# Patient Record
Sex: FEMALE | Race: WHITE | Employment: OTHER | URBAN - METROPOLITAN AREA
[De-identification: names, ages, dates, MRNs, and addresses within clinical notes are randomized per-mention and may not be internally consistent; named-entity substitution may affect disease eponyms.]

---

## 2017-01-10 ENCOUNTER — TELEPHONE (OUTPATIENT)
Dept: NEUROLOGY | Age: 82
End: 2017-01-10

## 2017-01-18 ENCOUNTER — OFFICE VISIT (OUTPATIENT)
Dept: INTERNAL MEDICINE CLINIC | Age: 82
End: 2017-01-18

## 2017-01-18 ENCOUNTER — DOCUMENTATION ONLY (OUTPATIENT)
Dept: INTERNAL MEDICINE CLINIC | Age: 82
End: 2017-01-18

## 2017-01-18 VITALS
TEMPERATURE: 97.5 F | HEIGHT: 61 IN | BODY MASS INDEX: 20.65 KG/M2 | SYSTOLIC BLOOD PRESSURE: 132 MMHG | DIASTOLIC BLOOD PRESSURE: 77 MMHG | HEART RATE: 73 BPM | OXYGEN SATURATION: 98 % | WEIGHT: 109.4 LBS

## 2017-01-18 DIAGNOSIS — Z13.39 SCREENING FOR ALCOHOLISM: ICD-10-CM

## 2017-01-18 DIAGNOSIS — F90.9 ATTENTION DEFICIT HYPERACTIVITY DISORDER (ADHD), UNSPECIFIED ADHD TYPE: ICD-10-CM

## 2017-01-18 DIAGNOSIS — Z00.00 ROUTINE GENERAL MEDICAL EXAMINATION AT A HEALTH CARE FACILITY: ICD-10-CM

## 2017-01-18 DIAGNOSIS — Z13.31 SCREENING FOR DEPRESSION: ICD-10-CM

## 2017-01-18 DIAGNOSIS — F03.90 DEMENTIA WITHOUT BEHAVIORAL DISTURBANCE, UNSPECIFIED DEMENTIA TYPE: Primary | ICD-10-CM

## 2017-01-18 PROBLEM — F98.8 ADD (ATTENTION DEFICIT DISORDER): Status: ACTIVE | Noted: 2017-01-18

## 2017-01-18 NOTE — MR AVS SNAPSHOT
Visit Information Date & Time Provider Department Dept. Phone Encounter #  
 1/18/2017 10:30 AM Elio Simon, [address] [phone] [id number] Your Appointments 5/23/2017  3:40 PM  
Follow Up with Sharee Marr MD  
[facility] [address]) Appt Note: follow up memory loss $ 0 CP jll 11/7/16  
 [address] [place] [address], 
[address], [address] [address]  
[address], [address], [address] [phone] Upcoming Health Maintenance Date Due ZOSTER VACCINE AGE 60> 1/20/1992 GLAUCOMA SCREENING Q2Y 1/20/1997 MEDICARE YEARLY EXAM 1/19/2018 DTaP/Tdap/Td series (3 - Td) 10/22/2025 Allergies as of 1/18/2017  Review Complete On: 1/18/2017 By: Yusra Denson LPN Severity Noted Reaction Type Reactions Latex  12/14/2013    Unknown (comments) Dog Dander  12/14/2013    Shortness of Breath Current Immunizations  Reviewed on 4/18/2016 Name Date Influenza High Dose Vaccine PF 10/22/2015 Influenza Vaccine 10/10/2014 Influenza Vaccine Split 10/17/2011  9:12 AM  
 Influenza Vaccine Whole 9/1/2010 Pneumococcal Conjugate (PCV-13) 10/22/2015 Pneumococcal Vaccine (Unspecified Type) 1/10/2004 TDAP Vaccine 8/16/2010 Tdap 10/22/2015 Not reviewed this visit You Were Diagnosed With   
  
 Codes Comments Dementia without behavioral disturbance, unspecified dementia type    -  Primary ICD-10-CM: F03.90 ICD-9-CM: 294.20 Attention deficit hyperactivity disorder (ADHD), unspecified ADHD type     ICD-10-CM: F90.9 ICD-9-CM: 314.01 Vitals BP Pulse Temp Height(growth percentile) Weight(growth percentile) SpO2  
 132/77 (BP 1 Location: Left arm, BP Patient Position: Sitting) 73 97.5 °F (36.4 °C) (Oral) 5' 1\" (1.549 m) 109 lb 6.4 oz (49.6 kg) 98% BMI OB Status Smoking Status 20.67 kg/m2 Hysterectomy Never Smoker BMI and BSA Data Body Mass Index Body Surface Area  
 20.67 kg/m 2 1.46 m 2 Preferred Pharmacy Pharmacy Name Phone [address] [facility] [phone] Your Updated Medication List  
  
   
This list is accurate as of: 1/18/17 11:13 AM.  Always use your most recent med list.  
  
  
  
  
 atorvastatin 10 mg tablet Commonly known as:  LIPITOR  
TAKE 1 TABLET DAILY B COMPLEX PO Take  by mouth.  
  
 carbamide peroxide 6.5 % otic solution Commonly known as:  Joseph Citizen of [place] Administer 5 Drops into each ear two (2) times a day. CENTRUM SILVER PO Take  by mouth daily. diclofenac 1 % Gel Commonly known as:  VOLTAREN Apply 2 g to affected area four (4) times daily. Apply to hands qid  
  
 donepezil 10 mg tablet Commonly known as:  ARICEPT Take 1 Tab by mouth nightly. FLAXSEED OIL PO Take  by mouth. FLONASE 50 mcg/actuation nasal spray Generic drug:  fluticasone 2 Sprays by Both Nostrils route daily. Allergy note June 2013. LIQUID TEARS OP Apply  to eye.  
  
 lisdexamfetamine 20 mg capsule Commonly known as:  VYVANSE Earliest Fill Date: 1/10/17. Take 1 capsule daily MAGNESIUM PO Take  by mouth. * memantine 5-10 mg Dspk Commonly known as:  NAMENDA TITRATION GRAY Take 1 Tab by mouth two (2) times a day. Take as directed per the titration gray then take 10 mg bid * memantine 10 mg tablet Commonly known as:  Gwynda Peppers Take 1 Tab by mouth two (2) times a day. memantine-donepezil 28-10 mg Cspx Commonly known as:  MOTION PICTURE AND [facility] Take 1 Tab by mouth daily. SINGULAIR 10 mg tablet Generic drug:  montelukast  
Take 10 mg by mouth daily. SYMBICORT 160-4.5 mcg/actuation HFA inhaler Generic drug:  budesonide-formoterol Take 2 Puffs by inhalation two (2) times a day. From Dr. Renetta German. VESIcare 10 mg tablet Generic drug:  solifenacin Take 1 Tab by mouth daily. VITAMIN C PO Take  by mouth. VITAMIN D3 1,000 unit tablet Generic drug:  cholecalciferol Take  by mouth daily. * Notice: This list has 2 medication(s) that are the same as other medications prescribed for you. Read the directions carefully, and ask your doctor or other care provider to review them with you. Introducing [facility] SERVICES! Dear Oscar Ruvalcaba: Thank you for requesting a [employer] account. Our records indicate that you already have an active [employer] account. You can access your account anytime at [email or web address] Did you know that you can access your hospital and ER discharge instructions at any time in [employer]? You can also review all of your test results from your hospital stay or ER visit. Additional Information If you have questions, please visit the Frequently Asked Questions section of the [employer] website at [email or web address] Remember, [employer] is NOT to be used for urgent needs. For medical emergencies, dial 911. Now available from your iPhone and Android! Please provide this summary of care documentation to your next provider. Your primary care clinician is listed as Jeovany PRESSLEY. If you have any questions after today's visit, please call [phone].

## 2017-01-18 NOTE — PROGRESS NOTES
NNAWV    This is a Subsequent Medicare Annual Wellness Visit providing Personalized Prevention Plan Services (PPPS) (Performed 12 months after initial AWV and PPPS )    I have reviewed the patient's medical history in detail and updated the computerized patient record. History     Past Medical History   Diagnosis Date    Asthma      Dr. Angel Luis Fernando 2013--doesn't see routinely--pt reports no change in allergies--no need for routine follow-up. Dr. Martha Maza sees for mild COPD--on Symbicort since ~2013. Continued Singulair.  Ceruminosis      periodic ear cleaning at [place] [facility]    Encounter for screening colonoscopy ~     Normal per pt--[facility] records requested 2013 visit.  Fainting     Falls     Fatigue     Hearing loss, left      [facility]    History of mammogram May 2014     Normal/no change.  History of shingles 2010     Dec 2014:  Pt reports clinical history and shingles vaccine 4yrs ago. To try to obtain exact date of zostavax from /source.  History of skin cancer      melanoma ?, BCC & SCC- followed by Dr. Jose Amor- twice a yr appts. 2013-still on 6mo schedule for skin checks.  Memory disorder     Osteopenia      DEXA     Overactive bladder      detrol LA. Vesicare--[facility]es [facility] Considering procedure for incontinence.  Postmenopausal hormone replacement therapy      since hysterectomy    Right shoulder pain 2013. Mild osteopenia; mild AC osteoarthritis. Sports Med Eval Oct 2013.  Well woman exam (no gynecological exam) Aug 3, 2012     \"[facility] Screening\":  Results scanned to chart. Updated 2014-no change except noted concern for GFR--see Dec 2014 note, letter as reviewed with pt.       Past Surgical History   Procedure Laterality Date    Multiple delivery       Hx hysterectomy  1948    Hx cataract removal      Pr laser surgery of eye       Dr. Dexter Irvin- last eye exam 2010 Current Outpatient Prescriptions   Medication Sig Dispense Refill    lisdexamfetamine (VYVANSE) 20 mg capsule Earliest Fill Date: 1/10/17. Take 1 capsule daily 30 Cap 0    atorvastatin (LIPITOR) 10 mg tablet TAKE 1 TABLET DAILY 90 Tab 3    memantine-donepezil (NAMZARIC) 28-10 mg CSpX Take 1 Tab by mouth daily. 90 Cap 3    montelukast (SINGULAIR) 10 mg tablet Take 10 mg by mouth daily.  diclofenac (VOLTAREN) 1 % gel Apply 2 g to affected area four (4) times daily. Apply to hands qid 100 g 3    solifenacin (VESICARE) 10 mg tablet Take 1 Tab by mouth daily.  POLYVINYL ALCOHOL (LIQUID TEARS OP) Apply  to eye.  budesonide-formoterol (SYMBICORT) 160-4.5 mcg/actuation HFA inhaler Take 2 Puffs by inhalation two (2) times a day. From Dr. Nichole Mckeon.  ASCORBATE CALCIUM (VITAMIN C PO) Take  by mouth.  cholecalciferol, vitamin d3, (VITAMIN D) 1,000 unit tablet Take  by mouth daily.  carbamide peroxide (DEBROX) 6.5 % otic solution Administer 5 Drops into each ear two (2) times a day. 30 mL 1    fluticasone (FLONASE) 50 mcg/actuation nasal spray 2 Sprays by Both Nostrils route daily. Allergy note June 2013.  VITAMIN B COMPLEX (B COMPLEX PO) Take  by mouth.  MULTIVITAMINS W-MINERALS/LUT (CENTRUM SILVER PO) Take  by mouth daily.  FLAXSEED OIL PO Take  by mouth.  MAGNESIUM PO Take  by mouth.        Allergies   Allergen Reactions    Latex Unknown (comments)    Dog Dander Shortness of Breath     Family History   Problem Relation Age of Onset   Surendra Salty Dementia Father     Hypertension Mother     Stroke Mother     Cancer Other      daughter- breast cancer     Social History   Substance Use Topics    Smoking status: Never Smoker    Smokeless tobacco: Never Used    Alcohol use 0.0 oz/week      Comment: 1-2 drinks/every evening ( wine/vodka)     Patient Active Problem List   Diagnosis Code    Postmenopausal hormone replacement therapy Z79.890    Overactive bladder N32.81    Asthma J45.909    Hearing loss, left H91.92    Ceruminosis H61.20    History of skin cancer Z85.828    Syncope and collapse R55    Fever, unspecified R50.9    Syncope and collapse R55    TMJ (temporomandibular joint disorder) M26.609    Mild cognitive impairment without memory loss G31.84    Generalized anxiety disorder F41.1    Hyperlipidemia E78.5    ADD (attention deficit disorder) F98.8       Depression Risk Factor Screening:     PHQ 2 / 9, over the last two weeks 1/18/2017   Little interest or pleasure in doing things Not at all   Feeling down, depressed or hopeless Not at all   Total Score PHQ 2 -   Trouble falling or staying asleep, or sleeping too much Not at all   Feeling tired or having little energy Not at all   Poor appetite or overeating Not at all   Feeling bad about yourself - or that you are a failure or have let yourself or your family down Not at all   Moving or speaking so slowly that other people could have noticed; or the opposite being so fidgety that others notice Not at all   Thoughts of being better off dead, or hurting yourself in some way Not at all   How difficult have these problems made it for you to do your work, take care of your home and get along with others Not difficult at all     Alcohol Risk Factor Screening:   Pt stated has a vodka tonic every afternoon and a glass of wine with dinner some evenings, but not every evening. Counseled pt on recommendation of no more than 1 drink per day for females. Understanding and agreement was verbalized. Functional Ability and Level of Safety:     Hearing Loss   No hearing loss noted during visit. Activities of Daily Living   Self-care. Requires assistance with: no ADLs except needs help with setting up meds    Fall Risk     Fall Risk Assessment, last 12 mths 1/18/2017   Able to walk? Yes   Fall in past 12 months? Yes   Fall with injury?  No     Abuse Screen   Patient is not abused    Review of Systems   Not required    Physical Examination     Evaluation of Cognitive Function:  Mood/affect:  happy  Appearance: age appropriate and casually dressed  Family member/caregiver input:  present    No exam performed today, Medicare AWV    Patient Care Team:  Madisyn Minor MD as PCP - General (Internal Medicine)  Daniela Tristan MD as Consulting Provider (Urology)  Debora Cook MD (Ophthalmology)  Esther Baltazar MD (Neurology)  James Stevenson, RN as Ambulatory Care Navigator    Advice/Referrals/Counseling   Education and counseling provided:  Are appropriate based on today's review and evaluation  End-of-Life planning (with patient's consent)  Screening for glaucoma    Assessment/Plan     Encounter Diagnoses   Name Primary?  Dementia without behavioral disturbance, unspecified dementia type Yes    Attention deficit hyperactivity disorder (ADHD), unspecified ADHD type     Routine general medical examination at a health care facility     Screening for alcoholism     Screening for depression      Patient verbalized understanding of information presented. AVS and Medicare Part B Preventive Services Table printed and given to pt and reviewed. See table for findings under Recommendation and Scheduled. All of the patient's questions were answered. Pt has an advance medical directive, will review, and will bring or mail a copy for her chart. Pt lives with her  at [place] who is her main support network. Pt also has daughters. Exercise level - walks 2-2.5 miles around [place]; Tuesday and Thursday does stretching and balance exercises    Health Maintenance Due   Topic Date Due    ZOSTER VACCINE AGE 60>  01/20/1992    GLAUCOMA SCREENING Q2Y  01/20/1997     Glaucoma screening - Medical record request was faxed to Dr. Milton Zimmerman.   Zoster-pt stated received at [facility]     PCP note    Reviewed and agree with above    Travis Castle MD

## 2017-01-18 NOTE — PROGRESS NOTES
Today's Date -  01/18/17   Birth Date - [birth year]  Medicare Part B Preventive Services Limitations Recommendation/Date completed if known Scheduled/ Next Due   Bone Mass Measurement  (age 72 & older, biennial) Requires diagnosis related to osteoporosis or estrogen deficiency. Biennial benefit unless patient has history of long-term glucocorticoid tx or baseline is needed because initial test was by other method Completed:      Recommended every 2 years DUE:   Cardiovascular Screening Blood Tests (every 5 years)  Total cholesterol, HDL, Triglycerides Order as a panel if possible Completed:  10/18/16    Recommended annually DUE: 10/2017   Colorectal Cancer Screening  -Fecal occult blood test (annual)  -Flexible sigmoidoscopy (5y)  -Screening colonoscopy (10y)  -Barium Enema  Completed:        Recommended every ( ) years DUE:   Counseling to Prevent Tobacco Use (up to 8 sessions per year)  - Counseling greater than 3 and up to 10 minutes  - Counseling greater than 10 minutes Patients must be asymptomatic of tobacco-related conditions to receive as preventive service     Diabetes Screening Tests (at least every 3 years, Medicare covers annually or at 6-month intervals for prediabetic patients)    Fasting blood sugar (FBS) or glucose tolerance test (GTT) Patient must be diagnosed with one of the following:  -Hypertension, Dyslipidemia, obesity, previous impaired FBS or GTT  Or any two of the following: overweight, FH of diabetes, age ? 72, history of gestational diabetes, birth of baby weighing more than 9 pounds Completed:  Glucose 87 (normal) on 4/27/16      Recommended annually DUE: next set of usual labs-fasting   Diabetes Self-Management Training (DSMT) (no USPSTF recommendation) Requires referral by treating physician for patient with diabetes or renal disease. 10 hours of initial DSMT session of no less than 30 minutes each in a continuous 12-month period. 2 hours of follow-up DSMT in subsequent years.  n/a n/a Glaucoma Screening (no USPSTF recommendation) Diabetes mellitus, family history, , age 48 or over,  American, age 72 or over Completed:        Recommended annually DUE:   Human Immunodeficiency Virus (HIV) Screening (annually for increased risk patients)  HIV-1 and HIV-2 by EIA, TIMOTHY, rapid antibody test, or oral mucosa transudate Patient must be at increased risk for HIV infection per USPSTF guidelines or pregnant. Tests covered annually for patients at increased risk. Pregnant patients may receive up to 3 test during pregnancy. n/a n/a   Medical Nutrition Therapy (MNT) (for diabetes or renal disease not recommended schedule) Requires referral by treating physician for patient with diabetes or renal disease. Can be provided in same year as diabetes self-management training (DSMT), and CMS recommends medical nutrition therapy take place after DSMT. Up to 3 hours for initial year and 2 hours in subsequent years. n/a n/a   Prostate Cancer Screening (annually up to age 76)  - Digital rectal exam (RITO)  - Prostate specific antigen (PSA) Annually (age 48 or over), RITO not paid separately when covered E/M service is provided on same date Completed:  n/a      Recommended annually n/a   Seasonal Influenza Vaccination (annually)  Completed:       Recommended annually    DUE every Fall    Pneumococcal Vaccination (once after 72)  Completed:  1/10/04-PPSV 23  10/22/15-PCV 13 complete   Hepatitis B Vaccinations (if medium/high risk) Medium/high risk factors:  End-stage renal disease,  Hemophiliacs who received Factor VIII or IX concentrates, Clients of institutions for the mentally retarded, Persons who live in the same house as a HepB virus carrier, Homosexual men, Illicit injectable drug abusers. n/a n/a   Screening Mammography (biennial age 54-69)? Annually (age 36 or over) Completed:   5/21/14    Recommended annually DUE: now.  Call [phone] for appointment   Screening Pap Tests and Pelvic Examination (up to age 79 and after 79 if unknown history or abnormal study last 10 years) Every 24 months except high risk Completed:  n/a      Recommended every 2 years n/a   Ultrasound Screening for Abdominal Aortic Aneurysm (AAA) (once) Patient must be referred through IPPE and not have had a screening for abdominal aortic aneurysm before under Medicare. Limited to patients who meet one of the following criteria:  - Men who are 73-68 years old and have smoked more than 100 cigarettes in their lifetime.  -Anyone with a FH of AAA  -Anyone recommended for screening by USPSTF Not covered by Medicare as preventive. n/a n/a   Thanks for coming in today. It was nice to spend some time with you. If you have any questions about your visit today, please call [phone] and ask for [address].

## 2017-01-18 NOTE — PROGRESS NOTES
HISTORY OF PRESENT ILLNESS  Ruben Kaba is a 80 y.o. female. HPI     F/u dementia with mild irritibility  Has been started on namzeric by Dr. Surekha Henry  Pt and  feel her short term recall has improved--names etc  No medication side effects  Taking vyvanse for ADHD    Patient Active Problem List    Diagnosis Date Noted    ADD (attention deficit disorder) 01/18/2017    Hyperlipidemia 07/20/2015    Mild cognitive impairment without memory loss 02/02/2015    Generalized anxiety disorder 02/02/2015    TMJ (temporomandibular joint disorder) 12/21/2011    Syncope and collapse 08/10/2011    Fever, unspecified 08/10/2011    Syncope and collapse 08/10/2011    History of skin cancer     Asthma     Hearing loss, left     Ceruminosis     Postmenopausal hormone replacement therapy     Overactive bladder      Current Outpatient Prescriptions   Medication Sig Dispense Refill    lisdexamfetamine (VYVANSE) 20 mg capsule Earliest Fill Date: 1/10/17. Take 1 capsule daily 30 Cap 0    atorvastatin (LIPITOR) 10 mg tablet TAKE 1 TABLET DAILY 90 Tab 3    memantine-donepezil (NAMZARIC) 28-10 mg CSpX Take 1 Tab by mouth daily. 90 Cap 3    montelukast (SINGULAIR) 10 mg tablet Take 10 mg by mouth daily.  diclofenac (VOLTAREN) 1 % gel Apply 2 g to affected area four (4) times daily. Apply to hands qid 100 g 3    solifenacin (VESICARE) 10 mg tablet Take 1 Tab by mouth daily.  POLYVINYL ALCOHOL (LIQUID TEARS OP) Apply  to eye.  budesonide-formoterol (SYMBICORT) 160-4.5 mcg/actuation HFA inhaler Take 2 Puffs by inhalation two (2) times a day. From Dr. Esteban Voss.  ASCORBATE CALCIUM (VITAMIN C PO) Take  by mouth.  cholecalciferol, vitamin d3, (VITAMIN D) 1,000 unit tablet Take  by mouth daily.  carbamide peroxide (DEBROX) 6.5 % otic solution Administer 5 Drops into each ear two (2) times a day. 30 mL 1    fluticasone (FLONASE) 50 mcg/actuation nasal spray 2 Sprays by Both Nostrils route daily. Allergy note June 2013.  VITAMIN B COMPLEX (B COMPLEX PO) Take  by mouth.  MULTIVITAMINS W-MINERALS/LUT (CENTRUM SILVER PO) Take  by mouth daily.  FLAXSEED OIL PO Take  by mouth.  MAGNESIUM PO Take  by mouth. Allergies   Allergen Reactions    Latex Unknown (comments)    Dog Dander Shortness of Breath      Lab Results  Component Value Date/Time   WBC 5.4 04/27/2016 09:18 AM   HGB 13.5 04/27/2016 09:18 AM   HCT 39.5 04/27/2016 09:18 AM   PLATELET 697 45/58/2738 09:18 AM   MCV 95 04/27/2016 09:18 AM       Lab Results  Component Value Date/Time   Cholesterol, total 204 10/18/2016 11:41 AM   HDL Cholesterol 95 10/18/2016 11:41 AM   LDL, calculated 94 10/18/2016 11:41 AM   Triglyceride 74 10/18/2016 11:41 AM   CHOL/HDL Ratio 2.4 08/31/2010 10:48 AM       Lab Results  Component Value Date/Time   GFR est AA >60 08/13/2011 03:55 AM   GFR est non-AA >60 08/13/2011 03:55 AM   Creatinine 0.73 04/27/2016 09:18 AM   BUN 14 04/27/2016 09:18 AM   Sodium 138 04/27/2016 09:18 AM   Potassium 4.0 04/27/2016 09:18 AM   Chloride 99 04/27/2016 09:18 AM   CO2 26 04/27/2016 09:18 AM         ROS    Physical Exam   Constitutional: She appears well-developed and well-nourished. Appears stated age   Cardiovascular: Normal rate, regular rhythm and normal heart sounds. Exam reveals no gallop and no friction rub. No murmur heard. Pulmonary/Chest: Effort normal and breath sounds normal. No respiratory distress. She has no wheezes. Abdominal: Soft. Bowel sounds are normal.   Musculoskeletal: She exhibits no edema. Neurological: She is alert. Psychiatric: She has a normal mood and affect. Nursing note and vitals reviewed. ASSESSMENT and PLAN  Latoya Bardales was seen today for memory loss and annual wellness visit.     Diagnoses and all orders for this visit:    Dementia without behavioral disturbance, unspecified dementia type   Stable on namzaric   Has f/u Dr Manuel     Attention deficit hyperactivity disorder (ADHD), unspecified ADHD type   vyvanse per neurologist    Routine general medical examination at a health care facility  -     Depression Screen Annual    Screening for alcoholism    Screening for depression  -     Depression Screen Annual    RTC 6 months  Follow-up Disposition: Not on File

## 2017-01-18 NOTE — PATIENT INSTRUCTIONS
Today's Date - 01/18/17 Birth Date - [birth year]  Medicare Part B Preventive Services Limitations Recommendation/Date completed if known Scheduled/ Next Due   Bone Mass Measurement  (age 72 & older, biennial) Requires diagnosis related to osteoporosis or estrogen deficiency. Biennial benefit unless patient has history of long-term glucocorticoid tx or baseline is needed because initial test was by other method Completed:   8/2011     Recommended every 2 years DUE: discuss with Dr. Darío Caballero Blood Tests (every 5 years)  Total cholesterol, HDL, Triglycerides Order as a panel if possible Completed:  10/18/16     Recommended annually DUE: 10/2017   Colorectal Cancer Screening  -Fecal occult blood test (annual)  -Flexible sigmoidoscopy (5y)  -Screening colonoscopy (10y)  -Barium Enema   Completed:   not done in the past        Recommended every ( ) years DUE: no further screening colonoscopy indicated due to your age     Counseling to Prevent Tobacco Use (up to 8 sessions per year)  - Counseling greater than 3 and up to 10 minutes  - Counseling greater than 10 minutes Patients must be asymptomatic of tobacco-related conditions to receive as preventive service  not a current smoker n/a   Diabetes Screening Tests (at least every 3 years, Medicare covers annually or at 6-month intervals for prediabetic patients)     Fasting blood sugar (FBS) or glucose tolerance test (GTT) Patient must be diagnosed with one of the following:  -Hypertension, Dyslipidemia, obesity, previous impaired FBS or GTT  Or any two of the following: overweight, FH of diabetes, age ? 72, history of gestational diabetes, birth of baby weighing more than 9 pounds Completed:  Glucose 87 (normal) on 4/27/16        Recommended annually DUE: next set of usual labs-fasting   Diabetes Self-Management Training (DSMT) (no USPSTF recommendation) Requires referral by treating physician for patient with diabetes or renal disease.  10 hours of initial DSMT session of no less than 30 minutes each in a continuous 12-month period. 2 hours of follow-up DSMT in subsequent years. n/a n/a   Glaucoma Screening (no USPSTF recommendation) Diabetes mellitus, family history, , age 48 or over,  American, age 72 or over Completed:   Dr. Nolberto Young  12/2016     Recommended annually DUE: 12/2017   Human Immunodeficiency Virus (HIV) Screening (annually for increased risk patients)  HIV-1 and HIV-2 by EIA, TIMOTHY, rapid antibody test, or oral mucosa transudate Patient must be at increased risk for HIV infection per USPSTF guidelines or pregnant. Tests covered annually for patients at increased risk. Pregnant patients may receive up to 3 test during pregnancy. n/a n/a   Medical Nutrition Therapy (MNT) (for diabetes or renal disease not recommended schedule) Requires referral by treating physician for patient with diabetes or renal disease. Can be provided in same year as diabetes self-management training (DSMT), and CMS recommends medical nutrition therapy take place after DSMT. Up to 3 hours for initial year and 2 hours in subsequent years. n/a n/a   Prostate Cancer Screening (annually up to age 76)  - Digital rectal exam (RITO)  - Prostate specific antigen (PSA) Annually (age 48 or over), RITO not paid separately when covered E/M service is provided on same date Completed:  n/a        Recommended annually n/a   Seasonal Influenza Vaccination (annually)   Completed:   10/2016 at [address] annually     DUE every Fall    Pneumococcal Vaccination (once after 72)   Completed:  1/10/04-PPSV 23  10/22/15-PCV 13 complete   Hepatitis B Vaccinations (if medium/high risk) Medium/high risk factors: End-stage renal disease,  Hemophiliacs who received Factor VIII or IX concentrates, Clients of institutions for the mentally retarded, Persons who live in the same house as a HepB virus carrier, Homosexual men, Illicit injectable drug abusers.  n/a n/a Screening Mammography (biennial age 54-69)? Annually (age 36 or over) Completed:   5/21/14     Recommended annually DUE: you decline further screening   Screening Pap Tests and Pelvic Examination (up to age 79 and after 79 if unknown history or abnormal study last 10 years) Every 25 months except high risk Completed:  n/a        Recommended every 2 years n/a   Ultrasound Screening for Abdominal Aortic Aneurysm (AAA) (once) Patient must be referred through IPPE and not have had a screening for abdominal aortic aneurysm before under Medicare. Limited to patients who meet one of the following criteria:  - Men who are 73-68 years old and have smoked more than 100 cigarettes in their lifetime.  -Anyone with a FH of AAA  -Anyone recommended for screening by USPSTF Not covered by Medicare as preventive.     n/a n/a   Thanks for coming in today. It was nice to spend some time with you. If you have any questions about your visit today, please call [phone] and ask for Amrita Graham. Health Maintenance Due   Topic Date Due    Shingles Vaccine  01/20/1992    Glaucoma Screening   01/20/1997     Shingles vaccine done at [facility] at [employer] Glaucoma screening-Patricia will ask Dr. Radha Cuellar for your glaucoma screening  You have an advance medical directive, also known as a living will. Please review for any changes that may need to be made and please bring or mail a copy to be placed in your chart. Have [employer] fax your advance medical directive to Dr. Alexandra Madrid at [phone]. Advance Directives: After Your Visit  Your Care Instructions  An advance directive is a legal way to state your wishes at the end of your life. It tells your family and your doctor what to do if you can no longer say what you want. There are two main types of advance directives. You can change them any time that your wishes change. · A living will tells your family and your doctor your wishes about life support and other treatment.   · A medical power of  lets you name a person to make treatment decisions for you when you can't speak for yourself. This person is called a health care agent. If you do not have an advance directive, decisions about your medical care may be made by a doctor or a  who doesn't know you. It may help to think of an advance directive as a gift to the people who care for you. If you have one, they won't have to make tough decisions by themselves. Follow-up care is a key part of your treatment and safety. Be sure to make and go to all appointments, and call your doctor if you are having problems. It's also a good idea to know your test results and keep a list of the medicines you take. How can you care for yourself at home? · Discuss your wishes with your loved ones and your doctor. This way, there are no surprises. · Many states have a unique form. Or you might use a universal form that has been approved by many states. This kind of form can sometimes be completed and stored online. Your electronic copy will then be available wherever you have a connection to the Internet. In most cases, doctors will respect your wishes even if you have a form from a different state. · You don't need a  to do an advance directive. But you may want to get legal advice. · Think about these questions when you prepare an advance directive:  ¨ Who do you want to make decisions about your medical care if you are not able to? Many people choose a family member, close friend, or doctor. ¨ Do you know enough about life support methods that might be used? If not, talk to your doctor so you understand. ¨ What are you most afraid of that might happen? You might be afraid of having pain, losing your independence, or being kept alive by machines. ¨ Where would you prefer to die? Choices include your home, a hospital, or a nursing home. ¨ Would you like to have information about hospice care to support you and your family?   ¨ Do you want to donate organs when you die? ¨ Do you want certain [employer]ze practices performed before you die? If so, put your wishes in the advance directive. · Read your advance directive every year, and make changes as needed. When should you call for help? Be sure to contact your doctor if you have any questions. Where can you learn more? Go to [employer]  Enter R264 in the search box to learn more about \"Advance Directives: After Your Visit. \"   © 3925-0034 Healthwise, Incorporated. Care instructions adapted under license by [employer] (which disclaims liability or warranty for this information). This care instruction is for use with your licensed healthcare professional. If you have questions about a medical condition or this instruction, always ask your healthcare professional. [address] any warranty or liability for your use of this information.   Content Version: 13.1.359401; Current as of: February 20, 2015

## 2017-03-28 ENCOUNTER — TELEPHONE (OUTPATIENT)
Dept: NEUROLOGY | Age: 82
End: 2017-03-28

## 2017-03-28 NOTE — TELEPHONE ENCOUNTER
----- Message from Cira Rider sent at 3/27/2017  4:28 PM EDT -----  Regarding: Dr. Wood Miller: Pt called regarding refill for Rx Vyvanse 20mg being sent to [facility] on [address] in [place].  P t number [phone]

## 2017-03-28 NOTE — TELEPHONE ENCOUNTER
----- Message from Darren Harrison sent at 3/27/2017  4:32 PM EDT -----  Regarding: Dr. Amber Recinos: Pt called regarding refill for Rx Vyvanse 20mg being sent to [facility] on [address] in [place].  P t number [phone]

## 2017-04-13 ENCOUNTER — OFFICE VISIT (OUTPATIENT)
Dept: NEUROLOGY | Age: 82
End: 2017-04-13

## 2017-04-13 VITALS
HEIGHT: 61 IN | HEART RATE: 69 BPM | WEIGHT: 109.8 LBS | BODY MASS INDEX: 20.73 KG/M2 | SYSTOLIC BLOOD PRESSURE: 138 MMHG | OXYGEN SATURATION: 98 % | DIASTOLIC BLOOD PRESSURE: 80 MMHG

## 2017-04-13 DIAGNOSIS — F43.23 ADJUSTMENT DISORDER WITH MIXED ANXIETY AND DEPRESSED MOOD: ICD-10-CM

## 2017-04-13 DIAGNOSIS — F02.80 ALZHEIMER'S TYPE DEMENTIA WITH LATE ONSET WITHOUT BEHAVIORAL DISTURBANCE (HCC): ICD-10-CM

## 2017-04-13 DIAGNOSIS — F43.21 ADJUSTMENT DISORDER WITH DEPRESSED MOOD: ICD-10-CM

## 2017-04-13 DIAGNOSIS — F03.90 DEMENTIA WITHOUT BEHAVIORAL DISTURBANCE, UNSPECIFIED DEMENTIA TYPE: Primary | ICD-10-CM

## 2017-04-13 DIAGNOSIS — F98.8 ADD (ATTENTION DEFICIT DISORDER) WITHOUT HYPERACTIVITY: ICD-10-CM

## 2017-04-13 DIAGNOSIS — G30.1 ALZHEIMER'S TYPE DEMENTIA WITH LATE ONSET WITHOUT BEHAVIORAL DISTURBANCE (HCC): ICD-10-CM

## 2017-04-13 RX ORDER — MEMANTINE HYDROCHLORIDE 10 MG/1
TABLET ORAL DAILY
COMMUNITY
End: 2017-05-06

## 2017-04-13 NOTE — PROGRESS NOTES
NEUROLOGY FOLLOW UP NOTE    04/13/17    Chief Complaint   Patient presents with    Follow-up     Memory loss       Ligia Lorenzo is a 80 y.o. female who presented to the neurology office for management of dementia and adjustment disorder. The patient has been having mild memory changes such as not remembering where she has placed things. She has not had any problems with finances except she forgot how to try to check once. Other than that, no problems with driving. She does not drive long distances but within her neighborhood and to the grocery store. She is on Namzaric 28/10 mg to be taken every day and has noticed mild improvement. She is also taking Vyvanse for anxiety. Current Outpatient Prescriptions   Medication Sig    memantine (NAMENDA) 10 mg tablet Take  by mouth daily.  [START ON 6/13/2017] lisdexamfetamine (VYVANSE) 20 mg capsule Earliest Fill Date: 6/13/17. Take 1 capsule daily    [START ON 5/13/2017] lisdexamfetamine (VYVANSE) 20 mg capsule Take 1 Cap (20 mg total) by mouth dailyEarliest Fill Date: 5/13/17. Max Daily Amount: 20 mg    lisdexamfetamine (VYVANSE) 20 mg capsule Take 1 Cap (20 mg total) by mouth daily. Max Daily Amount: 20 mg    atorvastatin (LIPITOR) 10 mg tablet TAKE 1 TABLET DAILY    memantine-donepezil (NAMZARIC) 28-10 mg CSpX Take 1 Tab by mouth daily.  montelukast (SINGULAIR) 10 mg tablet Take 10 mg by mouth daily.  diclofenac (VOLTAREN) 1 % gel Apply 2 g to affected area four (4) times daily. Apply to hands qid    solifenacin (VESICARE) 10 mg tablet Take 1 Tab by mouth daily.  carbamide peroxide (DEBROX) 6.5 % otic solution Administer 5 Drops into each ear two (2) times a day.  POLYVINYL ALCOHOL (LIQUID TEARS OP) Apply  to eye.  budesonide-formoterol (SYMBICORT) 160-4.5 mcg/actuation HFA inhaler Take 2 Puffs by inhalation two (2) times a day. From Dr. Janell Castillo.     fluticasone (FLONASE) 50 mcg/actuation nasal spray 2 Sprays by Both Nostrils route daily. Allergy note June 2013.  ASCORBATE CALCIUM (VITAMIN C PO) Take  by mouth.  VITAMIN B COMPLEX (B COMPLEX PO) Take  by mouth.  MULTIVITAMINS W-MINERALS/LUT (CENTRUM SILVER PO) Take  by mouth daily.  FLAXSEED OIL PO Take  by mouth.  MAGNESIUM PO Take  by mouth.  cholecalciferol, vitamin d3, (VITAMIN D) 1,000 unit tablet Take  by mouth daily. No current facility-administered medications for this visit. REVIEW OF SYSTEMS:   A ten system review of constitutional, cardiovascular, respiratory, musculoskeletal, endocrine, skin, SHEENT, genitourinary, psychiatric and neurologic systems was obtained and is unremarkable memory loss    EXAMINATION:   Visit Vitals    /80    Pulse 69    Ht 5' 1\" (1.549 m)    Wt 109 lb 12.8 oz (49.8 kg)    SpO2 98%    BMI 20.75 kg/m2        General:   General appearance: Pt is in no acute distress   Distal pulses are preserved    Neurological Examination:   Mental Status: AAO x3. Speech is fluent. Follows commands, has fair fund of knowledge, attention, short term recall, comprehension and insight. Cranial Nerves: Visual fields are full. PERRL, Extraocular movements are full. Facial sensation intact V1- V3. Facial movement intact, symmetric. Hearing intact to conversation. Palate elevates symmetrically. Shoulder shrug symmetric. Tongue midline. Motor: Strength is 5/5 in all 4 ext. Sensation: Normal to light touch    Coordination/Cerebellar: Intact to finger-nose-finger     Skin: No significant bruising or lacerations.     Laboratory review:   Results for orders placed or performed in visit on 10/18/16   LIPID PANEL   Result Value Ref Range    Cholesterol, total 204 (H) 100 - 199 mg/dL    Triglyceride 74 0 - 149 mg/dL    HDL Cholesterol 95 >39 mg/dL    VLDL, calculated 15 5 - 40 mg/dL    LDL, calculated 94 0 - 99 mg/dL   AST   Result Value Ref Range    AST (SGOT) 25 0 - 40 IU/L   ALT   Result Value Ref Range    ALT (SGPT) 26 0 - 32 IU/L       Imaging review:  8/10/2011  CT scan of the head without contrast  No acute process or change compared to the prior exam     Documentation review:  I did review the notes from Dr. Alicia Banerjee from 3/15/2016 and the patient has had decline in functioning over time. Initially the diagnosis with mild cognitive impairment in 2015 but he diagnosed her as mild dementia and adjustment disorder with depression/irritable features which is mild. Assessment/Plan:   Zuri Myers is a 80 y.o. female who presented to the neurology office for management of mild dementia and adjustment disorder with depressed/irritable features. The patient is taking Namzaric 28/10 mg a day and is also on Vyvanse 20 mg a day. She did recently moved to [facility] and is happy over there. I did refill the Vyvanse for 3 months. Follow-up in 6 months      ICD-10-CM ICD-9-CM    1. Dementia without behavioral disturbance, unspecified dementia type F03.90 294.20    2. Adjustment disorder with mixed anxiety and depressed mood F43.23 309.28 lisdexamfetamine (VYVANSE) 20 mg capsule      lisdexamfetamine (VYVANSE) 20 mg capsule      lisdexamfetamine (VYVANSE) 20 mg capsule   3. 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 ADD (attention deficit disorder) without hyperactivity F98.8 314.00    5. Adjustment disorder with depressed mood F43.21 309.0         Over 40 minutes was spent with the patient and family of which > 50% of the visit was spent counseling on diagnosis, management, and treatment of the diagnosis. I did discuss about mild dementia and anxiety. Hilaria Fuller MD  Diplomate, American Board of Psychiatry & Neurology (Neurology)  [facility] Board of Psychiatry & Neurology (Clinical Neurophysiology)    CC: Linda Jefferson MD  Fax: [phone]    This note was created using voice recognition software.  Despite editing, there may be syntax errors. This note will not be viewable in [address]

## 2017-04-13 NOTE — MR AVS SNAPSHOT
Visit Information Date & Time Provider Department Dept. Phone Encounter #  
 4/13/2017  8:00 AM Soniya Paul MD [facility]rology [facility] at [facility] [phone] [id number] Your Appointments 5/24/2017 10:40 AM  
Follow Up with Kaykay Edgar MD  
Neurology [facility] [address] [place] ([address]) Appt Note: Follow up fran from [facility] office  memory loss [place] [address] [facility] [address] [id number] [phone]  
  
   
 [place] [address] [address] [address] 7/18/2017  9:45 AM  
ROUTINE CARE with Neil Laura MD  
[facility] [address]) Appt Note: 6 month follow up, memory loss/ labs [facility] [address] [facility]  
[phone]  
  
   
 [facility] [address] [facility] [address] [address]  
  
    
 10/12/2017  9:00 AM  
Follow Up with Soniya Paul MD  
Neurology Clinic at [facility] [address]) Appt Note: FU,Dementia,CP,$0,adt,4/13/2017  
 [address] [facility], 
[address] [place] [address], [address] [address]  
[address], [address] [facility], [address] [address] Upcoming Health Maintenance Date Due ZOSTER VACCINE AGE 60> 1/20/1992 MEDICARE YEARLY EXAM 1/19/2018 GLAUCOMA SCREENING Q2Y 3/16/2018 DTaP/Tdap/Td series (3 - Td) 10/22/2025 Allergies as of 4/13/2017  Review Complete On: 4/13/2017 By: Soniya Paul MD  
  
 Severity Noted Reaction Type Reactions Latex  12/14/2013    Unknown (comments) Dog Dander  12/14/2013    Shortness of Breath Current Immunizations  Reviewed on 4/18/2016 Name Date Influenza High Dose Vaccine PF 10/22/2015 Influenza Vaccine 10/10/2014 Influenza Vaccine Split 10/17/2011  9:12 AM  
 Influenza Vaccine Whole 9/1/2010 Pneumococcal Conjugate (PCV-13) 10/22/2015 Pneumococcal Vaccine (Unspecified Type) 1/10/2004 TDAP Vaccine 8/16/2010 Tdap 10/22/2015 Not reviewed this visit You Were Diagnosed With   
  
 Codes Comments Dementia without behavioral disturbance, unspecified dementia type    -  Primary ICD-10-CM: F03.90 ICD-9-CM: 294.20 Adjustment disorder with mixed anxiety and depressed mood     ICD-10-CM: F43.23 
ICD-9-CM: 309.28 Vitals BP Pulse Height(growth percentile) Weight(growth percentile) SpO2 BMI  
 138/80 69 5' 1\" (1.549 m) 109 lb 12.8 oz (49.8 kg) 98% 20.75 kg/m2 OB Status Smoking Status Hysterectomy Never Smoker BMI and BSA Data Body Mass Index Body Surface Area 20.75 kg/m 2 1.46 m 2 Preferred Pharmacy Pharmacy Name [facility]  [phone] , [address] [place] [address] [phone] Your Updated Medication List  
  
   
This list is accurate as of: 4/13/17  8:45 AM.  Always use your most recent med list.  
  
  
  
  
 atorvastatin 10 mg tablet Commonly known as:  LIPITOR  
TAKE 1 TABLET DAILY B COMPLEX PO Take  by mouth.  
  
 carbamide peroxide 6.5 % otic solution Commonly known as:  Jorge Alberto Jungamadou Administer 5 Drops into each ear two (2) times a day. CENTRUM SILVER PO Take  by mouth daily. diclofenac 1 % Gel Commonly known as:  VOLTAREN Apply 2 g to affected area four (4) times daily. Apply to hands qid FLAXSEED OIL PO Take  by mouth. FLONASE 50 mcg/actuation nasal spray Generic drug:  fluticasone 2 Sprays by Both Nostrils route daily. Allergy note June 2013. LIQUID TEARS OP Apply  to eye. * lisdexamfetamine 20 mg capsule Commonly known as:  VYVANSE Take 1 Cap (20 mg total) by mouth daily. Max Daily Amount: 20 mg  
  
 * lisdexamfetamine 20 mg capsule Commonly known as:  VYVANSE Take 1 Cap (20 mg total) by mouth dailyEarliest Fill Date: 5/13/17.   Max Daily Amount: 20 mg  
 Start taking on:  5/13/2017 * lisdexamfetamine 20 mg capsule Commonly known as:  VYVANSE Earliest Fill Date: 6/13/17. Take 1 capsule daily Start taking on:  6/13/2017 MAGNESIUM PO Take  by mouth.  
  
 memantine-donepezil 28-10 mg Cspx Commonly known as:  [facility] Take 1 Tab by mouth daily. NAMENDA 10 mg tablet Generic drug:  memantine Take  by mouth daily. SINGULAIR 10 mg tablet Generic drug:  montelukast  
Take 10 mg by mouth daily. SYMBICORT 160-4.5 mcg/actuation HFA inhaler Generic drug:  budesonide-formoterol Take 2 Puffs by inhalation two (2) times a day. From Dr. Collins Liao. VESIcare 10 mg tablet Generic drug:  solifenacin Take 1 Tab by mouth daily. VITAMIN C PO Take  by mouth. VITAMIN D3 1,000 unit tablet Generic drug:  cholecalciferol Take  by mouth daily. * Notice: This list has 3 medication(s) that are the same as other medications prescribed for you. Read the directions carefully, and ask your doctor or other care provider to review them with you. Prescriptions Printed Refills  
 lisdexamfetamine (VYVANSE) 20 mg capsule 0 Starting on: 6/13/2017 Sig: Earliest [facility] Date: 6/13/17. Take 1 capsule daily Class: Print  
 lisdexamfetamine (VYVANSE) 20 mg capsule 0 Starting on: 5/13/2017 Sig: Take 1 Cap (20 mg total) by mouth dailyEarliest Fill Date: 5/13/17. Max Daily Amount: 20 mg  
 Class: Print Route: Oral  
 lisdexamfetamine (VYVANSE) 20 mg capsule 0 Sig: Take 1 Cap (20 mg total) by mouth daily. Max Daily Amount: 20 mg  
 Class: Print Route: Oral  
  
Introducing hospitals & HEALTH SERVICES! Dear Terra Childs: Thank you for requesting a [employer] account. Our records indicate that you already have an active [employer] account. You can access your account anytime at [email or web address]. [employer]/Work in Field Did you know that you can access your hospital and ER discharge instructions at any time in [employer]? You can also review all of your test results from your hospital stay or ER visit. Additional Information If you have questions, please visit the Frequently Asked Questions section of the [employer] website at [email or web address] Remember, [employer] is NOT to be used for urgent needs. For medical emergencies, dial 911. Now available from your iPhone and Android! Please provide this summary of care documentation to your next provider. Your primary care clinician is listed as Miguel PRESSLEY. If you have any questions after today's visit, please call [phone].

## 2017-04-13 NOTE — LETTER
4/13/2017 9:14 AM 
 
Patient:    Rober Lawson YOB: 1932 Date of Visit:    4/13/2017 Dear Gustavo Huggins MD 
 
Thank you for referring Ms. Dang Espana to me for evaluation/treatment. Below are the relevant portions of my assessment and plan of care. NEUROLOGY FOLLOW UP NOTE 
 
04/13/17 Chief Complaint Patient presents with  Follow-up Memory loss SUBJECTIVE Rober Lawson is a 80 y.o. female who presented to the neurology office for management of dementia and adjustment disorder. The patient has been having mild memory changes such as not remembering where she has placed things. She has not had any problems with finances except she forgot how to try to check once. Other than that, no problems with driving. She does not drive long distances but within her neighborhood and to the grocery store. She is on Namzaric 28/10 mg to be taken every day and has noticed mild improvement. She is also taking Vyvanse for anxiety. Current Outpatient Prescriptions Medication Sig  
 memantine (NAMENDA) 10 mg tablet Take  by mouth daily.  [START ON 6/13/2017] lisdexamfetamine (VYVANSE) 20 mg capsule Earliest Fill Date: 6/13/17. Take 1 capsule daily  [START ON 5/13/2017] lisdexamfetamine (VYVANSE) 20 mg capsule Take 1 Cap (20 mg total) by mouth dailyEarliest Fill Date: 5/13/17. Max Daily Amount: 20 mg  
 lisdexamfetamine (VYVANSE) 20 mg capsule Take 1 Cap (20 mg total) by mouth daily. Max Daily Amount: 20 mg  
 atorvastatin (LIPITOR) 10 mg tablet TAKE 1 TABLET DAILY  memantine-donepezil (NAMZARIC) 28-10 mg CSpX Take 1 Tab by mouth daily.  montelukast (SINGULAIR) 10 mg tablet Take 10 mg by mouth daily.  diclofenac (VOLTAREN) 1 % gel Apply 2 g to affected area four (4) times daily. Apply to hands qid  solifenacin (VESICARE) 10 mg tablet Take 1 Tab by mouth daily.   
 carbamide peroxide (DEBROX) 6.5 % otic solution Administer 5 Drops into each ear two (2) times a day.  POLYVINYL ALCOHOL (LIQUID TEARS OP) Apply  to eye.  budesonide-formoterol (SYMBICORT) 160-4.5 mcg/actuation HFA inhaler Take 2 Puffs by inhalation two (2) times a day. From Dr. bAilio Jacobs.  fluticasone (FLONASE) 50 mcg/actuation nasal spray 2 Sprays by Both Nostrils route daily. Allergy note June 2013.  ASCORBATE CALCIUM (VITAMIN C PO) Take  by mouth.  VITAMIN B COMPLEX (B COMPLEX PO) Take  by mouth.  MULTIVITAMINS W-MINERALS/LUT (CENTRUM SILVER PO) Take  by mouth daily.  FLAXSEED OIL PO Take  by mouth.  MAGNESIUM PO Take  by mouth.  cholecalciferol, vitamin d3, (VITAMIN D) 1,000 unit tablet Take  by mouth daily. No current facility-administered medications for this visit. REVIEW OF SYSTEMS:  
A ten system review of constitutional, cardiovascular, respiratory, musculoskeletal, endocrine, skin, SHEENT, genitourinary, psychiatric and neurologic systems was obtained and is unremarkable memory loss EXAMINATION:  
Visit Vitals  /80  Pulse 69  Ht 5' 1\" (1.549 m)  Wt 109 lb 12.8 oz (49.8 kg)  SpO2 98%  BMI 20.75 kg/m2 General:  
General appearance: Pt is in no acute distress Distal pulses are preserved Neurological Examination:  
Mental Status: AAO x3. Speech is fluent. Follows commands, has fair fund of knowledge, attention, short term recall, comprehension and insight. Cranial Nerves: Visual fields are full. PERRL, Extraocular movements are full. Facial sensation intact V1- V3. Facial movement intact, symmetric. Hearing intact to conversation. Palate elevates symmetrically. Shoulder shrug symmetric. Tongue midline. Motor: Strength is 5/5 in all 4 ext. Sensation: Normal to light touch Coordination/Cerebellar: Intact to finger-nose-finger Skin: No significant bruising or lacerations. Laboratory review:  
Results for orders placed or performed in visit on 10/18/16 LIPID PANEL  
 Result Value Ref Range Cholesterol, total 204 (H) 100 - 199 mg/dL Triglyceride 74 0 - 149 mg/dL HDL Cholesterol 95 >39 mg/dL VLDL, calculated 15 5 - 40 mg/dL LDL, calculated 94 0 - 99 mg/dL AST Result Value Ref Range AST (SGOT) 25 0 - 40 IU/L  
ALT Result Value Ref Range ALT (SGPT) 26 0 - 32 IU/L Imaging review: 
8/10/2011 CT scan of the head without contrast 
No acute process or change compared to the prior exam  
 
Documentation review: I did review the notes from Dr. Capo Cuellar from 3/15/2016 and the patient has had decline in functioning over time. Initially the diagnosis with mild cognitive impairment in 2015 but he diagnosed her as mild dementia and adjustment disorder with depression/irritable features which is mild. Assessment/Plan:  
Brenden Stark is a 80 y.o. female who presented to the neurology office for management of mild dementia and adjustment disorder with depressed/irritable features. The patient is taking Namzaric 28/10 mg a day and is also on Vyvanse 20 mg a day. She did recently moved to [facility] and is happy over there. I did refill the Vyvanse for 3 months. Follow-up in 6 months ICD-10-CM ICD-9-CM 1. Dementia without behavioral disturbance, unspecified dementia type F03.90 294.20 2. Adjustment disorder with mixed anxiety and depressed mood F43.23 309.28 lisdexamfetamine (VYVANSE) 20 mg capsule  
   lisdexamfetamine (VYVANSE) 20 mg capsule  
   lisdexamfetamine (VYVANSE) 20 mg capsule 3. 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   
4. ADD (attention deficit disorder) without hyperactivity F98.8 314.00   
5. Adjustment disorder with depressed mood F43.21 309.0 Over 40 minutes was spent with the patient and family of which > 50% of the visit was spent counseling on diagnosis, management, and treatment of the diagnosis. I did discuss about mild dementia and anxiety. Salvatore Hassan MD 
Diplomate, American Board of Psychiatry & Neurology (Neurology) Jannette Lacey Board of Psychiatry & Neurology (Clinical Neurophysiology) CC: Lucille Schroeder MD 
Fax: [phone] This note was created using voice recognition software. Despite editing, there may be syntax errors. This note will not be viewable in [address] If you have questions, please do not hesitate to call me. I look forward to following Ms. Sandhu along with you. Sincerely, Salvatore Hassan MD

## 2017-05-02 DIAGNOSIS — F43.23 ADJUSTMENT DISORDER WITH MIXED ANXIETY AND DEPRESSED MOOD: ICD-10-CM

## 2017-05-03 NOTE — TELEPHONE ENCOUNTER
states Dr. Dimple Carroll wrote for his wife's Vyvanse last month. At the time they had not yet run out of medication, and he had some pills to last through until May. She will run out on Saturday & the fill by dates are going to prevent her getting this filled. Please advise if approved.

## 2017-05-03 NOTE — TELEPHONE ENCOUNTER
Advised  script is ready. He will  script tomorrow. Of note, he mentioned that his wife had to start seeing Dr. Sera Young since Dr. Racheal Zhao transferred. At their last OV Dr. Sera Young informed patient that she should start getting Vyvanse filled through PCP since this is a \"routine thing\". Advised  Ebony Horne I will pass this along to Dr. Landon Dubin.

## 2017-05-05 ENCOUNTER — OFFICE VISIT (OUTPATIENT)
Dept: FAMILY MEDICINE CLINIC | Age: 82
End: 2017-05-05

## 2017-05-05 VITALS
HEART RATE: 103 BPM | SYSTOLIC BLOOD PRESSURE: 132 MMHG | RESPIRATION RATE: 20 BRPM | HEIGHT: 61 IN | BODY MASS INDEX: 21.11 KG/M2 | TEMPERATURE: 97.3 F | OXYGEN SATURATION: 98 % | WEIGHT: 111.8 LBS | DIASTOLIC BLOOD PRESSURE: 77 MMHG

## 2017-05-05 DIAGNOSIS — R06.2 WHEEZING: ICD-10-CM

## 2017-05-05 DIAGNOSIS — J40 BRONCHITIS: Primary | ICD-10-CM

## 2017-05-05 DIAGNOSIS — G30.1 LATE ONSET ALZHEIMER'S DISEASE WITHOUT BEHAVIORAL DISTURBANCE (HCC): ICD-10-CM

## 2017-05-05 DIAGNOSIS — F02.80 LATE ONSET ALZHEIMER'S DISEASE WITHOUT BEHAVIORAL DISTURBANCE (HCC): ICD-10-CM

## 2017-05-05 RX ORDER — CEFUROXIME AXETIL 500 MG/1
500 TABLET ORAL 2 TIMES DAILY
Qty: 20 TAB | Refills: 0 | Status: SHIPPED | OUTPATIENT
Start: 2017-05-05 | End: 2017-08-07 | Stop reason: ALTCHOICE

## 2017-05-05 NOTE — PROGRESS NOTES
Subjective: Porfirio Paige is a 80 y.o. female  here for follow up of chronic medical conditions listed has Postmenopausal hormone replacement therapy, Overactive bladder, Asthma, Hearing loss, left, Ceruminosis, History of skin cancer, Syncope and collapse, Fever, unspecified, Syncope and collapse, TMJ (temporomandibular joint disorder), Mild cognitive impairment without memory loss, Generalized anxiety disorder, Hyperlipidemia, and ADD (attention deficit disorder) on her problem list.. She  is accompanied by {Relatives - adult:5061::\"patient\"}. She lives as follows: {Lives:[address]1940} and {does/not):28272} have Advanced Directives. New Complaints today include: Cold Symptoms (since tue, productive brown mucus) and Generalized Body Aches       Recent History includes: {recent admits:20826}    Review of Systems  ROS     Geriatric Issues: Activities of Daily Living (ADLs):    She is independent in the following: {adls:42758}  Requires assistance with the following: {adls:33207}  Patient has changes due to:  {changes in status:20827}    Outside reports reviewed: {Outside review:92777}. Patient Active Problem List   Diagnosis Code    Postmenopausal hormone replacement therapy Z79.890    Overactive bladder N32.81    Asthma J45.909    Hearing loss, left H91.92    Ceruminosis H61.20    History of skin cancer Z85.828    Syncope and collapse R55    Fever, unspecified R50.9    Syncope and collapse R55    TMJ (temporomandibular joint disorder) M26.609    Mild cognitive impairment without memory loss G31.84    Generalized anxiety disorder F41.1    Hyperlipidemia E78.5    ADD (attention deficit disorder) F98.8     Current Outpatient Prescriptions   Medication Sig Dispense Refill    cefUROXime (CEFTIN) 500 mg tablet Take 1 Tab by mouth two (2) times a day. 20 Tab 0    [START ON 6/13/2017] lisdexamfetamine (VYVANSE) 20 mg capsule Earliest Fill Date: 6/13/17.   Take 1 capsule daily 30 Cap 0    atorvastatin (LIPITOR) 10 mg tablet TAKE 1 TABLET DAILY 90 Tab 3    memantine-donepezil (NAMZARIC) 28-10 mg CSpX Take 1 Tab by mouth daily. 90 Cap 3    montelukast (SINGULAIR) 10 mg tablet Take 10 mg by mouth daily.  diclofenac (VOLTAREN) 1 % gel Apply 2 g to affected area four (4) times daily. Apply to hands qid 100 g 3    POLYVINYL ALCOHOL (LIQUID TEARS OP) Apply  to eye.  budesonide-formoterol (SYMBICORT) 160-4.5 mcg/actuation HFA inhaler Take 2 Puffs by inhalation two (2) times a day. From Dr. Francisca Staley.  fluticasone (FLONASE) 50 mcg/actuation nasal spray 2 Sprays by Both Nostrils route daily. Allergy note June 2013.  ASCORBATE CALCIUM (VITAMIN C PO) Take  by mouth.  MULTIVITAMINS W-MINERALS/LUT (CENTRUM SILVER PO) Take  by mouth daily.  FLAXSEED OIL PO Take  by mouth.  MAGNESIUM PO Take  by mouth.  cholecalciferol, vitamin d3, (VITAMIN D) 1,000 unit tablet Take  by mouth daily.  lisdexamfetamine (VYVANSE) 20 mg capsule Take 1 Cap (20 mg total) by mouth daily. Max Daily Amount: 20 mg 14 Cap 0    memantine (NAMENDA) 10 mg tablet Take  by mouth daily.  [START ON 5/13/2017] lisdexamfetamine (VYVANSE) 20 mg capsule Take 1 Cap (20 mg total) by mouth dailyEarliest Fill Date: 5/13/17. Max Daily Amount: 20 mg 30 Cap 0    lisdexamfetamine (VYVANSE) 20 mg capsule Take 1 Cap (20 mg total) by mouth daily. Max Daily Amount: 20 mg 30 Cap 0    solifenacin (VESICARE) 10 mg tablet Take 1 Tab by mouth daily.  carbamide peroxide (DEBROX) 6.5 % otic solution Administer 5 Drops into each ear two (2) times a day. 30 mL 1    VITAMIN B COMPLEX (B COMPLEX PO) Take  by mouth. Allergies   Allergen Reactions    Latex Unknown (comments)    Dog Dander Shortness of Breath     Past Medical History:   Diagnosis Date    Asthma     Dr. Astrid Barajas June 2013--doesn't see routinely--pt reports no change in allergies--no need for routine follow-up.   Dr. Frnacisca Staley sees for mild COPD--on Symbicort since ~2013. Continued Singulair.  Ceruminosis     periodic ear cleaning at [place] [facility]    Encounter for screening colonoscopy ~    Normal per pt--[facility] records requested 2013 visit.  Fainting     Falls     Fatigue     Hearing loss, left     [facility]    History of mammogram May 2014    Normal/no change.  History of shingles 2010    Dec 2014:  Pt reports clinical history and shingles vaccine 4yrs ago. To try to obtain exact date of zostavax from /source.  History of skin cancer     melanoma ?, BCC & SCC- followed by Dr. Amie Zhang- twice a yr appts. 2013-still on 6mo schedule for skin checks.  Memory disorder     Osteopenia     DEXA     Overactive bladder     detrol LA. Vesicare--[facility] Considering procedure for incontinence.  Postmenopausal hormone replacement therapy     since hysterectomy    Right shoulder pain 2013. Mild osteopenia; mild AC osteoarthritis. Sports Med Eval Oct 2013.  Well woman exam (no gynecological exam) Aug 3, 2012    \"[facility] Screening\":  Results scanned to chart. Updated 2014-no change except noted concern for GFR--see Dec 2014 note, letter as reviewed with pt.      Past Surgical History:   Procedure Laterality Date    HX CATARACT REMOVAL      Deborah Forte - [address] [place] [address]- last eye exam 2010    MULTIPLE DELIVERY        Family History   Problem Relation Age of Onset   [facility] Dementia Father     Hypertension Mother     Stroke Mother     Cancer Other      daughter- breast cancer     Social History   Substance Use Topics    Smoking status: Never Smoker    Smokeless tobacco: Never Used    Alcohol use 0.0 oz/week      Comment: 1-2 drinks/every evening ( wine/vodka)          Objective:     Visit Vitals    /77 (BP 1 Location: Left arm, BP Patient Position: Sitting)    Pulse (!) 103    Temp 97.3 °F (36.3 °C) (Oral)    Resp 20    Ht 5' 1\" (1.549 m)    Wt 111 lb 12.8 oz (50.7 kg)    SpO2 98%    BMI 21.12 kg/m2     Physical Exam     Imaging  ***    Lab Review  Results for orders placed or performed in visit on 10/18/16   LIPID PANEL   Result Value Ref Range    Cholesterol, total 204 (H) 100 - 199 mg/dL    Triglyceride 74 0 - 149 mg/dL    HDL Cholesterol 95 >39 mg/dL    VLDL, calculated 15 5 - 40 mg/dL    LDL, calculated 94 0 - 99 mg/dL   AST   Result Value Ref Range    AST (SGOT) 25 0 - 40 IU/L   ALT   Result Value Ref Range    ALT (SGPT) 26 0 - 32 IU/L        Assessment/Plan:   ***    ICD-10-CM ICD-9-CM    1. Bronchitis J40 490 cefUROXime (CEFTIN) 500 mg tablet   2.  Wheezing R06.2 786.07 cefUROXime (CEFTIN) 500 mg tablet     Follow-up Disposition: Not on File    By:  Eula Ferrell [address]  [phone]

## 2017-05-05 NOTE — PROGRESS NOTES
Subjective:     Chief Complaint   Patient presents with    Cold Symptoms     since tue, productive brown mucus    Generalized Body Aches        She  is a 80y.o. year old female who presents for evaluation of cough with production, increased wheezing X 5 days. Sputum is yellow-brown. Her  accompanies her. He took her temperature at home several times and it was always below 99. She is eating and drinking well per her . Review of Systems   Constitutional: Positive for malaise/fatigue. Negative for chills, diaphoresis, fever and weight loss. HENT: Negative for congestion, ear discharge, ear pain, hearing loss, nosebleeds, sore throat and tinnitus. Eyes: Negative for blurred vision, double vision, photophobia, pain, discharge and redness. Respiratory: Positive for cough, sputum production and wheezing. Negative for hemoptysis, shortness of breath and stridor. Cardiovascular: Negative for chest pain, palpitations, orthopnea, claudication, leg swelling and PND. Gastrointestinal: Negative for abdominal pain, blood in stool, constipation, diarrhea, heartburn, melena, nausea and vomiting. Genitourinary: Negative for dysuria, flank pain, frequency, hematuria and urgency. Musculoskeletal: Negative for back pain, falls, joint pain, myalgias and neck pain. Skin: Negative for itching and rash. Neurological: Negative for dizziness, tingling, tremors, sensory change, speech change, focal weakness, seizures, loss of consciousness, weakness and headaches. Endo/Heme/Allergies: Negative for environmental allergies and polydipsia. Does not bruise/bleed easily. Psychiatric/Behavioral: Positive for memory loss. Negative for depression, hallucinations, substance abuse and suicidal ideas. The patient is not nervous/anxious and does not have insomnia.         Objective:     Vitals:    05/05/17 0938   BP: 132/77   Pulse: (!) 103   Resp: 20   Temp: 97.3 °F (36.3 °C)   TempSrc: Oral   SpO2: 98% Weight: 111 lb 12.8 oz (50.7 kg)   Height: 5' 1\" (1.549 m)       Physical Exam   Constitutional: She is oriented to person, place, and time. She appears well-developed and well-nourished. No distress. HENT:   Head: Normocephalic and atraumatic. Right Ear: External ear normal.   Left Ear: External ear normal.   Nose: Nose normal.   Mouth/Throat: Oropharynx is clear and moist. No oropharyngeal exudate. Eyes: Conjunctivae and EOM are normal. Pupils are equal, round, and reactive to light. Right eye exhibits no discharge. Left eye exhibits no discharge. No scleral icterus. Neck: Normal range of motion. Neck supple. No JVD present. No tracheal deviation present. No thyromegaly present. Cardiovascular: Normal rate, regular rhythm, normal heart sounds and intact distal pulses. Exam reveals no gallop and no friction rub. No murmur heard. Pulmonary/Chest: Effort normal. No stridor. No respiratory distress. She has wheezes. She has rales. She exhibits no tenderness. Exp wheezes and scattered rales. Abdominal: Soft. Bowel sounds are normal. She exhibits no distension and no mass. There is no tenderness. There is no rebound and no guarding. Musculoskeletal: Normal range of motion. She exhibits no edema or deformity. Lymphadenopathy:     She has no cervical adenopathy. Neurological: She is alert and oriented to person, place, and time. She has normal reflexes. No cranial nerve deficit. She exhibits normal muscle tone. Coordination normal.   Skin: Skin is warm and dry. No rash noted. She is not diaphoretic. No erythema. No pallor. Psychiatric: She has a normal mood and affect. Her behavior is normal. Judgment and thought content normal.   Nursing note and vitals reviewed.       Allergies   Allergen Reactions    Latex Unknown (comments)    Dog Dander Shortness of Breath      Social History     Social History    Marital status:      Spouse name: N/A    Number of children: N/A    Years of education: N/A     Social History Main Topics    Smoking status: Never Smoker    Smokeless tobacco: Never Used    Alcohol use 0.0 oz/week      Comment: 1-2 drinks/every evening ( wine/vodka)    Drug use: No    Sexual activity: No     Other Topics Concern    None     Social History Narrative      Family History   Problem Relation Age of Onset    Dementia Father     Hypertension Mother     Stroke Mother     Cancer Other      daughter- breast cancer      Past Surgical History:   Procedure Laterality Date    HX CATARACT REMOVAL      Cassie Repress      Dr. Dea Rowell - [address] [place] [address]- last eye exam 2010    MULTIPLE DELIVERY         Past Medical History:   Diagnosis Date    Asthma     Dr. Sari Paz 2013--doesn't see routinely--pt reports no change in allergies--no need for routine follow-up. Dr. Rozina Renee sees for mild COPD--on Symbicort since ~2013. Continued Singulair.  Ceruminosis     periodic ear cleaning at [place] [facility]    Encounter for screening colonoscopy ~    Normal per pt--[facility] records requested 2013 visit.  Fainting     Falls     Fatigue     Hearing loss, left     [facility]    History of mammogram May 2014    Normal/no change.  History of shingles 2010    Dec 2014:  Pt reports clinical history and shingles vaccine 4yrs ago. To try to obtain exact date of zostavax from /source.  History of skin cancer     melanoma ?, BCC & SCC- followed by Dr. Sisi Sharif- twice a yr appts. 2013-still on 6mo schedule for skin checks.  Memory disorder     Osteopenia     DEXA     Overactive bladder     detrol LA. [facility]are--[facility] Considering procedure for incontinence.  Postmenopausal hormone replacement therapy     since hysterectomy    Right shoulder pain 2013. Mild osteopenia; mild AC osteoarthritis. Sports Med Eval Oct 2013.     Well woman exam (no gynecological exam) Aug 3, 2012    \"[facility] Screening\":  Results scanned to chart. Updated Nov 2014-no change except noted concern for GFR--see Dec 2014 note, letter as reviewed with pt. Current Outpatient Prescriptions   Medication Sig Dispense Refill    cefUROXime (CEFTIN) 500 mg tablet Take 1 Tab by mouth two (2) times a day. 20 Tab 0    [START ON 6/13/2017] lisdexamfetamine (VYVANSE) 20 mg capsule Earliest Fill Date: 6/13/17. Take 1 capsule daily 30 Cap 0    atorvastatin (LIPITOR) 10 mg tablet TAKE 1 TABLET DAILY 90 Tab 3    memantine-donepezil (NAMZARIC) 28-10 mg CSpX Take 1 Tab by mouth daily. 90 Cap 3    montelukast (SINGULAIR) 10 mg tablet Take 10 mg by mouth daily.  diclofenac (VOLTAREN) 1 % gel Apply 2 g to affected area four (4) times daily. Apply to hands qid 100 g 3    POLYVINYL ALCOHOL (LIQUID TEARS OP) Apply  to eye.  budesonide-formoterol (SYMBICORT) 160-4.5 mcg/actuation HFA inhaler Take 2 Puffs by inhalation two (2) times a day. From Dr. Gomez Cox.  fluticasone (FLONASE) 50 mcg/actuation nasal spray 2 Sprays by Both Nostrils route daily. Allergy note June 2013.  ASCORBATE CALCIUM (VITAMIN C PO) Take  by mouth.  MULTIVITAMINS W-MINERALS/LUT (CENTRUM SILVER PO) Take  by mouth daily.  FLAXSEED OIL PO Take  by mouth.  MAGNESIUM PO Take  by mouth.  cholecalciferol, vitamin d3, (VITAMIN D) 1,000 unit tablet Take  by mouth daily.  lisdexamfetamine (VYVANSE) 20 mg capsule Take 1 Cap (20 mg total) by mouth daily. Max Daily Amount: 20 mg 14 Cap 0    memantine (NAMENDA) 10 mg tablet Take  by mouth daily.  [START ON 5/13/2017] lisdexamfetamine (VYVANSE) 20 mg capsule Take 1 Cap (20 mg total) by mouth dailyEarliest Fill Date: 5/13/17. Max Daily Amount: 20 mg 30 Cap 0    lisdexamfetamine (VYVANSE) 20 mg capsule Take 1 Cap (20 mg total) by mouth daily. Max Daily Amount: 20 mg 30 Cap 0    solifenacin (VESICARE) 10 mg tablet Take 1 Tab by mouth daily.       carbamide peroxide (DEBROX) 6.5 % otic solution Administer 5 Drops into each ear two (2) times a day. 30 mL 1    VITAMIN B COMPLEX (B COMPLEX PO) Take  by mouth. Assessment/ Plan:   Elena Keller was seen today for cold symptoms and generalized body aches. Diagnoses and all orders for this visit:    Bronchitis  -     cefUROXime (CEFTIN) 500 mg tablet; Take 1 Tab by mouth two (2) times a day. Wheezing  -     cefUROXime (CEFTIN) 500 mg tablet; Take 1 Tab by mouth two (2) times a day. I have discussed the diagnosis with the patient and the intended plan as seen in the above orders. The patient has received an after-visit summary and questions were answered concerning future plans. I have discussed medication side effects and warnings with the patient as well.     Follow-up Disposition: Not on File

## 2017-05-05 NOTE — PATIENT INSTRUCTIONS
Bronchitis: Care Instructions  Your Care Instructions    Bronchitis is inflammation of the bronchial tubes, which carry air to the lungs. The tubes swell and produce mucus, or phlegm. The mucus and inflamed bronchial tubes make you cough. You may have trouble breathing. Most cases of bronchitis are caused by viruses like those that cause colds. Antibiotics usually do not help and they may be harmful. Bronchitis usually develops rapidly and lasts about 2 to 3 weeks in otherwise healthy people. Follow-up care is a key part of your treatment and safety. Be sure to make and go to all appointments, and call your doctor if you are having problems. It's also a good idea to know your test results and keep a list of the medicines you take. How can you care for yourself at home? · Take all medicines exactly as prescribed. Call your doctor if you think you are having a problem with your medicine. · Get some extra rest.  · Take an over-the-counter pain medicine, such as acetaminophen (Tylenol), ibuprofen (Advil, Motrin), or naproxen (Aleve) to reduce fever and relieve body aches. Read and follow all instructions on the label. · Do not take two or more pain medicines at the same time unless the doctor told you to. Many pain medicines have acetaminophen, which is Tylenol. Too much acetaminophen (Tylenol) can be harmful. · Take an over-the-counter cough medicine that contains dextromethorphan to help quiet a dry, hacking cough so that you can sleep. Avoid cough medicines that have more than one active ingredient. Read and follow all instructions on the label. · Breathe moist air from a humidifier, hot shower, or sink filled with hot water. The heat and moisture will thin mucus so you can cough it out. · Do not smoke. Smoking can make bronchitis worse. If you need help quitting, talk to your doctor about stop-smoking programs and medicines. These can increase your chances of quitting for good.   When should you call for help? Call 911 anytime you think you may need emergency care. For example, call if:  · You have severe trouble breathing. Call your doctor now or seek immediate medical care if:  · You have new or worse trouble breathing. · You cough up dark brown or bloody mucus (sputum). · You have a new or higher fever. · You have a new rash. Watch closely for changes in your health, and be sure to contact your doctor if:  · You cough more deeply or more often, especially if you notice more mucus or a change in the color of your mucus. · You are not getting better as expected. Where can you learn more? Go to http://jelani-joyce.info/. Enter H333 in the search box to learn more about \"Bronchitis: Care Instructions. \"  Current as of: May 23, 2016  Content Version: 11.2  © 5619-9352 [employer]. Care instructions adapted under license by [employer] (which disclaims liability or warranty for this information). If you have questions about a medical condition or this instruction, always ask your healthcare professional. [address] any warranty or liability for your use of this information.

## 2017-05-05 NOTE — PROGRESS NOTES
Chief Complaint   Patient presents with    Cold Symptoms     since tue, productive brown mucus    Generalized Body Aches     Health Maintenance Due   Topic Date Due    ZOSTER VACCINE AGE 60>  01/20/1992     Coordination of Care Questions    1. Have you been to the ER, urgent care clinic since your last visit? No       Hospitalized since your last visit? No    2. Have you seen or consulted any other health care providers outside of the [address] [place] [address] since your last visit? Include any pap smears or colon screening.  No

## 2017-05-05 NOTE — MR AVS SNAPSHOT
Visit Information Date & Time Provider Department Dept. Phone Encounter #  
 5/5/2017  9:30 AM Queen Gaudencio NP [facility] [address] [facility] [phone] [id number] Follow-up Instructions Return if symptoms worsen or fail to improve. Follow-up and Disposition History Your Appointments 5/24/2017 10:40 AM  
Follow Up with Ivan Love MD  
Neurology [facility] [address] ([facility]) Appt Note: Follow up fran from [facility] office  memory loss [place] [address] [address] [phone]  
  
   
 [place] [address] [phone] [address] 7/18/2017  9:45 AM  
ROUTINE CARE with Sherif Quinones MD  
[facility]) Appt Note: 6 month follow up, memory loss/ labs [address] [place] [phone]  
  
   
 [address] [facility] [address] [address]  
  
    
 10/12/2017  9:00 AM  
Follow Up with Maren Delcid MD  
[facility]) Appt Note: FU,Dementia,CP,$0,adt,4/13/2017  
 [address] [place] [address], 
[address] [place] [address], [address] [address]  
[address] [facility], [address] [facility], [address] [address] Upcoming Health Maintenance Date Due ZOSTER VACCINE AGE 60> 1/20/1992 INFLUENZA AGE 9 TO ADULT 8/1/2017 MEDICARE YEARLY EXAM 1/19/2018 GLAUCOMA SCREENING Q2Y 3/16/2018 DTaP/Tdap/Td series (3 - Td) 10/22/2025 Allergies as of 5/5/2017  Review Complete On: 5/5/2017 By: Queen Gaudencio NP Severity Noted Reaction Type Reactions Latex  12/14/2013    Unknown (comments) Dog Dander  12/14/2013    Shortness of Breath Current Immunizations  Reviewed on 4/18/2016 Name Date Influenza High Dose Vaccine PF 10/22/2015 Influenza Vaccine 10/10/2014  Influenza Vaccine Split 10/17/2011  9:12 AM  
 Influenza Vaccine Whole 9/1/2010 Pneumococcal Conjugate (PCV-13) 10/22/2015 Pneumococcal Vaccine (Unspecified Type) 1/10/2004 TDAP Vaccine 8/16/2010 Tdap 10/22/2015 Not reviewed this visit You Were Diagnosed With   
  
 Codes Comments Bronchitis    -  Primary ICD-10-CM: K44 ICD-9-CM: 131 Wheezing     ICD-10-CM: R06.2 ICD-9-CM: 786.07 Late onset Alzheimer's disease without behavioral disturbance     ICD-10-CM: G30.1, F02.80 ICD-9-CM: 331.0, 294.10 Vitals BP Pulse Temp Resp Height(growth percentile) Weight(growth percentile) 132/77 (BP 1 Location: Left arm, BP Patient Position: Sitting) (!) 103 97.3 °F (36.3 °C) (Oral) 20 5' 1\" (1.549 m) 111 lb 12.8 oz (50.7 kg) SpO2 BMI OB Status Smoking Status 98% 21.12 kg/m2 Hysterectomy Never Smoker BMI and BSA Data Body Mass Index Body Surface Area  
 21.12 kg/m 2 1.48 m 2 Preferred Pharmacy Pharmacy Name Phone [phone] - [address], [address] AT [place] [address] [phone] Your Updated Medication List  
  
   
This list is accurate as of: 5/5/17  2:59 PM.  Always use your most recent med list.  
  
  
  
  
 atorvastatin 10 mg tablet Commonly known as:  LIPITOR  
TAKE 1 TABLET DAILY B COMPLEX PO Take  by mouth.  
  
 carbamide peroxide 6.5 % otic solution Commonly known as:  Mary Jo Kiara Administer 5 Drops into each ear two (2) times a day. cefUROXime 500 mg tablet Commonly known as:  CEFTIN Take 1 Tab by mouth two (2) times a day. CENTRUM SILVER PO Take  by mouth daily. diclofenac 1 % Gel Commonly known as:  VOLTAREN Apply 2 g to affected area four (4) times daily. Apply to hands qid FLAXSEED OIL PO Take  by mouth. FLONASE 50 mcg/actuation nasal spray Generic drug:  fluticasone 2 Sprays by Both Nostrils route daily. Allergy note June 2013.   
  
 LIQUID TEARS OP  
 Apply  to eye. * lisdexamfetamine 20 mg capsule Commonly known as:  VYVANSE Take 1 Cap (20 mg total) by mouth daily. Max Daily Amount: 20 mg  
  
 * lisdexamfetamine 20 mg capsule Commonly known as:  VYVANSE Take 1 Cap (20 mg total) by mouth daily. Max Daily Amount: 20 mg  
  
 * lisdexamfetamine 20 mg capsule Commonly known as:  VYVANSE Take 1 Cap (20 mg total) by mouth dailyEarliest Fill Date: 5/13/17. Max Daily Amount: 20 mg  
Start taking on:  5/13/2017 * lisdexamfetamine 20 mg capsule Commonly known as:  VYVANSE Earliest Fill Date: 6/13/17. Take 1 capsule daily Start taking on:  6/13/2017 MAGNESIUM PO Take  by mouth.  
  
 memantine-donepezil 28-10 mg Cspx Commonly known as:  [facility] Take 1 Tab by mouth daily. NAMENDA 10 mg tablet Generic drug:  memantine Take  by mouth daily. SINGULAIR 10 mg tablet Generic drug:  montelukast  
Take 10 mg by mouth daily. SYMBICORT 160-4.5 mcg/actuation HFA inhaler Generic drug:  budesonide-formoterol Take 2 Puffs by inhalation two (2) times a day. From Dr. Cailin Sarmiento. VESIcare 10 mg tablet Generic drug:  solifenacin Take 1 Tab by mouth daily. VITAMIN C PO Take  by mouth. VITAMIN D3 1,000 unit tablet Generic drug:  cholecalciferol Take  by mouth daily. * Notice: This list has 4 medication(s) that are the same as other medications prescribed for you. Read the directions carefully, and ask your doctor or other care provider to review them with you. Prescriptions Sent to Pharmacy Refills  
 cefUROXime (CEFTIN) 500 mg tablet 0 Sig: Take 1 Tab by mouth two (2) times a day. Class: Normal  
 Pharmacy: [facility] [address] [place] [address] #: [phone] Route: Oral  
  
Follow-up Instructions Return if symptoms worsen or fail to improve. Patient Instructions Bronchitis: Care Instructions Your Care Instructions Bronchitis is inflammation of the bronchial tubes, which carry air to the lungs. The tubes swell and produce mucus, or phlegm. The mucus and inflamed bronchial tubes make you cough. You may have trouble breathing. Most cases of bronchitis are caused by viruses like those that cause colds. Antibiotics usually do not help and they may be harmful. Bronchitis usually develops rapidly and lasts about 2 to 3 weeks in otherwise healthy people. Follow-up care is a key part of your treatment and safety. Be sure to make and go to all appointments, and call your doctor if you are having problems. It's also a good idea to know your test results and keep a list of the medicines you take. How can you care for yourself at home? · Take all medicines exactly as prescribed. Call your doctor if you think you are having a problem with your medicine. · Get some extra rest. 
· Take an over-the-counter pain medicine, such as acetaminophen (Tylenol), ibuprofen (Advil, Motrin), or naproxen (Aleve) to reduce fever and relieve body aches. Read and follow all instructions on the label. · Do not take two or more pain medicines at the same time unless the doctor told you to. Many pain medicines have acetaminophen, which is Tylenol. Too much acetaminophen (Tylenol) can be harmful. · Take an over-the-counter cough medicine that contains dextromethorphan to help quiet a dry, hacking cough so that you can sleep. Avoid cough medicines that have more than one active ingredient. Read and follow all instructions on the label. · Breathe moist air from a humidifier, hot shower, or sink filled with hot water. The heat and moisture will thin mucus so you can cough it out. · Do not smoke. Smoking can make bronchitis worse. If you need help quitting, talk to your doctor about stop-smoking programs and medicines. These can increase your chances of quitting for good. When should you call for help? Call 911 anytime you think you may need emergency care. For example, call if: 
· You have severe trouble breathing. Call your doctor now or seek immediate medical care if: 
· You have new or worse trouble breathing. · You cough up dark brown or bloody mucus (sputum). · You have a new or higher fever. · You have a new rash. Watch closely for changes in your health, and be sure to contact your doctor if: 
· You cough more deeply or more often, especially if you notice more mucus or a change in the color of your mucus. · You are not getting better as expected. Where can you learn more? Go to http://jelani-joyce.info/. Enter H333 in the search box to learn more about \"Bronchitis: Care Instructions. \" Current as of: May 23, 2016 Content Version: 11.2 © 6097-0362 [employer]. Care instructions adapted under license by [employer] (which disclaims liability or warranty for this information). If you have questions about a medical condition or this instruction, always ask your healthcare professional. [address] any warranty or liability for your use of this information. Patient Instructions History Introducing [facility]! Dear Larry: Thank you for requesting a [employer] account. Our records indicate that you already have an active [employer] account. You can access your account anytime at [email or web address] Did you know that you can access your hospital and ER discharge instructions at any time in [employer]? You can also review all of your test results from your hospital stay or ER visit. Additional Information If you have questions, please visit the Frequently Asked Questions section of the [employer] website at [email or web address] Remember, [employer] is NOT to be used for urgent needs. For medical emergencies, dial 911. Now available from your iPhone and Android! Please provide this summary of care documentation to your next provider. Your primary care clinician is listed as Sujata PRESSLEY. If you have any questions after today's visit, please call [phone].

## 2017-05-05 NOTE — PROGRESS NOTES
Subjective: Brenden Stark is a 80 y.o. female  here for follow up of chronic medical conditions listed has Postmenopausal hormone replacement therapy, Overactive bladder, Asthma, Hearing loss, left, Ceruminosis, History of skin cancer, Syncope and collapse, Fever, unspecified, Syncope and collapse, TMJ (temporomandibular joint disorder), Mild cognitive impairment without memory loss, Generalized anxiety disorder, Hyperlipidemia, and ADD (attention deficit disorder) on her problem list.. She  is accompanied by {Relatives - adult:5061::\"patient\"}. She lives as follows: {Lives:[address]} and {does/not):[address]} have Advanced Directives. New Complaints today include: Cold Symptoms (since tue, productive brown mucus) and Generalized Body Aches       Recent History includes: {recent admits:20826}    Review of Systems  ROS     Geriatric Issues: Activities of Daily Living (ADLs):    She is independent in the following: {adls:48859}  Requires assistance with the following: {adls:36337}  Patient has changes due to:  {changes in status:20827}    Outside reports reviewed: {Outside review:03467}. Patient Active Problem List   Diagnosis Code    Postmenopausal hormone replacement therapy Z79.890    Overactive bladder N32.81    Asthma J45.909    Hearing loss, left H91.92    Ceruminosis H61.20    History of skin cancer Z85.828    Syncope and collapse R55    Fever, unspecified R50.9    Syncope and collapse R55    TMJ (temporomandibular joint disorder) M26.609    Mild cognitive impairment without memory loss G31.84    Generalized anxiety disorder F41.1    Hyperlipidemia E78.5    ADD (attention deficit disorder) F98.8     Current Outpatient Prescriptions   Medication Sig Dispense Refill    cefUROXime (CEFTIN) 500 mg tablet Take 1 Tab by mouth two (2) times a day. 20 Tab 0    [START ON 6/13/2017] lisdexamfetamine (VYVANSE) 20 mg capsule Earliest Fill Date: 6/13/17.   Take 1 capsule daily 30 Cap 0    atorvastatin (LIPITOR) 10 mg tablet TAKE 1 TABLET DAILY 90 Tab 3    memantine-donepezil (NAMZARIC) 28-10 mg CSpX Take 1 Tab by mouth daily. 90 Cap 3    montelukast (SINGULAIR) 10 mg tablet Take 10 mg by mouth daily.  diclofenac (VOLTAREN) 1 % gel Apply 2 g to affected area four (4) times daily. Apply to hands qid 100 g 3    POLYVINYL ALCOHOL (LIQUID TEARS OP) Apply  to eye.  budesonide-formoterol (SYMBICORT) 160-4.5 mcg/actuation HFA inhaler Take 2 Puffs by inhalation two (2) times a day. From Dr. Radha Lawson.  fluticasone (FLONASE) 50 mcg/actuation nasal spray 2 Sprays by Both Nostrils route daily. Allergy note June 2013.  ASCORBATE CALCIUM (VITAMIN C PO) Take  by mouth.  MULTIVITAMINS W-MINERALS/LUT (CENTRUM SILVER PO) Take  by mouth daily.  FLAXSEED OIL PO Take  by mouth.  MAGNESIUM PO Take  by mouth.  cholecalciferol, vitamin d3, (VITAMIN D) 1,000 unit tablet Take  by mouth daily.  lisdexamfetamine (VYVANSE) 20 mg capsule Take 1 Cap (20 mg total) by mouth daily. Max Daily Amount: 20 mg 14 Cap 0    memantine (NAMENDA) 10 mg tablet Take  by mouth daily.  [START ON 5/13/2017] lisdexamfetamine (VYVANSE) 20 mg capsule Take 1 Cap (20 mg total) by mouth dailyEarliest Fill Date: 5/13/17. Max Daily Amount: 20 mg 30 Cap 0    lisdexamfetamine (VYVANSE) 20 mg capsule Take 1 Cap (20 mg total) by mouth daily. Max Daily Amount: 20 mg 30 Cap 0    solifenacin (VESICARE) 10 mg tablet Take 1 Tab by mouth daily.  carbamide peroxide (DEBROX) 6.5 % otic solution Administer 5 Drops into each ear two (2) times a day. 30 mL 1    VITAMIN B COMPLEX (B COMPLEX PO) Take  by mouth. Allergies   Allergen Reactions    Latex Unknown (comments)    Dog Dander Shortness of Breath     Past Medical History:   Diagnosis Date    Asthma     Dr. Tran Santos June 2013--doesn't see routinely--pt reports no change in allergies--no need for routine follow-up.   Dr. Radha Lawson sees for mild COPD--on Symbicort since ~2013. Continued Singulair.  Ceruminosis     periodic ear cleaning at [place] [facility]    Encounter for screening colonoscopy ~    Normal per pt--[facility] records requested 2013 visit.  Fainting     Falls     Fatigue     [facility] loss, left     [facility]    History of mammogram May 2014    Normal/no change.  History of shingles 2010    Dec 2014:  Pt reports clinical history and shingles vaccine 4yrs ago. To try to obtain exact date of zostavax from /source.  History of skin cancer     melanoma ?, BCC & SCC- followed by Dr. Komal Simpson- twice a yr appts. 2013-still on 6mo schedule for skin checks.  Memory disorder     Osteopenia     DEXA     Overactive bladder     detrol LA. Vesicare--[facility]es [facility] Considering procedure for incontinence.  Postmenopausal hormone replacement therapy     since hysterectomy    Right shoulder pain 2013. Mild osteopenia; mild AC osteoarthritis. Sports Med Eval Oct 2013.  Well woman exam (no gynecological exam) Aug 3, 2012    \"[facility] Screening\":  Results scanned to chart. Updated 2014-no change except noted concern for GFR--see Dec 2014 note, letter as reviewed with pt.      Past Surgical History:   Procedure Laterality Date    HX CATARACT REMOVAL      Selvin Wilson - [facility]- last eye exam 2010    MULTIPLE DELIVERY        Family History   Problem Relation Age of Onset   [facility] Dementia Father     Hypertension Mother     Stroke Mother     Cancer Other      daughter- breast cancer     Social History   Substance Use Topics    Smoking status: Never Smoker    Smokeless tobacco: Never Used    Alcohol use 0.0 oz/week      Comment: 1-2 drinks/every evening ( wine/vodka)          Objective:     Visit Vitals    /77 (BP 1 Location: Left arm, BP Patient Position: Sitting)    Pulse (!) 103    Temp 97.3 °F (36.3 °C) (Oral)    Resp 20    Ht 5' 1\" (1.549 m)    Wt 111 lb 12.8 oz (50.7 kg)    SpO2 98%    BMI 21.12 kg/m2     Physical Exam     Imaging  ***    Lab Review  Results for orders placed or performed in visit on 10/18/16   LIPID PANEL   Result Value Ref Range    Cholesterol, total 204 (H) 100 - 199 mg/dL    Triglyceride 74 0 - 149 mg/dL    HDL Cholesterol 95 >39 mg/dL    VLDL, calculated 15 5 - 40 mg/dL    LDL, calculated 94 0 - 99 mg/dL   AST   Result Value Ref Range    AST (SGOT) 25 0 - 40 IU/L   ALT   Result Value Ref Range    ALT (SGPT) 26 0 - 32 IU/L        Assessment/Plan:   ***    ICD-10-CM ICD-9-CM    1. Bronchitis J40 490 cefUROXime (CEFTIN) 500 mg tablet   2.  Wheezing R06.2 786.07 cefUROXime (CEFTIN) 500 mg tablet     Follow-up Disposition: Not on File    By:  Eula Ferrell 55 [address]  [phone]

## 2017-05-06 ENCOUNTER — HOSPITAL ENCOUNTER (EMERGENCY)
Age: 82
Discharge: HOME | End: 2017-05-06
Attending: EMERGENCY MEDICINE
Payer: MEDICARE

## 2017-05-06 ENCOUNTER — APPOINTMENT (OUTPATIENT)
Dept: GENERAL RADIOLOGY | Age: 82
End: 2017-05-06
Attending: EMERGENCY MEDICINE
Payer: MEDICARE

## 2017-05-06 VITALS
HEIGHT: 60 IN | TEMPERATURE: 98.1 F | BODY MASS INDEX: 21.4 KG/M2 | RESPIRATION RATE: 16 BRPM | SYSTOLIC BLOOD PRESSURE: 105 MMHG | WEIGHT: 109 LBS | OXYGEN SATURATION: 97 % | DIASTOLIC BLOOD PRESSURE: 45 MMHG | HEART RATE: 104 BPM

## 2017-05-06 DIAGNOSIS — J45.901 ACUTE BRONCHITIS WITH ASTHMA WITH ACUTE EXACERBATION: Primary | ICD-10-CM

## 2017-05-06 DIAGNOSIS — J20.9 ACUTE BRONCHITIS WITH ASTHMA WITH ACUTE EXACERBATION: Primary | ICD-10-CM

## 2017-05-06 LAB
ALBUMIN SERPL BCP-MCNC: 3.1 G/DL (ref 3.5–5)
ALBUMIN/GLOB SERPL: 0.8 {RATIO} (ref 1.1–2.2)
ALP SERPL-CCNC: 67 U/L (ref 45–117)
ALT SERPL-CCNC: 26 U/L (ref 12–78)
ANION GAP BLD CALC-SCNC: 10 MMOL/L (ref 5–15)
APPEARANCE UR: ABNORMAL
AST SERPL W P-5'-P-CCNC: 19 U/L (ref 15–37)
BACTERIA URNS QL MICRO: NEGATIVE /HPF
BASOPHILS # BLD AUTO: 0 K/UL (ref 0–0.1)
BASOPHILS # BLD: 0 % (ref 0–1)
BILIRUB SERPL-MCNC: 0.3 MG/DL (ref 0.2–1)
BILIRUB UR QL: NEGATIVE
BUN SERPL-MCNC: 11 MG/DL (ref 6–20)
BUN/CREAT SERPL: 16 (ref 12–20)
CALCIUM SERPL-MCNC: 8.2 MG/DL (ref 8.5–10.1)
CHLORIDE SERPL-SCNC: 103 MMOL/L (ref 97–108)
CK SERPL-CCNC: 60 U/L (ref 26–192)
CO2 SERPL-SCNC: 24 MMOL/L (ref 21–32)
COLOR UR: ABNORMAL
CREAT SERPL-MCNC: 0.7 MG/DL (ref 0.55–1.02)
DIFFERENTIAL METHOD BLD: ABNORMAL
EOSINOPHIL # BLD: 0.1 K/UL (ref 0–0.4)
EOSINOPHIL NFR BLD: 1 % (ref 0–7)
EPITH CASTS URNS QL MICRO: ABNORMAL /LPF
ERYTHROCYTE [DISTWIDTH] IN BLOOD BY AUTOMATED COUNT: 12.8 % (ref 11.5–14.5)
FLUAV AG NPH QL IA: NEGATIVE
FLUBV AG NOSE QL IA: NEGATIVE
GLOBULIN SER CALC-MCNC: 3.7 G/DL (ref 2–4)
GLUCOSE SERPL-MCNC: 88 MG/DL (ref 65–100)
GLUCOSE UR STRIP.AUTO-MCNC: NEGATIVE MG/DL
HCT VFR BLD AUTO: 37.9 % (ref 35–47)
HGB BLD-MCNC: 12.5 G/DL (ref 11.5–16)
HGB UR QL STRIP: ABNORMAL
KETONES UR QL STRIP.AUTO: NEGATIVE MG/DL
LEUKOCYTE ESTERASE UR QL STRIP.AUTO: ABNORMAL
LYMPHOCYTES # BLD AUTO: 8 % (ref 12–49)
LYMPHOCYTES # BLD: 0.5 K/UL (ref 0.8–3.5)
MCH RBC QN AUTO: 32.3 PG (ref 26–34)
MCHC RBC AUTO-ENTMCNC: 33 G/DL (ref 30–36.5)
MCV RBC AUTO: 97.9 FL (ref 80–99)
MONOCYTES # BLD: 0.9 K/UL (ref 0–1)
MONOCYTES NFR BLD AUTO: 13 % (ref 5–13)
NEUTS SEG # BLD: 5.2 K/UL (ref 1.8–8)
NEUTS SEG NFR BLD AUTO: 78 % (ref 32–75)
NITRITE UR QL STRIP.AUTO: NEGATIVE
PH UR STRIP: 6.5 [PH] (ref 5–8)
PLATELET # BLD AUTO: 180 K/UL (ref 150–400)
POTASSIUM SERPL-SCNC: 4.1 MMOL/L (ref 3.5–5.1)
PROT SERPL-MCNC: 6.8 G/DL (ref 6.4–8.2)
PROT UR STRIP-MCNC: ABNORMAL MG/DL
RBC # BLD AUTO: 3.87 M/UL (ref 3.8–5.2)
RBC #/AREA URNS HPF: ABNORMAL /HPF (ref 0–5)
RBC MORPH BLD: ABNORMAL
SODIUM SERPL-SCNC: 137 MMOL/L (ref 136–145)
SP GR UR REFRACTOMETRY: 1.03 (ref 1–1.03)
TROPONIN I SERPL-MCNC: <0.04 NG/ML
UA: UC IF INDICATED,UAUC: ABNORMAL
UROBILINOGEN UR QL STRIP.AUTO: 0.2 EU/DL (ref 0.2–1)
WBC # BLD AUTO: 6.7 K/UL (ref 3.6–11)
WBC URNS QL MICRO: ABNORMAL /HPF (ref 0–4)

## 2017-05-06 PROCEDURE — 74011000250 HC RX REV CODE- 250: Performed by: EMERGENCY MEDICINE

## 2017-05-06 PROCEDURE — 85025 COMPLETE CBC W/AUTO DIFF WBC: CPT | Performed by: EMERGENCY MEDICINE

## 2017-05-06 PROCEDURE — 99284 EMERGENCY DEPT VISIT MOD MDM: CPT

## 2017-05-06 PROCEDURE — 36415 COLL VENOUS BLD VENIPUNCTURE: CPT | Performed by: EMERGENCY MEDICINE

## 2017-05-06 PROCEDURE — 81001 URINALYSIS AUTO W/SCOPE: CPT | Performed by: EMERGENCY MEDICINE

## 2017-05-06 PROCEDURE — 96374 THER/PROPH/DIAG INJ IV PUSH: CPT

## 2017-05-06 PROCEDURE — 84484 ASSAY OF TROPONIN QUANT: CPT | Performed by: EMERGENCY MEDICINE

## 2017-05-06 PROCEDURE — 94640 AIRWAY INHALATION TREATMENT: CPT

## 2017-05-06 PROCEDURE — 87804 INFLUENZA ASSAY W/OPTIC: CPT | Performed by: EMERGENCY MEDICINE

## 2017-05-06 PROCEDURE — 80053 COMPREHEN METABOLIC PANEL: CPT | Performed by: EMERGENCY MEDICINE

## 2017-05-06 PROCEDURE — 93005 ELECTROCARDIOGRAM TRACING: CPT

## 2017-05-06 PROCEDURE — 71020 XR CHEST PA LAT: CPT

## 2017-05-06 PROCEDURE — 77030013140 HC MSK NEB VYRM -A

## 2017-05-06 PROCEDURE — 74011250636 HC RX REV CODE- 250/636: Performed by: EMERGENCY MEDICINE

## 2017-05-06 PROCEDURE — 82550 ASSAY OF CK (CPK): CPT | Performed by: EMERGENCY MEDICINE

## 2017-05-06 PROCEDURE — 87086 URINE CULTURE/COLONY COUNT: CPT | Performed by: EMERGENCY MEDICINE

## 2017-05-06 RX ORDER — NEBULIZER AND COMPRESSOR
1 EACH MISCELLANEOUS
Qty: 1 EACH | Refills: 0 | Status: SHIPPED | OUTPATIENT
Start: 2017-05-06 | End: 2017-05-06

## 2017-05-06 RX ORDER — IPRATROPIUM BROMIDE AND ALBUTEROL SULFATE 2.5; .5 MG/3ML; MG/3ML
3 SOLUTION RESPIRATORY (INHALATION) ONCE
Status: COMPLETED | OUTPATIENT
Start: 2017-05-06 | End: 2017-05-06

## 2017-05-06 RX ORDER — ALBUTEROL SULFATE 0.83 MG/ML
2.5 SOLUTION RESPIRATORY (INHALATION)
Qty: 24 EACH | Refills: 2 | Status: SHIPPED | OUTPATIENT
Start: 2017-05-06 | End: 2018-09-06

## 2017-05-06 RX ORDER — NEBULIZER AND COMPRESSOR
1 EACH MISCELLANEOUS
Qty: 1 EACH | Refills: 0 | Status: SHIPPED | OUTPATIENT
Start: 2017-05-06 | End: 2019-11-04

## 2017-05-06 RX ORDER — PREDNISONE 20 MG/1
40 TABLET ORAL DAILY
Qty: 10 TAB | Refills: 0 | Status: SHIPPED | OUTPATIENT
Start: 2017-05-06 | End: 2017-05-11

## 2017-05-06 RX ADMIN — IPRATROPIUM BROMIDE AND ALBUTEROL SULFATE 3 ML: .5; 3 SOLUTION RESPIRATORY (INHALATION) at 10:09

## 2017-05-06 RX ADMIN — METHYLPREDNISOLONE SODIUM SUCCINATE 125 MG: 125 INJECTION, POWDER, FOR SOLUTION INTRAMUSCULAR; INTRAVENOUS at 10:09

## 2017-05-06 NOTE — ED PROVIDER NOTES
HPI Comments: Kei Loaiza is a 80 y.o. female with history significant for a memory disorder, asthma, osteopenia presenting ambulatory to [facility] ED with c/o sudden onset of gradually worsening SOB x today. Per , the pt was seen by [facility] nurse x yesterday for her symptoms. Chart review informs pt was seen for a persistent productive cough x 5 days with associated yellow-brown sputum. Chart review reports pt was diagnosed with bronchitis and placed on Ceftin 500mg BID. Pt states she has since taken two doses of her antibiotic. Pt's  informs following that visit, the pt's symptoms did not seem to improve rather her cough and SOB remained and gradually worsened into today with severity x this morning. Pt reports she has taken her Albuterol inhaler twice already with one treatment yesterday and another one this morning prior to arrival around 0715. Pt informs she took two puffs and did not visualize much relief to her SOB.  denies any fever and states the last reading was at 80 F following the pt's ingestion of \"hot coffee\". Pt and  specifically deny any nausea, vomiting, fevers, chills, abdominal pain, urinary complications, or chest pain. PCP: Anthony Sheehan MD    PMHx: hearing loss L   PSHx: hysterectomy   Social Hx: + EtOH; - Smoker; - Illicit Drugs    There are no other changes, complaints or physical findings at this time. Written by ALLISON Zendejas, as dictated by Richardson Coleman MD.     The history is provided by the patient and the spouse. Past Medical History:   Diagnosis Date    Asthma     Dr. Anny Rios June 2013--doesn't see routinely--pt reports no change in allergies--no need for routine follow-up. Dr. Joanne Cortes sees for mild COPD--on Symbicort since ~Nov 2013. Continued Singulair.     Ceruminosis     periodic ear cleaning at [facility]    Chronic obstructive pulmonary disease ([facility] [address]     Encounter for screening colonoscopy ~2006 Normal per pt--[facility] records requested 2013 visit.  Fainting     Falls     Fatigue     Hearing loss, left     [facility] hearing    History of mammogram May 2014    Normal/no change.  History of shingles 2010    Dec 2014:  Pt reports clinical history and shingles vaccine 4yrs ago. To try to obtain exact date of zostavax from /source.  History of skin cancer     melanoma ?, BCC & SCC- followed by Dr. Clayton Kitchen- twice a yr appts. 2013-still on 6mo schedule for skin checks.  Memory disorder     Osteopenia     DEXA     Overactive bladder     detrol LA. Vesicare--[facility]es [facility] Considering procedure for incontinence.  Postmenopausal hormone replacement therapy     since hysterectomy    Right shoulder pain 2013. Mild osteopenia; mild AC osteoarthritis. Sports Med Eval Oct 2013.  Well woman exam (no gynecological exam) Aug 3, 2012    \"[facility] Screening\":  Results scanned to chart. Updated 2014-no change except noted concern for GFR--see Dec 2014 note, letter as reviewed with pt. Past Surgical History:   Procedure Laterality Date    HX CATARACT REMOVAL      Samantha Davenport - [address] [place] [address]- last eye exam 2010    MULTIPLE DELIVERY        Family History:   Problem Relation Age of Onset   [address] [facility] Dementia Father     Hypertension Mother     Stroke Mother     Cancer Other      daughter- breast cancer     Social History     Social History    Marital status:      Spouse name: N/A    Number of children: N/A    Years of education: N/A     Occupational History    Not on file.      Social History Main Topics    Smoking status: Never Smoker    Smokeless tobacco: Never Used    Alcohol use 0.0 oz/week      Comment: 1-2 drinks/every evening ( wine/vodka)    Drug use: No    Sexual activity: No     Other Topics Concern    Not on file     Social History Narrative     ALLERGIES: Latex and Dog dander    Review of Systems   Constitutional: Negative for activity change, chills and fever. HENT: Negative for congestion and sore throat. Eyes: Negative for pain and redness. Respiratory: Positive for cough and shortness of breath. Negative for chest tightness. Cardiovascular: Negative for chest pain and palpitations. Gastrointestinal: Negative for abdominal pain, diarrhea, nausea and vomiting. Genitourinary: Negative for dysuria, frequency, hematuria and urgency. Musculoskeletal: Negative for back pain and neck pain. Skin: Negative for rash. Neurological: Negative for syncope, light-headedness and headaches. Psychiatric/Behavioral: Negative for confusion. All other systems reviewed and are negative. Vitals:    05/06/17 0808 05/06/17 1048   BP: 136/69 105/45   Pulse: 84 (!) 104   Resp: 16    Temp: 98.1 °F (36.7 °C)    SpO2: 95% 97%   Weight: 49.4 kg (109 lb)    Height: 5' (1.524 m)           Physical Exam   Constitutional: She is oriented to person, place, and time. She appears well-developed and well-nourished. No distress. HENT:   Head: Normocephalic. Nose: Nose normal.   Mouth/Throat: Oropharynx is clear and moist. No oropharyngeal exudate. Eyes: Conjunctivae are normal. Pupils are equal, round, and reactive to light. No scleral icterus. Neck: Normal range of motion. Neck supple. No JVD present. No tracheal deviation present. No thyromegaly present. Cardiovascular: Normal rate, regular rhythm and intact distal pulses. Exam reveals no gallop and no friction rub. No murmur heard. Pulmonary/Chest: Effort normal. No stridor. No respiratory distress. She has wheezes (minimal, BL expiratory). She has no rales. Good air movements, speaking in normal, full sentences    Abdominal: Soft. Bowel sounds are normal. She exhibits no distension. There is no tenderness. There is no rebound and no guarding. Musculoskeletal: Normal range of motion. She exhibits no edema. Lymphadenopathy:     She has no cervical adenopathy. Neurological: She is alert and oriented to person, place, and time. No cranial nerve deficit. She exhibits normal muscle tone. Coordination normal.   Skin: Skin is warm and dry. No rash noted. She is not diaphoretic. No erythema. Psychiatric: She has a normal mood and affect. Her behavior is normal.   Nursing note and vitals reviewed. MDM  Number of Diagnoses or Management Options  Acute bronchitis with asthma with acute exacerbation:   Diagnosis management comments: DDx: acute asthma, bronchitis, PNA. CHF, ACS, flu       Amount and/or Complexity of Data Reviewed  Tests in the medicine section of CPT®: reviewed and ordered  Obtain history from someone other than the patient: yes (Spouse)  Review and summarize past medical records: yes  Independent visualization of images, tracings, or specimens: yes    Risk of Complications, Morbidity, and/or Mortality  General comments: Pt well appearing; normal cxr; flu negative; vss; no increased wbc; normal cardiac enzymes; normal ekg; sx markedly improved with duoneb; I believe she was not getting good delivery with her mdi at home; will dc with nebulizer and compressor and short course prednisone; pt agreed to return to ed if any worsening sob, fevers, cp; Eleni Doe MD      Patient Progress  Patient progress: stable    ED Course     Procedures    ED EKG interpretation: 08:31  Rhythm: normal sinus rhythm; and regular . Rate (approx.): 87; Axis: normal; MD Interval: normal; QRS interval: normal ; ST/T wave: normal; normal ekg; This EKG was interpreted by Eleni Doe MD,ED Provider. Progress Note:   11:33 AM  Pt reports her breathing is much improved and she feels better.    Written by More Fuller, ED Scribe, as dictated by Eleni Doe MD.     LABORATORY TESTS:  Recent Results (from the past 12 hour(s))   EKG, 12 LEAD, INITIAL    Collection Time: 05/06/17  8:31 AM   Result Value Ref Range    Ventricular Rate 87 BPM    Atrial Rate 87 BPM    P-R Interval 154 ms    QRS Duration 68 ms    Q-T Interval 374 ms    QTC Calculation (Bezet) 450 ms    Calculated P Axis 48 degrees    Calculated R Axis 1 degrees    Calculated T Axis 50 degrees    Diagnosis       Normal sinus rhythm  Normal ECG  When compared with ECG of 10-AUG-2011 08:31,  T wave inversion no longer evident in Inferior leads     CBC WITH AUTOMATED DIFF    Collection Time: 05/06/17  9:45 AM   Result Value Ref Range    WBC 6.7 3.6 - 11.0 K/uL    RBC 3.87 3.80 - 5.20 M/uL    HGB 12.5 11.5 - 16.0 g/dL    HCT 37.9 35.0 - 47.0 %    MCV 97.9 80.0 - 99.0 FL    MCH 32.3 26.0 - 34.0 PG    MCHC 33.0 30.0 - 36.5 g/dL    RDW 12.8 11.5 - 14.5 %    PLATELET 805 190 - 264 K/uL    NEUTROPHILS 78 (H) 32 - 75 %    LYMPHOCYTES 8 (L) 12 - 49 %    MONOCYTES 13 5 - 13 %    EOSINOPHILS 1 0 - 7 %    BASOPHILS 0 0 - 1 %    ABS. NEUTROPHILS 5.2 1.8 - 8.0 K/UL    ABS. LYMPHOCYTES 0.5 (L) 0.8 - 3.5 K/UL    ABS. MONOCYTES 0.9 0.0 - 1.0 K/UL    ABS. EOSINOPHILS 0.1 0.0 - 0.4 K/UL    ABS. BASOPHILS 0.0 0.0 - 0.1 K/UL    RBC COMMENTS NORMOCYTIC, NORMOCHROMIC      DF SMEAR SCANNED     METABOLIC PANEL, COMPREHENSIVE    Collection Time: 05/06/17  9:45 AM   Result Value Ref Range    Sodium 137 136 - 145 mmol/L    Potassium 4.1 3.5 - 5.1 mmol/L    Chloride 103 97 - 108 mmol/L    CO2 24 21 - 32 mmol/L    Anion gap 10 5 - 15 mmol/L    Glucose 88 65 - 100 mg/dL    BUN 11 6 - 20 MG/DL    Creatinine 0.70 0.55 - 1.02 MG/DL    BUN/Creatinine ratio 16 12 - 20      GFR est AA >60 >60 ml/min/1.73m2    GFR est non-AA >60 >60 ml/min/1.73m2    Calcium 8.2 (L) 8.5 - 10.1 MG/DL    Bilirubin, total 0.3 0.2 - 1.0 MG/DL    ALT (SGPT) 26 12 - 78 U/L    AST (SGOT) 19 15 - 37 U/L    Alk.  phosphatase 67 45 - 117 U/L    Protein, total 6.8 6.4 - 8.2 g/dL    Albumin 3.1 (L) 3.5 - 5.0 g/dL    Globulin 3.7 2.0 - 4.0 g/dL    A-G Ratio 0.8 (L) 1.1 - 2.2     TROPONIN I    Collection Time: 05/06/17  9:45 AM   Result Value Ref Range    Troponin-I, Qt. <0.04 <0.05 ng/mL   CK W/ REFLX CKMB    Collection Time: 05/06/17  9:45 AM   Result Value Ref Range    CK 60 26 - 192 U/L   URINALYSIS W/ REFLEX CULTURE    Collection Time: 05/06/17 10:07 AM   Result Value Ref Range    Color YELLOW/STRAW      Appearance CLOUDY (A) CLEAR      Specific gravity 1.027 1.003 - 1.030      pH (UA) 6.5 5.0 - 8.0      Protein TRACE (A) NEG mg/dL    Glucose NEGATIVE  NEG mg/dL    Ketone NEGATIVE  NEG mg/dL    Bilirubin NEGATIVE  NEG      Blood TRACE (A) NEG      Urobilinogen 0.2 0.2 - 1.0 EU/dL    Nitrites NEGATIVE  NEG      Leukocyte Esterase TRACE (A) NEG      WBC 5-10 0 - 4 /hpf    RBC 0-5 0 - 5 /hpf    Epithelial cells FEW FEW /lpf    Bacteria NEGATIVE  NEG /hpf    UA:UC IF INDICATED URINE CULTURE ORDERED (A) CNI     INFLUENZA A & B AG (RAPID TEST)    Collection Time: 05/06/17 10:52 AM   Result Value Ref Range    Influenza A Antigen NEGATIVE  NEG      Influenza B Antigen NEGATIVE  NEG       IMAGING RESULTS:  INDICATION: short of breath      COMPARISON: 8/10/2011     FINDINGS: PA and lateral views of the chest demonstrate a stable  cardiomediastinal silhouette and clear lungs bilaterally. The visualized osseous  structures are unremarkable.     IMPRESSION  IMPRESSION: No acute process    MEDICATIONS GIVEN:  Medications   albuterol-ipratropium (DUO-NEB) 2.5 MG-0.5 MG/3 ML (3 mL Nebulization Given 5/6/17 1009)   methylPREDNISolone (PF) (SOLU-MEDROL) injection 125 mg (125 mg IntraVENous Given 5/6/17 1009)     IMPRESSION:  1. Acute bronchitis with asthma with acute exacerbation      PLAN:  1. Current Discharge Medication List      START taking these medications    Details   predniSONE (DELTASONE) 20 mg tablet Take 2 Tabs by mouth daily for 5 days. Qty: 10 Tab, Refills: 0      albuterol (PROVENTIL VENTOLIN) 2.5 mg /3 mL (0.083 %) nebulizer solution 3 mL by Nebulization route every four (4) hours as needed for Wheezing.   Qty: 24 Each, Refills: 2      Nebulizer & Compressor machine 1 Each by Does Not Apply route every four (4) hours as needed. As directed  Indications: acute asthma with exacerbation  Qty: 1 Each, Refills: 0           2. Follow-up Information     Follow up With Details Comments [address], [place] Schedule an appointment as soon as possible for a visit in 2 days  [facility]  [place]  [phone]          Return to ED if worse     DISCHARGE NOTE:    11:41 AM  The patient is ready for discharge. The patient signs, symptoms, diagnosis, and discharge instructions have been discussed and the patient has conveyed their understanding. The patient is to follow-up as reccommended or returned to the ER should their symptoms worsen. Plan has been discussed and patient is in agreement. This note is prepared by Brielle Mendoza acting as Scribe for Ivan Montes MD.    Ivan Montes MD: This scribe's documentation has been prepared under my direction and personally reviewed by me in its entirety. I confirm that the note above accurately reflects all work, treatment procedures and medical decision makings performed by me.

## 2017-05-06 NOTE — ED NOTES
Wheeled to exam room. C/o cough, wheezing weakness sob x6days. [facility], visited the nurse there and was placed on abx. Felt worse today came to ed. Denies fever and pain.

## 2017-05-06 NOTE — DISCHARGE INSTRUCTIONS
Bronchitis: Care Instructions  Your Care Instructions    Bronchitis is inflammation of the bronchial tubes, which carry air to the lungs. The tubes swell and produce mucus, or phlegm. The mucus and inflamed bronchial tubes make you cough. You may have trouble breathing. Most cases of bronchitis are caused by viruses like those that cause colds. Antibiotics usually do not help and they may be harmful. Bronchitis usually develops rapidly and lasts about 2 to 3 weeks in otherwise healthy people. Follow-up care is a key part of your treatment and safety. Be sure to make and go to all appointments, and call your doctor if you are having problems. It's also a good idea to know your test results and keep a list of the medicines you take. How can you care for yourself at home? · Take all medicines exactly as prescribed. Call your doctor if you think you are having a problem with your medicine. · Get some extra rest.  · Take an over-the-counter pain medicine, such as acetaminophen (Tylenol), ibuprofen (Advil, Motrin), or naproxen (Aleve) to reduce fever and relieve body aches. Read and follow all instructions on the label. · Do not take two or more pain medicines at the same time unless the doctor told you to. Many pain medicines have acetaminophen, which is Tylenol. Too much acetaminophen (Tylenol) can be harmful. · Take an over-the-counter cough medicine that contains dextromethorphan to help quiet a dry, hacking cough so that you can sleep. Avoid cough medicines that have more than one active ingredient. Read and follow all instructions on the label. · Breathe moist air from a humidifier, hot shower, or sink filled with hot water. The heat and moisture will thin mucus so you can cough it out. · Do not smoke. Smoking can make bronchitis worse. If you need help quitting, talk to your doctor about stop-smoking programs and medicines. These can increase your chances of quitting for good.   When should you call for help? Call 911 anytime you think you may need emergency care. For example, call if:  · You have severe trouble breathing. Call your doctor now or seek immediate medical care if:  · You have new or worse trouble breathing. · You cough up dark brown or bloody mucus (sputum). · You have a new or higher fever. · You have a new rash. Watch closely for changes in your health, and be sure to contact your doctor if:  · You cough more deeply or more often, especially if you notice more mucus or a change in the color of your mucus. · You are not getting better as expected. Where can you learn more? Go to http://jelani-joyce.info/. Enter H333 in the search box to learn more about \"Bronchitis: Care Instructions. \"  Current as of: May 23, 2016  Content Version: 11.2  © 9590-1345 [employer]. Care instructions adapted under license by [employer] (which disclaims liability or warranty for this information). If you have questions about a medical condition or this instruction, always ask your healthcare professional. [address] any warranty or liability for your use of this information.

## 2017-05-07 LAB
BACTERIA SPEC CULT: NORMAL
CC UR VC: NORMAL
SERVICE CMNT-IMP: NORMAL

## 2017-05-08 LAB
ATRIAL RATE: 87 BPM
CALCULATED P AXIS, ECG09: 48 DEGREES
CALCULATED R AXIS, ECG10: 1 DEGREES
CALCULATED T AXIS, ECG11: 50 DEGREES
DIAGNOSIS, 93000: NORMAL
P-R INTERVAL, ECG05: 154 MS
Q-T INTERVAL, ECG07: 374 MS
QRS DURATION, ECG06: 68 MS
QTC CALCULATION (BEZET), ECG08: 450 MS
VENTRICULAR RATE, ECG03: 87 BPM

## 2017-06-29 ENCOUNTER — PATIENT MESSAGE (OUTPATIENT)
Dept: INTERNAL MEDICINE CLINIC | Age: 82
End: 2017-06-29

## 2017-06-29 RX ORDER — DICLOFENAC SODIUM 10 MG/G
2 GEL TOPICAL 4 TIMES DAILY
Qty: 100 G | Refills: 3 | Status: SHIPPED | OUTPATIENT
Start: 2017-06-29 | End: 2018-02-12 | Stop reason: SDUPTHER

## 2017-06-29 NOTE — TELEPHONE ENCOUNTER
From: Yeimi Harrison  To: Susie Montero MD  Sent: 6/29/2017 8:25 AM EDT  Subject: Non-Urgent Medical Question    I would like a refill of the prescription- Diclofenac Sodium Topical Gel 1% sent to [facility] on [address] I m almost out for treatment of arthritic hands.

## 2017-07-07 ENCOUNTER — TELEPHONE (OUTPATIENT)
Dept: NEUROLOGY | Age: 82
End: 2017-07-07

## 2017-07-11 DIAGNOSIS — F43.23 ADJUSTMENT DISORDER WITH MIXED ANXIETY AND DEPRESSED MOOD: ICD-10-CM

## 2017-07-18 DIAGNOSIS — F43.23 ADJUSTMENT DISORDER WITH MIXED ANXIETY AND DEPRESSED MOOD: ICD-10-CM

## 2017-08-07 ENCOUNTER — OFFICE VISIT (OUTPATIENT)
Dept: INTERNAL MEDICINE CLINIC | Age: 82
End: 2017-08-07

## 2017-08-07 VITALS
OXYGEN SATURATION: 94 % | BODY MASS INDEX: 22.07 KG/M2 | TEMPERATURE: 97 F | HEART RATE: 78 BPM | WEIGHT: 112.4 LBS | HEIGHT: 60 IN | SYSTOLIC BLOOD PRESSURE: 107 MMHG | DIASTOLIC BLOOD PRESSURE: 64 MMHG

## 2017-08-07 DIAGNOSIS — R41.3 MEMORY LOSS: ICD-10-CM

## 2017-08-07 DIAGNOSIS — F90.9 ATTENTION DEFICIT HYPERACTIVITY DISORDER (ADHD), UNSPECIFIED ADHD TYPE: Primary | ICD-10-CM

## 2017-08-07 NOTE — PROGRESS NOTES
Patient is here today for medication refill and follow up  On the medTa Vyvanse. Patient has no complaints on today's visit.

## 2017-08-07 NOTE — PROGRESS NOTES
HISTORY OF PRESENT ILLNESS  Stanley Holman is a 80 y.o. female. HPI      F/u dementia with mild irritibility  Has been started on namzeric by Dr. Spencer Maurice  Pt and  feel her short term recall has improved--names etc but still has some difficulty with names, words and remembering conversation  Long term memory fairly well intact per pt  No medication side effects  Taking vyvanse for ADHD--pt not sure if this has had any effect on improving memory or attention but not any worse  They want to f/u with Dr Spencer Maurice at [address] albuterol MDI only prn for sob sxs which are infrequent    Patient Active Problem List    Diagnosis Date Noted    ADD (attention deficit disorder) 01/18/2017    Hyperlipidemia 07/20/2015    Mild cognitive impairment without memory loss 02/02/2015    Generalized anxiety disorder 02/02/2015    TMJ (temporomandibular joint disorder) 12/21/2011    Syncope and collapse 08/10/2011    Fever, unspecified 08/10/2011    Syncope and collapse 08/10/2011    History of skin cancer     Asthma     Hearing loss, left     Ceruminosis     Postmenopausal hormone replacement therapy     Overactive bladder      Current Outpatient Prescriptions   Medication Sig Dispense Refill    lisdexamfetamine (VYVANSE) 20 mg capsule Take 1 capsule daily--may fill 10-13-17 30 Cap 0    diclofenac (VOLTAREN) 1 % gel Apply 2 g to affected area four (4) times daily. Apply to hands qid 100 g 3    atorvastatin (LIPITOR) 10 mg tablet TAKE 1 TABLET DAILY 90 Tab 3    memantine-donepezil (NAMZARIC) 28-10 mg CSpX Take 1 Tab by mouth daily. 90 Cap 3    montelukast (SINGULAIR) 10 mg tablet Take 10 mg by mouth daily.  solifenacin (VESICARE) 10 mg tablet Take 1 Tab by mouth daily.  fluticasone (FLONASE) 50 mcg/actuation nasal spray 2 Sprays by Both Nostrils route daily. Allergy note June 2013.       albuterol (PROVENTIL VENTOLIN) 2.5 mg /3 mL (0.083 %) nebulizer solution 3 mL by Nebulization route every four (4) hours as needed for Wheezing. 24 Each 2    Nebulizer & Compressor machine 1 Each by Does Not Apply route every four (4) hours as needed. As directed  Indications: acute asthma with exacerbation 1 Each 0    POLYVINYL ALCOHOL (LIQUID TEARS OP) Apply  to eye.  budesonide-formoterol (SYMBICORT) 160-4.5 mcg/actuation HFA inhaler Take 2 Puffs by inhalation two (2) times a day. From Dr. Hayley Ceballos.  ASCORBATE CALCIUM (VITAMIN C PO) Take  by mouth.  VITAMIN B COMPLEX (B COMPLEX PO) Take  by mouth.  cholecalciferol, vitamin d3, (VITAMIN D) 1,000 unit tablet Take  by mouth daily. Allergies   Allergen Reactions    Latex Unknown (comments)    Dog Dander Shortness of Breath      Lab Results  Component Value Date/Time   GFR est non-AA >60 05/06/2017 09:45 AM   GFR est AA >60 05/06/2017 09:45 AM   Creatinine 0.70 05/06/2017 09:45 AM   BUN 11 05/06/2017 09:45 AM   Sodium 137 05/06/2017 09:45 AM   Potassium 4.1 05/06/2017 09:45 AM   Chloride 103 05/06/2017 09:45 AM   CO2 24 05/06/2017 09:45 AM   Magnesium 1.8 08/10/2011 05:30 PM        ROS    Physical Exam    ASSESSMENT and PLAN  Diagnoses and all orders for this visit:    1. Attention deficit hyperactivity disorder (ADHD), unspecified ADHD type  -     REFERRAL TO NEUROLOGY   Refilled vyvanse for 3 months   Reviewed     of pt will call in 3 month for another 90d refill       2. Memory loss   Mild , stable   Continue namzeric      Follow-up Disposition:  Return in about 6 months (around 2/7/2018) for adhd, memory loss.

## 2017-08-07 NOTE — MR AVS SNAPSHOT
Visit Information Date & Time Provider Department Dept. Phone Encounter #  
 8/7/2017  1:45 PM [facility], [address] [phone] [id number] Follow-up Instructions Return in about 6 months (around 2/7/2018) for adhd, memory loss. Your Appointments 10/12/2017  9:00 AM  
Follow Up with Ravin Simpson MD  
Neurology Clinic at [facility] [address]) Appt Note: FU,Dementia,CP,$0,adt,4/13/2017  
 [facility], 
[address], [address] [phone]  
[address], [address], [address] [address] Upcoming Health Maintenance Date Due ZOSTER VACCINE AGE 60> 11/20/1991 INFLUENZA AGE 9 TO ADULT 8/1/2017 MEDICARE YEARLY EXAM 1/19/2018 GLAUCOMA SCREENING Q2Y 3/16/2018 DTaP/Tdap/Td series (3 - Td) 10/22/2025 Allergies as of 8/7/2017  Review Complete On: 8/7/2017 By: Kassandra Sanchez LPN Severity Noted Reaction Type Reactions Latex  12/14/2013    Unknown (comments) Dog Dander  12/14/2013    Shortness of Breath Current Immunizations  Reviewed on 4/18/2016 Name Date Influenza High Dose Vaccine PF 10/22/2015 Influenza Vaccine 10/10/2014 Influenza Vaccine Split 10/17/2011  9:12 AM  
 Influenza Vaccine Whole 9/1/2010 Pneumococcal Conjugate (PCV-13) 10/22/2015 TDAP Vaccine 8/16/2010 Tdap 10/22/2015 ZZZ-RETIRED (DO NOT USE) Pneumococcal Vaccine (Unspecified Type) 1/10/2004 Not reviewed this visit You Were Diagnosed With   
  
 Codes Comments Attention deficit hyperactivity disorder (ADHD), unspecified ADHD type    -  Primary ICD-10-CM: F90.9 ICD-9-CM: 314.01 Memory loss     ICD-10-CM: R41.3 ICD-9-CM: 780.93 Vitals  BP Pulse Temp Height(growth percentile) Weight(growth percentile) SpO2  
 107/64 (BP 1 Location: Left arm, BP Patient Position: Sitting) 78 97 °F (36.1 °C) (Oral) 5' (1.524 m) 112 lb 6.4 oz (51 kg) 94% BMI OB Status Smoking Status 21.95 kg/m2 Hysterectomy Never Smoker BMI and BSA Data Body Mass Index Body Surface Area  
 21.95 kg/m 2 1.47 m 2 Preferred Pharmacy Pharmacy Name Phone [facility] [address] - [address], [address] [place] [address] [phone] Your Updated Medication List  
  
   
This list is accurate as of: 8/7/17  2:48 PM.  Always use your most recent med list.  
  
  
  
  
 albuterol 2.5 mg /3 mL (0.083 %) nebulizer solution Commonly known as:  PROVENTIL VENTOLIN  
3 mL by Nebulization route every four (4) hours as needed for Wheezing. atorvastatin 10 mg tablet Commonly known as:  LIPITOR  
TAKE 1 TABLET DAILY B COMPLEX PO Take  by mouth. diclofenac 1 % Gel Commonly known as:  VOLTAREN Apply 2 g to affected area four (4) times daily. Apply to hands qid FLONASE 50 mcg/actuation nasal spray Generic drug:  fluticasone 2 Sprays by Both Nostrils route daily. Allergy note June 2013. LIQUID TEARS OP Apply  to eye.  
  
 lisdexamfetamine 20 mg capsule Commonly known as:  VYVANSE Take 1 capsule daily--may fill 10-13-17  
  
 memantine-donepezil 28-10 mg Cspx Commonly known as:  [facility] Take 1 Tab by mouth daily. Nebulizer & Compressor machine 1 Each by Does Not Apply route every four (4) hours as needed. As directed  Indications: acute asthma with exacerbation SINGULAIR 10 mg tablet Generic drug:  montelukast  
Take 10 mg by mouth daily. SYMBICORT 160-4.5 mcg/actuation HFA inhaler Generic drug:  budesonide-formoterol Take 2 Puffs by inhalation two (2) times a day. From Dr. Mag Horner. VESIcare 10 mg tablet Generic drug:  solifenacin Take 1 Tab by mouth daily. VITAMIN C PO Take  by mouth. VITAMIN D3 1,000 unit tablet Generic drug:  cholecalciferol Take  by mouth daily. We Performed the Following REFERRAL TO NEUROLOGY [FJA03 Custom] Comments:  
 [place] [address], [address], [place], [address] [facility] Follow-up Instructions Return in about 6 months (around 2/7/2018) for adhd, memory loss. Referral Information Referral ID Referred By Referred To  
  
 [id number] [facility], [address] [place] [address] [address], [address] Phone: [phone] Fax: [phone] Visits Status Start Date End Date 1 New Request 8/7/17 8/7/18 If your referral has a status of pending review or denied, additional information will be sent to support the outcome of this decision. Introducing [facility]! Dear Sid Barber: Thank you for requesting a e-volo account. Our records indicate that you already have an active e-volo account. You can access your account anytime at [email or web address] Did you know that you can access your hospital and ER discharge instructions at any time in e-volo? You can also review all of your test results from your hospital stay or ER visit. Additional Information If you have questions, please visit the Frequently Asked Questions section of the e-volo website at [email or web address] Remember, e-volo is NOT to be used for urgent needs. For medical emergencies, dial 911. Now available from your iPhone and Android! Please provide this summary of care documentation to your next provider. Your primary care clinician is listed as Ajay PRESSLEY. If you have any questions after today's visit, please call [phone].

## 2017-09-27 ENCOUNTER — OFFICE VISIT (OUTPATIENT)
Dept: NEUROLOGY | Age: 82
End: 2017-09-27

## 2017-09-27 DIAGNOSIS — F02.80 ALZHEIMER'S TYPE DEMENTIA WITH LATE ONSET WITHOUT BEHAVIORAL DISTURBANCE (HCC): Primary | ICD-10-CM

## 2017-09-27 DIAGNOSIS — F98.8 ADD (ATTENTION DEFICIT DISORDER) WITHOUT HYPERACTIVITY: ICD-10-CM

## 2017-09-27 DIAGNOSIS — G30.1 ALZHEIMER'S TYPE DEMENTIA WITH LATE ONSET WITHOUT BEHAVIORAL DISTURBANCE (HCC): Primary | ICD-10-CM

## 2017-09-27 NOTE — PROGRESS NOTES
Name: Karmen Hwang  :  1932  MRN:  [id number]     PCP:  Zohra Gamez MD    Chief Complaint   Patient presents with    Memory Loss     HISTORY OF PRESENT ILLNESS: Pt here for f/u of dementia. Patient is doing well overall. She is now taking Namzeric. She is not having any issues with the medication. Patient reports her long-term memory is okay. She is slow to recall things. She reports that her  is very supportive and helpful with this. Patient is very active. She walks daily at [employer]. She has not gotten lost.  Patient is not driving. Her  drives her. She would like to drive, but we discussed reaction time probably is not good. Patient has not started any new medications since last visit. Patient does take Vyvanse for ADD. They are concerned about whether she needs this medication or not. They are also having issues getting it refilled at her previous office at [address] [place] [address]. Her PCP has discussed that he is willing to do the medication for them. Patient is only had one fall since last visit where she got tripped up by a tree root. Patient has had some issues with anger outbursts, but this is not often. Patient denies hallucinations. Recap:  She is a new patient for me today. Previously followed at [facility]. She started with [facility] in . They moved to [facility] in July of this year. Patient likes her new living arrangement. She says the people and nice and happy. They cook dinner for them. They eat breakfast and lunch in the apartment. She has gained some weight. She is getting involved in athletics. She has a schedule and this helps. Pt feels memory is okay. She has challenges with peoples names. She can recognize the face. Her  notes that she has been more forgetful since the move. She will forget going to activities and keeping a schedule. They wish they would have moved sooner.     She is on aricept and this was increased to 10mg in  prior to the move. She has just started on namenda 10mg BID and is on the titration pack at 5/10 at this point. She has had some crying spells in the evening. The patient doesn't recall telling her  about these events. Current Outpatient Prescriptions   Medication Sig    lisdexamfetamine (VYVANSE) 20 mg capsule Take 1 capsule daily--may fill 10-13-17    diclofenac (VOLTAREN) 1 % gel Apply 2 g to affected area four (4) times daily. Apply to hands qid    albuterol (PROVENTIL VENTOLIN) 2.5 mg /3 mL (0.083 %) nebulizer solution 3 mL by Nebulization route every four (4) hours as needed for Wheezing.  Nebulizer & Compressor machine 1 Each by Does Not Apply route every four (4) hours as needed. As directed  Indications: acute asthma with exacerbation    atorvastatin (LIPITOR) 10 mg tablet TAKE 1 TABLET DAILY    memantine-donepezil (NAMZARIC) 28-10 mg CSpX Take 1 Tab by mouth daily.  montelukast (SINGULAIR) 10 mg tablet Take 10 mg by mouth daily.  solifenacin (VESICARE) 10 mg tablet Take 1 Tab by mouth daily.  POLYVINYL ALCOHOL (LIQUID TEARS OP) Apply  to eye.  budesonide-formoterol (SYMBICORT) 160-4.5 mcg/actuation HFA inhaler Take 2 Puffs by inhalation two (2) times a day. From Dr. Bruce Spears.  ASCORBATE CALCIUM (VITAMIN C PO) Take  by mouth.  VITAMIN B COMPLEX (B COMPLEX PO) Take  by mouth.  cholecalciferol, vitamin d3, (VITAMIN D) 1,000 unit tablet Take  by mouth daily.  fluticasone (FLONASE) 50 mcg/actuation nasal spray 2 Sprays by Both Nostrils route daily. Allergy note June 2013. No current facility-administered medications for this visit. Allergies   Allergen Reactions    Latex Unknown (comments)    Dog Dander Shortness of Breath     Past Medical History:   Diagnosis Date    Asthma     Dr. Chantal Hall June 2013--doesn't see routinely--pt reports no change in allergies--no need for routine follow-up.   Dr. Bruce Spears sees for mild COPD--on Symbicort since ~2013. Continued Singulair.  Ceruminosis     periodic ear cleaning at [place] hearing    Chronic obstructive pulmonary disease ([facility] [address]     Encounter for screening colonoscopy ~    Normal per pt--[facility] records requested 2013 visit.  Fainting     Falls     Fatigue     [facility] loss[facility] left     [facility]    History of mammogram May 2014    Normal/no change.  History of shingles 2010    Dec 2014:  Pt reports clinical history and shingles vaccine 4yrs ago. To try to obtain exact date of zostavax from /source.  History of skin cancer     melanoma ?, BCC & SCC- followed by Dr. Alicea Shoulder- twice a yr appts. 2013-still on 6mo schedule for skin checks.  Memory disorder     Osteopenia     DEXA     Overactive bladder     detrol LA. Vesicare--sees [facility] Considering procedure for incontinence.  Postmenopausal hormone replacement therapy     since hysterectomy    Right shoulder pain 2013. Mild osteopenia; mild AC osteoarthritis. Sports Med Eval Oct 2013.  Well woman exam (no gynecological exam) Aug 3, 2012    \"[facility] Screening\":  Results scanned to chart. Updated 2014-no change except noted concern for GFR--see Dec 2014 note, letter as reviewed with pt. Past Surgical History:   Procedure Laterality Date    HX CATARACT REMOVAL      Donavan Webber - [facility]- last eye exam 2010    MULTIPLE DELIVERY        Social History     Social History    Marital status:      Spouse name: N/A    Number of children: N/A    Years of education: N/A     Occupational History    Not on file.      Social History Main Topics    Smoking status: Never Smoker    Smokeless tobacco: Never Used    Alcohol use 0.0 oz/week      Comment: 1-2 drinks/every evening ( wine/vodka)    Drug use: Yes     Special: Prescription, OTC    Sexual activity: No     Other Topics Concern    Not on file     Social History Narrative     Family History   Problem Relation Age of Onset   Gopi Yoder Dementia Father     Hypertension Mother     Stroke Mother     Cancer Other      daughter- breast cancer     Repeat neuropsych with progression to dementia from [facility]    PHYSICAL EXAMINATION:    Vitals stable  General: Well defined, nourished, and groomed individual in no acute distress. Neck: Supple, nontender, no bruits, no pain with resistance to active range of motion. Heart: Regular rate and rhythm, no murmurs, rub, or gallop. Normal S1S2. Lungs: Clear to auscultation bilaterally with equal chest expansion, no cough, no wheeze  Musculoskeletal: Extremities revealed no edema and had full range of motion of joints. Psych: Good mood and bright affect    NEUROLOGICAL EXAMINATION:   Mental Status: Alert and oriented to person, place, and time. She was unable to recall what for they came to. She was aware of current events. Cranial Nerves:   II, III, IV, VI: Visual acuity grossly intact. Visual fields are normal.   Pupils are equal, round, and reactive to light and accommodation. Extra-ocular movements are full and fluid. Fundoscopic exam was benign, no ptosis or nystagmus. V-XII: Hearing is grossly intact. Facial features are symmetric, with normal sensation and strength. The palate rises symmetrically and the tongue protrudes midline. Sternocleidomastoids 5/5. Motor Examination: Normal tone, bulk, and strength, 5/5 muscle strength throughout. Coordination: Finger to nose was normal. No resting or intention tremor  Gait and Station: Steady while walking. Normal arm swing. No pronator drift. No muscle wasting or fasiculations noted. Reflexes: DTRs 2+ throughout. ASSESSMENT AND PLAN   79 y/o female with dementia who is currently stable on Namzeric. She is also on Vyvanse for ADD. They question if this is needed. We did discuss other medication options. 1.  Continue Namzeric daily.   2.  Discussed irritability and potential for other medications including SSRI for this. 3.  Continue Vyvanse. Will send message to PCP to ask if he will fill this for them. Did discuss a trial going off of this medication and/or considering Strattera is a future alternative  4. Discussed neuropsych testing. Discussed that this could be repeated, but at this point I do not think it would change her management, so I am not sure that it is necessary. Will hold off on ordering this for now. Will reassess at follow-up. 5.  Encouraged patient to refrain from driving as she already is. FU in 6 months    Angel Jones MD  9/27/2017      Over 40 minutes was spent with the patient and family of which > 50% of the visit was spent counseling on diagnosis, management, and treatment of the diagnosis dementia    This note was created using voice recognition software. Despite editing, there may be syntax errors. This note will not be viewable in [address]

## 2017-09-27 NOTE — LETTER
Tamanna Samuel, Would you consider taking over the prescription for Ms. Sandhu's Vyvanse. We discussed today about potential of going off this medication or trying a non-stimulant variant, but for now family would like to continue current prescription. Please let me know if it will be an issue for you to fill it. Thanks Willam Claudia Patient states she feels she is doing well,  states his wife has had a slight decline in short term memory, patient does agree to this. He also states she gets more upset and her temperament is changing. Name:  Montana Huffman 
:  1932 MRN:  [id number] PCP:  Sandy Dsouza MD 
 
Chief Complaint Patient presents with  Memory Loss HISTORY OF PRESENT ILLNESS: Pt here for f/u of dementia. Patient is doing well overall. She is now taking Namzeric. She is not having any issues with the medication. Patient reports her long-term memory is okay. She is slow to recall things. She reports that her  is very supportive and helpful with this. Patient is very active. She walks daily at [employer]. She has not gotten lost. 
Patient is not driving. Her  drives her. She would like to drive, but we discussed reaction time probably is not good. Patient has not started any new medications since last visit. Patient does take Vyvanse for ADD. They are concerned about whether she needs this medication or not. They are also having issues getting it refilled at her previous office at [address] [place] [address]. Her PCP has discussed that he is willing to do the medication for them. Patient is only had one fall since last visit where she got tripped up by a tree root. Patient has had some issues with anger outbursts, but this is not often. Patient denies hallucinations. Recap: She is a new patient for me today. Previously followed at [facility]. She started with [facility] in .  They moved to [facility] in July of this year. Patient likes her new living arrangement. She says the people and nice and happy. They cook dinner for them. They eat breakfast and lunch in the apartment. She has gained some weight. She is getting involved in athletics. She has a schedule and this helps. Pt feels memory is okay. She has challenges with peoples names. She can recognize the face. Her  notes that she has been more forgetful since the move. She will forget going to activities and keeping a schedule. They wish they would have moved sooner. She is on aricept and this was increased to 10mg in June, prior to the move. She has just started on namenda 10mg BID and is on the titration pack at 5/10 at this point. She has had some crying spells in the evening. The patient doesn't recall telling her  about these events. Current Outpatient Prescriptions Medication Sig  
 lisdexamfetamine (VYVANSE) 20 mg capsule Take 1 capsule daily--may fill 10-13-17  diclofenac (VOLTAREN) 1 % gel Apply 2 g to affected area four (4) times daily. Apply to hands qid  albuterol (PROVENTIL VENTOLIN) 2.5 mg /3 mL (0.083 %) nebulizer solution 3 mL by Nebulization route every four (4) hours as needed for Wheezing.  Nebulizer & Compressor machine 1 Each by Does Not Apply route every four (4) hours as needed. As directed  Indications: acute asthma with exacerbation  atorvastatin (LIPITOR) 10 mg tablet TAKE 1 TABLET DAILY  memantine-donepezil (NAMZARIC) 28-10 mg CSpX Take 1 Tab by mouth daily.  montelukast (SINGULAIR) 10 mg tablet Take 10 mg by mouth daily.  solifenacin (VESICARE) 10 mg tablet Take 1 Tab by mouth daily.  POLYVINYL ALCOHOL (LIQUID TEARS OP) Apply  to eye.  budesonide-formoterol (SYMBICORT) 160-4.5 mcg/actuation HFA inhaler Take 2 Puffs by inhalation two (2) times a day. From Dr. Cindy Min.  ASCORBATE CALCIUM (VITAMIN C PO) Take  by mouth.  VITAMIN B COMPLEX (B COMPLEX PO) Take  by mouth.  cholecalciferol, vitamin d3, (VITAMIN D) 1,000 unit tablet Take  by mouth daily.  fluticasone (FLONASE) 50 mcg/actuation nasal spray 2 Sprays by Both Nostrils route daily. Allergy note June 2013. No current facility-administered medications for this visit. Allergies Allergen Reactions  Latex Unknown (comments)  Dog Dander Shortness of Breath Past Medical History:  
Diagnosis Date  Asthma Dr. Emmy Francis June 2013--doesn't see routinely--pt reports no change in allergies--no need for routine follow-up. Dr. Isha Self sees for mild COPD--on Symbicort since ~Nov 2013. Continued Singulair.  Ceruminosis   
 periodic ear cleaning at [address] [facility]  Chronic obstructive pulmonary disease ([address]  Encounter for screening colonoscopy ~2006 Normal per pt--[facility] records requested July 2013 visit.  Fainting [address]  Fatigue  [facility]  History of mammogram May 2014 Normal/no change.  History of shingles 2010 Dec 2014:  Pt reports clinical history and shingles vaccine 4yrs ago. To try to obtain exact date of zostavax from /source.  History of skin cancer   
 melanoma 2003?, BCC & SCC- followed by Dr. Marin Carry- twice a yr appts. July 2013-still on 6mo schedule for skin checks.  Memory disorder  Osteopenia DEXA 8/11  Overactive bladder   
 detrol LA. Vesicare--sees [facility]. Considering procedure for incontinence.  Postmenopausal hormone replacement therapy   
 since hysterectomy  Right shoulder pain Sept 2013. Mild osteopenia; mild AC osteoarthritis. Sports Med Eval Oct 2013.  Well woman exam (no gynecological exam) Aug 3, 2012 \"[facility] Screening\":  Results scanned to chart. Updated Nov 2014-no change except noted concern for GFR--see Dec 2014 note, letter as reviewed with pt. Past Surgical History:  
Procedure Laterality Date  HX CATARACT REMOVAL  2005 [address]  LASER SURGERY OF EYE Dr. Mateo Conklin- last eye exam 2010  MULTIPLE DELIVERY  Social History Social History  Marital status:  Spouse name: N/A  
 Number of children: N/A  
 Years of education: N/A Occupational History  Not on file. Social History Main Topics  Smoking status: Never Smoker  Smokeless tobacco: Never Used  Alcohol use 0.0 oz/week Comment: 1-2 drinks/every evening ( wine/vodka)  Drug use: Yes Special: Prescription, OTC  Sexual activity: No  
 
Other Topics Concern  Not on file Social History Narrative Family History Problem Relation Age of Onset  Dementia Father  Hypertension Mother  Stroke Mother  Cancer Other   
  daughter- breast cancer Repeat neuropsych with progression to dementia from [facility] PHYSICAL EXAMINATION:   
Vitals stable General: Well defined, nourished, and groomed individual in no acute distress. Neck: Supple, nontender, no bruits, no pain with resistance to active range of motion. Heart: Regular rate and rhythm, no murmurs, rub, or gallop. Normal S1S2. Lungs: Clear to auscultation bilaterally with equal chest expansion, no cough, no wheeze Musculoskeletal: Extremities revealed no edema and had full range of motion of joints. Psych: Good mood and bright affect NEUROLOGICAL EXAMINATION:  
Mental Status: Alert and oriented to person, place, and time. She was unable to recall what for they came to. She was aware of current events. Cranial Nerves:  
II, III, IV, VI: Visual acuity grossly intact. Visual fields are normal.  
Pupils are equal, round, and reactive to light and accommodation. Extra-ocular movements are full and fluid. Fundoscopic exam was benign, no ptosis or nystagmus. V-XII: Hearing is grossly intact. Facial features are symmetric, with normal sensation and strength.  The palate rises symmetrically and the tongue protrudes midline. Sternocleidomastoids 5/5. Motor Examination: Normal tone, bulk, and strength, 5/5 muscle strength throughout. Coordination: Finger to nose was normal. No resting or intention tremor Gait and Station: Steady while walking. Normal arm swing. No pronator drift. No muscle wasting or fasiculations noted. Reflexes: DTRs 2+ throughout. ASSESSMENT AND PLAN 
 79 y/o female with dementia who is currently stable on Namzeric. She is also on Vyvanse for ADD. They question if this is needed. We did discuss other medication options. 1.  Continue Namzeric daily. 2.  Discussed irritability and potential for other medications including SSRI for this. 3.  Continue Vyvanse. Will send message to PCP to ask if he will fill this for them. Did discuss a trial going off of this medication and/or considering Amanda Awa is a future alternative 4. Discussed neuropsych testing. Discussed that this could be repeated, but at this point I do not think it would change her management, so I am not sure that it is necessary. Will hold off on ordering this for now. Will reassess at follow-up. 5.  Encouraged patient to refrain from driving as she already is. FU in 6 months Juan José Gaines MD 
9/27/2017 Over 40 minutes was spent with the patient and family of which > 50% of the visit was spent counseling on diagnosis, management, and treatment of the diagnosis dementia This note was created using voice recognition software. Despite editing, there may be syntax errors. This note will not be viewable in [address]

## 2017-09-27 NOTE — MR AVS SNAPSHOT
Visit Information Date & Time Provider Department Dept. Phone Encounter #  
 9/27/2017 10:40 AM Pretty Smith MD [facility] Neurology [facility] [phone] [id number] Your Appointments 10/12/2017  9:00 AM  
Follow Up with Hilario Haines MD  
Neurology [facility] at [facility] [address]) Appt Note: FU,Dementia,CP,$0,adt,4/13/2017  
 [facility], 
[address] [place] [address], [address] [phone]  
[address] [facility], [address] [place] [address], [address] [phone] Upcoming Health Maintenance Date Due ZOSTER VACCINE AGE 60> 11/20/1991 INFLUENZA AGE 9 TO ADULT 8/1/2017 MEDICARE YEARLY EXAM 1/19/2018 GLAUCOMA SCREENING Q2Y 3/16/2018 DTaP/Tdap/Td series (3 - Td) 10/22/2025 Allergies as of 9/27/2017  Review Complete On: 9/27/2017 By: Gilberto Lara Severity Noted Reaction Type Reactions Latex  12/14/2013    Unknown (comments) Dog Dander  12/14/2013    Shortness of Breath Current Immunizations  Reviewed on 4/18/2016 Name Date Influenza High Dose Vaccine PF 10/22/2015 Influenza Vaccine 10/10/2014 Influenza Vaccine Split 10/17/2011  9:12 AM  
 Influenza Vaccine Whole 9/1/2010 Pneumococcal Conjugate (PCV-13) 10/22/2015 TDAP Vaccine 8/16/2010 Tdap 10/22/2015 ZZZ-RETIRED (DO NOT USE) Pneumococcal Vaccine (Unspecified Type) 1/10/2004 Not reviewed this visit Vitals OB Status Smoking Status Hysterectomy Never Smoker Preferred Pharmacy Pharmacy Name Phone [phone], [address] AT [place] [address] [phone] Your Updated Medication List  
  
   
This list is accurate as of: 9/27/17 11:37 AM.  Always use your most recent med list.  
  
  
  
  
 albuterol 2.5 mg /3 mL (0.083 %) nebulizer solution Commonly known as:  PROVENTIL VENTOLIN  
 3 mL by Nebulization route every four (4) hours as needed for Wheezing. atorvastatin 10 mg tablet Commonly known as:  LIPITOR  
TAKE 1 TABLET DAILY B COMPLEX PO Take  by mouth. diclofenac 1 % Gel Commonly known as:  VOLTAREN Apply 2 g to affected area four (4) times daily. Apply to hands qid FLONASE 50 mcg/actuation nasal spray Generic drug:  fluticasone 2 Sprays by Both Nostrils route daily. Allergy note June 2013. LIQUID TEARS OP Apply  to eye.  
  
 lisdexamfetamine 20 mg capsule Commonly known as:  VYVANSE Take 1 capsule daily--may fill 10-13-17  
  
 memantine-donepezil 28-10 mg Cspx Commonly known as:  MOTION PICTURE [facility] Take 1 Tab by mouth daily. Nebulizer & Compressor machine 1 Each by Does Not Apply route every four (4) hours as needed. As directed  Indications: acute asthma with exacerbation SINGULAIR 10 mg tablet Generic drug:  montelukast  
Take 10 mg by mouth daily. SYMBICORT 160-4.5 mcg/actuation Hfaa Generic drug:  budesonide-formoterol Take 2 Puffs by inhalation two (2) times a day. From Dr. Mag Horner. VESIcare 10 mg tablet Generic drug:  solifenacin Take 1 Tab by mouth daily. VITAMIN C PO Take  by mouth. VITAMIN D3 1,000 unit tablet Generic drug:  cholecalciferol Take  by mouth daily. Patient Instructions PRESCRIPTION REFILL POLICY [facility] Statement to Patients April 1, 2014 In an effort to ensure the large volume of patient prescription refills is processed in the most efficient and expeditious manner, we are asking our patients to assist us by calling your Pharmacy for all prescription refills, this will include also your  Mail Order Pharmacy. The pharmacy will contact our office electronically to continue the refill process. Please do not wait until the last minute to call your pharmacy.  We need at least 48 hours (2days) to fill prescriptions. We also encourage you to call your pharmacy before going to  your prescription to make sure it is ready. With regard to controlled substance prescription refill requests (narcotic refills) that need to be picked up at our office, we ask your cooperation by providing us with at least 72 hours (3days) notice that you will need a refill. We will not refill narcotic prescription refill requests after 4:00pm on any weekday, Monday through Thursday, or after 2:00pm on Fridays, or on the weekends. We encourage everyone to explore another way of getting your prescription refill request processed using Wildfang, our patient web portal through our electronic medical record system. Wildfang is an efficient and effective way to communicate your medication request directly to the office and  downloadable as an arcelia on your smart phone . Wildfang also features a review functionality that allows you to view your medication list as well as leave messages for your physician. Are you ready to get connected? If so please review the attatched instructions or speak to any of our staff to get you set up right away! Thank you so much for your cooperation. Should you have any questions please contact our Practice Administrator. The Physicians and Staff,  [facility] [address] What is a living will? A living will is a legal form you use to write down the kind of care you want at the end of your life. It is used by the health professionals who will treat you if you aren't able to decide for yourself. If you put your wishes in writing, your loved ones and others will know what kind of care you want. They won't need to guess. This can ease your mind and be helpful to others. A living will is not the same as an estate or property will. An estate will explains what you want to happen with your money and property after you die. Is a living will a legal document? A living will is a legal document. Each state has its own laws about living bee. If you move to another state, make sure that your living will is legal in the state where you now live. Or you might use a universal form that has been approved by many states. This kind of form can sometimes be completed and stored online. Your electronic copy will then be available wherever you have a connection to the Internet. In most cases, doctors will respect your wishes even if you have a form from a different state. · You don't need an  to complete a living will. But legal advice can be helpful if your state's laws are unclear, your health history is complicated, or your family can't agree on what should be in your living will. · You can change your living will at any time. Some people find that their wishes about end-of-life care change as their health changes. · In addition to making a living will, think about completing a medical power of  form. This form lets you name the person you want to make end-of-life treatment decisions for you (your \"health care agent\") if you're not able to. Many hospitals and nursing homes will give you the forms you need to complete a living will and a medical power of . · Your living will is used only if you can't make or communicate decisions for yourself anymore. If you become able to make decisions again, you can accept or refuse any treatment, no matter what you wrote in your living will. · Your state may offer an online registry. This is a place where you can store your living will online so the doctors and nurses who need to treat you can find it right away. What should you think about when creating a living will? Talk about your end-of-life wishes with your family members and your doctor. Let them know what you want. That way the people making decisions for you won't be surprised by your choices. Think about these questions as you make your living will: · Do you know enough about life support methods that might be used? If not, talk to your doctor so you know what might be done if you can't breathe on your own, your heart stops, or you're unable to swallow. · What things would you still want to be able to do after you receive life-support methods? Would you want to be able to walk? To speak? To eat on your own? To live without the help of machines? · If you have a choice, where do you want to be cared for? In your home? At a hospital or nursing home? · Do you want certain Yazidi practices performed if you become very ill? · If you have a choice at the end of your life, where would you prefer to die? At home? In a hospital or nursing home? Somewhere else? · Would you prefer to be buried or cremated? · Do you want your organs to be donated after you die? What should you do with your living will? · Make sure that your family members and your health care agent have copies of your living will. · Give your doctor a copy of your living will to keep in your medical record. If you have more than one doctor, make sure that each one has a copy. · You may want to put a copy of your living will where it can be easily found. Where can you learn more? Go to http://jelani-joyce.info/. Enter K703 in the search box to learn more about \"Learning About Living Guera. \" Current as of: August 8, 2016 Content Version: 11.3 © 5210-0263 [employer] Technologies. Care instructions adapted under license by [employer] (which disclaims liability or warranty for this information). If you have questions about a medical condition or this instruction, always ask your healthcare professional. [address] any warranty or liability for your use of this information. Advance Directives: Care Instructions Your Care Instructions An advance directive is a legal way to state your wishes at the end of your life. It tells your family and your doctor what to do if you can no longer say what you want. There are two main types of advance directives. You can change them any time that your wishes change. · A living will tells your family and your doctor your wishes about life support and other treatment. · A durable power of  for health care lets you name a person to make treatment decisions for you when you can't speak for yourself. This person is called a health care agent. If you do not have an advance directive, decisions about your medical care may be made by a doctor or a  who doesn't know you. It may help to think of an advance directive as a gift to the people who care for you. If you have one, they won't have to make tough decisions by themselves. Follow-up care is a key part of your treatment and safety. Be sure to make and go to all appointments, and call your doctor if you are having problems. It's also a good idea to know your test results and keep a list of the medicines you take. How can you care for yourself at home? · Discuss your wishes with your loved ones and your doctor. This way, there are no surprises. · Many states have a unique form. Or you might use a universal form that has been approved by many states. This kind of form can sometimes be completed and stored online. Your electronic copy will then be available wherever you have a connection to the Internet. In most cases, doctors will respect your wishes even if you have a form from a different state. · You don't need a  to do an advance directive. But you may want to get legal advice. · Think about these questions when you prepare an advance directive: ¨ Who do you want to make decisions about your medical care if you are not able to? Many people choose a family member or close friend. ¨ Do you know enough about life support methods that might be used? If not, talk to your doctor so you understand. ¨ What are you most afraid of that might happen? You might be afraid of having pain, losing your independence, or being kept alive by machines. ¨ Where would you prefer to die? Choices include your home, a hospital, or a nursing home. ¨ Would you like to have information about hospice care to support you and your family? ¨ Do you want to donate organs when you die? ¨ Do you want certain Taoism practices performed before you die? If so, put your wishes in the advance directive. · Read your advance directive every year, and make changes as needed. When should you call for help? Be sure to contact your doctor if you have any questions. Where can you learn more? Go to http://jelani-joyce.info/. Enter R264 in the search box to learn more about \"Advance Directives: Care Instructions. \" Current as of: November 17, 2016 Content Version: 11.3 © 4064-6782 [employer]. Care instructions adapted under license by [employer] (which disclaims liability or warranty for this information). If you have questions about a medical condition or this instruction, always ask your healthcare professional. [place] [address] any warranty or liability for your use of this information. Introducing [facility] & HEALTH SERVICES! Dear Helen Rodriguez: Thank you for requesting a [employer] account. Our records indicate that you already have an active [employer] account. You can access your account anytime at [email or web address] Did you know that you can access your hospital and ER discharge instructions at any time in [employer]? You can also review all of your test results from your hospital stay or ER visit. Additional Information If you have questions, please visit the Frequently Asked Questions section of the [employer] website at https://[place]. [employer]. "ivi[employer] [email or web address]."/mychart/. Remember, [employer] is NOT to be used for urgent needs. For medical emergencies, dial 911. Now available from your iPhone and Android! Please provide this summary of care documentation to your next provider. Your primary care clinician is listed as Isma PRESSLEY. If you have any questions after today's visit, please call [phone].

## 2017-09-27 NOTE — PATIENT INSTRUCTIONS
[address] [facility]   Statement to Patients  April 1, 2014      In an effort to ensure the large volume of patient prescription refills is processed in the most efficient and expeditious manner, we are asking our patients to assist us by calling your Pharmacy for all prescription refills, this will include also your  Mail Order Pharmacy. The pharmacy will contact our office electronically to continue the refill process. Please do not wait until the last minute to call your pharmacy. We need at least 48 hours (2days) to fill prescriptions. We also encourage you to call your pharmacy before going to  your prescription to make sure it is ready. With regard to controlled substance prescription refill requests (narcotic refills) that need to be picked up at our office, we ask your cooperation by providing us with at least 72 hours (3days) notice that you will need a refill. We will not refill narcotic prescription refill requests after 4:00pm on any weekday, Monday through Thursday, or after 2:00pm on Fridays, or on the weekends. We encourage everyone to explore another way of getting your prescription refill request processed using Zooomr, our patient web portal through our electronic medical record system. Zooomr is an efficient and effective way to communicate your medication request directly to the office and  downloadable as an arcelia on your smart phone . Zooomr also features a review functionality that allows you to view your medication list as well as leave messages for your physician. Are you ready to get connected? If so please review the attatched instructions or speak to any of our staff to get you set up right away! Thank you so much for your cooperation. Should you have any questions please contact our Practice Administrator. The Physicians and Staff,  [facility] Neurology [id number] [address]  What is a living will?   A living will is a legal form you use to write down the kind of care you want at the end of your life. It is used by the health professionals who will treat you if you aren't able to decide for yourself. If you put your wishes in writing, your loved ones and others will know what kind of care you want. They won't need to guess. This can ease your mind and be helpful to others. A living will is not the same as an estate or property will. An estate will explains what you want to happen with your money and property after you die. Is a living will a legal document? A living will is a legal document. Each state has its own laws about living bee. If you move to another state, make sure that your living will is legal in the state where you now live. Or you might use a universal form that has been approved by many states. This kind of form can sometimes be completed and stored online. Your electronic copy will then be available wherever you have a connection to the Internet. In most cases, doctors will respect your wishes even if you have a form from a different state. · You don't need an  to complete a living will. But legal advice can be helpful if your state's laws are unclear, your health history is complicated, or your family can't agree on what should be in your living will. · You can change your living will at any time. Some people find that their wishes about end-of-life care change as their health changes. · In addition to making a living will, think about completing a medical power of  form. This form lets you name the person you want to make end-of-life treatment decisions for you (your \"health care agent\") if you're not able to. Many hospitals and nursing homes will give you the forms you need to complete a living will and a medical power of . · Your living will is used only if you can't make or communicate decisions for yourself anymore.  If you become able to make decisions again, you can accept or refuse any treatment, no matter what you wrote in your living will. · Your state may offer an online registry. This is a place where you can store your living will online so the doctors and nurses who need to treat you can find it right away. What should you think about when creating a living will? Talk about your end-of-life wishes with your family members and your doctor. Let them know what you want. That way the people making decisions for you won't be surprised by your choices. Think about these questions as you make your living will:  · Do you know enough about life support methods that might be used? If not, talk to your doctor so you know what might be done if you can't breathe on your own, your heart stops, or you're unable to swallow. · What things would you still want to be able to do after you receive life-support methods? Would you want to be able to walk? To speak? To eat on your own? To live without the help of machines? · If you have a choice, where do you want to be cared for? In your home? At a hospital or nursing home? · Do you want certain Jain practices performed if you become very ill? · If you have a choice at the end of your life, where would you prefer to die? At home? In a hospital or nursing home? Somewhere else? · Would you prefer to be buried or cremated? · Do you want your organs to be donated after you die? What should you do with your living will? · Make sure that your family members and your health care agent have copies of your living will. · Give your doctor a copy of your living will to keep in your medical record. If you have more than one doctor, make sure that each one has a copy. · You may want to put a copy of your living will where it can be easily found. Where can you learn more? Go to http://jelani-joyce.info/. Enter B850 in the search box to learn more about \"Learning About Living Genesis Dates. \"  Current as of: August 8, 2016  Content Version: 11.3  © 0045-5246 RollCall (roll.to). Care instructions adapted under license by [employer] (which disclaims liability or warranty for this information). If you have questions about a medical condition or this instruction, always ask your healthcare professional. [facility] any warranty or liability for your use of this information. Advance Directives: Care Instructions  Your Care Instructions  An advance directive is a legal way to state your wishes at the end of your life. It tells your family and your doctor what to do if you can no longer say what you want. There are two main types of advance directives. You can change them any time that your wishes change. · A living will tells your family and your doctor your wishes about life support and other treatment. · A durable power of  for health care lets you name a person to make treatment decisions for you when you can't speak for yourself. This person is called a health care agent. If you do not have an advance directive, decisions about your medical care may be made by a doctor or a  who doesn't know you. It may help to think of an advance directive as a gift to the people who care for you. If you have one, they won't have to make tough decisions by themselves. Follow-up care is a key part of your treatment and safety. Be sure to make and go to all appointments, and call your doctor if you are having problems. It's also a good idea to know your test results and keep a list of the medicines you take. How can you care for yourself at home? · Discuss your wishes with your loved ones and your doctor. This way, there are no surprises. · Many states have a unique form. Or you might use a universal form that has been approved by many states. This kind of form can sometimes be completed and stored online.  Your electronic copy will then be available wherever you have a connection to the Internet. In most cases, doctors will respect your wishes even if you have a form from a different state. · You don't need a  to do an advance directive. But you may want to get legal advice. · Think about these questions when you prepare an advance directive:  ¨ Who do you want to make decisions about your medical care if you are not able to? Many people choose a family member or close friend. ¨ Do you know enough about life support methods that might be used? If not, talk to your doctor so you understand. ¨ What are you most afraid of that might happen? You might be afraid of having pain, losing your independence, or being kept alive by machines. ¨ Where would you prefer to die? Choices include your home, a hospital, or a nursing home. ¨ Would you like to have information about hospice care to support you and your family? ¨ Do you want to donate organs when you die? ¨ Do you want certain Advent practices performed before you die? If so, put your wishes in the advance directive. · Read your advance directive every year, and make changes as needed. When should you call for help? Be sure to contact your doctor if you have any questions. Where can you learn more? Go to http://jelani-joyce.info/. Enter R264 in the search box to learn more about \"Advance Directives: Care Instructions. \"  Current as of: November 17, 2016  Content Version: 11.3  © 1689-6342 [employer]. Care instructions adapted under license by [employer] (which disclaims liability or warranty for this information). If you have questions about a medical condition or this instruction, always ask your healthcare professional. [address] any warranty or liability for your use of this information.

## 2017-09-27 NOTE — PROGRESS NOTES
Patient states she feels she is doing well,  states his wife has had a slight decline in short term memory, patient does agree to this. He also states she gets more upset and her temperament is changing.

## 2017-10-31 ENCOUNTER — TELEPHONE (OUTPATIENT)
Dept: INTERNAL MEDICINE CLINIC | Age: 82
End: 2017-10-31

## 2017-10-31 NOTE — TELEPHONE ENCOUNTER
Patient's , Luiz Haas, requests a kathy back to get the name of a New Urologist in the [place] area as patient's current doctor has moved out of the area & patient needs a New specialist. Please call to advise.  Thank you

## 2017-10-31 NOTE — TELEPHONE ENCOUNTER
Advised patients  that he could call [facility] and schedule with them.  Number given

## 2017-11-20 DIAGNOSIS — F43.23 ADJUSTMENT DISORDER WITH MIXED ANXIETY AND DEPRESSED MOOD: ICD-10-CM

## 2017-12-02 ENCOUNTER — HOSPITAL ENCOUNTER (EMERGENCY)
Age: 82
Discharge: HOME | End: 2017-12-02
Attending: EMERGENCY MEDICINE
Payer: MEDICARE

## 2017-12-02 VITALS
OXYGEN SATURATION: 100 % | BODY MASS INDEX: 22.58 KG/M2 | HEART RATE: 83 BPM | HEIGHT: 59 IN | RESPIRATION RATE: 16 BRPM | SYSTOLIC BLOOD PRESSURE: 154 MMHG | TEMPERATURE: 97.5 F | WEIGHT: 111.99 LBS | DIASTOLIC BLOOD PRESSURE: 64 MMHG

## 2017-12-02 DIAGNOSIS — R30.0 DYSURIA: Primary | ICD-10-CM

## 2017-12-02 LAB
ALBUMIN SERPL-MCNC: 3.1 G/DL (ref 3.5–5)
ALBUMIN/GLOB SERPL: 0.9 {RATIO} (ref 1.1–2.2)
ALP SERPL-CCNC: 67 U/L (ref 45–117)
ALT SERPL-CCNC: 33 U/L (ref 12–78)
ANION GAP SERPL CALC-SCNC: 7 MMOL/L (ref 5–15)
APPEARANCE UR: CLEAR
AST SERPL-CCNC: 24 U/L (ref 15–37)
BACTERIA URNS QL MICRO: NEGATIVE /HPF
BASOPHILS # BLD: 0 K/UL (ref 0–0.1)
BASOPHILS NFR BLD: 1 % (ref 0–1)
BILIRUB SERPL-MCNC: 0.3 MG/DL (ref 0.2–1)
BILIRUB UR QL: NEGATIVE
BUN SERPL-MCNC: 11 MG/DL (ref 6–20)
BUN/CREAT SERPL: 16 (ref 12–20)
CALCIUM SERPL-MCNC: 8.9 MG/DL (ref 8.5–10.1)
CHLORIDE SERPL-SCNC: 106 MMOL/L (ref 97–108)
CLUE CELLS VAG QL WET PREP: NORMAL
CO2 SERPL-SCNC: 29 MMOL/L (ref 21–32)
COLOR UR: NORMAL
CREAT SERPL-MCNC: 0.7 MG/DL (ref 0.55–1.02)
EOSINOPHIL # BLD: 0.2 K/UL (ref 0–0.4)
EOSINOPHIL NFR BLD: 4 % (ref 0–7)
EPITH CASTS URNS QL MICRO: NORMAL /LPF
ERYTHROCYTE [DISTWIDTH] IN BLOOD BY AUTOMATED COUNT: 12.4 % (ref 11.5–14.5)
GLOBULIN SER CALC-MCNC: 3.4 G/DL (ref 2–4)
GLUCOSE SERPL-MCNC: 94 MG/DL (ref 65–100)
GLUCOSE UR STRIP.AUTO-MCNC: NEGATIVE MG/DL
HCT VFR BLD AUTO: 39.1 % (ref 35–47)
HGB BLD-MCNC: 12.9 G/DL (ref 11.5–16)
HGB UR QL STRIP: NEGATIVE
HYALINE CASTS URNS QL MICRO: NORMAL /LPF (ref 0–5)
KETONES UR QL STRIP.AUTO: NEGATIVE MG/DL
KOH PREP SPEC: NORMAL
LEUKOCYTE ESTERASE UR QL STRIP.AUTO: NEGATIVE
LYMPHOCYTES # BLD: 1.2 K/UL (ref 0.8–3.5)
LYMPHOCYTES NFR BLD: 26 % (ref 12–49)
MCH RBC QN AUTO: 32.2 PG (ref 26–34)
MCHC RBC AUTO-ENTMCNC: 33 G/DL (ref 30–36.5)
MCV RBC AUTO: 97.5 FL (ref 80–99)
MONOCYTES # BLD: 0.4 K/UL (ref 0–1)
MONOCYTES NFR BLD: 9 % (ref 5–13)
NEUTS SEG # BLD: 2.9 K/UL (ref 1.8–8)
NEUTS SEG NFR BLD: 60 % (ref 32–75)
NITRITE UR QL STRIP.AUTO: NEGATIVE
PH UR STRIP: 8 [PH] (ref 5–8)
PLATELET # BLD AUTO: 249 K/UL (ref 150–400)
POTASSIUM SERPL-SCNC: 4.1 MMOL/L (ref 3.5–5.1)
PROT SERPL-MCNC: 6.5 G/DL (ref 6.4–8.2)
PROT UR STRIP-MCNC: NEGATIVE MG/DL
RBC # BLD AUTO: 4.01 M/UL (ref 3.8–5.2)
RBC #/AREA URNS HPF: NORMAL /HPF (ref 0–5)
SERVICE CMNT-IMP: NORMAL
SODIUM SERPL-SCNC: 142 MMOL/L (ref 136–145)
SP GR UR REFRACTOMETRY: 1.01 (ref 1–1.03)
T VAGINALIS VAG QL WET PREP: NORMAL
UA: UC IF INDICATED,UAUC: NORMAL
UROBILINOGEN UR QL STRIP.AUTO: 0.2 EU/DL (ref 0.2–1)
WBC # BLD AUTO: 4.8 K/UL (ref 3.6–11)
WBC URNS QL MICRO: NORMAL /HPF (ref 0–4)

## 2017-12-02 PROCEDURE — 80053 COMPREHEN METABOLIC PANEL: CPT | Performed by: EMERGENCY MEDICINE

## 2017-12-02 PROCEDURE — 87086 URINE CULTURE/COLONY COUNT: CPT | Performed by: EMERGENCY MEDICINE

## 2017-12-02 PROCEDURE — 99284 EMERGENCY DEPT VISIT MOD MDM: CPT

## 2017-12-02 PROCEDURE — 85025 COMPLETE CBC W/AUTO DIFF WBC: CPT | Performed by: EMERGENCY MEDICINE

## 2017-12-02 PROCEDURE — 81001 URINALYSIS AUTO W/SCOPE: CPT | Performed by: EMERGENCY MEDICINE

## 2017-12-02 PROCEDURE — 87210 SMEAR WET MOUNT SALINE/INK: CPT | Performed by: EMERGENCY MEDICINE

## 2017-12-02 PROCEDURE — 36415 COLL VENOUS BLD VENIPUNCTURE: CPT | Performed by: EMERGENCY MEDICINE

## 2017-12-02 NOTE — DISCHARGE INSTRUCTIONS
Painful Urination (Dysuria): Care Instructions  Your Care Instructions  Burning pain with urination (dysuria) is a common symptom of a urinary tract infection or other urinary problems. The bladder may become inflamed. This can cause pain when the bladder fills and empties. You may also feel pain if the tube that carries urine from the bladder to the outside of the body (urethra) gets irritated or infected. Sexually transmitted infections (STIs) also may cause pain when you urinate. Sometimes the pain can be caused by things other than an infection. The urethra can be irritated by soaps, perfumes, or foreign objects in the urethra. Kidney stones can cause pain when they pass through the urethra. The cause may be hard to find. You may need tests. Treatment for painful urination depends on the cause. Follow-up care is a key part of your treatment and safety. Be sure to make and go to all appointments, and call your doctor if you are having problems. It's also a good idea to know your test results and keep a list of the medicines you take. How can you care for yourself at home? · Drink extra water for the next day or two. This will help make the urine less concentrated. (If you have kidney, heart, or liver disease and have to limit fluids, talk with your doctor before you increase the amount of fluids you drink.)  · Avoid drinks that are carbonated or have caffeine. They can irritate the bladder. · Urinate often. Try to empty your bladder each time. For women:  · Urinate right after you have sex. · After going to the bathroom, wipe from front to back. · Avoid douches, bubble baths, and feminine hygiene sprays. And avoid other feminine hygiene products that have deodorants. When should you call for help? Call your doctor now or seek immediate medical care if:  ? · You have new symptoms, such as fever, nausea, or vomiting. ? · You have new or worse symptoms of a urinary problem.  For example:  Dawna Lefort have blood or pus in your urine. ¨ You have chills or body aches. ¨ It hurts worse to urinate. ¨ You have groin or belly pain. ¨ You have pain in your back just below your rib cage (the flank area). ? Watch closely for changes in your health, and be sure to contact your doctor if you have any problems. Where can you learn more? Go to http://jelani-joyce.info/. Enter K822 in the search box to learn more about \"Painful Urination (Dysuria): Care Instructions. \"  Current as of: May 12, 2017  Content Version: 11.4  © 8975-6651 [employer]. Care instructions adapted under license by [employer] (which disclaims liability or warranty for this information). If you have questions about a medical condition or this instruction, always ask your healthcare professional. [address] any warranty or liability for your use of this information.

## 2017-12-02 NOTE — ED PROVIDER NOTES
EMERGENCY DEPARTMENT HISTORY AND PHYSICAL EXAM      Date: 12/2/2017  Patient Name: Danyelle Loo    History of Presenting Illness     Chief Complaint   Patient presents with    Urinary Frequency     Pt. complains of urinary frequency and burning that started during the night. History Provided By: Patient    HPI: Danyelle Loo, 80 y.o. female with PMHx significant for COPD, overactive bladder, presents ambulatory to the ED with cc of two days of gradually worsening mild to moderate dysuria, described as a burning pain, with associated urinary frequency, lower back aching, c/w past UTIs. Patient states her sxs are typically responsive to abx. She specifically denies fever, vomiting, flank pain, abd pain, changes in PO intake, cough, chest pain, abd distension. Her pmhx is not significant for CKD, pelvic reconstructive surgery, chemotherapy or radiation therapy. Patient takes Vesicare qam.       PCP: Jsoefina Livingston MD    There are no other complaints, changes, or physical findings at this time. Current Outpatient Prescriptions   Medication Sig Dispense Refill    lisdexamfetamine (VYVANSE) 20 mg capsule Earliest Fill Date: 11/20/17. Take 1 capsule daily--may fill 10-13-17 30 Cap 0    [START ON 12/18/2017] lisdexamfetamine (VYVANSE) 20 mg capsule Take 1 Cap (20 mg total) by mouth dailyEarliest Fill Date: 12/18/17. Max Daily Amount: 20 mg 30 Cap 0    [START ON 1/19/2018] lisdexamfetamine (VYVANSE) 20 mg capsule Take 1 Cap (20 mg total) by mouth dailyEarliest Fill Date: 1/19/18. Max Daily Amount: 20 mg 30 Cap 0    diclofenac (VOLTAREN) 1 % gel Apply 2 g to affected area four (4) times daily. Apply to hands qid 100 g 3    albuterol (PROVENTIL VENTOLIN) 2.5 mg /3 mL (0.083 %) nebulizer solution 3 mL by Nebulization route every four (4) hours as needed for Wheezing. 24 Each 2    Nebulizer & Compressor machine 1 Each by Does Not Apply route every four (4) hours as needed.  As directed  Indications: acute asthma with exacerbation 1 Each 0    atorvastatin (LIPITOR) 10 mg tablet TAKE 1 TABLET DAILY 90 Tab 3    memantine-donepezil (NAMZARIC) 28-10 mg CSpX Take 1 Tab by mouth daily. 90 Cap 3    montelukast (SINGULAIR) 10 mg tablet Take 10 mg by mouth daily.  solifenacin (VESICARE) 10 mg tablet Take 1 Tab by mouth daily.  POLYVINYL ALCOHOL (LIQUID TEARS OP) Apply  to eye.  budesonide-formoterol (SYMBICORT) 160-4.5 mcg/actuation HFA inhaler Take 2 Puffs by inhalation two (2) times a day. From Dr. Renetta German.  fluticasone (FLONASE) 50 mcg/actuation nasal spray 2 Sprays by Both Nostrils route daily. Allergy note June 2013.  ASCORBATE CALCIUM (VITAMIN C PO) Take  by mouth.  VITAMIN B COMPLEX (B COMPLEX PO) Take  by mouth.  cholecalciferol, vitamin d3, (VITAMIN D) 1,000 unit tablet Take  by mouth daily. Past History     Past Medical History:  Past Medical History:   Diagnosis Date    Asthma     Dr. Carmen Bagley June 2013--doesn't see routinely--pt reports no change in allergies--no need for routine follow-up. Dr. Renetta German sees for mild COPD--on Symbicort since ~Nov 2013. Continued Singulair.  Ceruminosis     [facility]    Chronic obstructive pulmonary disease ([facility] [address]     Encounter for screening colonoscopy ~2006    Normal per pt--[facility] records requested July 2013 visit.  Fainting     Falls     Fatigue     Hearing loss, left     va hearing    History of mammogram May 2014    Normal/no change.  History of shingles 2010    Dec 2014:  Pt reports clinical history and shingles vaccine 4yrs ago. To try to obtain exact date of zostavax from /source.  History of skin cancer     melanoma 2003?, BCC & SCC- followed by Dr. Kathryn Galeana- twice a yr appts. July 2013-still on 6mo schedule for skin checks.  Memory disorder     Osteopenia     DEXA 8/11    Overactive bladder     detrol [place]. Vesicare--sees Urology Spring/Fall.   Considering procedure for incontinence.  Postmenopausal hormone replacement therapy     since hysterectomy    Right shoulder pain 2013. Mild osteopenia; mild AC osteoarthritis. Sports Med Eval Oct 2013.  Well woman exam (no gynecological exam) Aug 3, 2012    \"[facility] Screening\":  Results scanned to chart. Updated 2014-no change except noted concern for GFR--see Dec 2014 note, letter as reviewed with pt. Past Surgical History:  Past Surgical History:   Procedure Laterality Date    HX CATARACT REMOVAL      Preethi Hassan - [address] [place] [address]- last eye exam 2010    MULTIPLE DELIVERY          Family History:  Family History   Problem Relation Age of Onset   Darlene Bijal Dementia Father     Hypertension Mother     Stroke Mother     Cancer Other      daughter- breast cancer       Social History:  Social History   Substance Use Topics    Smoking status: Never Smoker    Smokeless tobacco: Never Used    Alcohol use 0.0 oz/week      Comment: 1-2 drinks/every evening ( wine/vodka)       Allergies: Allergies   Allergen Reactions    Latex Unknown (comments)    Dog Dander Shortness of Breath         Review of Systems   Review of Systems   Constitutional: Negative for appetite change, chills and fever. HENT: Negative for congestion. Eyes: Negative for visual disturbance. Respiratory: Negative for cough and chest tightness. Cardiovascular: Negative for chest pain and leg swelling. Gastrointestinal: Negative for abdominal distention, abdominal pain, diarrhea, nausea and vomiting. Endocrine: Negative for polyuria. Genitourinary: Positive for dysuria and frequency. Negative for flank pain. Musculoskeletal: Positive for back pain. Negative for myalgias. Skin: Negative for color change. Allergic/Immunologic: Negative for immunocompromised state. Neurological: Negative for numbness.        Physical Exam   Physical Exam   Nursing note and vitals reviewed. General appearance: non-toxic  Eyes: PERRL, conjunctiva normal, anicteric sclera  HEENT: mucous membranes slightly tacky, oropharynx is clear  Pulmonary: clear to auscultation bilaterally  Cardiac: normal rate and regular rhythm, no murmurs, gallops, or rubs, 2+DP pulses, 2+ radial pulses  Abdomen: soft, minimally tender in suprapubic region, nondistended, bowel sounds present, no CVAT  : Nml appearing external. No discharge. MSK: no pre-tibial edema, no step off or focal TTP  Neuro: Alert, answers questions appropriately  Skin: capillary refill brisk    Diagnostic Study Results     Labs -     Recent Results (from the past 12 hour(s))   URINALYSIS W/ REFLEX CULTURE    Collection Time: 12/02/17  8:33 AM   Result Value Ref Range    Color YELLOW/STRAW      Appearance CLEAR CLEAR      Specific gravity 1.013 1.003 - 1.030      pH (UA) 8.0 5.0 - 8.0      Protein NEGATIVE  NEG mg/dL    Glucose NEGATIVE  NEG mg/dL    Ketone NEGATIVE  NEG mg/dL    Bilirubin NEGATIVE  NEG      Blood NEGATIVE  NEG      Urobilinogen 0.2 0.2 - 1.0 EU/dL    Nitrites NEGATIVE  NEG      Leukocyte Esterase NEGATIVE  NEG      WBC 0-4 0 - 4 /hpf    RBC 0-5 0 - 5 /hpf    Epithelial cells FEW FEW /lpf    Bacteria NEGATIVE  NEG /hpf    UA:UC IF INDICATED CULTURE NOT INDICATED BY UA RESULT CNI      Hyaline cast 0-2 0 - 5 /lpf   CBC WITH AUTOMATED DIFF    Collection Time: 12/02/17  9:12 AM   Result Value Ref Range    WBC 4.8 3.6 - 11.0 K/uL    RBC 4.01 3.80 - 5.20 M/uL    HGB 12.9 11.5 - 16.0 g/dL    HCT 39.1 35.0 - 47.0 %    MCV 97.5 80.0 - 99.0 FL    MCH 32.2 26.0 - 34.0 PG    MCHC 33.0 30.0 - 36.5 g/dL    RDW 12.4 11.5 - 14.5 %    PLATELET 732 278 - 055 K/uL    NEUTROPHILS 60 32 - 75 %    LYMPHOCYTES 26 12 - 49 %    MONOCYTES 9 5 - 13 %    EOSINOPHILS 4 0 - 7 %    BASOPHILS 1 0 - 1 %    ABS. NEUTROPHILS 2.9 1.8 - 8.0 K/UL    ABS. LYMPHOCYTES 1.2 0.8 - 3.5 K/UL    ABS. MONOCYTES 0.4 0.0 - 1.0 K/UL    ABS.  EOSINOPHILS 0.2 0.0 - 0.4 K/UL ABS. BASOPHILS 0.0 0.0 - 0.1 K/UL   METABOLIC PANEL, COMPREHENSIVE    Collection Time: 12/02/17  9:12 AM   Result Value Ref Range    Sodium 142 136 - 145 mmol/L    Potassium 4.1 3.5 - 5.1 mmol/L    Chloride 106 97 - 108 mmol/L    CO2 29 21 - 32 mmol/L    Anion gap 7 5 - 15 mmol/L    Glucose 94 65 - 100 mg/dL    BUN 11 6 - 20 MG/DL    Creatinine 0.70 0.55 - 1.02 MG/DL    BUN/Creatinine ratio 16 12 - 20      GFR est AA >60 >60 ml/min/1.73m2    GFR est non-AA >60 >60 ml/min/1.73m2    Calcium 8.9 8.5 - 10.1 MG/DL    Bilirubin, total 0.3 0.2 - 1.0 MG/DL    ALT (SGPT) 33 12 - 78 U/L    AST (SGOT) 24 15 - 37 U/L    Alk. phosphatase 67 45 - 117 U/L    Protein, total 6.5 6.4 - 8.2 g/dL    Albumin 3.1 (L) 3.5 - 5.0 g/dL    Globulin 3.4 2.0 - 4.0 g/dL    A-G Ratio 0.9 (L) 1.1 - 2.2     KOH, OTHER SOURCES    Collection Time: 12/02/17 11:10 AM   Result Value Ref Range    Special Requests: NO SPECIAL REQUESTS      KOH NO YEAST SEEN     WET PREP    Collection Time: 12/02/17 11:10 AM   Result Value Ref Range    Clue cells CLUE CELLS ABSENT      Wet prep NO TRICHOMONAS SEEN         Radiologic Studies -   No orders to display       Medical Decision Making   I am the first provider for this patient. I reviewed the vital signs, available nursing notes, past medical history, past surgical history, family history and social history. Vital Signs-Reviewed the patient's vital signs. Patient Vitals for the past 12 hrs:   Temp Pulse Resp BP SpO2   12/02/17 0829 97.5 °F (36.4 °C) 83 16 154/64 100 %       Pulse Oximetry Analysis - 100% on RA    Cardiac Monitor:   Rate: 80 bpm  Rhythm: Normal Sinus Rhythm     Records Reviewed: Nursing Notes and Old Medical Records    Provider Notes (Medical Decision Making):   DDx: cystitis, bladder spasm, lower suspicion for pyelonephritis, no signs of systemic toxicity.  Doubt GI or other intra-abd pathology    Work up largely negative; suspicious for bladder spasm given chronic use of vesicare; no other signs of serious pathology; imaging not indicated at this point as pt essentially has no symptoms on re-examination. ED Course:   Initial assessment performed. The patients presenting problems have been discussed, and they are in agreement with the care plan formulated and outlined with them. I have encouraged them to ask questions as they arise throughout their visit. 8:43 AM  Patient was offered a dose of pain medication. Patient declines analgesics at this time. 10:05 AM  On re-examination, pt has no abd pain on palpation. Updated and counseled on negative UA, addressed questions, updated on plan to perform external vaginal exam. Patient expresses understanding and agrees with plan. Awaiting labs. Written by Radha Hodges ED Scribe, as dictated by Porfirio Barker MD.     Procedure Note - Pelvic Exam:    11:08 AM   Performed by: Porfirio Barker MD  Chaperoned by: clementina Mitchell  External pelvic exam performed. Further findings noted in physical exam.   The procedure took 1-15 minutes, and pt tolerated well. 11:08 AM  Of note, pt recently voided. No sxs, no burning. Pt declines any imaging at this time as she has no pain or symptoms. Will discharge and f/u swabs. Written by Radha Hodges ED Scribe, as dictated by Porfirio Barker MD.       Critical Care Time: none    Disposition:    Discharge Note:  11:19 AM  The pt is ready for discharge. The pt's signs, symptoms, diagnosis, and discharge instructions have been discussed and pt has conveyed their understanding. The pt is to follow up as recommended or return to ER should their symptoms worsen. Plan has been discussed and pt is in agreement. PLAN:  1. Discharge Medication List as of 12/2/2017 11:18 AM      CONTINUE these medications which have NOT CHANGED    Details   !! lisdexamfetamine (VYVANSE) 20 mg capsule Earliest Fill Date: 11/20/17.   Take 1 capsule daily--may fill 10-13-17, Print, Disp-30 Cap, R-0      !! lisdexamfetamine (VYVANSE) 20 mg capsule Take 1 Cap (20 mg total) by mouth dailyEarliest Fill Date: 12/18/17. Max Daily Amount: 20 mg, Print, Disp-30 Cap, R-0      !! lisdexamfetamine (VYVANSE) 20 mg capsule Take 1 Cap (20 mg total) by mouth dailyEarliest Fill Date: 1/19/18. Max Daily Amount: 20 mg, Print, Disp-30 Cap, R-0      diclofenac (VOLTAREN) 1 % gel Apply 2 g to affected area four (4) times daily. Apply to hands qid, Normal, Disp-100 g, R-3      albuterol (PROVENTIL VENTOLIN) 2.5 mg /3 mL (0.083 %) nebulizer solution 3 mL by Nebulization route every four (4) hours as needed for Wheezing., Normal, Disp-24 Each, R-2      Nebulizer & Compressor machine 1 Each by Does Not Apply route every four (4) hours as needed. As directed  Indications: acute asthma with exacerbation, Print, Disp-1 Each, R-0      atorvastatin (LIPITOR) 10 mg tablet TAKE 1 TABLET DAILY, Normal, Disp-90 Tab, R-3      memantine-donepezil (NAMZARIC) 28-10 mg CSpX Take 1 Tab by mouth daily. , Normal, Disp-90 Cap, R-3      montelukast (SINGULAIR) 10 mg tablet Take 10 mg by mouth daily. , Historical Med      solifenacin (VESICARE) 10 mg tablet Take 1 Tab by mouth daily. , Historical Med      POLYVINYL ALCOHOL (LIQUID TEARS OP) Apply  to eye., Historical Med      budesonide-formoterol (SYMBICORT) 160-4.5 mcg/actuation HFA inhaler Take 2 Puffs by inhalation two (2) times a day. From Dr. Lane Potter., Historical Med      fluticasone (FLONASE) 50 mcg/actuation nasal spray 2 Sprays by Both Nostrils route daily. Allergy note June 2013., Historical Med      ASCORBATE CALCIUM (VITAMIN C PO) Take  by mouth., Historical Med      VITAMIN B COMPLEX (B COMPLEX PO) Take  by mouth., Historical Med      cholecalciferol, vitamin d3, (VITAMIN D) 1,000 unit tablet Take  by mouth daily. , Historical Med       !! - Potential duplicate medications found. Please discuss with provider.         2.   Follow-up Information     Follow up With Details Comments Contact Info    Jelani Horne MD Schedule an appointment as soon as possible for a visit in 2 days  [address] [facility] [address]. [phone]      [facility] EMERGENCY DEPT Go in 1 day If symptoms worsen [address] [place] [address]  [phone]        Return to ED if worse     Diagnosis     Clinical Impression:   1. Dysuria        Attestations: This note is prepared by Yesika Cullen, acting as Scribe for Beto Camarena MD.       The scribe's documentation has been prepared under my direction and personally reviewed by me in its entirety. I confirm that the note above accurately reflects all work, treatment, procedures, and medical decision making performed by me.

## 2017-12-03 LAB
BACTERIA SPEC CULT: NORMAL
CC UR VC: NORMAL
SERVICE CMNT-IMP: NORMAL

## 2018-02-12 DIAGNOSIS — E78.5 DYSLIPIDEMIA: Primary | ICD-10-CM

## 2018-02-12 RX ORDER — ATORVASTATIN CALCIUM 10 MG/1
10 TABLET, FILM COATED ORAL DAILY
Qty: 90 TAB | Refills: 3 | Status: SHIPPED | OUTPATIENT
Start: 2018-02-12 | End: 2018-02-20 | Stop reason: SDUPTHER

## 2018-02-12 RX ORDER — DICLOFENAC SODIUM 10 MG/G
2 GEL TOPICAL 4 TIMES DAILY
Qty: 100 G | Refills: 3 | Status: SHIPPED | OUTPATIENT
Start: 2018-02-12 | End: 2020-02-26

## 2018-02-12 NOTE — TELEPHONE ENCOUNTER
Requested Prescriptions     Pending Prescriptions Disp Refills    atorvastatin (LIPITOR) 10 mg tablet 90 Tab 3     Sig: Take 1 Tab by mouth daily. PCP: Tina Salinas MD    Last appt: 8/7/2017  Future Appointments  Date Time Provider Fred Leonei   3/1/2018 2:40 PM Vesta Busby MD [address]   3/26/2018 9:40 AM Reshma Jalloh MD [address] [facility]       Requested Prescriptions     Pending Prescriptions Disp Refills    atorvastatin (LIPITOR) 10 mg tablet 90 Tab 3     Sig: Take 1 Tab by mouth daily.

## 2018-02-14 ENCOUNTER — TELEPHONE (OUTPATIENT)
Dept: INTERNAL MEDICINE CLINIC | Age: 83
End: 2018-02-14

## 2018-02-14 ENCOUNTER — OFFICE VISIT (OUTPATIENT)
Dept: INTERNAL MEDICINE CLINIC | Age: 83
End: 2018-02-14

## 2018-02-14 VITALS
BODY MASS INDEX: 22.78 KG/M2 | HEART RATE: 80 BPM | SYSTOLIC BLOOD PRESSURE: 152 MMHG | DIASTOLIC BLOOD PRESSURE: 61 MMHG | HEIGHT: 59 IN | WEIGHT: 113 LBS | OXYGEN SATURATION: 98 % | TEMPERATURE: 97.8 F

## 2018-02-14 DIAGNOSIS — R05.9 COUGH: Primary | ICD-10-CM

## 2018-02-14 RX ORDER — DICLOFENAC SODIUM 10 MG/G
2 GEL TOPICAL 4 TIMES DAILY
Qty: 100 G | Refills: 11 | Status: SHIPPED | OUTPATIENT
Start: 2018-02-14 | End: 2018-06-01 | Stop reason: SDUPTHER

## 2018-02-14 RX ORDER — ALBUTEROL SULFATE 90 UG/1
1 AEROSOL, METERED RESPIRATORY (INHALATION)
Qty: 3 INHALER | Refills: 3 | Status: SHIPPED | OUTPATIENT
Start: 2018-02-14

## 2018-02-14 RX ORDER — LEVOFLOXACIN 500 MG/1
500 TABLET, FILM COATED ORAL DAILY
Qty: 10 TAB | Refills: 0 | Status: SHIPPED | OUTPATIENT
Start: 2018-02-14 | End: 2018-06-01 | Stop reason: ALTCHOICE

## 2018-02-14 NOTE — MR AVS SNAPSHOT
[address] [facility] 
[phone] Patient: Holger Shabazz MRN: [id number] Visit Information Date & Time Provider Department Dept. Phone Encounter #  
 2/14/2018 11:45 AM Siri Sanderson, [address] [place] [address],[address] Floor [phone] [id number] Follow-up Instructions Return if symptoms worsen or fail to improve. Your Appointments 3/1/2018  2:40 PM  
Follow Up with Louis Mccollum MD  
[facility]) Appt Note: memory loss [place] [address] [facility] 99 [id number] [phone]  
  
   
 [place] [address] [place] [phone]  
  
    
 3/26/2018  9:40 AM  
Follow Up with Aleta Jacobs MD  
[facility] at [facility] Appt Note: FU,Dementia,CP,$0,adt,4/13/2017; FU,Dementia,CP,$0,adt,4/13/2017  
 [address] [place] [address], 
Merged with [facility], [address] [phone]  
[address] [facility], [address] [place] [address], [address] [place] [address] [address] [phone] Upcoming Health Maintenance Date Due ZOSTER VACCINE AGE 60> 11/20/1991 Influenza Age 5 to Adult 8/1/2017 MEDICARE YEARLY EXAM 1/19/2018 GLAUCOMA SCREENING Q2Y 3/16/2018 DTaP/Tdap/Td series (3 - Td) 10/22/2025 Allergies as of 2/14/2018  Review Complete On: 2/14/2018 By: Hank August LPN Severity Noted Reaction Type Reactions Latex  12/14/2013    Unknown (comments) Dog Dander  12/14/2013    Shortness of Breath Current Immunizations  Reviewed on 4/18/2016 Name Date Influenza High Dose Vaccine PF 10/22/2015 Influenza Vaccine 10/10/2014 Influenza Vaccine Split 10/17/2011  9:12 AM  
 Influenza Vaccine Whole 9/1/2010 Pneumococcal Conjugate (PCV-13) 10/22/2015 TDAP Vaccine 8/16/2010 Tdap 10/22/2015 ZZZ-RETIRED (DO NOT USE) Pneumococcal Vaccine (Unspecified Type) 1/10/2004 Not reviewed this visit You Were Diagnosed With   
  
 Codes Comments Cough    -  Primary ICD-10-CM: N96 ICD-9-CM: 983. 2 Vitals BP Pulse Temp Height(growth percentile) Weight(growth percentile) SpO2  
 152/61 (BP 1 Location: Left arm, BP Patient Position: Sitting) 80 97.8 °F (36.6 °C) (Oral) 4' 11\" (1.499 m) 113 lb (51.3 kg) 98% BMI OB Status Smoking Status 22.82 kg/m2 Hysterectomy Never Smoker BMI and BSA Data Body Mass Index Body Surface Area  
 22.82 kg/m 2 1.46 m 2 Preferred Pharmacy Pharmacy Name Phone [phone], [address] AT [place] [address] [phone] Your Updated Medication List  
  
   
This list is accurate as of: 2/14/18 12:17 PM.  Always use your most recent med list.  
  
  
  
  
 albuterol 2.5 mg /3 mL (0.083 %) nebulizer solution Commonly known as:  PROVENTIL VENTOLIN  
3 mL by Nebulization route every four (4) hours as needed for Wheezing. atorvastatin 10 mg tablet Commonly known as:  LIPITOR Take 1 Tab by mouth daily. B COMPLEX PO Take  by mouth. diclofenac 1 % Gel Commonly known as:  VOLTAREN Apply 2 g to affected area four (4) times daily. Apply to hands qid FLONASE 50 mcg/actuation nasal spray Generic drug:  fluticasone 2 Sprays by Both Nostrils route daily. Allergy note June 2013. levoFLOXacin 500 mg tablet Commonly known as:  Marli Asters Take 1 Tab by mouth daily. LIQUID TEARS OP Apply  to eye.  
  
 lisdexamfetamine 20 mg capsule Commonly known as:  VYVANSE Earliest Fill Date: 11/20/17. Take 1 capsule daily--may fill 10-13-17  
  
 memantine-donepezil 28-10 mg Cspx Commonly known as:  [employer] [facility] Take 1 Tab by mouth daily. Nebulizer & Compressor machine 1 Each by Does Not Apply route every four (4) hours as needed. As directed  Indications: acute asthma with exacerbation SINGULAIR 10 mg tablet Generic drug:  montelukast  
Take 10 mg by mouth daily. SYMBICORT 160-4.5 mcg/actuation [facility] Generic drug:  budesonide-formoterol Take 2 Puffs by inhalation two (2) times a day. From Dr. Alfred Benavides. VESIcare 10 mg tablet Generic drug:  solifenacin Take 1 Tab by mouth daily. VITAMIN C PO Take  by mouth. VITAMIN D3 1,000 unit tablet Generic drug:  cholecalciferol Take  by mouth daily. Prescriptions Sent to Pharmacy Refills  
 levoFLOXacin (LEVAQUIN) 500 mg tablet 0 Sig: Take 1 Tab by mouth daily. Class: Normal  
 Pharmacy: [facility] [address] [place] [address] #: [phone] Route: Oral  
  
Follow-up Instructions Return if symptoms worsen or fail to improve. To-Do List   
 02/14/2018 Imaging:  XR CHEST PA LAT Introducing [facility] SERVICES! Dear Jay Pelletier: Thank you for requesting a [employer] account. Our records indicate that you already have an active [employer] account. You can access your account anytime at [email or web address] Did you know that you can access your hospital and ER discharge instructions at any time in [employer]? You can also review all of your test results from your hospital stay or ER visit. Additional Information If you have questions, please visit the Frequently Asked Questions section of the [employer] website at [email or web address]. Remember, [employer] is NOT to be used for urgent needs. For medical emergencies, dial 911. Now available from your iPhone and Android! Please provide this summary of care documentation to your next provider. Your primary care clinician is listed as Edith PRESSLEY.  If you have any questions after today's visit, please call [phone].

## 2018-02-14 NOTE — PROGRESS NOTES
HISTORY OF PRESENT ILLNESS  Ny Espinal is a 80 y.o. female. HPI     Here with   C/o productive cough, some wheezes and fatigue for 3-5 d  No fever or myalgias but some chills  On symbicort for asthma  Decreased appetitie and more weak today  No sob at rest  Patient Active Problem List    Diagnosis Date Noted    ADD (attention deficit disorder) 01/18/2017    Hyperlipidemia 07/20/2015    Mild cognitive impairment without memory loss 02/02/2015    Generalized anxiety disorder 02/02/2015    TMJ (temporomandibular joint disorder) 12/21/2011    Syncope and collapse 08/10/2011    Fever, unspecified 08/10/2011    Syncope and collapse 08/10/2011    History of skin cancer     Asthma     Hearing loss, left     Ceruminosis     Postmenopausal hormone replacement therapy     Overactive bladder      Current Outpatient Prescriptions   Medication Sig Dispense Refill    levoFLOXacin (LEVAQUIN) 500 mg tablet Take 1 Tab by mouth daily. 10 Tab 0    diclofenac (VOLTAREN) 1 % gel Apply 2 g to affected area four (4) times daily. 100 g 11    atorvastatin (LIPITOR) 10 mg tablet Take 1 Tab by mouth daily. 90 Tab 3    lisdexamfetamine (VYVANSE) 20 mg capsule Earliest Fill Date: 11/20/17. Take 1 capsule daily--may fill 10-13-17 30 Cap 0    albuterol (PROVENTIL VENTOLIN) 2.5 mg /3 mL (0.083 %) nebulizer solution 3 mL by Nebulization route every four (4) hours as needed for Wheezing. 24 Each 2    Nebulizer & Compressor machine 1 Each by Does Not Apply route every four (4) hours as needed. As directed  Indications: acute asthma with exacerbation 1 Each 0    memantine-donepezil (NAMZARIC) 28-10 mg CSpX Take 1 Tab by mouth daily. 90 Cap 3    montelukast (SINGULAIR) 10 mg tablet Take 10 mg by mouth daily.  solifenacin (VESICARE) 10 mg tablet Take 1 Tab by mouth daily.  POLYVINYL ALCOHOL (LIQUID TEARS OP) Apply  to eye.       budesonide-formoterol (SYMBICORT) 160-4.5 mcg/actuation HFA inhaler Take 2 Puffs by inhalation two (2) times a day. From Dr. Kesha Blunt.  ASCORBATE CALCIUM (VITAMIN C PO) Take  by mouth.  cholecalciferol, vitamin d3, (VITAMIN D) 1,000 unit tablet Take  by mouth daily.  diclofenac (VOLTAREN) 1 % gel Apply 2 g to affected area four (4) times daily. Apply to hands qid 100 g 3    fluticasone (FLONASE) 50 mcg/actuation nasal spray 2 Sprays by Both Nostrils route daily. Allergy note June 2013.  VITAMIN B COMPLEX (B COMPLEX PO) Take  by mouth. Allergies   Allergen Reactions    Latex Unknown (comments)    Dog Dander Shortness of Breath      Lab Results  Component Value Date/Time   Glucose 94 12/02/2017 09:12 AM   LDL, calculated 94 10/18/2016 11:41 AM   Creatinine 0.70 12/02/2017 09:12 AM      Lab Results  Component Value Date/Time   Cholesterol, total 204 (H) 10/18/2016 11:41 AM   HDL Cholesterol 95 10/18/2016 11:41 AM   LDL, calculated 94 10/18/2016 11:41 AM   Triglyceride 74 10/18/2016 11:41 AM   CHOL/HDL Ratio 2.4 08/31/2010 10:48 AM     Lab Results  Component Value Date/Time   GFR est non-AA >60 12/02/2017 09:12 AM   GFR est AA >60 12/02/2017 09:12 AM   Creatinine 0.70 12/02/2017 09:12 AM   BUN 11 12/02/2017 09:12 AM   Sodium 142 12/02/2017 09:12 AM   Potassium 4.1 12/02/2017 09:12 AM   Chloride 106 12/02/2017 09:12 AM   CO2 29 12/02/2017 09:12 AM   Magnesium 1.8 08/10/2011 05:30 PM        ROS    Physical Exam   Constitutional: She appears well-developed and well-nourished. Appears stated age   Cardiovascular: Normal rate, regular rhythm and normal heart sounds. Exam reveals no gallop and no friction rub. No murmur heard. Pulmonary/Chest: Effort normal. No respiratory distress. She has wheezes. Abdominal: Soft. Bowel sounds are normal.   Musculoskeletal: She exhibits no edema. Neurological: She is alert. Psychiatric: She has a normal mood and affect. Nursing note and vitals reviewed. ASSESSMENT and PLAN  Diagnoses and all orders for this visit:    1. Cough  -     XR CHEST PA LAT; Future   levaquin rx   mucinex   Continue symbicort   To call or return if not improving  Other orders  -     levoFLOXacin (LEVAQUIN) 500 mg tablet; Take 1 Tab by mouth daily. -     diclofenac (VOLTAREN) 1 % gel; Apply 2 g to affected area four (4) times daily. Follow-up Disposition:  Return if symptoms worsen or fail to improve.

## 2018-02-14 NOTE — TELEPHONE ENCOUNTER
#[phone] , Phylicia Brunor states pt has a bad cold and asthma. She is having a hard time breathing and needs to be seen. Please call right back. Thanks.

## 2018-02-14 NOTE — TELEPHONE ENCOUNTER
Spoke with patient after 2 patient identifiers being note and advised of appt with Dr. Blanca Montez on Wednesday, February 14, 2018 11:45 AM, patient accepted. Patient expressed understanding and has no further questions at this time.

## 2018-02-20 DIAGNOSIS — E78.5 DYSLIPIDEMIA: ICD-10-CM

## 2018-02-20 RX ORDER — ATORVASTATIN CALCIUM 10 MG/1
10 TABLET, FILM COATED ORAL DAILY
Qty: 90 TAB | Refills: 3 | Status: SHIPPED | OUTPATIENT
Start: 2018-02-20 | End: 2019-07-19 | Stop reason: SDUPTHER

## 2018-02-20 NOTE — TELEPHONE ENCOUNTER
Stephany Lennox, spouse, is requesting that a Rx for Atorvastatin 90 day supply with 3 refills  be sent to [facility] today as opposed to the one that was sent to [facility] in error     Best contact [phone] or [phone]       Message received & copied from [facility]

## 2018-02-26 DIAGNOSIS — F90.9 ATTENTION DEFICIT HYPERACTIVITY DISORDER (ADHD), UNSPECIFIED ADHD TYPE: Primary | ICD-10-CM

## 2018-02-26 DIAGNOSIS — F43.23 ADJUSTMENT DISORDER WITH MIXED ANXIETY AND DEPRESSED MOOD: ICD-10-CM

## 2018-03-01 ENCOUNTER — OFFICE VISIT (OUTPATIENT)
Dept: NEUROLOGY | Age: 83
End: 2018-03-01

## 2018-03-01 VITALS
RESPIRATION RATE: 20 BRPM | DIASTOLIC BLOOD PRESSURE: 64 MMHG | HEIGHT: 59 IN | SYSTOLIC BLOOD PRESSURE: 110 MMHG | BODY MASS INDEX: 22.78 KG/M2 | WEIGHT: 113 LBS

## 2018-03-01 DIAGNOSIS — G30.1 ALZHEIMER'S TYPE DEMENTIA WITH LATE ONSET WITHOUT BEHAVIORAL DISTURBANCE (HCC): Primary | ICD-10-CM

## 2018-03-01 DIAGNOSIS — F43.23 ADJUSTMENT DISORDER WITH MIXED ANXIETY AND DEPRESSED MOOD: ICD-10-CM

## 2018-03-01 DIAGNOSIS — F98.8 ADD (ATTENTION DEFICIT DISORDER) WITHOUT HYPERACTIVITY: ICD-10-CM

## 2018-03-01 DIAGNOSIS — F02.80 ALZHEIMER'S TYPE DEMENTIA WITH LATE ONSET WITHOUT BEHAVIORAL DISTURBANCE (HCC): Primary | ICD-10-CM

## 2018-03-01 NOTE — PROGRESS NOTES
Name: Keith Hwang  :  1932  MRN:  [id number]     PCP:  Maria M Esqueda MD    Chief Complaint   Patient presents with    Memory Loss     HISTORY OF PRESENT ILLNESS: Pt here for f/u of dementia. Since last visit patient did have to be hospitalized for bronchitis. Her short-term memory has progressively worsened. She and her  question if that has something to do with her drinking 1 alcoholic beverage plus a glass of wine at dinner every night. Patient reports that she will forget events that occurred hours previous. Overall patient's mood is stable. She continues on Vyvanse for focusing and this is working well. She does have some trouble remembering to take medications in the morning. Her  wants her to take them right away but she wants to take them with food and then will subsequently forget to take them at times. She has started on Myrbetriq for frequency and this is helping. Patient is reading a lot and follows the story okay. She is active and walks the cord was outside when she can. She does live at [facility]. Generally she sleeps well with no issues. Occasionally she will wake up around 3 AM and had trouble falling asleep after this, but it is not typical.    Recap:  Patient is doing well overall. She is now taking Namzeric. She is not having any issues with the medication. Patient reports her long-term memory is okay. She is slow to recall things. She reports that her  is very supportive and helpful with this. Patient is very active. She walks daily at [facility]. She has not gotten lost.  Patient is not driving. Her  drives her. She would like to drive, but we discussed reaction time probably is not good. Patient has not started any new medications since last visit. Patient does take Vyvanse for ADD. They are concerned about whether she needs this medication or not.   They are also having issues getting it refilled at her previous office at [address] [place] [address]. Her PCP has discussed that he is willing to do the medication for them. Patient is only had one fall since last visit where she got tripped up by a tree root. Patient has had some issues with anger outbursts, but this is not often. Patient denies hallucinations. Current Outpatient Prescriptions   Medication Sig    MIRABEGRON (MYRBETRIQ PO) Take  by mouth.  lisdexamfetamine (VYVANSE) 20 mg capsule Take 1 Cap (20 mg total) by mouth dailyIndications: Attention-Deficit Hyperactivity Disorder Earliest Fill Date: 2/26/18. Take 1 capsule daily Max Daily Amount: 20 mg    [START ON 3/23/2018] lisdexamfetamine (VYVANSE) 20 mg capsule Take 1 Cap (20 mg total) by mouth dailyIndications: Attention-Deficit Hyperactivity Disorder Earliest Fill Date: 3/23/18. Max Daily Amount: 20 mg    [START ON 4/23/2018] lisdexamfetamine (VYVANSE) 20 mg capsule Take 1 Cap (20 mg total) by mouth dailyIndications: Attention-Deficit Hyperactivity Disorder Earliest Fill Date: 4/23/18. Max Daily Amount: 20 mg    atorvastatin (LIPITOR) 10 mg tablet Take 1 Tab by mouth daily.  diclofenac (VOLTAREN) 1 % gel Apply 2 g to affected area four (4) times daily.  albuterol (PROAIR HFA) 90 mcg/actuation inhaler Take 1 Puff by inhalation every four (4) hours as needed for Wheezing or Shortness of Breath. Indications: Acute Asthma Attack    diclofenac (VOLTAREN) 1 % gel Apply 2 g to affected area four (4) times daily. Apply to hands qid    albuterol (PROVENTIL VENTOLIN) 2.5 mg /3 mL (0.083 %) nebulizer solution 3 mL by Nebulization route every four (4) hours as needed for Wheezing.  Nebulizer & Compressor machine 1 Each by Does Not Apply route every four (4) hours as needed. As directed  Indications: acute asthma with exacerbation    memantine-donepezil (NAMZARIC) 28-10 mg CSpX Take 1 Tab by mouth daily.  montelukast (SINGULAIR) 10 mg tablet Take 10 mg by mouth daily.     solifenacin (VESICARE) 10 mg tablet Take 1 Tab by mouth daily.  budesonide-formoterol (SYMBICORT) 160-4.5 mcg/actuation HFA inhaler Take 2 Puffs by inhalation two (2) times a day. From Dr. Edison Walker.  fluticasone (FLONASE) 50 mcg/actuation nasal spray 2 Sprays by Both Nostrils route daily. Allergy note June 2013.  ASCORBATE CALCIUM (VITAMIN C PO) Take  by mouth.  cholecalciferol, vitamin d3, (VITAMIN D) 1,000 unit tablet Take  by mouth daily.  levoFLOXacin (LEVAQUIN) 500 mg tablet Take 1 Tab by mouth daily.  POLYVINYL ALCOHOL (LIQUID TEARS OP) Apply  to eye.  VITAMIN B COMPLEX (B COMPLEX PO) Take  by mouth. No current facility-administered medications for this visit. Allergies   Allergen Reactions    Latex Unknown (comments)    Dog Dander Shortness of Breath     Past Medical History:   Diagnosis Date    Asthma     Dr. Lanie Cordoba June 2013--doesn't see routinely--pt reports no change in allergies--no need for routine follow-up. Dr. Edison Walker sees for mild COPD--on Symbicort since ~Nov 2013. Continued Singulair.  Ceruminosis     periodic ear cleaning at [facility]    Chronic obstructive pulmonary disease ([facility] [address]     Encounter for screening colonoscopy ~2006    Normal per pt--[facility] records requested July 2013 visit.  Fainting     Falls     Fatigue     Hearing loss, left     va hearing    History of mammogram May 2014    Normal/no change.  History of shingles 2010    Dec 2014:  Pt reports clinical history and shingles vaccine 4yrs ago. To try to obtain exact date of zostavax from /source.  History of skin cancer     melanoma 2003?, BCC & SCC- followed by Dr. Christelle Ferrera- twice a yr appts. July 2013-still on 6mo schedule for skin checks.  Memory disorder     Osteopenia     DEXA 8/11    Overactive bladder     detrol LA. [facility]--sees Urology Spring/Fall. Considering procedure for incontinence.     Postmenopausal hormone replacement therapy     since hysterectomy    Right shoulder pain Sept .    Mild osteopenia; mild AC osteoarthritis. Sports Med Eval Oct 2013.  Well woman exam (no gynecological exam) Aug 3, 2012    \"[facility] Screening\":  Results scanned to chart. Updated 2014-no change except noted concern for GFR--see Dec 2014 note, letter as reviewed with pt. Past Surgical History:   Procedure Laterality Date    HX CATARACT REMOVAL      HX HYSTERECTOMY  1948    MULTIPLE DELIVERY       GA TRABECULOPLASTY BY LASER SURGERY      Dr. Handy Acosta- last eye exam 2010     Social History     Social History    Marital status:      Spouse name: N/A    Number of children: N/A    Years of education: N/A     Occupational History    Not on file. Social History Main Topics    Smoking status: Never Smoker    Smokeless tobacco: Never Used    Alcohol use 0.0 oz/week      Comment: 1-2 drinks/every evening ( wine/vodka)    Drug use: Yes     Special: Prescription, OTC    Sexual activity: No     Other Topics Concern    Not on file     Social History Narrative     Family History   Problem Relation Age of Onset    Dementia Father     Hypertension Mother     Stroke Mother     Cancer Other      daughter- breast cancer     Repeat neuropsych with progression to dementia from [facility]    PHYSICAL EXAMINATION:    Vitals stable  General: Well defined, nourished, and groomed individual in no acute distress. Neck: Supple, nontender, no bruits, no pain with resistance to active range of motion. Heart: Regular rate and rhythm, no murmurs, rub, or gallop. Normal S1S2. Lungs: Clear to auscultation bilaterally with equal chest expansion, no cough, no wheeze  Musculoskeletal: Extremities revealed no edema and had full range of motion of joints. Psych: Good mood and bright affect    NEUROLOGICAL EXAMINATION:   Mental Status: Alert and oriented to person, place, and time. 2/3 word recall   Cranial nerves:  II, III, IV, VI: Visual acuity grossly intact.  Visual fields are normal.   Pupils are equal, round, and reactive to light and accommodation. Extra-ocular movements are full and fluid. Fundoscopic exam was benign, no ptosis or nystagmus. V-XII: Hearing is grossly intact. Facial features are symmetric, with normal sensation and strength. The palate rises symmetrically and the tongue protrudes midline. Sternocleidomastoids 5/5. Motor Examination: Normal tone, bulk, and strength, 5/5 muscle strength throughout. Coordination: Finger to nose was normal. No resting or intention tremor  Gait and Station: Steady while walking. Normal arm swing. No pronator drift. No muscle wasting or fasiculations noted. Reflexes: DTRs 2+ throughout. ASSESSMENT AND PLAN   81 y/o female with dementia who is currently stable on Namzeric. She is also on Vyvanse for ADD. We discussed decreasing her alcohol intake to avoid further decline. 1.  Continue Namzeric daily. 2.  Discussed irritability and potential for other medications including SSRI for this. This is stable at this point. 3.  Continue Vyvanse. 4.  Discussed neuropsych testing. Discussed that this could be repeated, but at this point I do not think it would change her management, so I am not sure that it is necessary. Will hold off on ordering this for now. Will reassess at follow-up. 5.  Encouraged patient to limit her alcohol intake. 6.  Encouraged patient to remain physically, socially, and mentally active    FU in 6 months    Steven Vale MD  3/1/2018      This note was created using voice recognition software. Despite editing, there may be syntax errors. This note will not be viewable in [address]

## 2018-03-01 NOTE — LETTER
Name: Lyle Hogan 
:  1932 MRN:  [id number] PCP:  Romana Nevarez MD 
 
Chief Complaint Patient presents with  Memory Loss HISTORY OF PRESENT ILLNESS: Pt here for f/u of dementia. Since last visit patient did have to be hospitalized for bronchitis. Her short-term memory has progressively worsened. She and her  question if that has something to do with her drinking 1 alcoholic beverage plus a glass of wine at dinner every night. Patient reports that she will forget events that occurred hours previous. Overall patient's mood is stable. She continues on Vyvanse for focusing and this is working well. She does have some trouble remembering to take medications in the morning. Her  wants her to take them right away but she wants to take them with food and then will subsequently forget to take them at times. She has started on Myrbetriq for frequency and this is helping. Patient is reading a lot and follows the story okay. She is active and walks the cord was outside when she can. She does live at [place]. Generally she sleeps well with no issues. Occasionally she will wake up around 3 AM and had trouble falling asleep after this, but it is not typical. 
 
Recap: 
Patient is doing well overall. She is now taking Namzeric. She is not having any issues with the medication. Patient reports her long-term memory is okay. She is slow to recall things. She reports that her  is very supportive and helpful with this. Patient is very active. She walks daily at [employer]. She has not gotten lost. 
Patient is not driving. Her  drives her. She would like to drive, but we discussed reaction time probably is not good. Patient has not started any new medications since last visit. Patient does take Vyvanse for ADD. They are concerned about whether she needs this medication or not.   They are also having issues getting it refilled at her previous office at [address] [place] [address]. Her PCP has discussed that he is willing to do the medication for them. Patient is only had one fall since last visit where she got tripped up by a tree root. Patient has had some issues with anger outbursts, but this is not often. Patient denies hallucinations. Current Outpatient Prescriptions Medication Sig  MIRABEGRON (MYRBETRIQ PO) Take  by mouth.  lisdexamfetamine (VYVANSE) 20 mg capsule Take 1 Cap (20 mg total) by mouth dailyIndications: Attention-Deficit Hyperactivity Disorder Earliest Fill Date: 2/26/18. Take 1 capsule daily Max Daily Amount: 20 mg  
 [START ON 3/23/2018] lisdexamfetamine (VYVANSE) 20 mg capsule Take 1 Cap (20 mg total) by mouth dailyIndications: Attention-Deficit Hyperactivity Disorder Earliest Fill Date: 3/23/18. Max Daily Amount: 20 mg  
 [START ON 4/23/2018] lisdexamfetamine (VYVANSE) 20 mg capsule Take 1 Cap (20 mg total) by mouth dailyIndications: Attention-Deficit Hyperactivity Disorder Earliest Fill Date: 4/23/18. Max Daily Amount: 20 mg  
 atorvastatin (LIPITOR) 10 mg tablet Take 1 Tab by mouth daily.  diclofenac (VOLTAREN) 1 % gel Apply 2 g to affected area four (4) times daily.  albuterol (PROAIR HFA) 90 mcg/actuation inhaler Take 1 Puff by inhalation every four (4) hours as needed for Wheezing or Shortness of Breath. Indications: Acute Asthma Attack  diclofenac (VOLTAREN) 1 % gel Apply 2 g to affected area four (4) times daily. Apply to hands qid  albuterol (PROVENTIL VENTOLIN) 2.5 mg /3 mL (0.083 %) nebulizer solution 3 mL by Nebulization route every four (4) hours as needed for Wheezing.  Nebulizer & Compressor machine 1 Each by Does Not Apply route every four (4) hours as needed. As directed  Indications: acute asthma with exacerbation  memantine-donepezil (NAMZARIC) 28-10 mg CSpX Take 1 Tab by mouth daily.  montelukast (SINGULAIR) 10 mg tablet Take 10 mg by mouth daily.  solifenacin (VESICARE) 10 mg tablet Take 1 Tab by mouth daily.  budesonide-formoterol (SYMBICORT) 160-4.5 mcg/actuation HFA inhaler Take 2 Puffs by inhalation two (2) times a day. From Dr. Edwardo Finn.  fluticasone (FLONASE) 50 mcg/actuation nasal spray 2 Sprays by Both Nostrils route daily. Allergy note June 2013.  ASCORBATE CALCIUM (VITAMIN C PO) Take  by mouth.  cholecalciferol, vitamin d3, (VITAMIN D) 1,000 unit tablet Take  by mouth daily.  levoFLOXacin (LEVAQUIN) 500 mg tablet Take 1 Tab by mouth daily.  POLYVINYL ALCOHOL (LIQUID TEARS OP) Apply  to eye.  VITAMIN B COMPLEX (B COMPLEX PO) Take  by mouth. No current facility-administered medications for this visit. Allergies Allergen Reactions  Latex Unknown (comments)  Dog Dander Shortness of Breath Past Medical History:  
Diagnosis Date  Asthma Dr. Chica Norton June 2013--doesn't see routinely--pt reports no change in allergies--no need for routine follow-up. Dr. Edwardo Finn sees for mild COPD--on Symbicort since ~Nov 2013. Continued Singulair.  Ceruminosis   
 periodic ear cleaning at [facility]  Chronic obstructive pulmonary disease ([facility] [address]  Encounter for screening colonoscopy ~2006 Normal per pt--[facility] records requested July 2013 visit.  Fainting [address]  Fatigue  Hearing loss, left   
 [facility] hearing  History of mammogram May 2014 Normal/no change.  History of shingles 2010 Dec 2014:  Pt reports clinical history and shingles vaccine 4yrs ago. To try to obtain exact date of zostavax from /source.  History of skin cancer   
 melanoma 2003?, BCC & SCC- followed by Dr. Deepa Alvarado- twice a yr appts. July 2013-still on 6mo schedule for skin checks.  Memory disorder  Osteopenia DEXA 8/11  Overactive bladder   
 detrol [place]. [facility]--sees Urology Spring/Fall. Considering procedure for incontinence.  Postmenopausal hormone replacement therapy   
 since hysterectomy  Right shoulder pain 2013. Mild osteopenia; mild AC osteoarthritis. Sports Med Eval Oct 2013.  Well woman exam (no gynecological exam) Aug 3, 2012 \"[facility] Screening\":  Results scanned to chart. Updated 2014-no change except noted concern for GFR--see Dec 2014 note, letter as reviewed with pt. Past Surgical History:  
Procedure Laterality Date  HX CATARACT REMOVAL   [facility]  MULTIPLE DELIVERY     
 [place] TRABECULOPLASTY BY LASER SURGERY Dr. Venson Fothergill- last eye exam 2010 Social History Social History  Marital status:  Spouse name: N/A  
 Number of children: N/A  
 Years of education: N/A Occupational History  Not on file. Social History Main Topics  Smoking status: Never Smoker  Smokeless tobacco: Never Used  Alcohol use 0.0 oz/week Comment: 1-2 drinks/every evening ( wine/vodka)  Drug use: Yes Special: Prescription, OTC  Sexual activity: No  
 
Other Topics Concern  Not on file Social History Narrative Family History Problem Relation Age of Onset  Dementia Father  Hypertension Mother  Stroke Mother  Cancer Other   
  daughter- breast cancer Repeat neuropsych with progression to dementia from [facility] PHYSICAL EXAMINATION:   
Vitals stable General: Well defined, nourished, and groomed individual in no acute distress. Neck: Supple, nontender, no bruits, no pain with resistance to active range of motion. Heart: Regular rate and rhythm, no murmurs, rub, or gallop. Normal S1S2. Lungs: Clear to auscultation bilaterally with equal chest expansion, no cough, no wheeze Musculoskeletal: Extremities revealed no edema and had full range of motion of joints. Psych: Good mood and bright affect NEUROLOGICAL EXAMINATION:  
Mental Status: Alert and oriented to person, place, and time. 2/3 word recall Cranial nerves: II, III, IV, VI: Visual acuity grossly intact. Visual fields are normal.  
Pupils are equal, round, and reactive to light and accommodation. Extra-ocular movements are full and fluid. Fundoscopic exam was benign, no ptosis or nystagmus. V-XII: Hearing is grossly intact. Facial features are symmetric, with normal sensation and strength. The palate rises symmetrically and the tongue protrudes midline. Sternocleidomastoids 5/5. Motor Examination: Normal tone, bulk, and strength, 5/5 muscle strength throughout. Coordination: Finger to nose was normal. No resting or intention tremor Gait and Station: Steady while walking. Normal arm swing. No pronator drift. No muscle wasting or fasiculations noted. Reflexes: DTRs 2+ throughout. ASSESSMENT AND PLAN 
 79 y/o female with dementia who is currently stable on Namzeric. She is also on Vyvanse for ADD. We discussed decreasing her alcohol intake to avoid further decline. 1.  Continue Namzeric daily. 2.  Discussed irritability and potential for other medications including SSRI for this. This is stable at this point. 3.  Continue Vyvanse. 4.  Discussed neuropsych testing. Discussed that this could be repeated, but at this point I do not think it would change her management, so I am not sure that it is necessary. Will hold off on ordering this for now. Will reassess at follow-up. 5.  Encouraged patient to limit her alcohol intake. 6.  Encouraged patient to remain physically, socially, and mentally active FU in 6 months Joe Puentes MD 
3/1/2018 This note was created using voice recognition software. Despite editing, there may be syntax errors. This note will not be viewable in [address]

## 2018-03-01 NOTE — PATIENT INSTRUCTIONS
Patient Instructions/Plans:  · As we discussed in the office today:  · Please try to limit alcohol to no more than 2-3 drinks in a week's time to help save your brain cells

## 2018-03-01 NOTE — MR AVS SNAPSHOT
[facility] 
 
 
 [place] [address] [place] [id number] [phone] Patient: Jelani Hernandez MRN: [id number] BS Visit Information Date & Time Provider Department Dept. Phone Encounter #  
 3/1/2018  2:40 PM Shay Meneses MD [facility] Neurology [facility] [phone] [id number] Your Appointments 2018  2:40 PM  
Follow Up with Shay Meneses MD  
[facility]) Appt Note: memory loss [place] [address] [facility] [address] [place] [phone]  
  
   
 [place]  [address] [id number] [address] Upcoming Health Maintenance Date Due ZOSTER VACCINE AGE 60> 1991 Influenza Age 5 to Adult 2017 MEDICARE YEARLY EXAM 2018 GLAUCOMA SCREENING Q2Y 3/16/2018 DTaP/Tdap/Td series (3 - Td) 10/22/2025 Allergies as of 3/1/2018  Review Complete On: 2018 By: Severa Anes, LPN Severity Noted Reaction Type Reactions Latex  2013    Unknown (comments) Dog Dander  2013    Shortness of Breath Current Immunizations  Reviewed on 2016 Name Date Influenza High Dose Vaccine PF 10/22/2015 Influenza Vaccine 10/10/2014 Influenza Vaccine Split 10/17/2011  9:12 AM  
 Influenza Vaccine Whole 2010 Pneumococcal Conjugate (PCV-13) 10/22/2015 TDAP Vaccine 2010 Tdap 10/22/2015 ZZZ-RETIRED (DO NOT USE) Pneumococcal Vaccine (Unspecified Type) 1/10/2004 Not reviewed this visit You Were Diagnosed With   
  
 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 
ICD-9-CM: 309.28 Vitals BP Resp Height(growth percentile) Weight(growth percentile) BMI OB Status 110/64 20 4' 11\" (1.499 m) 113 lb (51.3 kg) 22.82 kg/m2 Hysterectomy Smoking Status Never Smoker Vitals History BMI and BSA Data Body Mass Index Body Surface Area  
 22.82 kg/m 2 1.46 m 2 Preferred Pharmacy Pharmacy Name Phone [address] [facility] [phone] Your Updated Medication List  
  
   
This list is accurate as of 3/1/18  3:22 PM.  Always use your most recent med list.  
  
  
  
  
 * albuterol 2.5 mg /3 mL (0.083 %) nebulizer solution Commonly known as:  PROVENTIL VENTOLIN  
3 mL by Nebulization route every four (4) hours as needed for Wheezing. * albuterol 90 mcg/actuation inhaler Commonly known as:  PROAIR HFA Take 1 Puff by inhalation every four (4) hours as needed for Wheezing or Shortness of Breath. Indications: Acute Asthma Attack  
  
 atorvastatin 10 mg tablet Commonly known as:  LIPITOR Take 1 Tab by mouth daily. B COMPLEX PO Take  by mouth. * diclofenac 1 % Gel Commonly known as:  VOLTAREN Apply 2 g to affected area four (4) times daily. Apply to hands qid * diclofenac 1 % Gel Commonly known as:  VOLTAREN Apply 2 g to affected area four (4) times daily. FLONASE 50 mcg/actuation nasal spray Generic drug:  fluticasone 2 Sprays by Both Nostrils route daily. Allergy note June 2013. levoFLOXacin 500 mg tablet Commonly known as:  Enriqueta Hipolito Take 1 Tab by mouth daily. LIQUID TEARS OP Apply  to eye. * lisdexamfetamine 20 mg capsule Commonly known as:  VYVANSE Take 1 Cap (20 mg total) by mouth dailyIndications: Attention-Deficit Hyperactivity Disorder Earliest Fill Date: 2/26/18. Take 1 capsule daily Max Daily Amount: 20 mg  
  
 * lisdexamfetamine 20 mg capsule Commonly known as:  VYVANSE  
 Take 1 Cap (20 mg total) by mouth dailyIndications: Attention-Deficit Hyperactivity Disorder Earliest Fill Date: 3/23/18. Max Daily Amount: 20 mg  
Start taking on:  3/23/2018 * lisdexamfetamine 20 mg capsule Commonly known as:  VYVANSE Take 1 Cap (20 mg total) by mouth dailyIndications: Attention-Deficit Hyperactivity Disorder Earliest Fill Date: 4/23/18. Max Daily Amount: 20 mg  
Start taking on:  4/23/2018  
  
 memantine-donepezil 28-10 mg Cspx Commonly known as:  MOTION PICTURE AND [employer] [facility] Take 1 Tab by mouth daily. MYRBETRIQ PO Take  by mouth. Nebulizer & Compressor machine 1 Each by Does Not Apply route every four (4) hours as needed. As directed  Indications: acute asthma with exacerbation SINGULAIR 10 mg tablet Generic drug:  montelukast  
Take 10 mg by mouth daily. SYMBICORT 160-4.5 mcg/actuation Hfaa Generic drug:  budesonide-formoterol Take 2 Puffs by inhalation two (2) times a day. From Dr. Nicole Duran. VESIcare 10 mg tablet Generic drug:  solifenacin Take 1 Tab by mouth daily. VITAMIN C PO Take  by mouth. VITAMIN D3 1,000 unit tablet Generic drug:  cholecalciferol Take  by mouth daily. * Notice: This list has 7 medication(s) that are the same as other medications prescribed for you. Read the directions carefully, and ask your doctor or other care provider to review them with you. Patient Instructions Patient Instructions/Plans: · As we discussed in the office today: · Please try to limit alcohol to no more than 2-3 drinks in a week's time to help save your brain cells Introducing [facility] & HEALTH SERVICES! Dear Salena Neal: Thank you for requesting a [employer] account. Our records indicate that you already have an active [employer] account. You can access your account anytime at [email or web address] Did you know that you can access your hospital and ER discharge instructions at any time in Tictail? You can also review all of your test results from your hospital stay or ER visit. Additional Information If you have questions, please visit the Frequently Asked Questions section of the Tictail website at [email or web address] Remember, Tictail is NOT to be used for urgent needs. For medical emergencies, dial 911. Now available from your iPhone and Android! Please provide this summary of care documentation to your next provider. Your primary care clinician is listed as Edith PRESSLEY. If you have any questions after today's visit, please call [phone].

## 2018-04-24 ENCOUNTER — TELEPHONE (OUTPATIENT)
Dept: NEUROLOGY | Age: 83
End: 2018-04-24

## 2018-04-24 NOTE — TELEPHONE ENCOUNTER
----- Message from Jayden Roe sent at 4/24/2018  1:44 PM EDT -----  Regarding: Dr. Carmen Jamison  Pt's (Toby Cat ) requested a refill on Rx\"(\"Namzaric\" 90 day supply 3 refills ) called to [facility]. Best contact number [phone].

## 2018-06-01 ENCOUNTER — HOSPITAL ENCOUNTER (OUTPATIENT)
Dept: LAB | Age: 83
Discharge: HOME | End: 2018-06-01
Payer: MEDICARE

## 2018-06-01 ENCOUNTER — OFFICE VISIT (OUTPATIENT)
Dept: INTERNAL MEDICINE CLINIC | Age: 83
End: 2018-06-01

## 2018-06-01 VITALS
HEART RATE: 79 BPM | BODY MASS INDEX: 22.38 KG/M2 | OXYGEN SATURATION: 97 % | HEIGHT: 59 IN | WEIGHT: 111 LBS | DIASTOLIC BLOOD PRESSURE: 86 MMHG | TEMPERATURE: 97.6 F | SYSTOLIC BLOOD PRESSURE: 147 MMHG

## 2018-06-01 DIAGNOSIS — R53.81 MALAISE: Primary | ICD-10-CM

## 2018-06-01 LAB
BILIRUB UR QL STRIP: NEGATIVE
GLUCOSE UR-MCNC: NEGATIVE MG/DL
KETONES P FAST UR STRIP-MCNC: NEGATIVE MG/DL
PH UR STRIP: 6 [PH] (ref 4.6–8)
PROT UR QL STRIP: NEGATIVE
SP GR UR STRIP: 1 (ref 1–1.03)
UA UROBILINOGEN AMB POC: NORMAL (ref 0.2–1)
URINALYSIS CLARITY POC: CLEAR
URINALYSIS COLOR POC: YELLOW
URINE BLOOD POC: NORMAL
URINE LEUKOCYTES POC: NEGATIVE
URINE NITRITES POC: NEGATIVE

## 2018-06-01 PROCEDURE — 36415 COLL VENOUS BLD VENIPUNCTURE: CPT

## 2018-06-01 PROCEDURE — 80048 BASIC METABOLIC PNL TOTAL CA: CPT

## 2018-06-01 PROCEDURE — 85027 COMPLETE CBC AUTOMATED: CPT

## 2018-06-01 PROCEDURE — 84443 ASSAY THYROID STIM HORMONE: CPT

## 2018-06-01 NOTE — PROGRESS NOTES
HISTORY OF PRESENT ILLNESS  Santiago Garner is a 80 y.o. female. HPI   Here for acute care visit  Feeling fatigued, weak, unsteady x 2-3d. Feels cloudy , \"cotton in my head\"  appetitie ok,  No f/c dysuria cough   No cp sob n/v  No pain  No mew medicines   had similar symptoms a few days ago    Patient Active Problem List    Diagnosis Date Noted    ADD (attention deficit disorder) 01/18/2017    Hyperlipidemia 07/20/2015    Mild cognitive impairment without memory loss 02/02/2015    Generalized anxiety disorder 02/02/2015    TMJ (temporomandibular joint disorder) 12/21/2011    Syncope and collapse 08/10/2011    Fever, unspecified 08/10/2011    Syncope and collapse 08/10/2011    History of skin cancer     Asthma     Hearing loss, left     Ceruminosis     Postmenopausal hormone replacement therapy     Overactive bladder      Current Outpatient Prescriptions   Medication Sig Dispense Refill    memantine-donepezil (NAMZARIC) 28-10 mg CSpX Take 1 Tab by mouth daily. 90 Cap 3    MIRABEGRON (MYRBETRIQ PO) Take  by mouth.  lisdexamfetamine (VYVANSE) 20 mg capsule Take 1 Cap (20 mg total) by mouth dailyIndications: Attention-Deficit Hyperactivity Disorder Earliest Fill Date: 2/26/18. Take 1 capsule daily Max Daily Amount: 20 mg 30 Cap 0    atorvastatin (LIPITOR) 10 mg tablet Take 1 Tab by mouth daily. 90 Tab 3    albuterol (PROAIR HFA) 90 mcg/actuation inhaler Take 1 Puff by inhalation every four (4) hours as needed for Wheezing or Shortness of Breath. Indications: Acute Asthma Attack 3 Inhaler 3    diclofenac (VOLTAREN) 1 % gel Apply 2 g to affected area four (4) times daily. Apply to hands qid 100 g 3    Nebulizer & Compressor machine 1 Each by Does Not Apply route every four (4) hours as needed. As directed  Indications: acute asthma with exacerbation 1 Each 0    montelukast (SINGULAIR) 10 mg tablet Take 10 mg by mouth daily.       budesonide-formoterol (SYMBICORT) 160-4.5 mcg/actuation HFA inhaler Take 2 Puffs by inhalation two (2) times a day. From Dr. Surjit Lipscomb.  albuterol (PROVENTIL VENTOLIN) 2.5 mg /3 mL (0.083 %) nebulizer solution 3 mL by Nebulization route every four (4) hours as needed for Wheezing. 24 Each 2    POLYVINYL ALCOHOL (LIQUID TEARS OP) Apply  to eye.  fluticasone (FLONASE) 50 mcg/actuation nasal spray 2 Sprays by Both Nostrils route daily. Allergy note June 2013.  ASCORBATE CALCIUM (VITAMIN C PO) Take  by mouth.  VITAMIN B COMPLEX (B COMPLEX PO) Take  by mouth.  cholecalciferol, vitamin d3, (VITAMIN D) 1,000 unit tablet Take  by mouth daily. Allergies   Allergen Reactions    Latex Unknown (comments)    Dog Dander Shortness of Breath     Social History   Substance Use Topics    Smoking status: Never Smoker    Smokeless tobacco: Never Used    Alcohol use 0.0 oz/week      Comment: 1-2 drinks/every evening ( wine/vodka)      Lab Results  Component Value Date/Time   WBC 4.8 12/02/2017 09:12 AM   HGB 12.9 12/02/2017 09:12 AM   HCT 39.1 12/02/2017 09:12 AM   PLATELET 208 03/72/3044 09:12 AM   MCV 97.5 12/02/2017 09:12 AM     Lab Results  Component Value Date/Time   Glucose 94 12/02/2017 09:12 AM   LDL, calculated 94 10/18/2016 11:41 AM   Creatinine 0.70 12/02/2017 09:12 AM      Lab Results  Component Value Date/Time   GFR est non-AA >60 12/02/2017 09:12 AM   GFR est AA >60 12/02/2017 09:12 AM   Creatinine 0.70 12/02/2017 09:12 AM   BUN 11 12/02/2017 09:12 AM   Sodium 142 12/02/2017 09:12 AM   Potassium 4.1 12/02/2017 09:12 AM   Chloride 106 12/02/2017 09:12 AM   CO2 29 12/02/2017 09:12 AM   Magnesium 1.8 08/10/2011 05:30 PM     Lab Results  Component Value Date/Time   TSH 2.970 07/14/2014 09:33 AM   T4, Free 1.17 07/14/2014 09:33 AM         ROS    Physical Exam   Constitutional: She appears well-developed and well-nourished.    Appears stated age, frail elderly but able to take steps without assistance   Cardiovascular: Normal rate, regular rhythm and normal heart sounds. Exam reveals no gallop and no friction rub. No murmur heard. Pulmonary/Chest: Effort normal and breath sounds normal. No respiratory distress. She has no wheezes. Abdominal: Soft. Bowel sounds are normal.   Musculoskeletal: She exhibits no edema. Neurological: She is alert. Clear speech   no focal deficits   Psychiatric: She has a normal mood and affect. Nursing note and vitals reviewed. ASSESSMENT and PLAN  Diagnoses and all orders for this visit:    1. Malaise  -     TSH 3RD GENERATION  -     CBC W/O DIFF  -     METABOLIC PANEL, BASIC  -     AMB POC URINALYSIS DIP STICK AUTO W/O MICRO--negative   ? Viral syndrome   Hydrate   Careful with ambulation--pt declines PT at her facility   To ED if sxs change or get worse--d/w pt and her     Follow-up Disposition:  Return if symptoms worsen or fail to improve.

## 2018-06-01 NOTE — PROGRESS NOTES
Chief Complaint   Patient presents with    Neurologic Problem     unsteady gait x 2 days    Fatigue     x 2 days    Constipation     x 3 days    Decreased Appetite

## 2018-06-01 NOTE — MR AVS SNAPSHOT
[address] [place] [address] 
[phone] Patient: Chase Hand MRN: [id number] OYS:[birth year] Visit Information Date & Time Provider Department Dept. Phone Encounter #  
 6/1/2018 10:30 AM Coleman Skaggs, [place] [address] [place] [address],[address] [phone] [id number] Follow-up Instructions Return if symptoms worsen or fail to improve. Your Appointments 9/6/2018  2:40 PM  
Follow Up with Narayan Interiano MD  
[facility]) Appt Note: memory loss [place] [address] [facility] [id number] [phone]  
  
   
 [place] [address] [id number] [address] Upcoming Health Maintenance Date Due ZOSTER VACCINE AGE 60> 11/20/1991 MEDICARE YEARLY EXAM 3/14/2018 GLAUCOMA SCREENING Q2Y 3/16/2018 Influenza Age 5 to Adult 8/1/2018 DTaP/Tdap/Td series (3 - Td) 10/22/2025 Allergies as of 6/1/2018  Review Complete On: 6/1/2018 By: Oumou Murillo LPN Severity Noted Reaction Type Reactions Latex  12/14/2013    Unknown (comments) Dog Dander  12/14/2013    Shortness of Breath Current Immunizations  Reviewed on 4/18/2016 Name Date Influenza High Dose Vaccine PF 10/22/2015 Influenza Vaccine 10/10/2014 Influenza Vaccine Split 10/17/2011  9:12 AM  
 Influenza Vaccine Whole 9/1/2010 Pneumococcal Conjugate (PCV-13) 10/22/2015 TDAP Vaccine 8/16/2010 Tdap 10/22/2015 ZZZ-RETIRED (DO NOT USE) Pneumococcal Vaccine (Unspecified Type) 1/10/2004 Not reviewed this visit You Were Diagnosed With   
  
 Codes Comments Malaise    -  Primary ICD-10-CM: R53.81 ICD-9-CM: 780.79 Vitals  BP Pulse Temp Height(growth percentile) Weight(growth percentile) SpO2  
 147/86 (BP 1 Location: Left arm, BP Patient Position: Sitting) 79 97.6 °F (36.4 °C) (Oral) 4' 11\" (1.499 m) 111 lb (50.3 kg) 97% BMI OB Status Smoking Status 22.42 kg/m2 Hysterectomy Never Smoker Vitals History BMI and BSA Data Body Mass Index Body Surface Area  
 22.42 kg/m 2 1.45 m 2 Preferred Pharmacy Pharmacy Name Phone [facility] [phone] , [facility] [place] [address] [phone] Your Updated Medication List  
  
   
This list is accurate as of 6/1/18 11:27 AM.  Always use your most recent med list.  
  
  
  
  
 * albuterol 2.5 mg /3 mL (0.083 %) nebulizer solution Commonly known as:  PROVENTIL VENTOLIN  
3 mL by Nebulization route every four (4) hours as needed for Wheezing. * albuterol 90 mcg/actuation inhaler Commonly known as:  PROAIR HFA Take 1 Puff by inhalation every four (4) hours as needed for Wheezing or Shortness of Breath. Indications: Acute Asthma Attack  
  
 atorvastatin 10 mg tablet Commonly known as:  LIPITOR Take 1 Tab by mouth daily. B COMPLEX PO Take  by mouth. diclofenac 1 % Gel Commonly known as:  VOLTAREN Apply 2 g to affected area four (4) times daily. Apply to hands qid FLONASE 50 mcg/actuation nasal spray Generic drug:  fluticasone 2 Sprays by Both Nostrils route daily. Allergy note June 2013. LIQUID TEARS OP Apply  to eye.  
  
 lisdexamfetamine 20 mg capsule Commonly known as:  VYVANSE Take 1 Cap (20 mg total) by mouth dailyIndications: Attention-Deficit Hyperactivity Disorder Earliest Fill Date: 2/26/18. Take 1 capsule daily Max Daily Amount: 20 mg  
  
 memantine-donepezil 28-10 mg Cspx Commonly known as:  MOTION PICTURE AND [facility] Take 1 Tab by mouth daily. MYRBETRIQ PO Take  by mouth. Nebulizer & Compressor machine 1 Each by Does Not Apply route every four (4) hours as needed. As directed  Indications: acute asthma with exacerbation SINGULAIR 10 mg tablet Generic drug:  montelukast  
Take 10 mg by mouth daily. SYMBICORT 160-4.5 mcg/actuation Hfaa Generic drug:  budesonide-formoterol Take 2 Puffs by inhalation two (2) times a day. From Dr. Christy Jones. VITAMIN C PO Take  by mouth. VITAMIN D3 1,000 unit tablet Generic drug:  cholecalciferol Take  by mouth daily. * Notice: This list has 2 medication(s) that are the same as other medications prescribed for you. Read the directions carefully, and ask your doctor or other care provider to review them with you. We Performed the Following CBC W/O DIFF [87959 CPT(R)] METABOLIC PANEL, BASIC [10960 CPT(R)] TSH 3RD GENERATION [88530 CPT(R)] Follow-up Instructions Return if symptoms worsen or fail to improve. Introducing [facility] & HE[facility] SERVICES! Dear Michael Morales: Thank you for requesting a [employer]. account. Our records indicate that you already have an active Amen. account. You can access your account anytime at [email or web address] Did you know that you can access your hospital and ER discharge instructions at any time in Amen.? You can also review all of your test results from your hospital stay or ER visit. Additional Information If you have questions, please visit the Frequently Asked Questions section of the Amen. website at [email or web address] Remember, Amen. is NOT to be used for urgent needs. For medical emergencies, dial 911. Now available from your iPhone and Android! Please provide this summary of care documentation to your next provider. Your primary care clinician is listed as Campos PRESSLEY. If you have any questions after today's visit, please call [phone].

## 2018-06-02 LAB
BUN SERPL-MCNC: 14 MG/DL (ref 8–27)
BUN/CREAT SERPL: 18 (ref 12–28)
CALCIUM SERPL-MCNC: 9.9 MG/DL (ref 8.7–10.3)
CHLORIDE SERPL-SCNC: 100 MMOL/L (ref 96–106)
CO2 SERPL-SCNC: 24 MMOL/L (ref 18–29)
CREAT SERPL-MCNC: 0.77 MG/DL (ref 0.57–1)
ERYTHROCYTE [DISTWIDTH] IN BLOOD BY AUTOMATED COUNT: 13.6 % (ref 12.3–15.4)
GFR SERPLBLD CREATININE-BSD FMLA CKD-EPI: 70 ML/MIN/1.73
GFR SERPLBLD CREATININE-BSD FMLA CKD-EPI: 81 ML/MIN/1.73
GLUCOSE SERPL-MCNC: 80 MG/DL (ref 65–99)
HCT VFR BLD AUTO: 41.1 % (ref 34–46.6)
HGB BLD-MCNC: 14 G/DL (ref 11.1–15.9)
MCH RBC QN AUTO: 32.1 PG (ref 26.6–33)
MCHC RBC AUTO-ENTMCNC: 34.1 G/DL (ref 31.5–35.7)
MCV RBC AUTO: 94 FL (ref 79–97)
PLATELET # BLD AUTO: 269 X10E3/UL (ref 150–379)
POTASSIUM SERPL-SCNC: 4 MMOL/L (ref 3.5–5.2)
RBC # BLD AUTO: 4.36 X10E6/UL (ref 3.77–5.28)
SODIUM SERPL-SCNC: 142 MMOL/L (ref 134–144)
TSH SERPL DL<=0.005 MIU/L-ACNC: 7.85 UIU/ML (ref 0.45–4.5)
WBC # BLD AUTO: 6 X10E3/UL (ref 3.4–10.8)

## 2018-06-08 ENCOUNTER — CLINICAL SUPPORT (OUTPATIENT)
Dept: FAMILY MEDICINE CLINIC | Age: 83
End: 2018-06-08

## 2018-06-08 VITALS
RESPIRATION RATE: 18 BRPM | HEART RATE: 85 BPM | OXYGEN SATURATION: 98 % | SYSTOLIC BLOOD PRESSURE: 147 MMHG | DIASTOLIC BLOOD PRESSURE: 83 MMHG | TEMPERATURE: 98.4 F

## 2018-06-08 DIAGNOSIS — G30.1 LATE ONSET ALZHEIMER'S DISEASE WITHOUT BEHAVIORAL DISTURBANCE (HCC): Primary | ICD-10-CM

## 2018-06-08 DIAGNOSIS — R53.82 CHRONIC FATIGUE: ICD-10-CM

## 2018-06-08 DIAGNOSIS — F90.9 ATTENTION DEFICIT HYPERACTIVITY DISORDER (ADHD), UNSPECIFIED ADHD TYPE: ICD-10-CM

## 2018-06-08 DIAGNOSIS — F02.80 LATE ONSET ALZHEIMER'S DISEASE WITHOUT BEHAVIORAL DISTURBANCE (HCC): Primary | ICD-10-CM

## 2018-06-08 DIAGNOSIS — F43.23 ADJUSTMENT DISORDER WITH MIXED ANXIETY AND DEPRESSED MOOD: ICD-10-CM

## 2018-06-08 NOTE — PATIENT INSTRUCTIONS
Lisdexamfetamine (Vyvanse) - (By mouth)   Why this medicine is used:   Treats attention-deficit/hyperactivity disorder and binge eating disorder. Contact a nurse or doctor right away if you have:  · Blistering, peeling, red skin rash  · Fast, pounding, or uneven heartbeat, chest pain, trouble breathing  · Seeing, hearing, or feeling things that are not there, anxiety, fainting  · Extreme energy, mood or mental changes, confusion, agitation, unusual behavior  · Dry mouth, nausea, loss of appetite, vomiting, weight loss, diarrhea, fever  · Numbness or weakness, headache, problems with vision, speech, or walking  · Sores, coldness, or color changes on your fingers or toes   © 2017 [address] [employer] Information is for End User's use only and may not be sold, redistributed or otherwise used for commercial purposes.

## 2018-06-08 NOTE — PROGRESS NOTES
Subjective: Melvin Han is a 80 y.o. female  here for follow up of chronic medical conditions listed has Postmenopausal hormone replacement therapy, Overactive bladder, Asthma, Hearing loss, left, Ceruminosis, History of skin cancer, Syncope and collapse, Fever, unspecified, Syncope and collapse, TMJ (temporomandibular joint disorder), Mild cognitive impairment without memory loss, Generalized anxiety disorder, Hyperlipidemia, and ADD (attention deficit disorder) on her problem list.. She  is accompanied by patient and spouse. She lives as follows: a spouse and does have Advanced Directives. New Complaints today include: Medication Refill   Patient has been driving 40 minutes to an appt for vyvanse refills every 3 months and it is becoming more difficult secondary to her dementia. Her  requests that she be seen in our clinic. She has been on the medication for two years and it is working well for daytime sleepiness without side effects. Her dementia is slowly progressing and she continues to live with her . She had labs 3 months ago which were normal.      Recent History includes: None    Review of Systems  Review of Systems   Constitutional: Negative for chills, fever, malaise/fatigue and weight loss. HENT: Negative for congestion. Respiratory: Negative for cough. Cardiovascular: Negative for chest pain and leg swelling. Gastrointestinal: Negative for abdominal pain, blood in stool, constipation, diarrhea, heartburn, melena, nausea and vomiting. Musculoskeletal: Negative for back pain, falls, joint pain, myalgias and neck pain. Skin: Negative for itching and rash. Neurological: Negative for dizziness, tingling, tremors, sensory change, speech change, focal weakness, seizures, loss of consciousness, weakness and headaches. Psychiatric/Behavioral: Positive for memory loss. Negative for depression, hallucinations, substance abuse and suicidal ideas.  The patient is not nervous/anxious and does not have insomnia. Geriatric Issues: Activities of Daily Living (ADLs):    She is independent in the following: ambulation, bathing and hygiene, feeding, continence, grooming, toileting and dressing  Requires assistance with the following: Transportation  Patient has changes due to:  None    Outside reports reviewed: none. Patient Active Problem List   Diagnosis Code    Postmenopausal hormone replacement therapy Z79.890    Overactive bladder N32.81    Asthma J45.909    Hearing loss, left H91.92    Ceruminosis H61.20    History of skin cancer Z85.828    Syncope and collapse R55    Fever, unspecified R50.9    Syncope and collapse R55    TMJ (temporomandibular joint disorder) M26.609    Mild cognitive impairment without memory loss G31.84    Generalized anxiety disorder F41.1    Hyperlipidemia E78.5    ADD (attention deficit disorder) F98.8     Current Outpatient Prescriptions   Medication Sig Dispense Refill    lisdexamfetamine (VYVANSE) 20 mg capsule Take 1 Cap (20 mg total) by mouth daily. Max Daily Amount: 20 mg 30 Cap 2    memantine-donepezil (NAMZARIC) 28-10 mg CSpX Take 1 Tab by mouth daily. 90 Cap 3    MIRABEGRON (MYRBETRIQ PO) Take  by mouth.  atorvastatin (LIPITOR) 10 mg tablet Take 1 Tab by mouth daily. 90 Tab 3    Nebulizer & Compressor machine 1 Each by Does Not Apply route every four (4) hours as needed. As directed  Indications: acute asthma with exacerbation 1 Each 0    montelukast (SINGULAIR) 10 mg tablet Take 10 mg by mouth daily.  POLYVINYL ALCOHOL (LIQUID TEARS OP) Apply  to eye.  budesonide-formoterol (SYMBICORT) 160-4.5 mcg/actuation HFA inhaler Take 2 Puffs by inhalation two (2) times a day. From Dr. Shea Gong.  fluticasone (FLONASE) 50 mcg/actuation nasal spray 2 Sprays by Both Nostrils route daily. Allergy note June 2013.  ASCORBATE CALCIUM (VITAMIN C PO) Take  by mouth.       VITAMIN B COMPLEX (B COMPLEX PO) Take by mouth.  cholecalciferol, vitamin d3, (VITAMIN D) 1,000 unit tablet Take  by mouth daily.  [START ON 8/1/2018] lisdexamfetamine (VYVANSE) 20 mg capsule Take 1 Cap (20 mg total) by mouth dailyIndications: Attention-Deficit Hyperactivity Disorder Earliest Fill Date: 8/1/18. Max Daily Amount: 20 mg 30 Cap 0    lisdexamfetamine (VYVANSE) 20 mg capsule Take 1 Cap (20 mg total) by mouth dailyIndications: Attention-Deficit Hyperactivity Disorder Earliest Fill Date: 6/8/18. Max Daily Amount: 20 mg 30 Cap 0    albuterol (PROAIR HFA) 90 mcg/actuation inhaler Take 1 Puff by inhalation every four (4) hours as needed for Wheezing or Shortness of Breath. Indications: Acute Asthma Attack 3 Inhaler 3    diclofenac (VOLTAREN) 1 % gel Apply 2 g to affected area four (4) times daily. Apply to hands qid 100 g 3    albuterol (PROVENTIL VENTOLIN) 2.5 mg /3 mL (0.083 %) nebulizer solution 3 mL by Nebulization route every four (4) hours as needed for Wheezing. 24 Each 2     Allergies   Allergen Reactions    Latex Unknown (comments)    Dog Dander Shortness of Breath     Past Medical History:   Diagnosis Date    Asthma     Dr. Esteban Harrison June 2013--doesn't see routinely--pt reports no change in allergies--no need for routine follow-up. Dr. Mag Horner sees for mild COPD--on Symbicort since ~Nov 2013. Continued Singulair.  Ceruminosis     periodic ear cleaning at [place] [facility]    Chronic obstructive pulmonary disease ([facility] [address]     Encounter for screening colonoscopy ~2006    Normal per pt--[facility] records requested July 2013 visit.  Fainting     Falls     Fatigue     Hearing loss, left     [facility]    History of mammogram May 2014    Normal/no change.  History of shingles 2010    Dec 2014:  Pt reports clinical history and shingles vaccine 4yrs ago. To try to obtain exact date of zostavax from /source.  History of skin cancer     melanoma 2003?, BCC & SCC- followed by Dr. Zack Gong- twice a yr appts.   July -still on 6mo schedule for skin checks.  Memory disorder     Osteopenia     DEXA     Overactive bladder     detrol LA. Vesicare--[facility]es [facility] Considering procedure for incontinence.  Postmenopausal hormone replacement therapy     since hysterectomy    Right shoulder pain 2013. Mild osteopenia; mild AC osteoarthritis. Sports Med Eval Oct 2013.  Well woman exam (no gynecological exam) Aug 3, 2012    \"[facility] Screening\":  Results scanned to chart. Updated 2014-no change except noted concern for GFR--see Dec 2014 note, letter as reviewed with pt. Past Surgical History:   Procedure Laterality Date    HX CATARACT REMOVAL      HX HYSTERECTOMY      MULTIPLE DELIVERY       [facility]- last eye exam 2010     Family History   Problem Relation Age of Onset   Kemi Frankel Dementia Father     Hypertension Mother     Stroke Mother     Cancer Other      daughter- breast cancer     Social History   Substance Use Topics    Smoking status: Never Smoker    Smokeless tobacco: Never Used    Alcohol use 0.0 oz/week      Comment: 1-2 drinks/every evening ( wine/vodka)          Objective:     Visit Vitals    /83 (BP 1 Location: Left arm, BP Patient Position: Sitting)    Pulse 85    Temp 98.4 °F (36.9 °C) (Oral)    Resp 18    SpO2 98%     Physical Exam   Constitutional: She is oriented to person, place, and time. She appears well-developed and well-nourished. No distress. HENT:   Head: Normocephalic. Mouth/Throat: Oropharynx is clear and moist. No oropharyngeal exudate. Eyes: Conjunctivae and EOM are normal. Pupils are equal, round, and reactive to light. Right eye exhibits no discharge. Left eye exhibits no discharge. No scleral icterus. Neck: Neck supple. No JVD present. No tracheal deviation present. No thyromegaly present.    Cardiovascular: Normal rate, regular rhythm, normal heart sounds and intact distal pulses. Exam reveals no gallop and no friction rub. No murmur heard. Pulmonary/Chest: Effort normal and breath sounds normal. No respiratory distress. She has no wheezes. She has no rales. Abdominal: Soft. Bowel sounds are normal. She exhibits no distension and no mass. There is no tenderness. There is no rebound and no guarding. Musculoskeletal: She exhibits no edema, tenderness or deformity. Lymphadenopathy:     She has no cervical adenopathy. Neurological: She is alert and oriented to person, place, and time. She has normal reflexes. No cranial nerve deficit. Skin: Skin is warm and dry. No rash noted. She is not diaphoretic. No erythema. No pallor. Psychiatric: She has a normal mood and affect. Her behavior is normal.   Nursing note and vitals reviewed.        Imaging  None    Lab Review  Results for orders placed or performed in visit on 06/01/18   TSH 3RD GENERATION   Result Value Ref Range    TSH 7.850 (H) 0.450 - 4.500 uIU/mL   CBC W/O DIFF   Result Value Ref Range    WBC 6.0 3.4 - 10.8 x10E3/uL    RBC 4.36 3.77 - 5.28 x10E6/uL    HGB 14.0 11.1 - 15.9 g/dL    HCT 41.1 34.0 - 46.6 %    MCV 94 79 - 97 fL    MCH 32.1 26.6 - 33.0 pg    MCHC 34.1 31.5 - 35.7 g/dL    RDW 13.6 12.3 - 15.4 %    PLATELET 430 049 - 000 S01U0/LO   METABOLIC PANEL, BASIC   Result Value Ref Range    Glucose 80 65 - 99 mg/dL    BUN 14 8 - 27 mg/dL    Creatinine 0.77 0.57 - 1.00 mg/dL    GFR est non-AA 70 >59 mL/min/1.73    GFR est AA 81 >59 mL/min/1.73    BUN/Creatinine ratio 18 12 - 28    Sodium 142 134 - 144 mmol/L    Potassium 4.0 3.5 - 5.2 mmol/L    Chloride 100 96 - 106 mmol/L    CO2 24 18 - 29 mmol/L    Calcium 9.9 8.7 - 10.3 mg/dL   AMB POC URINALYSIS DIP STICK AUTO W/O MICRO   Result Value Ref Range    Color (UA POC) Yellow     Clarity (UA POC) Clear     Glucose (UA POC) Negative Negative    Bilirubin (UA POC) Negative Negative    Ketones (UA POC) Negative Negative    Specific gravity (UA POC) 1.005 1.001 - 1.035    Blood (UA POC) Trace Negative    pH (UA POC) 6.0 4.6 - 8.0    Protein (UA POC) Negative Negative    Urobilinogen (UA POC) 0.2 mg/dL 0.2 - 1    Nitrites (UA POC) Negative Negative    Leukocyte esterase (UA POC) Negative Negative        Assessment/Plan:   Diagnoses and all orders for this visit:    1. Late onset Alzheimer's disease without behavioral disturbance  -     lisdexamfetamine (VYVANSE) 20 mg capsule; Take 1 Cap (20 mg total) by mouth daily. Max Daily Amount: 20 mg    2. Chronic fatigue    This medication was added for patient's daytime sleepiness related to dementia. Follow-up Disposition:  Return in about 3 months (around 9/8/2018). Disclaimer:  Advised her to call back or return to office if symptoms worsen/change/persist.  Discussed expected course/resolution/complications of diagnosis in detail with patient. Medication risks/benefits/costs/interactions/alternatives discussed with patient. She was given an after visit summary which includes diagnoses, current medications, & vitals. She expressed understanding with the diagnosis and plan.        By:  Eula Ferrell 08 [address] [facility]  [phone]

## 2018-06-08 NOTE — MR AVS SNAPSHOT
[address] [facility] 
[phone] Patient: Milton Mujica MRN: [id number] MCE:[id number] Visit Information Date & Time Provider Department Dept. Phone Encounter #  
 6/8/2018 11:30 AM Desmond Ryan NP [address] [phone] [id number] Follow-up Instructions Return in about 3 months (around 9/8/2018). Your Appointments 9/6/2018  2:40 PM  
Follow Up with Jay Ragsdale MD  
[facility]) Appt Note: memory loss [place] [address] [facility] [phone] [id number] [phone]  
  
   
 [place] [address] [id number] [address] Upcoming Health Maintenance Date Due ZOSTER VACCINE AGE 60> 11/20/1991 MEDICARE YEARLY EXAM 3/14/2018 GLAUCOMA SCREENING Q2Y 3/16/2018 Influenza Age 5 to Adult 8/1/2018 DTaP/Tdap/Td series (3 - Td) 10/22/2025 Allergies as of 6/8/2018  Review Complete On: 6/8/2018 By: Desmond Ryan NP Severity Noted Reaction Type Reactions Latex  12/14/2013    Unknown (comments) Dog Dander  12/14/2013    Shortness of Breath Current Immunizations  Reviewed on 4/18/2016 Name Date Influenza High Dose Vaccine PF 10/22/2015 Influenza Vaccine 10/10/2014 Influenza Vaccine Split 10/17/2011  9:12 AM  
 Influenza Vaccine Whole 9/1/2010 Pneumococcal Conjugate (PCV-13) 10/22/2015 TDAP Vaccine 8/16/2010 Tdap 10/22/2015 ZZZ-RETIRED (DO NOT USE) Pneumococcal Vaccine (Unspecified Type) 1/10/2004 Not reviewed this visit You Were Diagnosed With   
  
 Codes Comments Late onset Alzheimer's disease without behavioral disturbance    -  Primary ICD-10-CM: G30.1, F02.80 ICD-9-CM: 331.0, 294.10 Vitals BP Pulse Temp Resp SpO2 OB Status 147/83 (BP 1 Location: Left arm, BP Patient Position: Sitting) 85 98.4 °F (36.9 °C) (Oral) 18 98% Hysterectomy Smoking Status Never Smoker Vitals History Preferred Pharmacy Pharmacy Name Phone [facility] [phone] [facility] [phone], [address] AT [place] [address] [phone] Your Updated Medication List  
  
   
This list is accurate as of 6/8/18  2:46 PM.  Always use your most recent med list.  
  
  
  
  
 * albuterol 2.5 mg /3 mL (0.083 %) nebulizer solution Commonly known as:  PROVENTIL VENTOLIN  
3 mL by Nebulization route every four (4) hours as needed for Wheezing. * albuterol 90 mcg/actuation inhaler Commonly known as:  PROAIR HFA Take 1 Puff by inhalation every four (4) hours as needed for Wheezing or Shortness of Breath. Indications: Acute Asthma Attack  
  
 atorvastatin 10 mg tablet Commonly known as:  LIPITOR Take 1 Tab by mouth daily. B COMPLEX PO Take  by mouth. diclofenac 1 % Gel Commonly known as:  VOLTAREN Apply 2 g to affected area four (4) times daily. Apply to hands qid FLONASE 50 mcg/actuation nasal spray Generic drug:  fluticasone 2 Sprays by Both Nostrils route daily. Allergy note June 2013. LIQUID TEARS OP Apply  to eye. * lisdexamfetamine 20 mg capsule Commonly known as:  VYVANSE Take 1 Cap (20 mg total) by mouth dailyIndications: Attention-Deficit Hyperactivity Disorder Earliest Fill Date: 6/8/18. Max Daily Amount: 20 mg  
  
 * lisdexamfetamine 20 mg capsule Commonly known as:  VYVANSE Take 1 Cap (20 mg total) by mouth daily. Max Daily Amount: 20 mg  
  
 * lisdexamfetamine 20 mg capsule Commonly known as:  VYVANSE Take 1 Cap (20 mg total) by mouth dailyIndications: Attention-Deficit Hyperactivity Disorder Earliest Fill Date: 8/1/18. Max Daily Amount: 20 mg  
Start taking on:  8/1/2018 memantine-donepezil 28-10 mg Cspx Commonly known as:  MOTION [facility] Take 1 Tab by mouth daily. MYRBETRIQ PO Take  by mouth. Nebulizer & Compressor machine 1 Each by Does Not Apply route every four (4) hours as needed. As directed  Indications: acute asthma with exacerbation SINGULAIR 10 mg tablet Generic drug:  montelukast  
Take 10 mg by mouth daily. SYMBICORT 160-4.5 mcg/actuation [facility] Generic drug:  budesonide-formoterol Take 2 Puffs by inhalation two (2) times a day. From Dr. Yen Talbert. VITAMIN C PO Take  by mouth. VITAMIN D3 1,000 unit tablet Generic drug:  cholecalciferol Take  by mouth daily. * Notice: This list has 5 medication(s) that are the same as other medications prescribed for you. Read the directions carefully, and ask your doctor or other care provider to review them with you. Follow-up Instructions Return in about 3 months (around 9/8/2018). Patient Instructions Lisdexamfetamine (Vyvanse) - (By mouth) Why this medicine is used:  
Treats attention-deficit/hyperactivity disorder and binge eating disorder. Contact a nurse or doctor right away if you have: · Blistering, peeling, red skin rash · Fast, pounding, or uneven heartbeat, chest pain, trouble breathing · Seeing, hearing, or feeling things that are not there, anxiety, fainting · Extreme energy, mood or mental changes, confusion, agitation, unusual behavior · Dry mouth, nausea, loss of appetite, vomiting, weight loss, diarrhea, fever · Numbness or weakness, headache, problems with vision, speech, or walking · Sores, coldness, or color changes on your fingers or toes © 2017 [facility] Information is for End User's use only and may not be sold, redistributed or otherwise used for commercial purposes. Introducing [facility] HEALTH [facility]! Dear Asuncion Miles: Thank you for requesting a [employer] account.   Our records indicate that you already have an active [employer] account. You can access your account anytime at [email or web address] Did you know that you can access your hospital and ER discharge instructions at any time in [employer]? You can also review all of your test results from your hospital stay or ER visit. Additional Information If you have questions, please visit the Frequently Asked Questions section of the [employer] website at [email or web address] Remember, [employer] is NOT to be used for urgent needs. For medical emergencies, dial 911. Now available from your iPhone and Android! Please provide this summary of care documentation to your next provider. Your primary care clinician is listed as Ajay PRESSLEY. If you have any questions after today's visit, please call [phone].

## 2018-06-08 NOTE — TELEPHONE ENCOUNTER
Dr. Cindy Christine  Received: Today       Lawanda FINK [facility] Front Office Pool                     Mr. Sandhu pt's spouse, is calling to check status of \"Vyvanse\" 90 day supply. He would like to make sure he can pick it up early next week.  [phone].                Message copied/pasted from [facility]

## 2018-06-12 ENCOUNTER — OFFICE VISIT (OUTPATIENT)
Dept: FAMILY MEDICINE CLINIC | Age: 83
End: 2018-06-12

## 2018-06-12 VITALS
TEMPERATURE: 97.5 F | SYSTOLIC BLOOD PRESSURE: 118 MMHG | HEART RATE: 77 BPM | DIASTOLIC BLOOD PRESSURE: 51 MMHG | OXYGEN SATURATION: 100 % | RESPIRATION RATE: 18 BRPM

## 2018-06-12 DIAGNOSIS — E03.9 ACQUIRED HYPOTHYROIDISM: Primary | ICD-10-CM

## 2018-06-12 DIAGNOSIS — J40 BRONCHITIS: ICD-10-CM

## 2018-06-12 DIAGNOSIS — R06.2 WHEEZING: Primary | ICD-10-CM

## 2018-06-12 DIAGNOSIS — R05.9 COUGH: ICD-10-CM

## 2018-06-12 NOTE — PATIENT INSTRUCTIONS
Bronchitis: Care Instructions  Your Care Instructions    Bronchitis is inflammation of the bronchial tubes, which carry air to the lungs. The tubes swell and produce mucus, or phlegm. The mucus and inflamed bronchial tubes make you cough. You may have trouble breathing. Most cases of bronchitis are caused by viruses like those that cause colds. Antibiotics usually do not help and they may be harmful. Bronchitis usually develops rapidly and lasts about 2 to 3 weeks in otherwise healthy people. Follow-up care is a key part of your treatment and safety. Be sure to make and go to all appointments, and call your doctor if you are having problems. It's also a good idea to know your test results and keep a list of the medicines you take. How can you care for yourself at home? · Take all medicines exactly as prescribed. Call your doctor if you think you are having a problem with your medicine. · Get some extra rest.  · Take an over-the-counter pain medicine, such as acetaminophen (Tylenol), ibuprofen (Advil, Motrin), or naproxen (Aleve) to reduce fever and relieve body aches. Read and follow all instructions on the label. · Do not take two or more pain medicines at the same time unless the doctor told you to. Many pain medicines have acetaminophen, which is Tylenol. Too much acetaminophen (Tylenol) can be harmful. · Take an over-the-counter cough medicine that contains dextromethorphan to help quiet a dry, hacking cough so that you can sleep. Avoid cough medicines that have more than one active ingredient. Read and follow all instructions on the label. · Breathe moist air from a humidifier, hot shower, or sink filled with hot water. The heat and moisture will thin mucus so you can cough it out. · Do not smoke. Smoking can make bronchitis worse. If you need help quitting, talk to your doctor about stop-smoking programs and medicines. These can increase your chances of quitting for good.   When should you call for help? Call 911 anytime you think you may need emergency care. For example, call if:  ? · You have severe trouble breathing. ?Call your doctor now or seek immediate medical care if:  ? · You have new or worse trouble breathing. ? · You cough up dark brown or bloody mucus (sputum). ? · You have a new or higher fever. ? · You have a new rash. ? Watch closely for changes in your health, and be sure to contact your doctor if:  ? · You cough more deeply or more often, especially if you notice more mucus or a change in the color of your mucus. ? · You are not getting better as expected. Where can you learn more? Go to http://jelani-joyce.info/. Enter H333 in the search box to learn more about \"Bronchitis: Care Instructions. \"  Current as of: May 12, 2017  Content Version: 11.4  © 8104-4292 [employer]. Care instructions adapted under license by [employer] (which disclaims liability or warranty for this information). If you have questions about a medical condition or this instruction, always ask your healthcare professional. [address] any warranty or liability for your use of this information.

## 2018-06-12 NOTE — PROGRESS NOTES
Subjective: Naomi Alonso is a 80 y.o. female  here for follow up of chronic medical conditions listed has Postmenopausal hormone replacement therapy, Overactive bladder, Asthma, Hearing loss, left, Ceruminosis, History of skin cancer, Syncope and collapse, Fever, unspecified, Syncope and collapse, TMJ (temporomandibular joint disorder), Mild cognitive impairment without memory loss, Generalized anxiety disorder, Hyperlipidemia, and ADD (attention deficit disorder) on her problem list.. She  is accompanied by patient. She lives as follows: a spouse and does have Advanced Directives. New Complaints today include: Cough and Fever   Cough  Patient complains of chills, fever, nonproductive cough and wheezing. Symptoms began 5 days ago. The cough is non-productive, with wheezing and is aggravated by nothing Associated symptoms include:fever, chills, wheezing. Patient does not have new pets. Patient does have a history of asthma. Patient does have a history of environmental allergens. Patient does not have recent travel. Patient does not have a history of smoking. Patient  does not have previous Chest X-ray. Patient has not had a PPD done.  has been ill and is on antibiotic with similar symptoms. Recent History includes: None    Review of Systems  Review of Systems   Constitutional: Positive for chills, fever and malaise/fatigue. Negative for diaphoresis and weight loss. HENT: Negative for congestion, sinus pain and sore throat. Eyes: Negative for discharge and redness. Respiratory: Positive for cough and wheezing. Negative for hemoptysis, sputum production and shortness of breath. Cardiovascular: Negative for chest pain, palpitations and leg swelling. Gastrointestinal: Negative for abdominal pain, constipation, diarrhea, heartburn, nausea and vomiting. Genitourinary: Negative for dysuria, flank pain, frequency, hematuria and urgency.    Musculoskeletal: Negative for back pain, falls, joint pain, myalgias and neck pain. Skin: Negative for itching and rash. Neurological: Negative for dizziness, tingling, tremors, sensory change, speech change, focal weakness, seizures, loss of consciousness, weakness and headaches. Psychiatric/Behavioral: Positive for memory loss. Negative for depression. Geriatric Issues: Activities of Daily Living (ADLs):    She is independent in the following: ambulation, bathing and hygiene, feeding, continence, grooming, toileting and dressing  Requires assistance with the following: Transportation  Patient has changes due to:  None    Outside reports reviewed: office notes. Patient Active Problem List   Diagnosis Code    Postmenopausal hormone replacement therapy Z79.890    Overactive bladder N32.81    Asthma J45.909    Hearing loss, left H91.92    Ceruminosis H61.20    History of skin cancer Z85.828    Syncope and collapse R55    Fever, unspecified R50.9    Syncope and collapse R55    TMJ (temporomandibular joint disorder) M26.609    Mild cognitive impairment without memory loss G31.84    Generalized anxiety disorder F41.1    Hyperlipidemia E78.5    ADD (attention deficit disorder) F98.8     Current Outpatient Prescriptions   Medication Sig Dispense Refill    lisdexamfetamine (VYVANSE) 20 mg capsule Take 1 Cap (20 mg total) by mouth dailyIndications: Attention-Deficit Hyperactivity Disorder Earliest Fill Date: 6/8/18. Max Daily Amount: 20 mg 30 Cap 0    memantine-donepezil (NAMZARIC) 28-10 mg CSpX Take 1 Tab by mouth daily. 90 Cap 3    MIRABEGRON (MYRBETRIQ PO) Take  by mouth.  atorvastatin (LIPITOR) 10 mg tablet Take 1 Tab by mouth daily. 90 Tab 3    albuterol (PROAIR HFA) 90 mcg/actuation inhaler Take 1 Puff by inhalation every four (4) hours as needed for Wheezing or Shortness of Breath. Indications: Acute Asthma Attack 3 Inhaler 3    diclofenac (VOLTAREN) 1 % gel Apply 2 g to affected area four (4) times daily.  Apply to hands qid 100 g 3    albuterol (PROVENTIL VENTOLIN) 2.5 mg /3 mL (0.083 %) nebulizer solution 3 mL by Nebulization route every four (4) hours as needed for Wheezing. 24 Each 2    Nebulizer & Compressor machine 1 Each by Does Not Apply route every four (4) hours as needed. As directed  Indications: acute asthma with exacerbation 1 Each 0    montelukast (SINGULAIR) 10 mg tablet Take 10 mg by mouth daily.  POLYVINYL ALCOHOL (LIQUID TEARS OP) Apply  to eye.  budesonide-formoterol (SYMBICORT) 160-4.5 mcg/actuation HFA inhaler Take 2 Puffs by inhalation two (2) times a day. From Dr. Elijah Cardona.  fluticasone (FLONASE) 50 mcg/actuation nasal spray 2 Sprays by Both Nostrils route daily. Allergy note June 2013.  ASCORBATE CALCIUM (VITAMIN C PO) Take  by mouth.  VITAMIN B COMPLEX (B COMPLEX PO) Take  by mouth.  cholecalciferol, vitamin d3, (VITAMIN D) 1,000 unit tablet Take  by mouth daily. Allergies   Allergen Reactions    Latex Unknown (comments)    Dog Dander Shortness of Breath     Past Medical History:   Diagnosis Date    Asthma     Dr. Meliza Luis June 2013--doesn't see routinely--pt reports no change in allergies--no need for routine follow-up. Dr. Elijah Cardona sees for mild COPD--on Symbicort since ~Nov 2013. Continued Singulair.  Ceruminosis     [facility]    Chronic obstructive pulmonary disease ([facility] [address]     Encounter for screening colonoscopy ~2006    Normal per pt--[facility] records requested July 2013 visit.  Fainting     Falls     Fatigue     Hearing loss, left     va hearing    History of mammogram May 2014    Normal/no change.  History of shingles 2010    Dec 2014:  Pt reports clinical history and shingles vaccine 4yrs ago. To try to obtain exact date of zostavax from /source.  History of skin cancer     melanoma 2003?, BCC & SCC- followed by Dr. Leoma Seip- twice a yr appts. July 2013-still on 6mo schedule for skin checks.     Memory disorder  Osteopenia     DEXA     Overactive bladder     detrol [place]. Vesicare--[facility] Considering procedure for incontinence.  Postmenopausal hormone replacement therapy     since hysterectomy    Right shoulder pain 2013. Mild osteopenia; mild AC osteoarthritis. Sports Med Eval Oct 2013.  Well woman exam (no gynecological exam) Aug 3, 2012    \"[facility] Screening\":  Results scanned to chart. Updated 2014-no change except noted concern for GFR--see Dec 2014 note, letter as reviewed with pt. Past Surgical History:   Procedure Laterality Date    HX CATARACT REMOVAL      HX HYSTERECTOMY  1948    MULTIPLE DELIVERY       IN [place] Left      Dr. Deisy Regalado - [address] [facility]- last eye exam 2010     Family History   Problem Relation Age of Onset   Sim Gonzalez Dementia Father     Hypertension Mother     Stroke Mother     Cancer Other      daughter- breast cancer     Social History   Substance Use Topics    Smoking status: Never Smoker    Smokeless tobacco: Never Used    Alcohol use 0.0 oz/week      Comment: 1-2 drinks/every evening ( wine/vodka)          Objective:     Visit Vitals    /51 (BP 1 Location: Left arm, BP Patient Position: Sitting)    Pulse 77    Temp 97.5 °F (36.4 °C) (Oral)    Resp 18    SpO2 100%     Physical Exam   Constitutional: She is oriented to person, place, and time. She appears well-developed and well-nourished. No distress. HENT:   Head: Normocephalic and atraumatic. Right Ear: External ear normal.   Left Ear: External ear normal.   Nose: Nose normal.   Mouth/Throat: Oropharynx is clear and moist. No oropharyngeal exudate. Eyes: Conjunctivae and EOM are normal. Pupils are equal, round, and reactive to light. Right eye exhibits no discharge. Left eye exhibits no discharge. No scleral icterus. Neck: Normal range of motion. Neck supple. No JVD present. No tracheal deviation present. No thyromegaly present. Cardiovascular: Normal rate, regular rhythm, normal heart sounds and intact distal pulses. Exam reveals no gallop and no friction rub. No murmur heard. Pulmonary/Chest: Effort normal. No respiratory distress. She has wheezes. She has no rales. Abdominal: Soft. Bowel sounds are normal. She exhibits no distension. There is no tenderness. Musculoskeletal: She exhibits no edema. Lymphadenopathy:     She has no cervical adenopathy. Neurological: She is alert and oriented to person, place, and time. Skin: Skin is warm and dry. No rash noted. She is not diaphoretic. No erythema. No pallor. Psychiatric: She has a normal mood and affect. Her behavior is normal.   Nursing note and vitals reviewed.        Imaging  None    Lab Review  Results for orders placed or performed in visit on 06/01/18   TSH 3RD GENERATION   Result Value Ref Range    TSH 7.850 (H) 0.450 - 4.500 uIU/mL   CBC W/O DIFF   Result Value Ref Range    WBC 6.0 3.4 - 10.8 x10E3/uL    RBC 4.36 3.77 - 5.28 x10E6/uL    HGB 14.0 11.1 - 15.9 g/dL    HCT 41.1 34.0 - 46.6 %    MCV 94 79 - 97 fL    MCH 32.1 26.6 - 33.0 pg    MCHC 34.1 31.5 - 35.7 g/dL    RDW 13.6 12.3 - 15.4 %    PLATELET 625 838 - 704 X13H1/KD   METABOLIC PANEL, BASIC   Result Value Ref Range    Glucose 80 65 - 99 mg/dL    BUN 14 8 - 27 mg/dL    Creatinine 0.77 0.57 - 1.00 mg/dL    GFR est non-AA 70 >59 mL/min/1.73    GFR est AA 81 >59 mL/min/1.73    BUN/Creatinine ratio 18 12 - 28    Sodium 142 134 - 144 mmol/L    Potassium 4.0 3.5 - 5.2 mmol/L    Chloride 100 96 - 106 mmol/L    CO2 24 18 - 29 mmol/L    Calcium 9.9 8.7 - 10.3 mg/dL   AMB POC URINALYSIS DIP STICK AUTO W/O MICRO   Result Value Ref Range    Color (UA POC) Yellow     Clarity (UA POC) Clear     Glucose (UA POC) Negative Negative    Bilirubin (UA POC) Negative Negative    Ketones (UA POC) Negative Negative    Specific gravity (UA POC) 1.005 1.001 - 1.035    Blood (UA POC) Trace Negative    pH (UA POC) 6.0 4.6 - 8.0 Protein (UA POC) Negative Negative    Urobilinogen (UA POC) 0.2 mg/dL 0.2 - 1    Nitrites (UA POC) Negative Negative    Leukocyte esterase (UA POC) Negative Negative        Assessment/Plan:   Diagnoses and all orders for this visit:    1. Wheezing  -     XR CHEST PA LAT; Future    2. Cough  -     XR CHEST PA LAT; Future    3. Bronchitis  -     XR CHEST PA LAT; Future        Follow-up Disposition:  Return if symptoms worsen or fail to improve. Disclaimer:  Advised her to call back or return to office if symptoms worsen/change/persist.  Discussed expected course/resolution/complications of diagnosis in detail with patient. Medication risks/benefits/costs/interactions/alternatives discussed with patient. She was given an after visit summary which includes diagnoses, current medications, & vitals. She expressed understanding with the diagnosis and plan.        By:  Eula Ferrell 05 [id number] [facility]  [phone]

## 2018-07-03 ENCOUNTER — OFFICE VISIT (OUTPATIENT)
Dept: FAMILY MEDICINE CLINIC | Age: 83
End: 2018-07-03

## 2018-07-03 VITALS
TEMPERATURE: 98 F | RESPIRATION RATE: 18 BRPM | HEART RATE: 68 BPM | SYSTOLIC BLOOD PRESSURE: 112 MMHG | OXYGEN SATURATION: 98 % | DIASTOLIC BLOOD PRESSURE: 70 MMHG

## 2018-07-03 DIAGNOSIS — S81.811A SKIN TEAR OF RIGHT LOWER LEG WITHOUT COMPLICATION, INITIAL ENCOUNTER: Primary | ICD-10-CM

## 2018-07-03 NOTE — PATIENT INSTRUCTIONS
Skin Tears: Care Instructions  Your Care Instructions  As we get older, our skin gets drier and more fragile. Sometimes this can cause the outer layers of skin to split and tear open. Skin tears are treated in different ways. In some cases, doctors use pieces of tape called Steri-Strips to pull the skin together and help it heal. Other times, it's best to leave the tear open and cover it with a special wound-care bandage. Skin tears are usually not serious. They usually heal in a few weeks. But how long you take to heal depends on your body and the type of tear you have. Sometimes the torn piece of skin is used to protect the wound while it heals. But that piece of skin does not heal. It may fall off on its own. Or the doctor may remove it. As your tear heals, it's important to keep it clean to help prevent infection. The doctor has checked you carefully, but problems can develop later. If you notice any problems or new symptoms, get medical treatment right away. Follow-up care is a key part of your treatment and safety. Be sure to make and go to all appointments, and call your doctor if you are having problems. It's also a good idea to know your test results and keep a list of the medicines you take. How can you care for yourself at home? · If you have pain, ask your doctor if you can take an over-the-counter pain medicine, such as acetaminophen (Tylenol), ibuprofen (Advil, Motrin), or naproxen (Aleve). Be safe with medicines. Read and follow all instructions on the label. · If you have a bandage, follow your doctor's instructions for changing it. · If you have Steri-Strips, leave them on until they fall off. · Follow your doctor's instructions about bathing. · Gently wash the skin tear with plain water 2 times a day. Do not rub the area. · Let the area air dry. Or you can pat it carefully with a soft towel. When should you call for help?   Call your doctor now or seek immediate medical care if:  ? · You have signs of infection, such as:  ¨ Increased pain, swelling, warmth, or redness around the tear. ¨ Red streaks leading from the tear. ¨ Pus draining from the tear. ¨ A fever. ? · The tear starts to bleed a lot. Small amounts of blood are normal.   ? Watch closely for changes in your health, and be sure to contact your doctor if:  ? · You do not get better as expected. Where can you learn more? Go to http://jelani-joyce.info/. Enter L192 in the search box to learn more about \"Skin Tears: Care Instructions. \"  Current as of: March 20, 2017  Content Version: 11.4  © 6072-7768 [employer]. Care instructions adapted under license by [employer] (which disclaims liability or warranty for this information). If you have questions about a medical condition or this instruction, always ask your healthcare professional. [address] any warranty or liability for your use of this information.

## 2018-07-03 NOTE — MR AVS SNAPSHOT
[address] [place] 
[phone] Patient: Melonie Mercado MRN: [id number] FJB:[id number] Visit Information Date & Time Provider Department Dept. Phone Encounter #  
 7/3/2018  1:30 PM Dorina Calderon NP [address] [phone] [id number] Follow-up Instructions Return if symptoms worsen or fail to improve. Follow-up and Disposition History Your Appointments 9/6/2018  2:40 PM  
Follow Up with Adrianna Walker MD  
[facility]) Appt Note: memory loss [address] [place] [address] [facility] [id number] [phone]  
  
   
 [address] [address] [address] Upcoming Health Maintenance Date Due ZOSTER VACCINE AGE 60> 11/20/1991 MEDICARE YEARLY EXAM 3/14/2018 GLAUCOMA SCREENING Q2Y 3/16/2018 Influenza Age 5 to Adult 8/1/2018 DTaP/Tdap/Td series (3 - Td) 10/22/2025 Allergies as of 7/3/2018  Review Complete On: 7/3/2018 By: Dorina Calderon NP Severity Noted Reaction Type Reactions Latex  12/14/2013    Unknown (comments) Dog Dander  12/14/2013    Shortness of Breath Current Immunizations  Reviewed on 4/18/2016 Name Date Influenza High Dose Vaccine PF 10/22/2015 Influenza Vaccine 10/10/2014 Influenza Vaccine Split 10/17/2011  9:12 AM  
 Influenza Vaccine Whole 9/1/2010 Pneumococcal Conjugate (PCV-13) 10/22/2015 TDAP Vaccine 8/16/2010 Tdap 10/22/2015 ZZZ-RETIRED (DO NOT USE) Pneumococcal Vaccine (Unspecified Type) 1/10/2004 Not reviewed this visit You Were Diagnosed With   
  
 Codes Comments Skin tear of right lower leg without complication, initial encounter    -  Primary ICD-10-CM: Z84.539O ICD-9-CM: 891.0 Vitals BP Pulse Temp Resp SpO2 OB Status 112/70 (BP 1 Location: Left arm, BP Patient Position: Sitting) 68 98 °F (36.7 °C) (Oral) 18 98% Hysterectomy Smoking Status Never Smoker Preferred Pharmacy Pharmacy Name Phone [facility] [phone] [facility] [phone], [address] AT [place] [address] [phone] Your Updated Medication List  
  
   
This list is accurate as of 7/3/18  2:32 PM.  Always use your most recent med list.  
  
  
  
  
 * albuterol 2.5 mg /3 mL (0.083 %) nebulizer solution Commonly known as:  PROVENTIL VENTOLIN  
3 mL by Nebulization route every four (4) hours as needed for Wheezing. * albuterol 90 mcg/actuation inhaler Commonly known as:  PROAIR HFA Take 1 Puff by inhalation every four (4) hours as needed for Wheezing or Shortness of Breath. Indications: Acute Asthma Attack  
  
 atorvastatin 10 mg tablet Commonly known as:  LIPITOR Take 1 Tab by mouth daily. B COMPLEX PO Take  by mouth. diclofenac 1 % Gel Commonly known as:  VOLTAREN Apply 2 g to affected area four (4) times daily. Apply to hands qid FLONASE 50 mcg/actuation nasal spray Generic drug:  fluticasone 2 Sprays by Both Nostrils route daily. Allergy note June 2013. LIQUID TEARS OP Apply  to eye.  
  
 lisdexamfetamine 20 mg capsule Commonly known as:  VYVANSE Take 1 Cap (20 mg total) by mouth dailyIndications: Attention-Deficit Hyperactivity Disorder Earliest Fill Date: 6/8/18. Max Daily Amount: 20 mg  
  
 memantine-donepezil 28-10 mg Cspx Commonly known as:  [facility] Take 1 Tab by mouth daily. MYRBETRIQ PO Take  by mouth. Nebulizer & Compressor machine 1 Each by Does Not Apply route every four (4) hours as needed. As directed  Indications: acute asthma with exacerbation SINGULAIR 10 mg tablet Generic drug:  montelukast  
Take 10 mg by mouth daily.   
  
 SYMBICORT 160-4.5 mcg/actuation Hfaa  
 Generic drug:  budesonide-formoterol Take 2 Puffs by inhalation two (2) times a day. From Dr. Jacqueline Cota. VITAMIN C PO Take  by mouth. VITAMIN D3 1,000 unit tablet Generic drug:  cholecalciferol Take  by mouth daily. * Notice: This list has 2 medication(s) that are the same as other medications prescribed for you. Read the directions carefully, and ask your doctor or other care provider to review them with you. Follow-up Instructions Return if symptoms worsen or fail to improve. Patient Instructions Skin Tears: Care Instructions Your Care Instructions As we get older, our skin gets drier and more fragile. Sometimes this can cause the outer layers of skin to split and tear open. Skin tears are treated in different ways. In some cases, doctors use pieces of tape called Steri-Strips to pull the skin together and help it heal. Other times, it's best to leave the tear open and cover it with a special wound-care bandage. Skin tears are usually not serious. They usually heal in a few weeks. But how long you take to heal depends on your body and the type of tear you have. Sometimes the torn piece of skin is used to protect the wound while it heals. But that piece of skin does not heal. It may fall off on its own. Or the doctor may remove it. As your tear heals, it's important to keep it clean to help prevent infection. The doctor has checked you carefully, but problems can develop later. If you notice any problems or new symptoms, get medical treatment right away. Follow-up care is a key part of your treatment and safety. Be sure to make and go to all appointments, and call your doctor if you are having problems. It's also a good idea to know your test results and keep a list of the medicines you take. How can you care for yourself at home?  
· If you have pain, ask your doctor if you can take an over-the-counter pain medicine, such as acetaminophen (Tylenol), ibuprofen (Advil, Motrin), or naproxen (Aleve). Be safe with medicines. Read and follow all instructions on the label. · If you have a bandage, follow your doctor's instructions for changing it. · If you have Steri-Strips, leave them on until they fall off. · Follow your doctor's instructions about bathing. · Gently wash the skin tear with plain water 2 times a day. Do not rub the area. · Let the area air dry. Or you can pat it carefully with a soft towel. When should you call for help? Call your doctor now or seek immediate medical care if: 
? · You have signs of infection, such as: 
¨ Increased pain, swelling, warmth, or redness around the tear. ¨ Red streaks leading from the tear. ¨ Pus draining from the tear. ¨ A fever. ? · The tear starts to bleed a lot. Small amounts of blood are normal. ? Watch closely for changes in your health, and be sure to contact your doctor if: 
? · You do not get better as expected. Where can you learn more? Go to http://jelani-joyce.info/. Enter C305 in the search box to learn more about \"Skin Tears: Care Instructions. \" Current as of: March 20, 2017 Content Version: 11.4 © 2701-9032 [employer]. Care instructions adapted under license by [employer] (which disclaims liability or warranty for this information). If you have questions about a medical condition or this instruction, always ask your healthcare professional. [place] [address] any warranty or liability for your use of this information. Introducing [facility]! Dear Hilario More: Thank you for requesting a [facility] account. Our records indicate that you already have an active [facility] account. You can access your account anytime at [email or web address] Did you know that you can access your hospital and ER discharge instructions at any time in zweitgeist? You can also review all of your test results from your hospital stay or ER visit. Additional Information If you have questions, please visit the Frequently Asked Questions section of the zweitgeist website at [email or web address]/. Remember, zweitgeist is NOT to be used for urgent needs. For medical emergencies, dial 911. Now available from your iPhone and Android! Please provide this summary of care documentation to your next provider. Your primary care clinician is listed as Eric PRESSLEY. If you have any questions after today's visit, please call [phone].

## 2018-07-03 NOTE — PROGRESS NOTES
Subjective: Morenita You is a 80 y.o. female  here for follow up of chronic medical conditions listed has Postmenopausal hormone replacement therapy, Overactive bladder, Asthma, Hearing loss, left, Ceruminosis, History of skin cancer, Syncope and collapse, Fever, unspecified, Syncope and collapse, TMJ (temporomandibular joint disorder), Mild cognitive impairment without memory loss, Generalized anxiety disorder, Hyperlipidemia, and ADD (attention deficit disorder) on her problem list.. She  is accompanied by patient and spouse. She lives as follows: a spouse and does have Advanced Directives. New Complaints today include: Wound Check (new skin tear that occurred yesterday)   Patient's  has been in the hospital and he came home and his wife had a wound on her leg. She states she bumped it on furniture yesterday. She denies pain, fever, discharge, erythema. She is a poor historian secondary to cognitive issues. Recent History includes: None    Review of Systems  Review of Systems   Constitutional: Negative for chills, fever, malaise/fatigue and weight loss. HENT: Negative for congestion, ear discharge and hearing loss. Eyes: Negative for blurred vision, double vision and photophobia. Respiratory: Negative for cough and shortness of breath. Cardiovascular: Negative for chest pain, palpitations, orthopnea and leg swelling. Gastrointestinal: Negative for abdominal pain, blood in stool, constipation, diarrhea, heartburn, melena, nausea and vomiting. Genitourinary: Negative for dysuria, frequency and urgency. Musculoskeletal: Negative for falls and joint pain. Skin: Negative for itching and rash. Neurological: Negative for dizziness, tingling, tremors, sensory change, weakness and headaches. Endo/Heme/Allergies: Does not bruise/bleed easily. Psychiatric/Behavioral: Negative for depression and memory loss. The patient is not nervous/anxious and does not have insomnia. Geriatric Issues: Activities of Daily Living (ADLs):    She is independent in the following: ambulation, bathing and hygiene, feeding, continence, grooming, toileting and dressing  Requires assistance with the following: None  Patient has changes due to:  None    Outside reports reviewed: none. Patient Active Problem List   Diagnosis Code    Postmenopausal hormone replacement therapy Z79.890    Overactive bladder N32.81    Asthma J45.909    Hearing loss, left H91.92    Ceruminosis H61.20    History of skin cancer Z85.828    Syncope and collapse R55    Fever, unspecified R50.9    Syncope and collapse R55    TMJ (temporomandibular joint disorder) M26.609    Mild cognitive impairment without memory loss G31.84    Generalized anxiety disorder F41.1    Hyperlipidemia E78.5    ADD (attention deficit disorder) F98.8     Current Outpatient Prescriptions   Medication Sig Dispense Refill    lisdexamfetamine (VYVANSE) 20 mg capsule Take 1 Cap (20 mg total) by mouth dailyIndications: Attention-Deficit Hyperactivity Disorder Earliest Fill Date: 6/8/18. Max Daily Amount: 20 mg 30 Cap 0    memantine-donepezil (NAMZARIC) 28-10 mg CSpX Take 1 Tab by mouth daily. 90 Cap 3    MIRABEGRON (MYRBETRIQ PO) Take  by mouth.  atorvastatin (LIPITOR) 10 mg tablet Take 1 Tab by mouth daily. 90 Tab 3    diclofenac (VOLTAREN) 1 % gel Apply 2 g to affected area four (4) times daily. Apply to hands qid 100 g 3    Nebulizer & Compressor machine 1 Each by Does Not Apply route every four (4) hours as needed. As directed  Indications: acute asthma with exacerbation 1 Each 0    montelukast (SINGULAIR) 10 mg tablet Take 10 mg by mouth daily.  POLYVINYL ALCOHOL (LIQUID TEARS OP) Apply  to eye.  budesonide-formoterol (SYMBICORT) 160-4.5 mcg/actuation HFA inhaler Take 2 Puffs by inhalation two (2) times a day. From Dr. Martha Maza.       fluticasone (FLONASE) 50 mcg/actuation nasal spray 2 Sprays by Both Nostrils route daily. Allergy note June 2013.  ASCORBATE CALCIUM (VITAMIN C PO) Take  by mouth.  VITAMIN B COMPLEX (B COMPLEX PO) Take  by mouth.  cholecalciferol, vitamin d3, (VITAMIN D) 1,000 unit tablet Take  by mouth daily.  albuterol (PROAIR HFA) 90 mcg/actuation inhaler Take 1 Puff by inhalation every four (4) hours as needed for Wheezing or Shortness of Breath. Indications: Acute Asthma Attack 3 Inhaler 3    albuterol (PROVENTIL VENTOLIN) 2.5 mg /3 mL (0.083 %) nebulizer solution 3 mL by Nebulization route every four (4) hours as needed for Wheezing. 24 Each 2     Allergies   Allergen Reactions    Latex Unknown (comments)    Dog Dander Shortness of Breath     Past Medical History:   Diagnosis Date    Asthma     Dr. Yamila Lyons June 2013--doesn't see routinely--pt reports no change in allergies--no need for routine follow-up. Dr. Esteban Voss sees for mild COPD--on Symbicort since ~Nov 2013. Continued Singulair.  Ceruminosis     [facility]    Chronic obstructive pulmonary disease ([facility] [address]     Encounter for screening colonoscopy ~2006    Normal per pt--[facility] records requested July 2013 visit.  Fainting     Falls     Fatigue     Hearing loss, left     va hearing    History of mammogram May 2014    Normal/no change.  History of shingles 2010    Dec 2014:  Pt reports clinical history and shingles vaccine 4yrs ago. To try to obtain exact date of zostavax from /source.  History of skin cancer     melanoma 2003?, BCC & SCC- followed by Dr. Solomon Friendly- twice a yr appts. July 2013-still on 6mo schedule for skin checks.  Memory disorder     Osteopenia     DEXA 8/11    Overactive bladder     detrol LA. [facility]icare--sees Urology Spring/Fall. Considering procedure for incontinence.  Postmenopausal hormone replacement therapy     since hysterectomy    Right shoulder pain Sept 2013. Mild osteopenia; mild AC osteoarthritis. Sports Med Eval Oct 2013.     Well woman exam (no gynecological exam) Aug 3, 2012    \"[facility] Screening\":  Results scanned to chart. Updated 2014-no change except noted concern for GFR--see Dec 2014 note, letter as reviewed with pt. Past Surgical History:   Procedure Laterality Date    HX CATARACT REMOVAL      HX HYSTERECTOMY  1948    MULTIPLE DELIVERY       [facility] Jaquelin Hassan - [address] [place] [address]- last eye exam 2010     Family History   Problem Relation Age of Onset   [address] [facility] [address] Dementia Father     Hypertension Mother     Stroke Mother     Cancer Other      daughter- breast cancer     Social History   Substance Use Topics    Smoking status: Never Smoker    Smokeless tobacco: Never Used    Alcohol use 0.0 oz/week      Comment: 1-2 drinks/every evening ( wine/vodka)          Objective:     Visit Vitals    /70 (BP 1 Location: Left arm, BP Patient Position: Sitting)    Pulse 68    Temp 98 °F (36.7 °C) (Oral)    Resp 18    SpO2 98%     Physical Exam   Constitutional: She appears well-developed and well-nourished. No distress. HENT:   Mouth/Throat: Oropharynx is clear and moist. No oropharyngeal exudate. Eyes: Right eye exhibits no discharge. Left eye exhibits no discharge. No scleral icterus. Neck: Normal range of motion. Neck supple. No JVD present. No tracheal deviation present. No thyromegaly present. Cardiovascular: Normal rate, regular rhythm, normal heart sounds and intact distal pulses. Exam reveals no gallop and no friction rub. No murmur heard. Pulmonary/Chest: Effort normal and breath sounds normal. No respiratory distress. She has no wheezes. She has no rales. Abdominal: Soft. She exhibits no distension. Musculoskeletal: She exhibits no edema. Lymphadenopathy:     She has no cervical adenopathy. Neurological: She is alert. Oriented X 2 to person and place   Skin: Skin is warm and dry. No rash noted. She is not diaphoretic. No erythema. No pallor. 4 cm round skin tear on RLE pretibial area. No erythema or discharge. Bottom 1/4 with loose skin. Left in place to aid healing. Psychiatric: She has a normal mood and affect. Her behavior is normal.   Pleasantly confused and poor STM. Nursing note and vitals reviewed. Imaging  None    Lab Review  Results for orders placed or performed in visit on 06/01/18   TSH 3RD GENERATION   Result Value Ref Range    TSH 7.850 (H) 0.450 - 4.500 uIU/mL   CBC W/O DIFF   Result Value Ref Range    WBC 6.0 3.4 - 10.8 x10E3/uL    RBC 4.36 3.77 - 5.28 x10E6/uL    HGB 14.0 11.1 - 15.9 g/dL    HCT 41.1 34.0 - 46.6 %    MCV 94 79 - 97 fL    MCH 32.1 26.6 - 33.0 pg    MCHC 34.1 31.5 - 35.7 g/dL    RDW 13.6 12.3 - 15.4 %    PLATELET 342 835 - 021 L00P5/NS   METABOLIC PANEL, BASIC   Result Value Ref Range    Glucose 80 65 - 99 mg/dL    BUN 14 8 - 27 mg/dL    Creatinine 0.77 0.57 - 1.00 mg/dL    GFR est non-AA 70 >59 mL/min/1.73    GFR est AA 81 >59 mL/min/1.73    BUN/Creatinine ratio 18 12 - 28    Sodium 142 134 - 144 mmol/L    Potassium 4.0 3.5 - 5.2 mmol/L    Chloride 100 96 - 106 mmol/L    CO2 24 18 - 29 mmol/L    Calcium 9.9 8.7 - 10.3 mg/dL   AMB POC URINALYSIS DIP STICK AUTO W/O MICRO   Result Value Ref Range    Color (UA POC) Yellow     Clarity (UA POC) Clear     Glucose (UA POC) Negative Negative    Bilirubin (UA POC) Negative Negative    Ketones (UA POC) Negative Negative    Specific gravity (UA POC) 1.005 1.001 - 1.035    Blood (UA POC) Trace Negative    pH (UA POC) 6.0 4.6 - 8.0    Protein (UA POC) Negative Negative    Urobilinogen (UA POC) 0.2 mg/dL 0.2 - 1    Nitrites (UA POC) Negative Negative    Leukocyte esterase (UA POC) Negative Negative        Assessment/Plan:   Diagnoses and all orders for this visit:    1. Skin tear of right lower leg without complication, initial encounter  area cleansed with NS and vaseline gauze and telfa applied. Taught  how to do wound care daily until healed. Follow-up Disposition:  Return if symptoms worsen or fail to improve. Disclaimer:  Advised her to call back or return to office if symptoms worsen/change/persist.  Discussed expected course/resolution/complications of diagnosis in detail with patient. Medication risks/benefits/costs/interactions/alternatives discussed with patient. She was given an after visit summary which includes diagnoses, current medications, & vitals. She expressed understanding with the diagnosis and plan.        By:  Eula Ferrell [id number] [facility]  [phone]

## 2018-07-13 ENCOUNTER — OFFICE VISIT (OUTPATIENT)
Dept: FAMILY MEDICINE CLINIC | Age: 83
End: 2018-07-13

## 2018-07-13 VITALS
OXYGEN SATURATION: 97 % | RESPIRATION RATE: 16 BRPM | BODY MASS INDEX: 22.62 KG/M2 | SYSTOLIC BLOOD PRESSURE: 132 MMHG | HEIGHT: 59 IN | TEMPERATURE: 97.8 F | HEART RATE: 90 BPM | WEIGHT: 112.2 LBS | DIASTOLIC BLOOD PRESSURE: 80 MMHG

## 2018-07-13 DIAGNOSIS — M79.604 PAIN OF RIGHT LOWER EXTREMITY: ICD-10-CM

## 2018-07-13 DIAGNOSIS — S81.802D LEG WOUND, LEFT, SUBSEQUENT ENCOUNTER: Primary | ICD-10-CM

## 2018-07-13 NOTE — PROGRESS NOTES
Identified Patient with two Patient identifiers (Name and ). Two Patient Identifiers confirmed. Reviewed record in preparation for visit and have obtained necessary documentation. Chief Complaint   Patient presents with    Leg Injury     open wound lower right leg over a week       Visit Vitals    /80 (BP 1 Location: Left arm, BP Patient Position: Sitting)    Pulse 90    Temp 97.8 °F (36.6 °C) (Oral)    Resp 16    Ht 4' 11\" (1.499 m)    Wt 112 lb 3.2 oz (50.9 kg)    SpO2 97%    BMI 22.66 kg/m2       1. Have you been to the ER, urgent care clinic since your last visit? Yes [facility] this week for the wound  Hospitalized since your last visit? No    2. Have you seen or consulted any other health care providers outside of the [address] [place] [address] since your last visit? Include any pap smears or colon screening.  No

## 2018-07-16 NOTE — PROGRESS NOTES
Subjective: Gisell Maddox is a 80 y.o. female  here for follow up of chronic medical conditions listed has Postmenopausal hormone replacement therapy, Overactive bladder, Asthma, Hearing loss, left, Ceruminosis, History of skin cancer, Syncope and collapse, Fever, unspecified, Syncope and collapse, TMJ (temporomandibular joint disorder), Mild cognitive impairment without memory loss, Generalized anxiety disorder, Hyperlipidemia, and ADD (attention deficit disorder) on her problem list.. She  is accompanied by patient. She lives as follows: a spouse and does have Advanced Directives. New Complaints today include: Leg Injury (open wound lower right leg over a week)   Patient had a skin tear. Then she went to urgent care on several days ago because it got red. Given cephalexin for a wound infection. The wound looks worse to her  with increased erythema and it is painful for the patient. No fevers. Patient is a poor historian secondary to dementia. Recent History includes: None    Review of Systems  Review of Systems   Constitutional: Negative for chills, fever, malaise/fatigue and weight loss. Respiratory: Negative for cough, shortness of breath and wheezing. Cardiovascular: Negative for chest pain, palpitations and leg swelling. Gastrointestinal: Negative for abdominal pain, constipation, diarrhea, heartburn, nausea and vomiting. Skin: Negative for itching and rash. Psychiatric/Behavioral: Positive for memory loss. Negative for depression. Geriatric Issues: Activities of Daily Living (ADLs):    She is independent in the following: ambulation, bathing and hygiene, feeding, continence, grooming, toileting and dressing  Requires assistance with the following: None  Patient has changes due to:  None    Outside reports reviewed: ER records.     Patient Active Problem List   Diagnosis Code    Postmenopausal hormone replacement therapy Z79.890    Overactive bladder N32.81    Asthma J45.909    Hearing loss, left H91.92    Ceruminosis H61.20    History of skin cancer Z85.828    Syncope and collapse R55    Fever, unspecified R50.9    Syncope and collapse R55    TMJ (temporomandibular joint disorder) M26.609    Mild cognitive impairment without memory loss G31.84    Generalized anxiety disorder F41.1    Hyperlipidemia E78.5    ADD (attention deficit disorder) F98.8     Current Outpatient Prescriptions   Medication Sig Dispense Refill    lisdexamfetamine (VYVANSE) 20 mg capsule Take 1 Cap (20 mg total) by mouth dailyIndications: Attention-Deficit Hyperactivity Disorder Earliest Fill Date: 6/8/18. Max Daily Amount: 20 mg 30 Cap 0    memantine-donepezil (NAMZARIC) 28-10 mg CSpX Take 1 Tab by mouth daily. 90 Cap 3    MIRABEGRON (MYRBETRIQ PO) Take  by mouth.  atorvastatin (LIPITOR) 10 mg tablet Take 1 Tab by mouth daily. 90 Tab 3    albuterol (PROAIR HFA) 90 mcg/actuation inhaler Take 1 Puff by inhalation every four (4) hours as needed for Wheezing or Shortness of Breath. Indications: Acute Asthma Attack 3 Inhaler 3    diclofenac (VOLTAREN) 1 % gel Apply 2 g to affected area four (4) times daily. Apply to hands qid 100 g 3    albuterol (PROVENTIL VENTOLIN) 2.5 mg /3 mL (0.083 %) nebulizer solution 3 mL by Nebulization route every four (4) hours as needed for Wheezing. 24 Each 2    Nebulizer & Compressor machine 1 Each by Does Not Apply route every four (4) hours as needed. As directed  Indications: acute asthma with exacerbation 1 Each 0    montelukast (SINGULAIR) 10 mg tablet Take 10 mg by mouth daily.  POLYVINYL ALCOHOL (LIQUID TEARS OP) Apply  to eye.  budesonide-formoterol (SYMBICORT) 160-4.5 mcg/actuation HFA inhaler Take 2 Puffs by inhalation two (2) times a day. From Dr. Gail Arias.  fluticasone (FLONASE) 50 mcg/actuation nasal spray 2 Sprays by Both Nostrils route daily. Allergy note June 2013.  ASCORBATE CALCIUM (VITAMIN C PO) Take  by mouth.  VITAMIN B COMPLEX (B COMPLEX PO) Take  by mouth.  cholecalciferol, vitamin d3, (VITAMIN D) 1,000 unit tablet Take  by mouth daily. Allergies   Allergen Reactions    Latex Unknown (comments)    Dog Dander Shortness of Breath     Past Medical History:   Diagnosis Date    Asthma     Dr. Pieter Ramos 2013--doesn't see routinely--pt reports no change in allergies--no need for routine follow-up. Dr. Leigh Ann Ramos sees for mild COPD--on Symbicort since ~2013. Continued Singulair.  Ceruminosis     [facility] ear [facility] at [facility]    Chronic obstructive pulmonary disease ([facility] [address]     Encounter for screening colonoscopy ~    Normal per pt[facility].  Fainting     Falls     Fatigue     Hearing loss, left     va hearing    History of mammogram May 2014    Normal/no change.  History of shingles 2010    Dec 2014:  Pt reports clinical history and shingles vaccine 4yrs ago. To try to obtain exact date of zostavax from /source.  History of skin cancer     melanoma ?, BCC & SCC- followed by Dr. Inez Neil- twice a yr appts. 2013-still on 6mo schedule for skin checks.  Memory disorder     Osteopenia     DEXA     Overactive bladder     detrol LA. Vesicare--sees Urology [facility]/. Considering procedure for incontinence.  Postmenopausal hormone replacement therapy     since hysterectomy    Right shoulder pain 2013. Mild osteopenia; mild AC osteoarthritis. Sports Med Eval Oct 2013.  Well woman exam (no gynecological exam) Aug 3, 2012    \"[facility] Screening\":  Results scanned to chart. Updated 2014-no change except noted concern for GFR--see Dec 2014 note, letter as reviewed with pt.      Past Surgical History:   Procedure Laterality Date    HX CATARACT REMOVAL      HX HYSTERECTOMY  1948    MULTIPLE DELIVERY       HI TRABECULOPLASTY BY LASER SURGERY      Dr. Cazares Daily - [facility]- last eye exam 4/2010     Family History   Problem Relation Age of Onset   [facility] Dementia Father     Hypertension Mother     Stroke Mother     Cancer Other      daughter- breast cancer     Social History   Substance Use Topics    Smoking status: Never Smoker    Smokeless tobacco: Never Used    Alcohol use 0.0 oz/week      Comment: 1-2 drinks/every evening ( wine/vodka)          Objective:     Visit Vitals    /80 (BP 1 Location: Left arm, BP Patient Position: Sitting)    Pulse 90    Temp 97.8 °F (36.6 °C) (Oral)    Resp 16    Ht 4' 11\" (1.499 m)    Wt 112 lb 3.2 oz (50.9 kg)    SpO2 97%    BMI 22.66 kg/m2     Physical Exam   Constitutional: She appears well-developed and well-nourished. No distress. HENT:   Head: Normocephalic and atraumatic. Mouth/Throat: Oropharynx is clear and moist. No oropharyngeal exudate. Eyes: Right eye exhibits no discharge. Left eye exhibits no discharge. No scleral icterus. Neck: Normal range of motion. Neck supple. No JVD present. No tracheal deviation present. No thyromegaly present. Cardiovascular: Normal rate, regular rhythm, normal heart sounds and intact distal pulses. Exam reveals no gallop and no friction rub. No murmur heard. Pulmonary/Chest: Effort normal and breath sounds normal. No respiratory distress. She has no wheezes. She has no rales. Abdominal: Soft. Bowel sounds are normal. She exhibits no distension. There is no tenderness. Musculoskeletal: She exhibits no edema. Lymphadenopathy:     She has no cervical adenopathy. Neurological: She is alert. Skin: Skin is warm and dry. She is not diaphoretic. There is erythema. LE wound is now 0.8 X 1 cm and 4 mm deep. Serous drainage from wound but has erythema 2 cm around and is TTP. Wound is deeper and larger. Some yellowish slough. Psychiatric: She has a normal mood and affect. Poor stm, confusion   Nursing note and vitals reviewed.        Imaging  None    Lab Review  Results for orders placed or performed in visit on 06/01/18   TSH 3RD GENERATION   Result Value Ref Range    TSH 7.850 (H) 0.450 - 4.500 uIU/mL   CBC W/O DIFF   Result Value Ref Range    WBC 6.0 3.4 - 10.8 x10E3/uL    RBC 4.36 3.77 - 5.28 x10E6/uL    HGB 14.0 11.1 - 15.9 g/dL    HCT 41.1 34.0 - 46.6 %    MCV 94 79 - 97 fL    MCH 32.1 26.6 - 33.0 pg    MCHC 34.1 31.5 - 35.7 g/dL    RDW 13.6 12.3 - 15.4 %    PLATELET 539 882 - 242 X56P2/OL   METABOLIC PANEL, BASIC   Result Value Ref Range    Glucose 80 65 - 99 mg/dL    BUN 14 8 - 27 mg/dL    Creatinine 0.77 0.57 - 1.00 mg/dL    GFR est non-AA 70 >59 mL/min/1.73    GFR est AA 81 >59 mL/min/1.73    BUN/Creatinine ratio 18 12 - 28    Sodium 142 134 - 144 mmol/L    Potassium 4.0 3.5 - 5.2 mmol/L    Chloride 100 96 - 106 mmol/L    CO2 24 18 - 29 mmol/L    Calcium 9.9 8.7 - 10.3 mg/dL   AMB POC URINALYSIS DIP STICK AUTO W/O MICRO   Result Value Ref Range    Color (UA POC) Yellow     Clarity (UA POC) Clear     Glucose (UA POC) Negative Negative    Bilirubin (UA POC) Negative Negative    Ketones (UA POC) Negative Negative    Specific gravity (UA POC) 1.005 1.001 - 1.035    Blood (UA POC) Trace Negative    pH (UA POC) 6.0 4.6 - 8.0    Protein (UA POC) Negative Negative    Urobilinogen (UA POC) 0.2 mg/dL 0.2 - 1    Nitrites (UA POC) Negative Negative    Leukocyte esterase (UA POC) Negative Negative        Assessment/Plan:   Diagnoses and all orders for this visit:    1. Leg wound, left, subsequent encounter    2. Pain of right lower extremity    Continue cephalexin for wound infection. Called Dr. Sandra Palafox because patient likely needs unna boot and he will see later today or tomorrow and will come to [employer] to see her. Follow-up Disposition:  Return if symptoms worsen or fail to improve. Disclaimer:  Advised her to call back or return to office if symptoms worsen/change/persist.  Discussed expected course/resolution/complications of diagnosis in detail with patient.     Medication risks/benefits/costs/interactions/alternatives discussed with patient. She was given an after visit summary which includes diagnoses, current medications, & vitals. She expressed understanding with the diagnosis and plan.        By:  Eula Ferrell [id number] [facility]  [phone]

## 2018-07-16 NOTE — PATIENT INSTRUCTIONS
Cellulitis: Care Instructions  Your Care Instructions    Cellulitis is a skin infection caused by bacteria, most often strep or staph. It often occurs after a break in the skin from a scrape, cut, bite, or puncture, or after a rash. Cellulitis may be treated without doing tests to find out what caused it. But your doctor may do tests, if needed, to look for a specific bacteria, like methicillin-resistant Staphylococcus aureus (MRSA). The doctor has checked you carefully, but problems can develop later. If you notice any problems or new symptoms, get medical treatment right away. Follow-up care is a key part of your treatment and safety. Be sure to make and go to all appointments, and call your doctor if you are having problems. It's also a good idea to know your test results and keep a list of the medicines you take. How can you care for yourself at home? · Take your antibiotics as directed. Do not stop taking them just because you feel better. You need to take the full course of antibiotics. · Prop up the infected area on pillows to reduce pain and swelling. Try to keep the area above the level of your heart as often as you can. · If your doctor told you how to care for your wound, follow your doctor's instructions. If you did not get instructions, follow this general advice:  ¨ Wash the wound with clean water 2 times a day. Don't use hydrogen peroxide or alcohol, which can slow healing. ¨ You may cover the wound with a thin layer of petroleum jelly, such as Vaseline, and a nonstick bandage. ¨ Apply more petroleum jelly and replace the bandage as needed. · Be safe with medicines. Take pain medicines exactly as directed. ¨ If the doctor gave you a prescription medicine for pain, take it as prescribed. ¨ If you are not taking a prescription pain medicine, ask your doctor if you can take an over-the-counter medicine.   To prevent cellulitis in the future  · Try to prevent cuts, scrapes, or other injuries to your skin. Cellulitis most often occurs where there is a break in the skin. · If you get a scrape, cut, mild burn, or bite, wash the wound with clean water as soon as you can to help avoid infection. Don't use hydrogen peroxide or alcohol, which can slow healing. · If you have swelling in your legs (edema), support stockings and good skin care may help prevent leg sores and cellulitis. · Take care of your feet, especially if you have diabetes or other conditions that increase the risk of infection. Wear shoes and socks. Do not go barefoot. If you have athlete's foot or other skin problems on your feet, talk to your doctor about how to treat them. When should you call for help? Call your doctor now or seek immediate medical care if:    · You have signs that your infection is getting worse, such as:  ¨ Increased pain, swelling, warmth, or redness. ¨ Red streaks leading from the area. ¨ Pus draining from the area. ¨ A fever.     · You get a rash.    Watch closely for changes in your health, and be sure to contact your doctor if:    · You do not get better as expected. Where can you learn more? Go to http://jelani-joyce.info/. Kathia Snare in the search box to learn more about \"Cellulitis: Care Instructions. \"  Current as of: May 10, 2017  Content Version: 11.7  © 0631-7501 [employer]. Care instructions adapted under license by [employer] (which disclaims liability or warranty for this information). If you have questions about a medical condition or this instruction, always ask your healthcare professional. [place] [address] any warranty or liability for your use of this information.

## 2018-07-18 ENCOUNTER — OFFICE VISIT (OUTPATIENT)
Dept: FAMILY MEDICINE CLINIC | Age: 83
End: 2018-07-18

## 2018-07-18 DIAGNOSIS — T14.8XXA INFECTED WOUND: Primary | ICD-10-CM

## 2018-07-18 DIAGNOSIS — L08.9 INFECTED WOUND: Primary | ICD-10-CM

## 2018-07-18 NOTE — MR AVS SNAPSHOT
[facility] 
[phone] Patient: Kojo King MRN: [id number] Visit Information Date & Time Provider Department Dept. Phone Encounter #  
 7/18/2018  1:00 PM Melony Castillo NP [facility] [address] [facility] [phone] Follow-up Instructions Return if symptoms worsen or fail to improve. Follow-up and Disposition History Your Appointments 9/6/2018  2:40 PM  
Follow Up with Loni Dye MD  
[facility]) Appt Note: memory loss [place] [address] [facility] [id number] [phone]  
  
   
 [place] [address] [id number] [address] Upcoming Health Maintenance Date Due ZOSTER VACCINE AGE 60> 11/20/1991 MEDICARE YEARLY EXAM 3/14/2018 GLAUCOMA SCREENING Q2Y 3/16/2018 Influenza Age 5 to Adult 8/1/2018 DTaP/Tdap/Td series (3 - Td) 10/22/2025 Allergies as of 7/18/2018  Review Complete On: 7/16/2018 By: Melony Castillo NP Severity Noted Reaction Type Reactions Latex  12/14/2013    Unknown (comments) Dog Dander  12/14/2013    Shortness of Breath Current Immunizations  Reviewed on 4/18/2016 Name Date Influenza High Dose Vaccine PF 10/22/2015 Influenza Vaccine 10/10/2014 Influenza Vaccine Split 10/17/2011  9:12 AM  
 Influenza Vaccine Whole 9/1/2010 Pneumococcal Conjugate (PCV-13) 10/22/2015 TDAP Vaccine 8/16/2010 Tdap 10/22/2015 ZZZ-RETIRED (DO NOT USE) Pneumococcal Vaccine (Unspecified Type) 1/10/2004 Not reviewed this visit You Were Diagnosed With   
  
 Codes Comments Infected wound    -  Primary ICD-10-CM: T14. 8XXA, L08.9 ICD-9-CM: 110. 3 Vitals BP Pulse Temp Resp SpO2 OB Status  118/68 (BP 1 Location: Left arm, BP Patient Position: Sitting) 80 98.2 °F (36.8 °C) (Oral) 18 98% Hysterectomy Smoking Status Never Smoker Preferred Pharmacy Pharmacy Name Phone [facility] [address] [facility] [phone], [address] AT [place] [address] [phone] Your Updated Medication List  
  
   
This list is accurate as of 7/18/18 11:59 PM.  Always use your most recent med list.  
  
  
  
  
 * albuterol 2.5 mg /3 mL (0.083 %) nebulizer solution Commonly known as:  PROVENTIL VENTOLIN  
3 mL by Nebulization route every four (4) hours as needed for Wheezing. * albuterol 90 mcg/actuation inhaler Commonly known as:  PROAIR HFA Take 1 Puff by inhalation every four (4) hours as needed for Wheezing or Shortness of Breath. Indications: Acute Asthma Attack  
  
 atorvastatin 10 mg tablet Commonly known as:  LIPITOR Take 1 Tab by mouth daily. B COMPLEX PO Take  by mouth. diclofenac 1 % Gel Commonly known as:  VOLTAREN Apply 2 g to affected area four (4) times daily. Apply to hands qid FLONASE 50 mcg/actuation nasal spray Generic drug:  fluticasone 2 Sprays by Both Nostrils route daily. Allergy note June 2013. LIQUID TEARS OP Apply  to eye.  
  
 lisdexamfetamine 20 mg capsule Commonly known as:  VYVANSE Take 1 Cap (20 mg total) by mouth dailyIndications: Attention-Deficit Hyperactivity Disorder Earliest Fill Date: 6/8/18. Max Daily Amount: 20 mg  
  
 memantine-donepezil 28-10 mg Cspx Commonly known as:  MOTION PICTURE AND [facility] Take 1 Tab by mouth daily. MYRBETRIQ PO Take  by mouth. Nebulizer & Compressor machine 1 Each by Does Not Apply route every four (4) hours as needed. As directed  Indications: acute asthma with exacerbation SINGULAIR 10 mg tablet Generic drug:  montelukast  
Take 10 mg by mouth daily. SYMBICORT 160-4.5 mcg/actuation [facility] Generic drug:  budesonide-formoterol Take 2 Puffs by inhalation two (2) times a day. From Dr. Carmelina Hale. VITAMIN C PO Take  by mouth. VITAMIN D3 1,000 unit tablet Generic drug:  cholecalciferol Take  by mouth daily. * Notice: This list has 2 medication(s) that are the same as other medications prescribed for you. Read the directions carefully, and ask your doctor or other care provider to review them with you. Follow-up Instructions Return if symptoms worsen or fail to improve. Patient Instructions Amoxicillin/Clavulanate Potassium (By mouth) Amoxicillin (l-qaw-j-ASHER-in), Clavulanate Potassium (JYEC-dg-lb-julio vjg-RWX-mq-um) Treats infections. This medicine is a penicillin antibiotic. Brand Name(s): Augmentin, Augmentin ES-600, Augmentin XR There may be other brand names for this medicine. When This Medicine Should Not Be Used: This medicine is not right for everyone. Do not use it if you had an allergic reaction to amoxicillin, clavulanate, or a similar antibiotic (penicillin or cephalosporin), or if you had liver problems caused by Augmentin®. How to Use This Medicine:  
Liquid, Tablet, Chewable Tablet, Long Acting Tablet · Your doctor will tell you how much medicine to use. Do not use more than directed. · Take this medicine with a snack or at the beginning of a meal to help prevent nausea. · Chewable tablets: Chew the tablet completely before you swallow it. · Measure the oral liquid medicine with a marked measuring spoon, oral syringe, or medicine cup. Shake the medicine well just before you measure each dose. Rinse the spoon or dropper after each use. · Swallow the extended-release tablet whole. Do not crush, break, or chew it. · Take all of the medicine in your prescription to clear up your infection, even if you feel better after the first few doses. · Missed dose: Take a dose as soon as you remember.  If it is almost time for your next dose, wait until then and take a regular dose. Do not take extra medicine to make up for a missed dose. · Tablet, extended-release tablet, chewable tablet: Store at room temperature, away from heat, moisture, and direct light. · Oral liquid: Store in the refrigerator. Do not freeze. · Throw away any unused oral liquid after 10 days. Drugs and Foods to Avoid: Ask your doctor or pharmacist before using any other medicine, including over-the-counter medicines, vitamins, and herbal products. · Some medicines can affect how this medicine works. Tell your doctor if you are taking a blood thinner (such as warfarin), allopurinol, or probenecid. Warnings While Using This Medicine: · Tell your doctor if you are pregnant or breastfeeding, or if you have kidney disease, liver disease, or mononucleosis (mono). · Birth control pills may not work as well while you are taking this medicine. Use another form of birth control to prevent pregnancy. · This medicine can cause diarrhea. Call your doctor if the diarrhea becomes severe, does not stop, or is bloody. Do not take any medicine to stop diarrhea until you have talked to your doctor. Diarrhea can occur 2 months or more after you stop taking this medicine. · Tell any doctor or dentist who treats you that you are using this medicine. This medicine may affect certain medical test results. · Call your doctor if your symptoms do not improve or if they get worse. · The chewable tablet and oral liquid contain phenylalanine. Talk to your doctor before you use this medicine if you have phenylketonuria (PKU). · Keep all medicine out of the reach of children. Never share your medicine with anyone. Possible Side Effects While Using This Medicine:  
Call your doctor right away if you notice any of these side effects: · Allergic reaction: Itching or hives, swelling in your face or hands, swelling or tingling in your mouth or throat, chest tightness, trouble breathing · Blistering, peeling, red skin rash · Change in how much or how often you urinate · Dark urine or pale stools, nausea, vomiting, loss of appetite, stomach pain, yellow eyes or skin · Diarrhea that may contain blood, stomach cramps If you notice these less serious side effects, talk with your doctor: · Diaper rash · Mild diarrhea, nausea, vomiting · Tooth discoloration (in children) If you notice other side effects that you think are caused by this medicine, tell your doctor. Call your doctor for medical advice about side effects. You may report side effects to FDA at 2-233-KWN-3255 © 2017 [address] Information is for End User's use only and may not be sold, redistributed or otherwise used for commercial purposes. The above information is an  only. It is not intended as medical advice for individual conditions or treatments. Talk to your doctor, nurse or pharmacist before following any medical regimen to see if it is safe and effective for you. Patient Instructions History Introducing [facility]! Dear Jasmin Stroud: Thank you for requesting a Serviceful account. Our records indicate that you already have an active Serviceful account. You can access your account anytime at [email or web address] Did you know that you can access your hospital and ER discharge instructions at any time in Serviceful? You can also review all of your test results from your hospital stay or ER visit. Additional Information If you have questions, please visit the Frequently Asked Questions section of the Serviceful website at [email or web address] Remember, Serviceful is NOT to be used for urgent needs. For medical emergencies, dial 911. Now available from your iPhone and Android! Please provide this summary of care documentation to your next provider. Your primary care clinician is listed as Evette PRESSLEY.  If you have any questions after today's visit, please call [phone].

## 2018-07-19 NOTE — PROGRESS NOTES
Subjective:     Chief Complaint   Patient presents with    Wound Check        She  is a 80y.o. year old female who presents for evaluation of her wound on her RLE. She saw the podiatrist on Saturday the 14th who prescribed pomogran dressing but the prescription was $200 so they didn't get it. She has refused to allow her  to change the dressing since but will allow me to change it today. She states the area is sore and is draining brown fluid. She denies fevers, chills. Her  is with her. She is taking Augmentin as ordered. Review of Systems   Constitutional: Negative for chills, fever, malaise/fatigue and weight loss. HENT: Negative for congestion. Respiratory: Negative for cough, shortness of breath and wheezing. Cardiovascular: Negative for chest pain, palpitations and leg swelling. Gastrointestinal: Negative for abdominal pain, constipation, diarrhea, heartburn, nausea and vomiting. Musculoskeletal: Negative for back pain, falls, joint pain, myalgias and neck pain. Psychiatric/Behavioral: Positive for memory loss. Negative for depression. Objective:     Vitals:    07/18/18 1302   BP: 118/68   Pulse: 80   Resp: 18   Temp: 98.2 °F (36.8 °C)   TempSrc: Oral   SpO2: 98%       Physical Exam   Constitutional: She is oriented to person, place, and time. She appears well-developed and well-nourished. No distress. HENT:   Head: Normocephalic and atraumatic. Mouth/Throat: Oropharynx is clear and moist. No oropharyngeal exudate. Eyes: Right eye exhibits no discharge. Left eye exhibits no discharge. No scleral icterus. Neck: Normal range of motion. Neck supple. No JVD present. No tracheal deviation present. No thyromegaly present. Cardiovascular: Normal rate, regular rhythm, normal heart sounds and intact distal pulses. Exam reveals no gallop and no friction rub. No murmur heard. Pulmonary/Chest: Effort normal and breath sounds normal. No respiratory distress.  She has no wheezes. She has no rales. Abdominal: Soft. Bowel sounds are normal. She exhibits no distension. There is no tenderness. Musculoskeletal: She exhibits no edema. Lymphadenopathy:     She has no cervical adenopathy. Neurological: She is alert and oriented to person, place, and time. No cranial nerve deficit. Skin: Skin is warm and dry. No rash noted. She is not diaphoretic. There is erythema. No pallor. Open wound on right pretibial area. Copious brownish discharge with odor. Erythema 1 cm around. Yellow slough on wound. Area 1.2 X 2.3 cm. It is deeper. It is TTP. Cleansed with NS and dressed with band-aid until  picks up dressing today. Psychiatric: She has a normal mood and affect. Her behavior is normal.   Poor STM   Nursing note and vitals reviewed. Allergies   Allergen Reactions    Latex Unknown (comments)    Dog Dander Shortness of Breath      Social History     Social History    Marital status:      Spouse name: N/A    Number of children: N/A    Years of education: N/A     Social History Main Topics    Smoking status: Never Smoker    Smokeless tobacco: Never Used    Alcohol use 0.0 oz/week      Comment: 1-2 drinks/every evening ( wine/vodka)    Drug use: Yes     Special: Prescription, OTC    Sexual activity: No     Other Topics Concern    None     Social History Narrative      Family History   Problem Relation Age of Onset    Dementia Father     Hypertension Mother     Stroke Mother     Cancer Other      daughter- breast cancer      Past Surgical History:   Procedure Laterality Date    HX CATARACT REMOVAL      HX HYSTERECTOMY  194    MULTIPLE DELIVERY       MO TRABECULOPLASTY BY Clarence Barriga- last eye exam 2010      Past Medical History:   Diagnosis Date    Asthma     Dr. Danilo James 2013--doesn't see routinely--pt reports no change in allergies--no need for routine follow-up.    Halima Cartagena sees for mild COPD--on Symbicort since ~Nov 2013. Continued Singulair.  Ceruminosis     periodic ear cleaning at [place] [facility]    Chronic obstructive pulmonary disease ([facility] [address]     Encounter for screening colonoscopy ~2006    Normal per pt--[facility] records requested July 2013 visit.  Fainting     Falls     Fatigue     Hearing loss, left     [facility]    History of mammogram May 2014    Normal/no change.  History of shingles 2010    Dec 2014:  Pt reports clinical history and shingles vaccine 4yrs ago. To try to obtain exact date of zostavax from /source.  History of skin cancer     melanoma 2003?, BCC & SCC- followed by Dr. Cecilio Meadows- twice a yr appts. July 2013-still on 6mo schedule for skin checks.  Memory disorder     Osteopenia     DEXA 8/11    Overactive bladder     detrol LA. Vesicare--sees Urology Spring/Fall. Considering procedure for incontinence.  Postmenopausal hormone replacement therapy     since hysterectomy    Right shoulder pain Sept 2013. Mild osteopenia; mild AC osteoarthritis. Sports Med Eval Oct 2013.  Well woman exam (no gynecological exam) Aug 3, 2012    \"[facility] Screening\":  Results scanned to chart. Updated Nov 2014-no change except noted concern for GFR--see Dec 2014 note, letter as reviewed with pt. Current Outpatient Prescriptions   Medication Sig Dispense Refill    lisdexamfetamine (VYVANSE) 20 mg capsule Take 1 Cap (20 mg total) by mouth dailyIndications: Attention-Deficit Hyperactivity Disorder Earliest Fill Date: 6/8/18. Max Daily Amount: 20 mg 30 Cap 0    memantine-donepezil (NAMZARIC) 28-10 mg CSpX Take 1 Tab by mouth daily. 90 Cap 3    MIRABEGRON (MYRBETRIQ PO) Take  by mouth.  albuterol (PROAIR HFA) 90 mcg/actuation inhaler Take 1 Puff by inhalation every four (4) hours as needed for Wheezing or Shortness of Breath.  Indications: Acute Asthma Attack 3 Inhaler 3    diclofenac (VOLTAREN) 1 % gel Apply 2 g to affected area four (4) times daily. Apply to hands qid 100 g 3    albuterol (PROVENTIL VENTOLIN) 2.5 mg /3 mL (0.083 %) nebulizer solution 3 mL by Nebulization route every four (4) hours as needed for Wheezing. 24 Each 2    Nebulizer & Compressor machine 1 Each by Does Not Apply route every four (4) hours as needed. As directed  Indications: acute asthma with exacerbation 1 Each 0    montelukast (SINGULAIR) 10 mg tablet Take 10 mg by mouth daily.  POLYVINYL ALCOHOL (LIQUID TEARS OP) Apply  to eye.  budesonide-formoterol (SYMBICORT) 160-4.5 mcg/actuation HFA inhaler Take 2 Puffs by inhalation two (2) times a day. From Dr. Alma Dangelo.  fluticasone (FLONASE) 50 mcg/actuation nasal spray 2 Sprays by Both Nostrils route daily. Allergy note June 2013.  ASCORBATE CALCIUM (VITAMIN C PO) Take  by mouth.  VITAMIN B COMPLEX (B COMPLEX PO) Take  by mouth.  cholecalciferol, vitamin d3, (VITAMIN D) 1,000 unit tablet Take  by mouth daily.  atorvastatin (LIPITOR) 10 mg tablet Take 1 Tab by mouth daily. 90 Tab 3        Assessment/ Plan:   Diagnoses and all orders for this visit:    1. Infected wound    Explained the importance of the dressing ordered by podiatry and patient and  agree to purchase it. Her wound is larger and there is not much space left before the bone. F/U with Dr. Shellie Burger scheduled Wednesday 25th at 3 pm.  Discussed with patient that she needs to allow her  to change the dressing daily and she agrees. Follow-up Disposition:  Return if symptoms worsen or fail to improve. Disclaimer:  Advised her to call back or return to office if symptoms worsen/change/persist.  Discussed expected course/resolution/complications of diagnosis in detail with patient. Medication risks/benefits/costs/interactions/alternatives discussed with patient. She was given an after visit summary which includes diagnoses, current medications, & vitals.   She expressed understanding with the diagnosis and plan.       Michelle Swan, NP

## 2018-07-20 ENCOUNTER — TELEPHONE (OUTPATIENT)
Dept: INTERNAL MEDICINE CLINIC | Age: 83
End: 2018-07-20

## 2018-07-20 VITALS
OXYGEN SATURATION: 98 % | RESPIRATION RATE: 18 BRPM | DIASTOLIC BLOOD PRESSURE: 68 MMHG | HEART RATE: 80 BPM | TEMPERATURE: 98.2 F | SYSTOLIC BLOOD PRESSURE: 118 MMHG

## 2018-07-20 NOTE — TELEPHONE ENCOUNTER
#[phone]  Kyle Horner states that script for Vyvanse was written for the wrong date as pt is out of medication and it can't be filled until the 32 th   The pharmacy states they would have to speak with Dr. Suzanne Pierson pertaining to filling this. Kyle Horner states \"this is an emergency and he wants this done now as he is waiting and going to wait at the pharmacy\"  I did try to explain time frame, but he over spoke me and told me to \"get the nurse and explain this so Dr. Suzanne Pierson would do what he wanted\"    Kyle Horner has requested a call back as soon as possible about this.

## 2018-07-20 NOTE — TELEPHONE ENCOUNTER
Spoke with patients pharmacist after 2 patient identifiers being note and she advised that patients  came into pharmacy and yelled an screamed at them about the vyvance script needing to be refilled. He asked her to void the vyvance script that the NP wrote as she is not a doctor and can onto write vyvance scripts. Pharmacist says she offered to call and get script authorized he declined still screaming and wanted the script canceled. The pharmacist states the script has now been canceled and patient needs a hard copy. Attempted to call patients  but he did not answer my call. Will try again later.

## 2018-07-20 NOTE — TELEPHONE ENCOUNTER
Spoke with patients  after 2 patient identifiers being note and he advised that he got another MD to fill the script and he will be back to us in three months. Patients  expressed understanding and has no further questions at this time.

## 2018-07-20 NOTE — PATIENT INSTRUCTIONS
Amoxicillin/Clavulanate Potassium (By mouth)   Amoxicillin (p-vpi-m-ASHER-in), Clavulanate Potassium (NXAS-oi-jr-julio pdf-LFY-vy-um)  Treats infections. This medicine is a penicillin antibiotic. Brand Name(s): Augmentin, Augmentin ES-600, Augmentin XR   There may be other brand names for this medicine. When This Medicine Should Not Be Used: This medicine is not right for everyone. Do not use it if you had an allergic reaction to amoxicillin, clavulanate, or a similar antibiotic (penicillin or cephalosporin), or if you had liver problems caused by Augmentin®. How to Use This Medicine:   Liquid, Tablet, Chewable Tablet, Long Acting Tablet  · Your doctor will tell you how much medicine to use. Do not use more than directed. · Take this medicine with a snack or at the beginning of a meal to help prevent nausea. · Chewable tablets: Chew the tablet completely before you swallow it. · Measure the oral liquid medicine with a marked measuring spoon, oral syringe, or medicine cup. Shake the medicine well just before you measure each dose. Rinse the spoon or dropper after each use. · Swallow the extended-release tablet whole. Do not crush, break, or chew it. · Take all of the medicine in your prescription to clear up your infection, even if you feel better after the first few doses. · Missed dose: Take a dose as soon as you remember. If it is almost time for your next dose, wait until then and take a regular dose. Do not take extra medicine to make up for a missed dose. · Tablet, extended-release tablet, chewable tablet: Store at room temperature, away from heat, moisture, and direct light. · Oral liquid: Store in the refrigerator. Do not freeze. · Throw away any unused oral liquid after 10 days. Drugs and Foods to Avoid:   Ask your doctor or pharmacist before using any other medicine, including over-the-counter medicines, vitamins, and herbal products. · Some medicines can affect how this medicine works.  Tell your doctor if you are taking a blood thinner (such as warfarin), allopurinol, or probenecid. Warnings While Using This Medicine:   · Tell your doctor if you are pregnant or breastfeeding, or if you have kidney disease, liver disease, or mononucleosis (mono). · Birth control pills may not work as well while you are taking this medicine. Use another form of birth control to prevent pregnancy. · This medicine can cause diarrhea. Call your doctor if the diarrhea becomes severe, does not stop, or is bloody. Do not take any medicine to stop diarrhea until you have talked to your doctor. Diarrhea can occur 2 months or more after you stop taking this medicine. · Tell any doctor or dentist who treats you that you are using this medicine. This medicine may affect certain medical test results. · Call your doctor if your symptoms do not improve or if they get worse. · The chewable tablet and oral liquid contain phenylalanine. Talk to your doctor before you use this medicine if you have phenylketonuria (PKU). · Keep all medicine out of the reach of children. Never share your medicine with anyone. Possible Side Effects While Using This Medicine:   Call your doctor right away if you notice any of these side effects:  · Allergic reaction: Itching or hives, swelling in your face or hands, swelling or tingling in your mouth or throat, chest tightness, trouble breathing  · Blistering, peeling, red skin rash  · Change in how much or how often you urinate  · Dark urine or pale stools, nausea, vomiting, loss of appetite, stomach pain, yellow eyes or skin  · Diarrhea that may contain blood, stomach cramps  If you notice these less serious side effects, talk with your doctor:   · Diaper rash  · Mild diarrhea, nausea, vomiting  · Tooth discoloration (in children)  If you notice other side effects that you think are caused by this medicine, tell your doctor. Call your doctor for medical advice about side effects.  You may report side effects to FDA at 2-760-FDA-9761  © 2017 [employer] [employer] Information is for End User's use only and may not be sold, redistributed or otherwise used for commercial purposes. The above information is an  only. It is not intended as medical advice for individual conditions or treatments. Talk to your doctor, nurse or pharmacist before following any medical regimen to see if it is safe and effective for you.

## 2018-07-23 ENCOUNTER — CLINICAL SUPPORT (OUTPATIENT)
Dept: FAMILY MEDICINE CLINIC | Age: 83
End: 2018-07-23

## 2018-07-23 VITALS
HEART RATE: 77 BPM | HEIGHT: 59 IN | WEIGHT: 112 LBS | BODY MASS INDEX: 22.58 KG/M2 | OXYGEN SATURATION: 97 % | SYSTOLIC BLOOD PRESSURE: 145 MMHG | DIASTOLIC BLOOD PRESSURE: 76 MMHG | TEMPERATURE: 97 F | RESPIRATION RATE: 16 BRPM

## 2018-07-23 DIAGNOSIS — S81.802D LEG WOUND, LEFT, SUBSEQUENT ENCOUNTER: Primary | ICD-10-CM

## 2018-07-23 NOTE — PROGRESS NOTES
Identified Patient with two Patient identifiers (Name and ). Two Patient Identifiers confirmed. Reviewed record in preparation for visit and have obtained necessary documentation. Chief Complaint   Patient presents with    Dressing Change     right leg redressing       Visit Vitals    /76 (BP 1 Location: Left arm, BP Patient Position: Sitting)    Pulse 77    Temp 97 °F (36.1 °C)    Resp 16    Ht 4' 11\" (1.499 m)    Wt 112 lb (50.8 kg)    SpO2 97%    BMI 22.62 kg/m2       1. Have you been to the ER, urgent care clinic since your last visit? Hospitalized since your last visit? No    2. Have you seen or consulted any other health care providers outside of the [address] [place] [address] since your last visit? Include any pap smears or colon screening.  No

## 2018-07-23 NOTE — MR AVS SNAPSHOT
[facility] 
[phone] Patient: Silvia Shah MRN: [id number] IP Visit Information Date & Time Provider Department Dept. Phone Encounter #  
 2018 11:15 AM Kael Barron NP [address] [phone] [id number] Follow-up Instructions Return if symptoms worsen or fail to improve. Follow-up and Disposition History Your Appointments 2018  2:40 PM  
Follow Up with Zulema Singleton MD  
[facility]) Appt Note: memory loss [place] [address] [facility] [place] [address] [facility] [id number] [phone]  
  
   
 [place] [address] [id number] [address] [place] Upcoming Health Maintenance Date Due ZOSTER VACCINE AGE 60> 1991 MEDICARE YEARLY EXAM 3/14/2018 GLAUCOMA SCREENING Q2Y 3/16/2018 Influenza Age 5 to Adult 2018 DTaP/Tdap/Td series (3 - Td) 10/22/2025 Allergies as of 2018  Review Complete On: 2018 By: Kael Barron NP Severity Noted Reaction Type Reactions Latex  2013    Unknown (comments) Dog Dander  2013    Shortness of Breath Current Immunizations  Reviewed on 2016 Name Date Influenza High Dose Vaccine PF 10/22/2015 Influenza Vaccine 10/10/2014 Influenza Vaccine Split 10/17/2011  9:12 AM  
 Influenza Vaccine Whole 2010 Pneumococcal Conjugate (PCV-13) 10/22/2015 TDAP Vaccine 2010 Tdap 10/22/2015 ZZZ-RETIRED (DO NOT USE) Pneumococcal Vaccine (Unspecified Type) 1/10/2004 Not reviewed this visit You Were Diagnosed With   
  
 Codes Comments Leg wound, left, subsequent encounter    -  Primary ICD-10-CM: C65.554Y ICD-9-CM: V58.89, 894.0 Vitals BP Pulse Temp Resp Height(growth percentile) Weight(growth percentile) 145/76 (BP 1 Location: Left arm, BP Patient Position: Sitting) 77 97 °F (36.1 °C) 16 4' 11\" (1.499 m) 112 lb (50.8 kg) SpO2 BMI OB Status Smoking Status 97% 22.62 kg/m2 Hysterectomy Never Smoker BMI and BSA Data Body Mass Index Body Surface Area  
 22.62 kg/m 2 1.45 m 2 Preferred Pharmacy Pharmacy Name Phone [phone], [address] AT [place] [address] [phone] Your Updated Medication List  
  
   
This list is accurate as of 7/23/18 11:59 PM.  Always use your most recent med list.  
  
  
  
  
 * albuterol 2.5 mg /3 mL (0.083 %) nebulizer solution Commonly known as:  PROVENTIL VENTOLIN  
3 mL by Nebulization route every four (4) hours as needed for Wheezing. * albuterol 90 mcg/actuation inhaler Commonly known as:  PROAIR HFA Take 1 Puff by inhalation every four (4) hours as needed for Wheezing or Shortness of Breath. Indications: Acute Asthma Attack  
  
 atorvastatin 10 mg tablet Commonly known as:  LIPITOR Take 1 Tab by mouth daily. B COMPLEX PO Take  by mouth. diclofenac 1 % Gel Commonly known as:  VOLTAREN Apply 2 g to affected area four (4) times daily. Apply to hands qid FLONASE 50 mcg/actuation nasal spray Generic drug:  fluticasone 2 Sprays by Both Nostrils route daily. Allergy note June 2013. LIQUID TEARS OP Apply  to eye.  
  
 lisdexamfetamine 20 mg capsule Commonly known as:  VYVANSE Take 1 Cap (20 mg total) by mouth dailyIndications: Attention-Deficit Hyperactivity Disorder Earliest Fill Date: 6/8/18. Max Daily Amount: 20 mg  
  
 memantine-donepezil 28-10 mg Cspx Commonly known as:  MOTION PICTURE AND [facility] Take 1 Tab by mouth daily. MYRBETRIQ PO Take  by mouth. Nebulizer & Compressor machine 1 Each by Does Not Apply route every four (4) hours as needed. As directed  Indications: acute asthma with exacerbation SINGULAIR 10 mg tablet Generic drug:  montelukast  
Take 10 mg by mouth daily. SYMBICORT 160-4.5 mcg/actuation [facility] Generic drug:  budesonide-formoterol Take 2 Puffs by inhalation two (2) times a day. From Dr. Gail Arias. VITAMIN C PO Take  by mouth. VITAMIN D3 1,000 unit tablet Generic drug:  cholecalciferol Take  by mouth daily. * Notice: This list has 2 medication(s) that are the same as other medications prescribed for you. Read the directions carefully, and ask your doctor or other care provider to review them with you. Follow-up Instructions Return if symptoms worsen or fail to improve. Introducing [facility]! Dear Oscar Ruvalcaba: Thank you for requesting a [employer] account. Our records indicate that you already have an active Feedlooks account. You can access your account anytime at [email or web address]://"Shahab P[employer] Tabatabai[employer] Broker". hopTo/"[employer]ah[employer]ker" Did you know that you can access your hospital and ER discharge instructions at any time in Feedlooks? You can also review all of your test results from your hospital stay or ER visit. Additional Information If you have questions, please visit the Frequently Asked Questions section of the Feedlooks website at https://"Shah[employer] Tabatabai, Broker". hopTo/"Shahab [employer] Tab[employer]ai, Broker"/. Remember, Feedlooks is NOT to be used for urgent needs. For medical emergencies, dial 911. Now available from your iPhone and Android! Please provide this summary of care documentation to your next provider. Your primary care clinician is listed as Jeovany PRESSLEY. If you have any questions after today's visit, please call [phone].

## 2018-08-06 ENCOUNTER — PATIENT MESSAGE (OUTPATIENT)
Dept: NEUROLOGY | Age: 83
End: 2018-08-06

## 2018-08-06 RX ORDER — ESCITALOPRAM OXALATE 10 MG/1
10 TABLET ORAL DAILY
Qty: 30 TAB | Refills: 5 | Status: SHIPPED | OUTPATIENT
Start: 2018-08-06 | End: 2018-11-05 | Stop reason: SDUPTHER

## 2018-08-07 ENCOUNTER — TELEPHONE (OUTPATIENT)
Dept: NEUROLOGY | Age: 83
End: 2018-08-07

## 2018-08-07 NOTE — TELEPHONE ENCOUNTER
Spoke with  and let them know that prescription was sent into [facility]. They stated understanding.

## 2018-08-07 NOTE — TELEPHONE ENCOUNTER
Patient's spouse called stating that the [facility] in [place] did not have Rx that was requested on 08/06/18. Rx was sent to [facility] in [place]. Patient requests that we resend Rx to [facility] in [place] instead (in pharmacy preferences). Patient's spouse would like it re-sent as soon as possible.  [phone]

## 2018-08-07 NOTE — TELEPHONE ENCOUNTER
Called and gave the pharmacy a verbal order for Lexapro 10 mg take 1 tablet daily by mouth 90 tablet 1 refill. Per Dr. Moreno Graham.

## 2018-09-06 ENCOUNTER — OFFICE VISIT (OUTPATIENT)
Dept: NEUROLOGY | Age: 83
End: 2018-09-06

## 2018-09-06 VITALS
OXYGEN SATURATION: 95 % | RESPIRATION RATE: 18 BRPM | SYSTOLIC BLOOD PRESSURE: 120 MMHG | HEART RATE: 78 BPM | DIASTOLIC BLOOD PRESSURE: 70 MMHG | WEIGHT: 110 LBS | HEIGHT: 59 IN | BODY MASS INDEX: 22.18 KG/M2

## 2018-09-06 DIAGNOSIS — F02.80 DEMENTIA OF THE ALZHEIMER'S TYPE, WITH LATE ONSET, UNCOMPLICATED (HCC): Primary | ICD-10-CM

## 2018-09-06 DIAGNOSIS — G30.1 DEMENTIA OF THE ALZHEIMER'S TYPE, WITH LATE ONSET, UNCOMPLICATED (HCC): Primary | ICD-10-CM

## 2018-09-06 DIAGNOSIS — F98.8 ADD (ATTENTION DEFICIT DISORDER) WITHOUT HYPERACTIVITY: ICD-10-CM

## 2018-09-06 DIAGNOSIS — F43.21 ADJUSTMENT DISORDER WITH DEPRESSED MOOD: ICD-10-CM

## 2018-09-06 NOTE — PATIENT INSTRUCTIONS
10 [facility]   Statement to Patients  April 1, 2014      In an effort to ensure the large volume of patient prescription refills is processed in the most efficient and expeditious manner, we are asking our patients to assist us by calling your Pharmacy for all prescription refills, this will include also your  Mail Order Pharmacy. The pharmacy will contact our office electronically to continue the refill process. Please do not wait until the last minute to call your pharmacy. We need at least 48 hours (2days) to fill prescriptions. We also encourage you to call your pharmacy before going to  your prescription to make sure it is ready. With regard to controlled substance prescription refill requests (narcotic refills) that need to be picked up at our office, we ask your cooperation by providing us with at least 72 hours (3days) notice that you will need a refill. We will not refill narcotic prescription refill requests after 4:00pm on any weekday, Monday through Thursday, or after 2:00pm on Fridays, or on the weekends. We encourage everyone to explore another way of getting your prescription refill request processed using [employer], our patient web portal through our electronic medical record system. [employer] is an efficient and effective way to communicate your medication request directly to the office and  downloadable as an arcelia on your smart phone . [employer] also features a review functionality that allows you to view your medication list as well as leave messages for your physician. Are you ready to get connected? If so please review the attatched instructions or speak to any of our staff to get you set up right away! Thank you so much for your cooperation. Should you have any questions please contact our Practice Administrator.     The Physicians and Staff,  [facility]   Patient Instruction Plan/ Result Policy    If we have ordered testing for you, know that; \"NO NEWS IS GOOD NEWS! \" It is our policy that we know longer call patients with results, nor do we  give test results over the phone. We schedule follow up appointments so that your results can be discussed in person. This allows you to address any questions you have regarding the results. If something of concern is revealed on your test, we will contact you to discuss the matter and if needed schedule a sooner follow up appointment. Additionally, results may be found by using the My Chart feature and one of our patient service representatives at the  can give you instructions on how to access this feature to utilize our electronic medical record system. Thank you for your understanding.

## 2018-09-06 NOTE — PROGRESS NOTES
Chief Complaint   Patient presents with    Other     Alzheimer's      1. Have you been to the ER, urgent care clinic since your last visit? Hospitalized since your last visit? No    2. Have you seen or consulted any other health care providers outside of the [address] [place] [address] since your last visit? Include any pap smears or colon screening.  No     Visit Vitals    /70    Pulse 78    Resp 18    Ht 4' 11\" (1.499 m)    Wt 49.9 kg (110 lb)    SpO2 95%    BMI 22.22 kg/m2

## 2018-09-06 NOTE — LETTER
Chief Complaint Patient presents with  
 Other Alzheimer's 1. Have you been to the ER, urgent care clinic since your last visit? Hospitalized since your last visit? No 
 
2. Have you seen or consulted any other health care providers outside of the [facility] since your last visit? Include any pap smears or colon screening. No  
 
Visit Vitals  /70  Pulse 78  Resp 18  Ht 4' 11\" (1.499 m)  Wt 49.9 kg (110 lb)  SpO2 95%  BMI 22.22 kg/m2 Name:  Leighton Castillo 
:  1932 MRN:  [id number] PCP:  Hermann Vazquez MD 
 
Chief Complaint Patient presents with  
 Other Alzheimer's HISTORY OF PRESENT ILLNESS: Pt here for f/u of dementia. She has been doing well. She has not had any major memory changes. She has not been hospitalized. She recovered well from her bronchitis. She has been using albuterol. She walks slowly after dinner. She is at [facility], so they don't have to leave the building. She does try to stay active and walk frequently when she can around the buildings. She has been sleeping comfortably. She does get up frequently to go to the bathroom. She is going to see a urologist. 
She is taking Namzaric daily. She has no side effects. She is continuing to have vodka and tonic drinks daily and a possibly an additional glass of wine. We did discuss this was likely progressing her dementia. She has not had any updated imaging since . Discussed the PET scan for AD and that we will test this when able. She continues on Vyvanse. She also recently started lexapro 10mg daily for mood and irritability. She is doing well on it. No falls. No hallucinations. Recap:  
Since last visit patient did have to be hospitalized for bronchitis. Her short-term memory has progressively worsened.   She and her  question if that has something to do with her drinking 1 alcoholic beverage plus a glass of wine at dinner every night. Patient reports that she will forget events that occurred hours previous. Overall patient's mood is stable. She continues on Vyvanse for focusing and this is working well. She does have some trouble remembering to take medications in the morning. Her  wants her to take them right away but she wants to take them with food and then will subsequently forget to take them at times. She has started on Myrbetriq for frequency and this is helping. Patient is reading a lot and follows the story okay. She is active and walks the cord was outside when she can. She does live at [facility]. Generally she sleeps well with no issues. Occasionally she will wake up around 3 AM and had trouble falling asleep after this, but it is not typical. 
 
 
Current Outpatient Prescriptions Medication Sig  escitalopram oxalate (LEXAPRO) 10 mg tablet Take 1 Tab by mouth daily.  memantine-donepezil (NAMZARIC) 28-10 mg CSpX Take 1 Tab by mouth daily.  lisdexamfetamine (VYVANSE) 20 mg capsule Take 1 Cap (20 mg total) by mouth dailyIndications: Attention-Deficit Hyperactivity Disorder Earliest Fill Date: 6/8/18. Max Daily Amount: 20 mg  MIRABEGRON (MYRBETRIQ PO) Take  by mouth.  atorvastatin (LIPITOR) 10 mg tablet Take 1 Tab by mouth daily.  albuterol (PROAIR HFA) 90 mcg/actuation inhaler Take 1 Puff by inhalation every four (4) hours as needed for Wheezing or Shortness of Breath. Indications: Acute Asthma Attack  diclofenac (VOLTAREN) 1 % gel Apply 2 g to affected area four (4) times daily. Apply to hands qid  Nebulizer & Compressor machine 1 Each by Does Not Apply route every four (4) hours as needed. As directed  Indications: acute asthma with exacerbation  montelukast (SINGULAIR) 10 mg tablet Take 10 mg by mouth daily.  POLYVINYL ALCOHOL (LIQUID TEARS OP) Apply  to eye.  fluticasone (FLONASE) 50 mcg/actuation nasal spray 2 Sprays by Both Nostrils route daily. Allergy note June 2013.  cholecalciferol, vitamin d3, (VITAMIN D) 1,000 unit tablet Take  by mouth daily.  albuterol (PROVENTIL VENTOLIN) 2.5 mg /3 mL (0.083 %) nebulizer solution 3 mL by Nebulization route every four (4) hours as needed for Wheezing.  budesonide-formoterol (SYMBICORT) 160-4.5 mcg/actuation HFA inhaler Take 2 Puffs by inhalation two (2) times a day. From Dr. Estrellita Cooper.  ASCORBATE CALCIUM (VITAMIN C PO) Take  by mouth.  VITAMIN B COMPLEX (B COMPLEX PO) Take  by mouth. No current facility-administered medications for this visit. Allergies Allergen Reactions  Latex Unknown (comments)  Dog Dander Shortness of Breath Past Medical History:  
Diagnosis Date  Asthma Dr. Danilo James June 2013--doesn't see routinely--pt reports no change in allergies--no need for routine follow-up. Dr. Estrellita Cooper sees for mild COPD--on Symbicort since ~Nov 2013. Continued Singulair.  Ceruminosis   
 periodic ear [facility] at [facility]  Chronic obstructive pulmonary disease ([facility] [address]  Encounter for screening colonoscopy ~2006 Normal per pt--[facility] records requested July 2013 visit.  Fainting [address]  Fatigue  Hearing loss, left   
 [facility]  History of mammogram May 2014 Normal/no change.  History of shingles 2010 Dec 2014:  Pt reports clinical history and shingles vaccine 4yrs ago. To try to obtain exact date of zostavax from /source.  History of skin cancer   
 melanoma 2003?, BCC & SCC- followed by Dr. Maria Esther Teixeira- twice a yr appts. July 2013-still on 6mo schedule for skin checks.  Memory disorder  Osteopenia DEXA 8/11  Overactive bladder   
 detrol [place]. Vesicare--sees Urology Spring/Fall. Considering procedure for incontinence.  Postmenopausal hormone replacement therapy   
 since hysterectomy  Right shoulder pain Sept 2013. Mild osteopenia; mild AC osteoarthritis. Sports Med Eval Oct 2013.  Well woman exam (no gynecological exam) Aug 3, 2012 \"[facility] Screening\":  Results scanned to chart. Updated 2014-no change except noted concern for GFR--see Dec 2014 note, letter as reviewed with pt. Past Surgical History:  
Procedure Laterality Date  HX CATARACT REMOVAL   [address]  MULTIPLE DELIVERY     
 [place] TRABECULOPLASTY BY LASER SURGERY Dr. Jesu Parra- last eye exam 2010 Social History Social History  Marital status:  Spouse name: N/A  
 Number of children: N/A  
 Years of education: N/A Occupational History  Not on file. Social History Main Topics  Smoking status: Never Smoker  Smokeless tobacco: Never Used  Alcohol use 0.0 oz/week Comment: 1-2 drinks/every evening ( wine/vodka)  Drug use: Yes Special: Prescription, OTC  Sexual activity: No  
 
Other Topics Concern  Not on file Social History Narrative Family History Problem Relation Age of Onset  Dementia Father  Hypertension Mother  Stroke Mother  Cancer Other   
  daughter- breast cancer Repeat neuropsych with progression to dementia from [facility] PHYSICAL EXAMINATION:   
Extended / Orthostatic Vitals: 
 
Vital Signs Pulse (Heart Rate): 78 (18 1416) Resp Rate: 18 (18 1416) BP: 120/70 (18 1416) Oxygen Therapy O2 Sat (%): 95 % (18 1416) General: Well defined, nourished, and groomed individual in no acute distress. Neck: Supple, nontender, no bruits, no pain with resistance to active range of motion. Heart: Regular rate and rhythm, no murmurs, rub, or gallop. Normal S1S2. Lungs: Clear to auscultation bilaterally with equal chest expansion, no cough, no wheeze Musculoskeletal: Extremities revealed no edema and had full range of motion of joints. Psych: Good mood and bright affect NEUROLOGICAL EXAMINATION:  
Mental Status: Alert and oriented to person, place, and time. 2/3 word recall Cranial nerves: 
II, III, IV, VI: Visual acuity grossly intact. Visual fields are normal.  
Pupils are equal, round, and reactive to light and accommodation. Extra-ocular movements are full and fluid. Fundoscopic exam was benign, no ptosis or nystagmus. V-XII: Hearing is grossly intact. Facial features are symmetric, with normal sensation and strength. The palate rises symmetrically and the tongue protrudes midline. Sternocleidomastoids 5/5. Motor Examination: Normal tone, bulk, and strength, 5/5 muscle strength throughout. Coordination: Finger to nose was normal. No resting or intention tremor Gait and Station: Steady while walking. Normal arm swing. No pronator drift. No muscle wasting or fasiculations noted. Reflexes: DTRs 2+ throughout. ASSESSMENT AND PLAN 
 79 y/o female with dementia who is currently stable on Namzeric. She is also on Vyvanse for ADD. We will get updated imaging with CT head. Will also continue lexapro at current dose. Letter written today that I have advised patient to stop all alcohol. 1.  Continue Namzeric daily. 2.  Cont lexapro 10mg daily but may need to increase this to 20mg at f/u 3. Continue Vyvanse. 4.  Discussed neuropsych testing. Discussed that this could be repeated, but at this point I do not think it would change her management, so I am not sure that it is necessary. Will hold off on ordering this for now. Will reassess at follow-up. 5.  Encouraged patient to wean off alcohol. Letter written 6. Encouraged patient to remain physically, socially, and mentally active 7. CT head ordered today FU in 6 months Trent Almanza MD 
9/6/2018 This note was created using voice recognition software. Despite editing, there may be syntax errors. This note will not be viewable in [address]

## 2018-09-06 NOTE — MR AVS SNAPSHOT
[address] [facility] [address] [facility] [address] [phone] Patient: Angel Al MRN: [id number] VDV:[id number] Visit Information Date & Time Provider Department Dept. Phone Encounter #  
 9/6/2018  2:40 PM Raquel Barrett MD [facility] [phone] [id number] Follow-up Instructions Return in about 6 months (around 3/6/2019). Upcoming Health Maintenance Date Due ZOSTER VACCINE AGE 60> 11/20/1991 MEDICARE YEARLY EXAM 3/14/2018 GLAUCOMA SCREENING Q2Y 3/16/2018 Influenza Age 5 to Adult 8/1/2018 DTaP/Tdap/Td series (3 - Td) 10/22/2025 Allergies as of 9/6/2018  Review Complete On: 7/23/2018 By: José Miguel Ga NP Severity Noted Reaction Type Reactions Latex  12/14/2013    Unknown (comments) Dog Dander  12/14/2013    Shortness of Breath Current Immunizations  Reviewed on 4/18/2016 Name Date Influenza High Dose Vaccine PF 10/22/2015 Influenza Vaccine 10/10/2014 Influenza Vaccine Split 10/17/2011  9:12 AM  
 Influenza Vaccine Whole 9/1/2010 Pneumococcal Conjugate (PCV-13) 10/22/2015 TDAP Vaccine 8/16/2010 Tdap 10/22/2015 ZZZ-RETIRED (DO NOT USE) Pneumococcal Vaccine (Unspecified Type) 1/10/2004 Not reviewed this visit You Were Diagnosed With   
  
 Codes Comments Dementia of the Alzheimer's type, with late onset, uncomplicated    -  Primary ICD-10-CM: G30.1, F02.80 ICD-9-CM: 331.0, 294.10 Vitals BP Pulse Resp Height(growth percentile) Weight(growth percentile) SpO2  
 120/70 78 18 4' 11\" (1.499 m) 110 lb (49.9 kg) 95% BMI OB Status Smoking Status 22.22 kg/m2 Hysterectomy Never Smoker BMI and BSA Data Body Mass Index Body Surface Area  
 22.22 kg/m 2 1.44 m 2 Preferred Pharmacy Pharmacy Name Phone  [id number] [address] AT [facility] [phone] Your Updated Medication List  
  
   
This list is accurate as of 9/6/18  3:02 PM.  Always use your most recent med list.  
  
  
  
  
 * albuterol 2.5 mg /3 mL (0.083 %) nebulizer solution Commonly known as:  PROVENTIL VENTOLIN  
3 mL by Nebulization route every four (4) hours as needed for Wheezing. * albuterol 90 mcg/actuation inhaler Commonly known as:  PROAIR HFA Take 1 Puff by inhalation every four (4) hours as needed for Wheezing or Shortness of Breath. Indications: Acute Asthma Attack  
  
 atorvastatin 10 mg tablet Commonly known as:  LIPITOR Take 1 Tab by mouth daily. B COMPLEX PO Take  by mouth. diclofenac 1 % Gel Commonly known as:  VOLTAREN Apply 2 g to affected area four (4) times daily. Apply to hands qid  
  
 escitalopram oxalate 10 mg tablet Commonly known as:  Ilda Stacey Take 1 Tab by mouth daily. FLONASE 50 mcg/actuation nasal spray Generic drug:  fluticasone 2 Sprays by Both Nostrils route daily. Allergy note June 2013. LIQUID TEARS OP Apply  to eye.  
  
 lisdexamfetamine 20 mg capsule Commonly known as:  VYVANSE Take 1 Cap (20 mg total) by mouth dailyIndications: Attention-Deficit Hyperactivity Disorder Earliest Fill Date: 6/8/18. Max Daily Amount: 20 mg  
  
 memantine-donepezil 28-10 mg Cspx Commonly known as:  Bizible [facility] Take 1 Tab by mouth daily. MYRBETRIQ PO Take  by mouth. Nebulizer & Compressor machine 1 Each by Does Not Apply route every four (4) hours as needed. As directed  Indications: acute asthma with exacerbation SINGULAIR 10 mg tablet Generic drug:  montelukast  
Take 10 mg by mouth daily. SYMBICORT 160-4.5 mcg/actuation Hfaa Generic drug:  budesonide-formoterol Take 2 Puffs by inhalation two (2) times a day. From Dr. Mattie Santiago. VITAMIN C PO Take  by mouth. VITAMIN D3 1,000 unit tablet Generic drug:  cholecalciferol Take  by mouth daily. * Notice: This list has 2 medication(s) that are the same as other medications prescribed for you. Read the directions carefully, and ask your doctor or other care provider to review them with you. Follow-up Instructions Return in about 6 months (around 3/6/2019). To-Do List   
 09/07/2018 Imaging:  CT HEAD WO CONT Patient Instructions PRESCRIPTION REFILL POLICY [facility] Statement to Patients April 1, 2014 In an effort to ensure the large volume of patient prescription refills is processed in the most efficient and expeditious manner, we are asking our patients to assist us by calling your Pharmacy for all prescription refills, this will include also your  Mail Order Pharmacy. The pharmacy will contact our office electronically to continue the refill process. Please do not wait until the last minute to call your pharmacy. We need at least 48 hours (2days) to fill prescriptions. We also encourage you to call your pharmacy before going to  your prescription to make sure it is ready. With regard to controlled substance prescription refill requests (narcotic refills) that need to be picked up at our office, we ask your cooperation by providing us with at least 72 hours (3days) notice that you will need a refill. We will not refill narcotic prescription refill requests after 4:00pm on any weekday, Monday through Thursday, or after 2:00pm on Fridays, or on the weekends. We encourage everyone to explore another way of getting your prescription refill request processed using [employer], our patient web portal through our electronic medical record system. [employer] is an efficient and effective way to communicate your medication request directly to the office and  downloadable as an arcelia on your smart phone .  [employer] also features a review functionality that allows you to view your medication list as well as leave messages for your physician. Are you ready to get connected? If so please review the attatched instructions or speak to any of our staff to get you set up right away! Thank you so much for your cooperation. Should you have any questions please contact our Practice Administrator. The Physicians and Staff,  [facility] Patient Instruction Plan/ Result Policy If we have ordered testing for you, know that; \"NO NEWS IS GOOD NEWS! \" It is our policy that we know longer call patients with results, nor do we  give test results over the phone. We schedule follow up appointments so that your results can be discussed in person. This allows you to address any questions you have regarding the results. If something of concern is revealed on your test, we will contact you to discuss the matter and if needed schedule a sooner follow up appointment. Additionally, results may be found by using the My Chart feature and one of our patient service representatives at the  can give you instructions on how to access this feature to utilize our electronic medical record system. Thank you for your understanding. Introducing [facility] & [facility] SERVICES! Dear Oscar Ruvalcaba: Thank you for requesting a [employer] account. Our records indicate that you already have an active [employer] account. You can access your account anytime at [email or web address] Did you know that you can access your hospital and ER discharge instructions at any time in [employer]? You can also review all of your test results from your hospital stay or ER visit. Additional Information If you have questions, please visit the Frequently Asked Questions section of the [employer] website at [email or web address] Remember, [employer] is NOT to be used for urgent needs. For medical emergencies, dial 911. Now available from your iPhone and Android! Please provide this summary of care documentation to your next provider. Your primary care clinician is listed as Cruz PRESSLEY. If you have any questions after today's visit, please call [phone].

## 2018-09-06 NOTE — LETTER
9/6/2018 Ms. Kojo King [address] [address] To Whom It May Concern: 
 
Kojo King is currently under the care of [facility]. We have recommended that she STOP DRINKING all wine, vodka, and alcoholic beverages. The plan will be to slowly but back over the next several months until she is off alcohol completely. If there are questions or concerns please have the patient contact our office. Sincerely, Loni Dye MD

## 2018-09-06 NOTE — PROGRESS NOTES
Name: Mitchell Cross  :  1932  MRN:  [id number]     PCP:  Jozef Gracia MD    Chief Complaint   Patient presents with    Other     Alzheimer's      HISTORY OF PRESENT ILLNESS: Pt here for f/u of dementia. She has been doing well. She has not had any major memory changes. She has not been hospitalized. She recovered well from her bronchitis. She has been using albuterol. She walks slowly after dinner. She is at [facility], so they don't have to leave the building. She does try to stay active and walk frequently when she can around the buildings. She has been sleeping comfortably. She does get up frequently to go to the bathroom. She is going to see a urologist.  She is taking Namzaric daily. She has no side effects. She is continuing to have vodka and tonic drinks daily and a possibly an additional glass of wine. We did discuss this was likely progressing her dementia. She has not had any updated imaging since . Discussed the PET scan for AD and that we will test this when able. She continues on Vyvanse. She also recently started lexapro 10mg daily for mood and irritability. She is doing well on it. No falls. No hallucinations. Recap:   Since last visit patient did have to be hospitalized for bronchitis. Her short-term memory has progressively worsened. She and her  question if that has something to do with her drinking 1 alcoholic beverage plus a glass of wine at dinner every night. Patient reports that she will forget events that occurred hours previous. Overall patient's mood is stable. She continues on Vyvanse for focusing and this is working well. She does have some trouble remembering to take medications in the morning. Her  wants her to take them right away but she wants to take them with food and then will subsequently forget to take them at times. She has started on Myrbetriq for frequency and this is helping.     Patient is reading a lot and follows the story okay. She is active and walks the cord was outside when she can. She does live at [employer]. Generally she sleeps well with no issues. Occasionally she will wake up around 3 AM and had trouble falling asleep after this, but it is not typical.      Current Outpatient Prescriptions   Medication Sig    escitalopram oxalate (LEXAPRO) 10 mg tablet Take 1 Tab by mouth daily.  memantine-donepezil (NAMZARIC) 28-10 mg CSpX Take 1 Tab by mouth daily.  lisdexamfetamine (VYVANSE) 20 mg capsule Take 1 Cap (20 mg total) by mouth dailyIndications: Attention-Deficit Hyperactivity Disorder Earliest Fill Date: 6/8/18. Max Daily Amount: 20 mg    MIRABEGRON (MYRBETRIQ PO) Take  by mouth.  atorvastatin (LIPITOR) 10 mg tablet Take 1 Tab by mouth daily.  albuterol (PROAIR HFA) 90 mcg/actuation inhaler Take 1 Puff by inhalation every four (4) hours as needed for Wheezing or Shortness of Breath. Indications: Acute Asthma Attack    diclofenac (VOLTAREN) 1 % gel Apply 2 g to affected area four (4) times daily. Apply to hands qid    Nebulizer & Compressor machine 1 Each by Does Not Apply route every four (4) hours as needed. As directed  Indications: acute asthma with exacerbation    montelukast (SINGULAIR) 10 mg tablet Take 10 mg by mouth daily.  POLYVINYL ALCOHOL (LIQUID TEARS OP) Apply  to eye.  fluticasone (FLONASE) 50 mcg/actuation nasal spray 2 Sprays by Both Nostrils route daily. Allergy note June 2013.  cholecalciferol, vitamin d3, (VITAMIN D) 1,000 unit tablet Take  by mouth daily.  albuterol (PROVENTIL VENTOLIN) 2.5 mg /3 mL (0.083 %) nebulizer solution 3 mL by Nebulization route every four (4) hours as needed for Wheezing.  budesonide-formoterol (SYMBICORT) 160-4.5 mcg/actuation HFA inhaler Take 2 Puffs by inhalation two (2) times a day. From Dr. Dixie John.  ASCORBATE CALCIUM (VITAMIN C PO) Take  by mouth.  VITAMIN B COMPLEX (B COMPLEX PO) Take  by mouth.      No current facility-administered medications for this visit. Allergies   Allergen Reactions    Latex Unknown (comments)    Dog Dander Shortness of Breath     Past Medical History:   Diagnosis Date    Asthma     Dr. Angel Luis Fernando 2013--doesn't see routinely--pt reports no change in allergies--no need for routine follow-up. Dr. Martha Maza sees for mild COPD--on Symbicort since ~2013. Continued Singulair.  Ceruminosis     [facility]    Chronic obstructive pulmonary disease ([facility] [address]     Encounter for screening colonoscopy ~    Normal per pt--[facility] records requested 2013 visit.  Fainting     Falls     Fatigue     Hearing loss, left     va hearing    History of mammogram May 2014    Normal/no change.  History of shingles 2010    Dec 2014:  Pt reports clinical history and shingles vaccine 4yrs ago. To try to obtain exact date of zostavax from /source.  History of skin cancer     melanoma ?, BCC & SCC- followed by Dr. Jose Amor- twice a yr appts. 2013-still on 6mo schedule for skin checks.  Memory disorder     Osteopenia     DEXA     Overactive bladder     detrol LA. Vesicare--sees [facility] Considering procedure for incontinence.  Postmenopausal hormone replacement therapy     since hysterectomy    Right shoulder pain 2013. Mild osteopenia; mild AC osteoarthritis. Sports Med Eval Oct 2013.  Well woman exam (no gynecological exam) Aug 3, 2012    \"[facility] Screening\":  Results scanned to chart. Updated 2014-no change except noted concern for GFR--see Dec 2014 note, letter as reviewed with pt.      Past Surgical History:   Procedure Laterality Date    HX CATARACT REMOVAL      HX HYSTERECTOMY  1948    MULTIPLE DELIVERY       AK TRABECULOPLASTY BY LASER SURGERY      Dr. Dexter Irvin- last eye exam 2010     Social History     Social History    Marital status:      Spouse name: N/A  Number of children: N/A    Years of education: N/A     Occupational History    Not on file. Social History Main Topics    Smoking status: Never Smoker    Smokeless tobacco: Never Used    Alcohol use 0.0 oz/week      Comment: 1-2 drinks/every evening ( wine/vodka)    Drug use: Yes     Special: Prescription, OTC    Sexual activity: No     Other Topics Concern    Not on file     Social History Narrative     Family History   Problem Relation Age of Onset    Dementia Father     Hypertension Mother     Stroke Mother     Cancer Other      daughter- breast cancer     Repeat neuropsych with progression to dementia from [facility]    PHYSICAL EXAMINATION:    Extended / Orthostatic Vitals:    Vital Signs  Pulse (Heart Rate): 78 (09/06/18 1416)  Resp Rate: 18 (09/06/18 1416)  BP: 120/70 (09/06/18 1416)         Oxygen Therapy  O2 Sat (%): 95 % (09/06/18 1416)    General: Well defined, nourished, and groomed individual in no acute distress. Neck: Supple, nontender, no bruits, no pain with resistance to active range of motion. Heart: Regular rate and rhythm, no murmurs, rub, or gallop. Normal S1S2. Lungs: Clear to auscultation bilaterally with equal chest expansion, no cough, no wheeze  Musculoskeletal: Extremities revealed no edema and had full range of motion of joints. Psych: Good mood and bright affect    NEUROLOGICAL EXAMINATION:   Mental Status: Alert and oriented to person, place, and time. 2/3 word recall   Cranial nerves:  II, III, IV, VI: Visual acuity grossly intact. Visual fields are normal.   Pupils are equal, round, and reactive to light and accommodation. Extra-ocular movements are full and fluid. Fundoscopic exam was benign, no ptosis or nystagmus. V-XII: Hearing is grossly intact. Facial features are symmetric, with normal sensation and strength. The palate rises symmetrically and the tongue protrudes midline. Sternocleidomastoids 5/5.    Motor Examination: Normal tone, bulk, and strength, 5/5 muscle strength throughout. Coordination: Finger to nose was normal. No resting or intention tremor  Gait and Station: Steady while walking. Normal arm swing. No pronator drift. No muscle wasting or fasiculations noted. Reflexes: DTRs 2+ throughout. ASSESSMENT AND PLAN   79 y/o female with dementia who is currently stable on Namzeric. She is also on Vyvanse for ADD. We will get updated imaging with CT head. Will also continue lexapro at current dose. Letter written today that I have advised patient to stop all alcohol. 1.  Continue Namzeric daily. 2.  Cont lexapro 10mg daily but may need to increase this to 20mg at f/u  3. Continue Vyvanse. 4.  Discussed neuropsych testing. Discussed that this could be repeated, but at this point I do not think it would change her management, so I am not sure that it is necessary. Will hold off on ordering this for now. Will reassess at follow-up. 5.  Encouraged patient to wean off alcohol. Letter written  6. Encouraged patient to remain physically, socially, and mentally active  7. CT head ordered today    FU in 6 months    Roman Wagner MD  9/6/2018      This note was created using voice recognition software. Despite editing, there may be syntax errors. This note will not be viewable in [address]

## 2018-09-10 ENCOUNTER — TELEPHONE (OUTPATIENT)
Dept: INTERNAL MEDICINE CLINIC | Age: 83
End: 2018-09-10

## 2018-09-10 PROBLEM — G30.9 ALZHEIMER'S DISEASE (HCC): Status: ACTIVE | Noted: 2018-09-10

## 2018-09-10 PROBLEM — F02.80 ALZHEIMER'S DISEASE (HCC): Status: ACTIVE | Noted: 2018-09-10

## 2018-09-10 NOTE — TELEPHONE ENCOUNTER
Patient's , Les Olvera, states he needs a call back to get an appt asap for sore on shoulder that is weaping & possibly becoming infected. Please call to discuss.  Thank you

## 2018-09-10 NOTE — TELEPHONE ENCOUNTER
Spoke with patients  after 2 patient identifiers being note and advised of appt on Tuesday, September 11, 2018 08:45 AM, he accepted.

## 2018-09-11 ENCOUNTER — TELEPHONE (OUTPATIENT)
Dept: INTERNAL MEDICINE CLINIC | Age: 83
End: 2018-09-11

## 2018-09-11 ENCOUNTER — OFFICE VISIT (OUTPATIENT)
Dept: INTERNAL MEDICINE CLINIC | Age: 83
End: 2018-09-11

## 2018-09-11 ENCOUNTER — HOSPITAL ENCOUNTER (OUTPATIENT)
Dept: LAB | Age: 83
Discharge: HOME | End: 2018-09-11
Payer: MEDICARE

## 2018-09-11 VITALS
OXYGEN SATURATION: 98 % | HEART RATE: 80 BPM | HEIGHT: 59 IN | SYSTOLIC BLOOD PRESSURE: 128 MMHG | BODY MASS INDEX: 22.18 KG/M2 | DIASTOLIC BLOOD PRESSURE: 73 MMHG | TEMPERATURE: 97.3 F | WEIGHT: 110 LBS

## 2018-09-11 DIAGNOSIS — Z23 ENCOUNTER FOR IMMUNIZATION: Primary | ICD-10-CM

## 2018-09-11 DIAGNOSIS — L98.9 SKIN LESION OF NECK: ICD-10-CM

## 2018-09-11 DIAGNOSIS — R79.89 ELEVATED TSH: ICD-10-CM

## 2018-09-11 PROCEDURE — 84439 ASSAY OF FREE THYROXINE: CPT

## 2018-09-11 PROCEDURE — 84443 ASSAY THYROID STIM HORMONE: CPT

## 2018-09-11 PROCEDURE — 36415 COLL VENOUS BLD VENIPUNCTURE: CPT

## 2018-09-11 RX ORDER — CEPHALEXIN 500 MG/1
500 CAPSULE ORAL 3 TIMES DAILY
Qty: 21 CAP | Refills: 0 | Status: SHIPPED | OUTPATIENT
Start: 2018-09-11 | End: 2019-05-03

## 2018-09-11 RX ORDER — CEPHALEXIN 500 MG/1
500 CAPSULE ORAL 3 TIMES DAILY
Qty: 21 CAP | Refills: 0 | Status: SHIPPED | OUTPATIENT
Start: 2018-09-11 | End: 2018-09-11 | Stop reason: SDUPTHER

## 2018-09-11 NOTE — TELEPHONE ENCOUNTER
Patient wants to make sure her medication Cephalexin is sent to the [address] in [place].  .        Message received & copied from [facility]

## 2018-09-11 NOTE — MR AVS SNAPSHOT
[address] [facility]. 
[phone] Patient: Robert Almanza MRN: [id number] DAD:3[birth year]72[birth year]86 Visit Information Date & Time Provider Department Dept. Phone Encounter #  
 9/11/2018  8:45 AM Kinsey Sheehan, [address] [place] [address],[address] [phone] [id number] Follow-up Instructions Return in about 1 year (around 9/11/2019) for cpe . Your Appointments 3/7/2019  2:40 PM  
Follow Up with Kayla Machuca MD  
[facility]) Appt Note: 6 month f/u sukhdev [place] [address] [facility] [address] [facility] 99 [id number] [phone]  
  
   
 [place] [address] [id number] [address] [place] [address] Upcoming Health Maintenance Date Due ZOSTER VACCINE AGE 60> 11/20/1991 MEDICARE YEARLY EXAM 3/14/2018 GLAUCOMA SCREENING Q2Y 3/16/2018 Influenza Age 5 to Adult 8/1/2018 DTaP/Tdap/Td series (3 - Td) 10/22/2025 Allergies as of 9/11/2018  Review Complete On: 9/11/2018 By: Brionna Roach LPN Severity Noted Reaction Type Reactions Latex  12/14/2013    Unknown (comments) Dog Dander  12/14/2013    Shortness of Breath Current Immunizations  Reviewed on 4/18/2016 Name Date Influenza High Dose Vaccine PF 10/22/2015 Influenza Vaccine 10/10/2014 Influenza Vaccine (Tri) Adjuvanted  Incomplete Influenza Vaccine Split 10/17/2011  9:12 AM  
 Influenza Vaccine Whole 9/1/2010 Pneumococcal Conjugate (PCV-13) 10/22/2015 TDAP Vaccine 8/16/2010 Tdap 10/22/2015 ZZZ-RETIRED (DO NOT USE) Pneumococcal Vaccine (Unspecified Type) 1/10/2004 Not reviewed this visit You Were Diagnosed With   
  
 Codes Comments Encounter for immunization    -  Primary ICD-10-CM: V14 ICD-9-CM: V03.89 Skin lesion of neck     ICD-10-CM: L98.9 ICD-9-CM: 709.9 Elevated TSH     ICD-10-CM: R94.6 ICD-9-CM: 794.5 Vitals BP Pulse Temp Height(growth percentile) Weight(growth percentile) SpO2  
 128/73 (BP 1 Location: Left arm, BP Patient Position: Sitting) 80 97.3 °F (36.3 °C) (Oral) 4' 11\" (1.499 m) 110 lb (49.9 kg) 98% BMI OB Status Smoking Status 22.22 kg/m2 Hysterectomy Never Smoker BMI and BSA Data Body Mass Index Body Surface Area  
 22.22 kg/m 2 1.44 m 2 Preferred Pharmacy Pharmacy Name Phone [id number] [facility] [phone] Your Updated Medication List  
  
   
This list is accurate as of 9/11/18  9:06 AM.  Always use your most recent med list.  
  
  
  
  
 albuterol 90 mcg/actuation inhaler Commonly known as:  PROAIR HFA Take 1 Puff by inhalation every four (4) hours as needed for Wheezing or Shortness of Breath. Indications: Acute Asthma Attack  
  
 atorvastatin 10 mg tablet Commonly known as:  LIPITOR Take 1 Tab by mouth daily. cephALEXin 500 mg capsule Commonly known as:  Lauren Revels Take 1 Cap by mouth three (3) times daily. diclofenac 1 % Gel Commonly known as:  VOLTAREN Apply 2 g to affected area four (4) times daily. Apply to hands qid  
  
 escitalopram oxalate 10 mg tablet Commonly known as:  Silas Coronados Take 1 Tab by mouth daily. FLONASE 50 mcg/actuation nasal spray Generic drug:  fluticasone 2 Sprays by Both Nostrils route daily. Allergy note June 2013. LIQUID TEARS OP Apply  to eye.  
  
 lisdexamfetamine 20 mg capsule Commonly known as:  VYVANSE Take 1 Cap (20 mg total) by mouth dailyIndications: Attention-Deficit Hyperactivity Disorder Earliest Fill Date: 6/8/18. Max Daily Amount: 20 mg  
  
 memantine-donepezil 28-10 mg Cspx Commonly known as:  M[employer] PI[employer]URE AND [facility] Take 1 Tab by mouth daily. MYRBETRIQ PO Take  by mouth. Nebulizer & Compressor machine 1 Each by Does Not Apply route every four (4) hours as needed. As directed  Indications: acute asthma with exacerbation SINGULAIR 10 mg tablet Generic drug:  montelukast  
Take 10 mg by mouth daily. VITAMIN D3 1,000 unit tablet Generic drug:  cholecalciferol Take  by mouth daily. Prescriptions Sent to Pharmacy Refills  
 cephALEXin (KEFLEX) 500 mg capsule 0 Sig: Take 1 Cap by mouth three (3) times daily. Class: Normal  
 Pharmacy: [place] [address] [facility] [address] [place] [address] #: [phone] Route: Oral  
  
We Performed the Following INFLUENZA VACCINE INACTIVATED (IIV), SUBUNIT, ADJUVANTED, IM [id number] CPT(R)] REFERRAL TO DERMATOLOGY [REF19 Custom] T4, FREE [id number] CPT(R)] TSH 3RD GENERATION [19349 CPT(R)] Follow-up Instructions Return in about 1 year (around 9/11/2019) for cpe . To-Do List   
 09/14/2018 2:00 PM  
  Appointment with [facility] 1 at [facility] ([phone]) NON-CONTRAST STUDY: 1. Bring any non [facility] facility films/images pertaining to the area of interest with you on the day of appointment. 2. Check in at registration at least 30 minutes before appt time unless you were instructed to do otherwise. 3. If you have to drink oral contrast please pick it up any weekday prior to your appointment, if you cannot please check in 2 hrs  before appt time. Referral Information Referral ID Referred By Referred To  
  
 [id number] [facility] [address] Phone: [phone] Fax: [phone] Visits Status Start Date End Date 1 New Request 9/11/18 9/11/19 If your referral has a status of pending review or denied, additional information will be sent to support the outcome of this decision. Introducing \[facility]\"" & HEALTH SERVICES! Dear Ida Olivas: Thank you for requesting a [employer] account. Our records indicate that you already have an active [employer] account. You can access your account anytime at [email or web address] Did you know that you can access your hospital and ER discharge instructions at any time in [employer]? You can also review all of your test results from your hospital stay or ER visit. Additional Information If you have questions, please visit the Frequently Asked Questions section of the [employer] website at [email or web address] Remember, [employer] is NOT to be used for urgent needs. For medical emergencies, dial 911. Now available from your iPhone and Android! Please provide this summary of care documentation to your next provider. Your primary care clinician is listed as Sundeep PRESSLEY. If you have any questions after today's visit, please call [phone].

## 2018-09-11 NOTE — TELEPHONE ENCOUNTER
Spoke with patient after 2 patient identifiers being note and advised keflex had been sent to robyn per her request. Patient expressed understanding and has no further questions at this time.

## 2018-09-11 NOTE — PROGRESS NOTES
HISTORY OF PRESENT ILLNESS Ruchi Crawford is a 80 y.o. female. HPI Acute care visit, here with her  
c/o right shoudler skin lesion present for 2 yrs but recently in last 2-3 weeks with serous drainage Not itchy No bleeding Patient Active Problem List  
 Diagnosis Date Noted  ADD (attention deficit disorder) 01/18/2017  Hyperlipidemia 07/20/2015  Mild cognitive impairment without memory loss 02/02/2015  Generalized anxiety disorder 02/02/2015  TMJ (temporomandibular joint disorder) 12/21/2011  Syncope and collapse 08/10/2011  Fever, unspecified 08/10/2011  Syncope and collapse 08/10/2011  History of skin cancer  Asthma   
 Hearing loss, left  Ceruminosis  Postmenopausal hormone replacement therapy  Overactive bladder Current Outpatient Prescriptions Medication Sig Dispense Refill  escitalopram oxalate (LEXAPRO) 10 mg tablet Take 1 Tab by mouth daily. 30 Tab 5  
 memantine-donepezil (NAMZARIC) 28-10 mg CSpX Take 1 Tab by mouth daily. 90 Cap 3  
 lisdexamfetamine (VYVANSE) 20 mg capsule Take 1 Cap (20 mg total) by mouth dailyIndications: Attention-Deficit Hyperactivity Disorder Earliest Fill Date: 6/8/18. Max Daily Amount: 20 mg 30 Cap 0  
 MIRABEGRON (MYRBETRIQ PO) Take  by mouth.  atorvastatin (LIPITOR) 10 mg tablet Take 1 Tab by mouth daily. 90 Tab 3  
 albuterol (PROAIR HFA) 90 mcg/actuation inhaler Take 1 Puff by inhalation every four (4) hours as needed for Wheezing or Shortness of Breath. Indications: Acute Asthma Attack 3 Inhaler 3  
 diclofenac (VOLTAREN) 1 % gel Apply 2 g to affected area four (4) times daily. Apply to hands qid 100 g 3  
 Nebulizer & Compressor machine 1 Each by Does Not Apply route every four (4) hours as needed. As directed  Indications: acute asthma with exacerbation 1 Each 0  
 montelukast (SINGULAIR) 10 mg tablet Take 10 mg by mouth daily.  POLYVINYL ALCOHOL (LIQUID TEARS OP) Apply  to eye.  fluticasone (FLONASE) 50 mcg/actuation nasal spray 2 Sprays by Both Nostrils route daily. Allergy note June 2013.  cholecalciferol, vitamin d3, (VITAMIN D) 1,000 unit tablet Take  by mouth daily. Allergies Allergen Reactions  Latex Unknown (comments)  Dog Dander Shortness of Breath Lab Results Component Value Date/Time GFR est non-AA 70 06/01/2018 11:45 AM  
GFR est AA 81 06/01/2018 11:45 AM  
Creatinine 0.77 06/01/2018 11:45 AM  
BUN 14 06/01/2018 11:45 AM  
Sodium 142 06/01/2018 11:45 AM  
Potassium 4.0 06/01/2018 11:45 AM  
Chloride 100 06/01/2018 11:45 AM  
CO2 24 06/01/2018 11:45 AM  
Magnesium 1.8 08/10/2011 05:30 PM  
  
 
ROS Physical Exam  
Constitutional: She appears well-developed and well-nourished. Appears stated age Neck:  
 
 
Cardiovascular: Normal rate, regular rhythm and normal heart sounds. Exam reveals no gallop and no friction rub. No murmur heard. Pulmonary/Chest: Effort normal and breath sounds normal. No respiratory distress. She has no wheezes. Abdominal: Soft. Bowel sounds are normal.  
Musculoskeletal: She exhibits no edema. Neurological: She is alert. Psychiatric: She has a normal mood and affect. Nursing note and vitals reviewed. ASSESSMENT and PLAN Diagnoses and all orders for this visit: 1. Encounter for immunization -     Influenza Vaccine Inactivated (IIV) (FLUAD), Subunit, Adjuvanted, IM (19559) 2. Skin lesion of neck 
-     REFERRAL TO DERMATOLOGY 
 ? Infected cyst vs BCC Keflex x 7d Will see DERM MD next week 3. Elevated TSH 
-     TSH 3RD GENERATION 
-     T4, FREE Asymptomatic 4. Dementia On namzaric , has f/u neurolgist 
5. ADD Controlled on Vyvanse Other orders -     cephALEXin (KEFLEX) 500 mg capsule; Take 1 Cap by mouth three (3) times daily. Follow-up Disposition: 
Return in about 1 year (around 9/11/2019) for cpe .

## 2018-09-12 LAB
T4 FREE SERPL-MCNC: 1.34 NG/DL (ref 0.82–1.77)
TSH SERPL DL<=0.005 MIU/L-ACNC: 4.43 UIU/ML (ref 0.45–4.5)

## 2018-09-14 ENCOUNTER — HOSPITAL ENCOUNTER (OUTPATIENT)
Dept: CT IMAGING | Age: 83
Discharge: HOME | End: 2018-09-14
Attending: PSYCHIATRY & NEUROLOGY
Payer: MEDICARE

## 2018-09-14 DIAGNOSIS — G30.1 DEMENTIA OF THE ALZHEIMER'S TYPE, WITH LATE ONSET, UNCOMPLICATED (HCC): ICD-10-CM

## 2018-09-14 DIAGNOSIS — F02.80 DEMENTIA OF THE ALZHEIMER'S TYPE, WITH LATE ONSET, UNCOMPLICATED (HCC): ICD-10-CM

## 2018-09-14 PROCEDURE — 70450 CT HEAD/BRAIN W/O DYE: CPT

## 2018-10-09 DIAGNOSIS — F90.9 ATTENTION DEFICIT HYPERACTIVITY DISORDER (ADHD), UNSPECIFIED ADHD TYPE: ICD-10-CM

## 2018-10-09 DIAGNOSIS — F43.23 ADJUSTMENT DISORDER WITH MIXED ANXIETY AND DEPRESSED MOOD: ICD-10-CM

## 2018-10-13 ENCOUNTER — APPOINTMENT (OUTPATIENT)
Dept: GENERAL RADIOLOGY | Age: 83
End: 2018-10-13
Attending: EMERGENCY MEDICINE
Payer: MEDICARE

## 2018-10-13 ENCOUNTER — HOSPITAL ENCOUNTER (EMERGENCY)
Age: 83
Discharge: HOME | End: 2018-10-13
Attending: EMERGENCY MEDICINE
Payer: MEDICARE

## 2018-10-13 VITALS
RESPIRATION RATE: 18 BRPM | SYSTOLIC BLOOD PRESSURE: 138 MMHG | WEIGHT: 100 LBS | OXYGEN SATURATION: 100 % | HEIGHT: 60 IN | DIASTOLIC BLOOD PRESSURE: 81 MMHG | TEMPERATURE: 97.8 F | BODY MASS INDEX: 19.63 KG/M2

## 2018-10-13 DIAGNOSIS — S52.531A CLOSED COLLES' FRACTURE OF RIGHT RADIUS, INITIAL ENCOUNTER: Primary | ICD-10-CM

## 2018-10-13 PROCEDURE — 73090 X-RAY EXAM OF FOREARM: CPT

## 2018-10-13 PROCEDURE — 74011250636 HC RX REV CODE- 250/636: Performed by: EMERGENCY MEDICINE

## 2018-10-13 PROCEDURE — 99284 EMERGENCY DEPT VISIT MOD MDM: CPT

## 2018-10-13 PROCEDURE — 96372 THER/PROPH/DIAG INJ SC/IM: CPT

## 2018-10-13 PROCEDURE — 74011000250 HC RX REV CODE- 250: Performed by: EMERGENCY MEDICINE

## 2018-10-13 PROCEDURE — 75810000303 HC CLSD TRMT  FRACTURE/DISLOCATION W/  ANES

## 2018-10-13 RX ORDER — MORPHINE SULFATE 2 MG/ML
2 INJECTION, SOLUTION INTRAMUSCULAR; INTRAVENOUS
Status: COMPLETED | OUTPATIENT
Start: 2018-10-13 | End: 2018-10-13

## 2018-10-13 RX ORDER — TRAMADOL HYDROCHLORIDE 50 MG/1
50 TABLET ORAL
Qty: 10 TAB | Refills: 0 | Status: SHIPPED | OUTPATIENT
Start: 2018-10-13 | End: 2020-03-06 | Stop reason: SDUPTHER

## 2018-10-13 RX ORDER — BUPIVACAINE HYDROCHLORIDE 5 MG/ML
10 INJECTION, SOLUTION EPIDURAL; INTRACAUDAL
Status: COMPLETED | OUTPATIENT
Start: 2018-10-13 | End: 2018-10-13

## 2018-10-13 RX ADMIN — MORPHINE SULFATE 2 MG: 2 INJECTION, SOLUTION INTRAMUSCULAR; INTRAVENOUS at 19:12

## 2018-10-13 RX ADMIN — BUPIVACAINE HYDROCHLORIDE 50 MG: 5 INJECTION, SOLUTION EPIDURAL; INTRACAUDAL; PERINEURAL at 19:40

## 2018-10-14 NOTE — ED PROVIDER NOTES
EMERGENCY DEPARTMENT HISTORY AND PHYSICAL EXAM 
 
 
Date: 10/13/2018 Patient Name: Ayan Salas History of Presenting Illness Chief Complaint Patient presents with  Wrist Pain  
  right wrist pain s/p fall History Provided By: Patient HPI: Ayan Salas, 80 y.o. female with PMHx significant for asthma, COPD, presents via EMS to the ED with cc of gradual onset right wrist pain, onset s/p GLF several hours pta. Pt reports drinking alcohol before GLF. Pt reports taking Aspirin for pain, but denies opioid use. She specifically denies any fevers, chills, nausea, vomiting, chest pain, shortness of breath, headache, rash, diarrhea, sweating or weight loss. There are no other complaints, changes, or physical findings at this time. PCP: Haley Rick MD 
 
Current Outpatient Prescriptions Medication Sig Dispense Refill  traMADol (ULTRAM) 50 mg tablet Take 1 Tab by mouth every six (6) hours as needed for Pain. Max Daily Amount: 200 mg. Indications: Pain 10 Tab 0  
 lisdexamfetamine (VYVANSE) 20 mg capsule Take 1 Cap (20 mg total) by mouth dailyIndications: Attention-Deficit Hyperactivity Disorder. May fill 12-25-18 Max Daily Amount: 20 mg 30 Cap 0  cephALEXin (KEFLEX) 500 mg capsule Take 1 Cap by mouth three (3) times daily. 21 Cap 0  
 escitalopram oxalate (LEXAPRO) 10 mg tablet Take 1 Tab by mouth daily. 30 Tab 5  
 memantine-donepezil (NAMZARIC) 28-10 mg CSpX Take 1 Tab by mouth daily. 90 Cap 3  
 MIRABEGRON (MYRBETRIQ PO) Take  by mouth.  atorvastatin (LIPITOR) 10 mg tablet Take 1 Tab by mouth daily. 90 Tab 3  
 albuterol (PROAIR HFA) 90 mcg/actuation inhaler Take 1 Puff by inhalation every four (4) hours as needed for Wheezing or Shortness of Breath. Indications: Acute Asthma Attack 3 Inhaler 3  
 diclofenac (VOLTAREN) 1 % gel Apply 2 g to affected area four (4) times daily.  Apply to hands qid 100 g 3  
  Nebulizer & Compressor machine 1 Each by Does Not Apply route every four (4) hours as needed. As directed  Indications: acute asthma with exacerbation 1 Each 0  
 montelukast (SINGULAIR) 10 mg tablet Take 10 mg by mouth daily.  POLYVINYL ALCOHOL (LIQUID TEARS OP) Apply  to eye.  fluticasone (FLONASE) 50 mcg/actuation nasal spray 2 Sprays by Both Nostrils route daily. Allergy note June 2013.  cholecalciferol, vitamin d3, (VITAMIN D) 1,000 unit tablet Take  by mouth daily. Past History Past Medical History: 
Past Medical History:  
Diagnosis Date  Asthma Dr. Shankar Fox June 2013--doesn't see routinely--pt reports no change in allergies--no need for routine follow-up. Dr. Sandy Vasquez sees for mild COPD--on Symbicort since ~Nov 2013. Continued Singulair.  Ceruminosis   
 periodic ear cleaning at [facility]  Chronic obstructive pulmonary disease ([facility] [address]  Encounter for screening colonoscopy ~2006 Normal per pt--[facility] records requested July 2013 visit.  Fainting [address]  Fatigue  Hearing loss, left   
 [facility] hearing  History of mammogram May 2014 Normal/no change.  History of shingles 2010 Dec 2014:  Pt reports clinical history and shingles vaccine 4yrs ago. To try to obtain exact date of zostavax from /source.  History of skin cancer   
 melanoma 2003?, BCC & SCC- followed by Dr. Leatha Gunderson- twice a yr appts. July 2013-still on 6mo schedule for skin checks.  Memory disorder  Osteopenia DEXA 8/11  Overactive bladder   
 detrol [place]. Vesicare--sees Urology Spring/Fall. Considering procedure for incontinence.  Postmenopausal hormone replacement therapy   
 since hysterectomy  Right shoulder pain Sept 2013. Mild osteopenia; mild AC osteoarthritis. Sports Med Eval Oct 2013.  Well woman exam (no gynecological exam) Aug 3, 2012 \"[facility] Screening\":  Results scanned to chart.  Updated Nov 2014-no change except noted concern for GFR--see Dec 2014 note, letter as reviewed with pt. Past Surgical History: 
Past Surgical History:  
Procedure Laterality Date  [facility]  MULTIPLE DELIVERY     
 HI TRABECULOPLASTY BY LASER SURGERY Dr. Eliezer Meza- last eye exam 2010 Family History: 
Family History Problem Relation Age of Onset  Dementia Father  Hypertension Mother  Stroke Mother  Cancer Other   
  daughter- breast cancer Social History: 
Social History Substance Use Topics  Smoking status: Never Smoker  Smokeless tobacco: Never Used  Alcohol use 0.0 oz/week Comment: 1-2 drinks/every evening ( wine/vodka) Allergies: Allergies Allergen Reactions  Latex Unknown (comments)  Dog Dander Shortness of Breath Review of Systems Review of Systems Constitutional: Negative. Negative for activity change, appetite change, chills, fatigue, fever and unexpected weight change. HENT: Negative. Negative for congestion, hearing loss, rhinorrhea, sneezing and voice change. Eyes: Negative. Negative for pain and visual disturbance. Respiratory: Negative. Negative for apnea, cough, choking, chest tightness and shortness of breath. Cardiovascular: Negative. Negative for chest pain and palpitations. Gastrointestinal: Negative. Negative for abdominal distention, abdominal pain, blood in stool, diarrhea, nausea and vomiting. Genitourinary: Negative. Negative for difficulty urinating, flank pain, frequency and urgency. No discharge Musculoskeletal: Positive for myalgias (right wrist). Negative for arthralgias, back pain and neck stiffness. Skin: Negative. Negative for color change and rash. Neurological: Negative. Negative for dizziness, seizures, syncope, speech difficulty, weakness, numbness and headaches. Hematological: Negative for adenopathy. Psychiatric/Behavioral: Negative. Negative for agitation, behavioral problems, dysphoric mood and suicidal ideas. The patient is not nervous/anxious. Physical Exam  
Physical Exam  
Constitutional: She is oriented to person, place, and time. She appears well-developed and well-nourished. No distress. HENT:  
Head: Normocephalic and atraumatic. Mouth/Throat: Oropharynx is clear and moist. No oropharyngeal exudate. Eyes: Conjunctivae and EOM are normal. Pupils are equal, round, and reactive to light. Right eye exhibits no discharge. Left eye exhibits no discharge. Neck: Normal range of motion. Neck supple. Cardiovascular: Normal rate, regular rhythm and intact distal pulses. Exam reveals no gallop and no friction rub. No murmur heard. Pulmonary/Chest: Effort normal and breath sounds normal. No respiratory distress. She has no wheezes. She has no rales. She exhibits no tenderness. Abdominal: Soft. Bowel sounds are normal. She exhibits no distension and no mass. There is no tenderness. There is no rebound and no guarding. Musculoskeletal: Normal range of motion. She exhibits no edema. Mild deformity at right wrist  
Lymphadenopathy:  
  She has no cervical adenopathy. Neurological: She is alert and oriented to person, place, and time. No cranial nerve deficit. Coordination normal.  
Skin: Skin is warm and dry. No rash noted. No erythema. Psychiatric: She has a normal mood and affect. Nursing note and vitals reviewed. Diagnostic Study Results Labs - No results found for this or any previous visit (from the past 12 hour(s)). Radiologic Studies -  
XR FOREARM RT AP/LAT Final Result CT Results  (Last 48 hours) None CXR Results  (Last 48 hours) None Medical Decision Making I am the first provider for this patient.  
 
I reviewed the vital signs, available nursing notes, past medical history, past surgical history, family history and social history. Vital Signs-Reviewed the patient's vital signs. Patient Vitals for the past 12 hrs: 
 Temp Resp BP SpO2  
10/13/18 2030 - - 138/81 100 % 10/13/18 2015 - - 134/75 100 % 10/13/18 2000 - - 137/73 98 % 10/13/18 1945 - - 128/69 97 % 10/13/18 1930 - - (!) 120/93 91 % 10/13/18 1915 - - (!) 121/107 96 % 10/13/18 1900 - - 125/56 100 % 10/13/18 1854 97.8 °F (36.6 °C) 18 133/61 100 % Pulse Oximetry Analysis - 100% on RA Records Reviewed: Nursing Notes, Old Medical Records and Ambulance Run Sheet Provider Notes (Medical Decision Making): DDx: strain/rianna/contusion/fracture ED Course:  
Initial assessment performed. The patients presenting problems have been discussed, and they are in agreement with the care plan formulated and outlined with them. I have encouraged them to ask questions as they arise throughout their visit. Procedure Note - Splint Placement: 
8:39 PM 
Performed by: Gunnar Bass. Yazmin Julio MD 
Neurovascularly intact prior to tx. An Orthoglass ulnar gutter splint was placed on pt's right wrist.  Joint was placed in neutral position. Neurovascularly intact after tx. The procedure took 1-15 minutes, and pt tolerated well. Procedure Note - Reduction:   
8:40 PM 
Performed by Gunnar Bass. Yazmin Julio MD.   
 
Procedure start time: 8:35 Procedure end time: 8:40 Procedure was performed with a hematoma block. Medications provided as below: 
Medications  
morphine injection 2 mg (2 mg IntraMUSCular Given 10/13/18 1912) bupivacaine (PF) (MARCAINE) 0.5 % (5 mg/mL) injection 50 mg (50 mg SubCUTAneous Given by Provider 10/13/18 1940) Immediately prior to the procedure, the patient was reevaluated and found suitable for the planned procedure and any planned medications. Immediately prior to the procedure a time out was called to verify the correct patient, procedure, equipment, staff, and marking as appropriate. Prior to the procedure, neurovascular exam was intact. Analgesia was obtained with 5 mLs of Narcan. To achieve reduction of the patient's right wrist joint, logitudinal traction manipulation was utilized. The joint was successfully reduced. Neurovascular exam was intact following the procedure. Post reduction x-ray ordered. The procedure took 1-15 minutes, and pt tolerated well. Critical Care Time:  
0 minutes Disposition: 
Discharge Note: 
8:56 PM 
The pt is ready for discharge. The pt's signs, symptoms, diagnosis, and discharge instructions have been discussed and pt has conveyed their understanding. The pt is to follow up as recommended or return to ER should their symptoms worsen. Plan has been discussed and pt is in agreement. PLAN: 
1. Discharge Medication List as of 10/13/2018  8:51 PM  
  
START taking these medications Details  
traMADol (ULTRAM) 50 mg tablet Take 1 Tab by mouth every six (6) hours as needed for Pain. Max Daily Amount: 200 mg. Indications: Pain, Print, Disp-10 Tab, R-0  
  
  
CONTINUE these medications which have NOT CHANGED Details  
lisdexamfetamine (VYVANSE) 20 mg capsule Take 1 Cap (20 mg total) by mouth dailyIndications: Attention-Deficit Hyperactivity Disorder. May fill 12-25-18 Max Daily Amount: 20 mg, Print, Disp-30 Cap, R-0  
  
cephALEXin (KEFLEX) 500 mg capsule Take 1 Cap by mouth three (3) times daily. , Normal, Disp-21 Cap, R-0  
  
escitalopram oxalate (LEXAPRO) 10 mg tablet Take 1 Tab by mouth daily. , Normal, Disp-30 Tab, R-5  
  
memantine-donepezil (NAMZARIC) 28-10 mg CSpX Take 1 Tab by mouth daily. , Normal, Disp-90 Cap, R-3  
  
MIRABEGRON (MYRBETRIQ PO) Take  by mouth., Historical Med  
  
atorvastatin (LIPITOR) 10 mg tablet Take 1 Tab by mouth daily. , Normal, Disp-90 Tab, R-3  
  
albuterol (PROAIR HFA) 90 mcg/actuation inhaler Take 1 Puff by inhalation every four (4) hours as needed for Wheezing or Shortness of Breath. Indications: Acute Asthma Attack, Normal, Disp-3 Inhaler, R-3  
  
diclofenac (VOLTAREN) 1 % gel Apply 2 g to affected area four (4) times daily. Apply to hands qid, Normal, Disp-100 g, R-3 Nebulizer & Compressor machine 1 Each by Does Not Apply route every four (4) hours as needed. As directed  Indications: acute asthma with exacerbation, Print, Disp-1 Each, R-0  
  
montelukast (SINGULAIR) 10 mg tablet Take 10 mg by mouth daily. , Historical Med POLYVINYL ALCOHOL (LIQUID TEARS OP) Apply  to eye., Historical Med  
  
fluticasone (FLONASE) 50 mcg/actuation nasal spray 2 Sprays by Both Nostrils route daily. Allergy note June 2013., Historical Med  
  
cholecalciferol, vitamin d3, (VITAMIN D) 1,000 unit tablet Take  by mouth daily. , Historical Med 2. Follow-up Information Follow up With Details Comments Contact Info Jose Cruz Trammell MD   [address] [facility] 
[phone] Zoraida Shook MD Call in 2 days As needed, If symptoms worsen [facility] [address] [facility] 
[phone] Return to ED if worse Diagnosis Clinical Impression: 1. Closed Colles' fracture of right radius, initial encounter Attestations: This note is prepared by Tran Brito, acting as Scribe for [employer] Dunia Mckeon, [address] Dunia Mckeon MD: The scribe's documentation has been prepared under my direction and personally reviewed by me in its entirety. I confirm that the note above accurately reflects all work, treatment, procedures, and medical decision making performed by me.

## 2018-10-14 NOTE — DISCHARGE INSTRUCTIONS
Broken Arm: Care Instructions  Your Care Instructions  Fractures can range from a small, hairline crack, to a bone or bones broken into two or more pieces. Your treatment depends on how bad the break is. Your doctor may have put your arm in a splint or cast to allow it to heal or to keep it stable until you see another doctor. It may take weeks or months for your arm to heal. You can help your arm heal with some care at home. You heal best when you take good care of yourself. Eat a variety of healthy foods, and don't smoke. You may have had a sedative to help you relax. You may be unsteady after having sedation. It can take a few hours for the medicine's effects to wear off. Common side effects of sedation include nausea, vomiting, and feeling sleepy or tired. The doctor has checked you carefully, but problems can develop later. If you notice any problems or new symptoms, get medical treatment right away. Follow-up care is a key part of your treatment and safety. Be sure to make and go to all appointments, and call your doctor if you are having problems. It's also a good idea to know your test results and keep a list of the medicines you take. How can you care for yourself at home? · If the doctor gave you a sedative:  ¨ For 24 hours, don't do anything that requires attention to detail. It takes time for the medicine's effects to completely wear off. ¨ For your safety, do not drive or operate any machinery that could be dangerous. Wait until the medicine wears off and you can think clearly and react easily. · Put ice or a cold pack on your arm for 10 to 20 minutes at a time. Try to do this every 1 to 2 hours for the next 3 days (when you are awake). Put a thin cloth between the ice and your cast or splint. Keep the cast or splint dry. · Follow the cast care instructions your doctor gives you. If you have a splint, do not take it off unless your doctor tells you to. · Be safe with medicines.  Take pain medicines exactly as directed. ¨ If the doctor gave you a prescription medicine for pain, take it as prescribed. ¨ If you are not taking a prescription pain medicine, ask your doctor if you can take an over-the-counter medicine. · Prop up your arm on pillows when you sit or lie down in the first few days after the injury. Keep the arm higher than the level of your heart. This will help reduce swelling. · Follow instructions for exercises to keep your arm strong. · Wiggle your fingers and wrist often to reduce swelling and stiffness. When should you call for help? Call 911 anytime you think you may need emergency care. For example, call if:    · You are very sleepy and you have trouble waking up.    Call your doctor now or seek immediate medical care if:    · You have new or worse nausea or vomiting.     · You have new or worse pain.     · Your hand or fingers are cool or pale or change color.     · Your cast or splint feels too tight.     · You have tingling, weakness, or numbness in your hand or fingers.    Watch closely for changes in your health, and be sure to contact your doctor if:    · You do not get better as expected.     · You have problems with your cast or splint. Where can you learn more? Go to http://jelani-joyce.info/. Enter M047 in the search box to learn more about \"Broken Arm: Care Instructions. \"  Current as of: November 29, 2017  Content Version: 11.8  © 2104-8236 [employer]. Care instructions adapted under license by [employer] (which disclaims liability or warranty for this information). If you have questions about a medical condition or this instruction, always ask your healthcare professional. [address] any warranty or liability for your use of this information.

## 2018-10-14 NOTE — ED NOTES
Dr. Christobal Bernheim has reviewed discharge instructions with the patient and spouse. The patient and spouse verbalized understanding. Pt ambulatory home with spouse, discharge papers in hand.

## 2018-11-06 RX ORDER — ESCITALOPRAM OXALATE 10 MG/1
10 TABLET ORAL DAILY
Qty: 90 TAB | Refills: 1 | Status: SHIPPED | OUTPATIENT
Start: 2018-11-06 | End: 2018-11-09 | Stop reason: SDUPTHER

## 2018-11-08 ENCOUNTER — TELEPHONE (OUTPATIENT)
Dept: NEUROLOGY | Age: 83
End: 2018-11-08

## 2018-11-08 NOTE — TELEPHONE ENCOUNTER
----- Message from Heike Hernandez sent at 11/8/2018 12:24 PM EST -----  Regarding: /Elena Fine pt's  called requesting that the pt medication esciitalopram 10 mg tabs 90 day [facility] in [place]. Best contact number is [phone].

## 2018-11-09 RX ORDER — ESCITALOPRAM OXALATE 10 MG/1
10 TABLET ORAL DAILY
Qty: 90 TAB | Refills: 1 | Status: SHIPPED | OUTPATIENT
Start: 2018-11-09 | End: 2018-12-08 | Stop reason: SDUPTHER

## 2018-11-09 NOTE — TELEPHONE ENCOUNTER
Review and sign if approved. Patient wants it through [facility] to have [facility] prescription.     Review and sign if approved

## 2018-12-10 RX ORDER — ESCITALOPRAM OXALATE 10 MG/1
10 TABLET ORAL DAILY
Qty: 90 TAB | Refills: 1 | Status: SHIPPED | OUTPATIENT
Start: 2018-12-10 | End: 2019-08-26

## 2018-12-12 ENCOUNTER — TELEPHONE (OUTPATIENT)
Dept: INTERNAL MEDICINE CLINIC | Age: 83
End: 2018-12-12

## 2018-12-12 NOTE — TELEPHONE ENCOUNTER
#[phone] Mr Angel Goldberg needs a call in refferance to escitalopram oxalate. He would like a 90 day to go to [facility] and a 10 day supply to [facility] to tide her over until mail script comes. We need to let [facility] know this so they don't cancel order. Can this be done as soon as possible? [facility] is denying the script until 1-11-19. The [facility] order for 90 days needs to be cancelled. Mr. Angel Goldberg wants a call to know this has all been taken care of.

## 2018-12-12 NOTE — TELEPHONE ENCOUNTER
Patient's , Lesli Webb, states medication problem has been resolved. Toby advised prescription was mailed out from [facility] but never received. [facility] advised they will resend & have contacted local pharmacy to over ride & allow for 30 day local pharmacy refill until Mail Order is received. Please call if any questions.  Thank you

## 2018-12-17 ENCOUNTER — TELEPHONE (OUTPATIENT)
Dept: NEUROLOGY | Age: 83
End: 2018-12-17

## 2018-12-17 NOTE — TELEPHONE ENCOUNTER
----- Message from Zackary Garcia sent at 12/17/2018  3:35 PM EST -----  Regarding: Dr Manuel/rx refill  Pt's  [phone], said he wants to make sure his wife rx for her tranquilizer medication to clam her nerves, he did not remember the name  Goes to her [facility] (p) [phone]  For a 90 day supply with three refills

## 2018-12-18 NOTE — TELEPHONE ENCOUNTER
Called the pharmacy and they stated that the medication for a 30 day supply was sold on 12/13/18 at 12:42pm.

## 2018-12-18 NOTE — TELEPHONE ENCOUNTER
Called and spoke with patient  and it was the LExapro that was sent. He stated their was an issue with the pharmacy and I stated that I would call [facility] to see if I can clear up the situation.

## 2018-12-18 NOTE — TELEPHONE ENCOUNTER
Called and wife picked up the phone and she went in and got the bottle and she stated that she had plenty of medication. I stated that she was able to get more refills. She stated that she would pass it on to her .

## 2019-01-07 NOTE — TELEPHONE ENCOUNTER
PCP: Damaris Devi MD    Last appt: 9/11/2018  Future Appointments   Date Time Provider Fred Ibarra   3/7/2019  2:40 PM Dona Manuel MD [address]       Requested Prescriptions     Pending Prescriptions Disp Refills    memantine-donepezil [facility]) 28-10 mg CSpX 90 Cap 3     Sig: Take 1 Tab by mouth daily.

## 2019-01-16 ENCOUNTER — TELEPHONE (OUTPATIENT)
Dept: NEUROLOGY | Age: 84
End: 2019-01-16

## 2019-01-16 NOTE — TELEPHONE ENCOUNTER
Pt's spouse is calling to see if Dr Martha Bob can refer pt to a physician at [facility], much closer to home.   [facility] # [phone]

## 2019-01-16 NOTE — TELEPHONE ENCOUNTER
Called and gave patient the name of Dr. Francoise Ryan and Dr. Catrina Holloway.  This is per Dr. Sabrina Lorenzo However I stressed that she felt they were all good and she was fine with any of the neurologist.

## 2019-01-21 ENCOUNTER — OFFICE VISIT (OUTPATIENT)
Dept: NEUROLOGY | Age: 84
End: 2019-01-21

## 2019-01-21 VITALS
HEART RATE: 80 BPM | HEIGHT: 60 IN | WEIGHT: 105 LBS | DIASTOLIC BLOOD PRESSURE: 60 MMHG | SYSTOLIC BLOOD PRESSURE: 96 MMHG | BODY MASS INDEX: 20.62 KG/M2 | OXYGEN SATURATION: 98 %

## 2019-01-21 DIAGNOSIS — F02.80 ALZHEIMER'S TYPE DEMENTIA WITH LATE ONSET WITHOUT BEHAVIORAL DISTURBANCE (HCC): Primary | ICD-10-CM

## 2019-01-21 DIAGNOSIS — G30.1 ALZHEIMER'S TYPE DEMENTIA WITH LATE ONSET WITHOUT BEHAVIORAL DISTURBANCE (HCC): Primary | ICD-10-CM

## 2019-01-21 DIAGNOSIS — F90.9 ATTENTION DEFICIT HYPERACTIVITY DISORDER (ADHD), UNSPECIFIED ADHD TYPE: ICD-10-CM

## 2019-01-21 NOTE — PATIENT INSTRUCTIONS
Helping a Person With Alzheimer's Disease: Care Instructions Your Care Instructions Alzheimer's disease is a type of dementia. It affects memory, intelligence, judgment, language, and behavior. It is not clear what causes this disease. But it is the most common form of dementia in older adults. It may take many years to develop. Alzheimer's disease is different than mild memory loss that occurs with aging. Family members usually notice symptoms first. But the person also may realize that something is wrong. Follow-up care is a key part of your loved one's treatment and safety. Be sure to make and go to all appointments, and call your doctor if your loved one is having problems. It's also a good idea to know your loved one's test results and keep a list of the medicines he or she takes. How can you care for your loved one at home? · Develop a routine. The person will feel less frustrated or confused with a clear, simple daily plan. Remind him or her about important facts and events. · Be patient. It may take longer for the person to complete a task than it used to. · Help the person eat a balanced diet. Serve plenty of whole grains, fruits, and vegetables every day. If the person is not eating well at mealtimes, give snacks at midmorning and in the afternoon. Offer drinks such as Boost, Ensure, or Sustacal if he or she is losing weight. · Encourage exercise. Walking and other activity may slow the decline of mental ability. Help the person keep an active mind. Encourage hobbies such as reading and crossword puzzles. · Take steps to help if the person is sundowning. This is the restless behavior and trouble with sleeping that may occur in late afternoon and at night. Try not to let the person nap during the day. Offer a glass of warm milk or caffeine-free tea before bedtime. · Ask family members and friends for help. You may need breaks where others can help care for the person. · Talk to the person's doctor about what resources are available for help in your area. · Review all of the person's medicines with his or her doctor. · For as long as the person is able, allow him or her to make decisions about activities, food, clothing, and other choices. Let the person be independent, even if tasks take more time or are not done perfectly. Tailor tasks to the person's abilities. For example, if cooking is no longer safe, ask for other help. He or she can help set the table or make simple dishes such as a salad. When the person needs help, offer it gently. Keeping safe · Make your home (or the person's home) safe. Tack down rugs, and put no-slip tape in the tub. Install handrails, and put safety switches on stoves and appliances. Keep rooms free of clutter. Make sure walkways around furniture are clear. Do not move furniture around, because the person may become confused. · Use locks on doors and cupboards. Lock up knives, scissors, medicines, cleaning supplies, and other dangerous things. · Do not let the person drive or cook if he or she cannot do it safely. A person with Alzheimer's should not drive unless he or she is able to pass an on-road driving test. Your state 's license bureau can do a driving test if there is any question. · Get medical alert jewelry for the person so you can be contacted if he or she wanders away. If possible, provide a safe place for wandering, such as an enclosed yard or garden. When should you call for help? Call 911 anytime you think you may need emergency care.  For example, call if: 
  · A person who has Alzheimer's disease has disappeared.  
  · A person who has Alzheimer's disease is seriously injured.  
[facility] your doctor now or seek immediate medical care if: 
  · The person you are caring for suddenly sees or hears things that are not there (hallucinates).  
  · The person you are caring for has a sudden, drastic change in his or her behavior.  
 Watch closely for changes in your loved one's health, and be sure to contact the doctor if: 
  · A person who has Alzheimer's disease gradually gets worse or has symptoms that could cause injury.  
  · You need help caring for a person with Alzheimer's disease.  
  · The person has problems with his or her medicine. Where can you learn more? Go to http://jelani-joyce.info/. Enter A079 in the search box to learn more about \"Helping a Person With Alzheimer's Disease: Care Instructions. \" Current as of: September 11, 2018 Content Version: 11.9 © 8441-7393 [employer]. Care instructions adapted under license by [employer] (which disclaims liability or warranty for this information). If you have questions about a medical condition or this instruction, always ask your healthcare professional. [address] any warranty or liability for your use of this information. Attention Deficit Hyperactivity Disorder (ADHD) in Adults: Care Instructions Your Care Instructions Attention deficit hyperactivity disorder, or ADHD, is a condition that makes it hard to pay attention. So you may have problems when you try to focus, get organized, and finish tasks. It might make you more active than other people. Or you might do things without thinking first. 
ADHD is very common. It usually starts in early childhood. Many adults don't realize they have it until their children are diagnosed. Then they become aware of their own symptoms. Doctors don't know what causes ADHD. But it often runs in families. ADHD can be treated with medicines, behavior training, and counseling. Treatment can improve your life. Follow-up care is a key part of your treatment and safety. Be sure to make and go to all appointments, and call your doctor if you are having problems.  It's also a good idea to know your test results and keep a list of the medicines you take. How can you care for yourself at home? · Learn all you can about ADHD. This will help you and your family understand it better. · Take your medicines exactly as prescribed. Call your doctor if you think you are having a problem with your medicine. You will get more details on the specific medicines your doctor prescribes. · If you miss a dose of your medicine, do not take an extra dose. · If your doctor suggests counseling, find a counselor you like and trust. Talk openly and honestly. Be willing to make some changes. · Find a support group for adults with ADHD. Talking to others with the same problems can help you feel better. It can also give you ideas about how to best cope with the condition. · Get rid of distractions at your work space. Keep your desk clean. Try not to face a window or busy hallway. · Use files, planners, and other tools to keep you organized. · Limit use of alcohol, and do not use illegal drugs. People with ADHD tend to become addicted more easily than others. Tell your doctor if you need help to quit. Counseling, support groups, and sometimes medicines can help you stay free of alcohol or drugs. · Get at least 30 minutes of physical activity on most days of the week. Exercise has been shown to help people cope with ADHD. Walking is a good choice. You also may want to do other activities, such as running, swimming, cycling, or playing tennis or team sports. When should you call for help? Watch closely for changes in your health, and be sure to contact your doctor if: 
  · You feel sad a lot or cry all the time.  
  · You have trouble sleeping, or you sleep too much.  
  · You find it hard to concentrate, make decisions, or remember things.  
  · You change how you normally eat.  
  · You feel guilty for no reason. Where can you learn more? Go to http://jelani-joyce.info/. Enter B196 in the search box to learn more about \"Attention Deficit Hyperactivity Disorder (ADHD) in Adults: Care Instructions. \" Current as of: September 11, 2018 Content Version: 11.9 © 8240-4576 [employer]. Care instructions adapted under license by [employer] (which disclaims liability or warranty for this information). If you have questions about a medical condition or this instruction, always ask your healthcare professional. [place] [address] any warranty or liability for your use of this information.

## 2019-01-21 NOTE — PROGRESS NOTES
NEUROLOGY CLINIC NOTE Patient ID: 
Zoya Douglass [id number] 
89 y.o. 
1/20/1932 Date of Consultation:  January 21, 2019 Reason for Consultation:  Establish care Chief Complaint Patient presents with  Follow-up More loss of memory since last visit History of Present Illness:  
 
Patient Active Problem List  
 Diagnosis Date Noted  Alzheimer's disease 09/10/2018  ADD (attention deficit disorder) 01/18/2017  Hyperlipidemia 07/20/2015  Mild cognitive impairment without memory loss 02/02/2015  Generalized anxiety disorder 02/02/2015  TMJ (temporomandibular joint disorder) 12/21/2011  Syncope and collapse 08/10/2011  Fever, unspecified 08/10/2011  Syncope and collapse 08/10/2011  History of skin cancer  Asthma   
 Hearing loss, left  Ceruminosis  Postmenopausal hormone replacement therapy  Overactive bladder Past Medical History:  
Diagnosis Date  Asthma Dr. Casarez Cons June 2013--doesn't see routinely--pt reports no change in allergies--no need for routine follow-up. Dr. Nicole Andrade sees for mild COPD--on Symbicort since ~Nov 2013. Continued Singulair.  Ceruminosis   
 periodic ear cleaning at [facility]  Chronic obstructive pulmonary disease ([facility] [address]  Encounter for screening colonoscopy ~2006 Normal per pt--[facility] records requested July 2013 visit.  Fainting [address]  Fatigue  Hearing loss, left   
 [facility]  History of mammogram May 2014 Normal/no change.  History of shingles 2010 Dec 2014:  Pt reports clinical history and shingles vaccine 4yrs ago. To try to obtain exact date of zostavax from /source.  History of skin cancer   
 melanoma 2003?, BCC & SCC- followed by Dr. Alicea Shoulder- twice a yr appts. July 2013-still on 6mo schedule for skin checks.  Memory disorder  Osteopenia DEXA 8/11  Overactive bladder   
 detrol [place]. [facility]--sees Urology Spring/Fall.   Considering procedure for incontinence.  Postmenopausal hormone replacement therapy   
 since hysterectomy  Right shoulder pain 2013. Mild osteopenia; mild AC osteoarthritis. Sports Med Eval Oct 2013.  Well woman exam (no gynecological exam) Aug 3, 2012 \"[facility] Screening\":  Results scanned to chart. Updated 2014-no change except noted concern for GFR--see Dec 2014 note, letter as reviewed with pt. Past Surgical History:  
Procedure Laterality Date  HX CATARACT REMOVAL   [address]  MULTIPLE DELIVERY     
 [place] TRABECULOPLASTY BY LASER SURGERY Dr. Juvencio Stacy- last eye exam 2010 Prior to Admission medications Medication Sig Start Date End Date Taking? Authorizing Provider  
mirabegron ER (MYRBETRIQ) 50 mg ER tablet Take 1 Tab by mouth daily. 19  Yes Bear Pizarro MD  
memantine-donepezil [facility]) 28-10 mg CSpX Take 1 Tab by mouth daily. 19  Yes Bear Pizarro MD  
escitalopram oxalate (LEXAPRO) 10 mg tablet Take 1 Tab by mouth daily. 12/10/18  Yes Andrea Manuel MD  
lisdexamfetamine (VYVANSE) 20 mg capsule Take 1 Cap (20 mg total) by mouth dailyIndications: Attention-Deficit Hyperactivity Disorder. May fill - Max Daily Amount: 20 mg 10/9/18  Yes Bear Pizarro MD  
atorvastatin (LIPITOR) 10 mg tablet Take 1 Tab by mouth daily. 18  Yes Bear Pizarro MD  
albuterol [facility]) 90 mcg/actuation inhaler Take 1 Puff by inhalation every four (4) hours as needed for Wheezing or Shortness of Breath. Indications: Acute Asthma Attack 18  Yes Bear Pizarro MD  
diclofenac (VOLTAREN) 1 % gel Apply 2 g to affected area four (4) times daily. Apply to hands qid 18  Yes Bear Pizarro MD  
Nebulizer & Compressor machine 1 Each by Does Not Apply route every four (4) hours as needed.  As directed  Indications: acute asthma with exacerbation 17  Yes Barbera Collet, MD  
 montelukast (SINGULAIR) 10 mg tablet Take 10 mg by mouth daily. Yes Provider, Historical  
POLYVINYL ALCOHOL (LIQUID TEARS OP) Apply  to eye. Yes Provider, Historical  
fluticasone (FLONASE) 50 mcg/actuation nasal spray 2 Sprays by Both Nostrils route daily. Allergy note June 2013. Yes Provider, Historical  
cholecalciferol, vitamin d3, (VITAMIN D) 1,000 unit tablet Take  by mouth daily. Yes Provider, Historical  
traMADol (ULTRAM) 50 mg tablet Take 1 Tab by mouth every six (6) hours as needed for Pain. Max Daily Amount: 200 mg. Indications: Pain 10/13/18   Teo Evangelista MD  
cephALEXin [facility]) 500 mg capsule Take 1 Cap by mouth three (3) times daily. 9/11/18   Damaris Devi MD  
 
Allergies Allergen Reactions  Latex Unknown (comments)  Dog Dander Shortness of Breath Social History Tobacco Use  Smoking status: Never Smoker  Smokeless tobacco: Never Used Substance Use Topics  Alcohol use: Yes Alcohol/week: 0.0 oz  
  Comment: 1-2 drinks/every evening ( wine/vodka) Family History Problem Relation Age of Onset  Dementia Father  Hypertension Mother  Stroke Mother  Cancer Other   
     daughter- breast cancer Subjective: Indiana Haas is a 80 y.o. SBVD with a history of Alzheimer's dementia, ADHD, hyperlipidemia, generalized anxiety disorder, syncope and overactive bladder who is here to establish her care. Patient was previously seen by Dr. Arlen Daniels for her dementia and was last seen 9/6/2018. Note mentions patient having no major memory changes. Sleeping well. Patient is taking Namzaric daily and denies any side effects. Patient continues to drink vodka and tonic drinks daily and possibly an additional glass of wine. Note also mentions discussion about a PET scan for Alzheimer's disease. Patient is also taking Vyvanse for attention deficit.   She was started on Lexapro 10 mg daily for mood and irritability and is doing well on it. Patient lives in [facility]. Plan was to continue Namzaric for the dementia and Vyvanse for the ADD. Continue current dosing of Lexapro. Letter was supposed to be written for patient to stop drinking any form of alcohol. Head CT was ordered and done 9/14/2018 and shows no acute intracranial process. Age-appropriate diffuse cortical atrophy with ex vacuo dilatation of the ventricular system. Stable periventricular white matter disease. Patient was last seen by her PCP 9/11/2018. TSH and free T4 done were normal. 
 
Since then, her  reports that her short-term memory has deteriorated. Patient celebrated a birthday 1/20/2019. She was told by her  that they were having a party for her but then when she got to the party she was surprised that there was an actual birthday party for her. Tendency to repeat things. Mainly reads. No issues with routine activities of daily living. Outside reports reviewed: office notes, ER records, radiology reports, lab reports, historical medical records. Review of Systems: A comprehensive review of systems was performed:  
Constitutional: positive for none Eyes: positive for none Ears, nose, mouth, throat, and face: positive for none Respiratory: positive for shortness of breath Cardiovascular: positive for none Gastrointestinal: positive for none Genitourinary: positive for none Integument/breast: positive for none Hematologic/lymphatic: positive for none Musculoskeletal: positive for joint pain, myalgia Neurological: positive for memory loss Behavioral/Psych: positive for anxiety Endocrine: positive for none Allergic/Immunologic: positive for none Objective:  
 
Visit Vitals BP 96/60 Pulse 80 Ht 5' (1.524 m) Wt 105 lb (47.6 kg) SpO2 98% BMI 20.51 kg/m² PHYSICAL EXAM: 
 
NEUROLOGICAL EXAM: 
 
Appearance:   The patient is well developed, well nourished, provides a coherent history and is in no acute distress. Mental Status: Oriented to place and person. Fluent, no aphasia or dysarthria. Mood and affect appropriate. MMSE 25/30 (missed year and exact date, problems with spelling WORLD backwards, missed 1 out of 3 on immediate recall, problems copying a design) Cranial Nerves:   Intact visual fields. CAPRICE, EOM's full, no nystagmus, no ptosis. Facial sensation is normal. Corneal reflexes are intact. Facial movement is symmetric. Hearing is normal bilaterally. Palate is midline with normal elevation. Sternocleidomastoid and trapezius muscles are normal. Tongue is midline. Motor:  5/5 strength in upper and lower proximal and distal muscles. Normal bulk and tone. No pronator drift. Reflexes:   Deep tendon reflexes 1+/4 and symmetrical. Downgoing toes. Sensory:   Normal to cold, pinprick and vibration. Gait:  Steady. No Romberg. Tremor:   No tremor noted. Cerebellar:  Intact FTN/JENISE/HTS. Neurovascular:  No carotid bruits. Imaging CT Head: reviewed Labs Reviewed Assessment:  
Alzheimer's dementia without behavioral disturbance ADHD Plan:  
Neurological examination is nonfocal except for the cognitive issues. Cognitive testing was done today and patient scored 25/30. Problems in multiple domains (time orientation, attention and concentration, short-term memory and copying design). Head CT was reviewed and did not show any acute process. Did reveal generalized atrophy as well as unchanged periventricular white matter disease. Clinical testing does not show any significant change compared to reported subjective worsening of memory. Continue Namzaric 28/10 mg daily. Patient was advised to do mental exercises, put structure to her day and repetitiveness. Patient was also advised to do routine physical exercises. Patient with history of ADHD. Continue Vyvanse 20 mg daily.   3-month prescription was provided. All questions and concerns were answered. Visit lasted 40 minutes. Greater than 50% was spent reviewing her medical records including office visits, neuroimaging, laboratory workup, discussion about her medical condition, etiology, prognosis, results of cognitive testing, treatment options, mental exercises, medications

## 2019-05-03 ENCOUNTER — OFFICE VISIT (OUTPATIENT)
Dept: INTERNAL MEDICINE CLINIC | Age: 84
End: 2019-05-03

## 2019-05-03 VITALS
TEMPERATURE: 97.4 F | OXYGEN SATURATION: 94 % | SYSTOLIC BLOOD PRESSURE: 145 MMHG | BODY MASS INDEX: 21.79 KG/M2 | WEIGHT: 111 LBS | HEIGHT: 60 IN | HEART RATE: 78 BPM | RESPIRATION RATE: 16 BRPM | DIASTOLIC BLOOD PRESSURE: 76 MMHG

## 2019-05-03 DIAGNOSIS — R10.30 LOWER ABDOMINAL PAIN: Primary | ICD-10-CM

## 2019-05-03 DIAGNOSIS — Z23 ENCOUNTER FOR IMMUNIZATION: ICD-10-CM

## 2019-05-03 DIAGNOSIS — K52.9 FREQUENT STOOLS: ICD-10-CM

## 2019-05-03 DIAGNOSIS — R10.30 INGUINAL PAIN, UNSPECIFIED LATERALITY: Primary | ICD-10-CM

## 2019-05-03 NOTE — PROGRESS NOTES
Chief Complaint Patient presents with  Abdominal Pain  
  sverval Bowel movements but not diarreha x3-4 days  Shortness of Breath  
  after walking  only in the evening time after dinner

## 2019-05-03 NOTE — PROGRESS NOTES
HISTORY OF PRESENT ILLNESS Morenita You is a 80 y.o. female. HPI Here with her  c/o frequent formed stools x 2-3 days  says has been occurring for several days Pt reports3 episodes yesterday and 2 so far today No loose or liquid stools Mild lower abdominal fullness sensation Has not been constipated per pt No blood in stools No polyps on last colonoscopy per pt Some mild CHAMORRO in evenings walking back to [place] after dinner but no CHAMORRO during the day and no cp or sough Patient Active Problem List  
Diagnosis Code  Postmenopausal hormone replacement therapy Z79.890  
 Overactive bladder N32.81  
 Asthma J45.909  
 Hearing loss, left H91.92  
 Ceruminosis H61.20  
 History of skin cancer Z85.828  Syncope and collapse R55  Fever, unspecified R50.9  Syncope and collapse R55  TMJ (temporomandibular joint disorder) M26.609  Mild cognitive impairment without memory loss G31.84  
 Generalized anxiety disorder F41.1  Hyperlipidemia E78.5  ADD (attention deficit disorder) F98.8  Alzheimer's disease G30.9, F02.80 Lab Results Component Value Date/Time WBC 6.0 06/01/2018 11:45 AM  
 HGB 14.0 06/01/2018 11:45 AM  
 HCT 41.1 06/01/2018 11:45 AM  
 PLATELET 805 68/75/4644 11:45 AM  
 MCV 94 06/01/2018 11:45 AM  
 
Lab Results Component Value Date/Time GFR est non-AA 70 06/01/2018 11:45 AM  
 GFR est AA 81 06/01/2018 11:45 AM  
 Creatinine 0.77 06/01/2018 11:45 AM  
 BUN 14 06/01/2018 11:45 AM  
 Sodium 142 06/01/2018 11:45 AM  
 Potassium 4.0 06/01/2018 11:45 AM  
 Chloride 100 06/01/2018 11:45 AM  
 CO2 24 06/01/2018 11:45 AM  
 Magnesium 1.8 08/10/2011 05:30 PM  
  
ROS Physical Exam  
Constitutional: She appears well-developed and well-nourished. Appears stated age Cardiovascular: Normal rate, regular rhythm and normal heart sounds. Exam reveals no gallop and no friction rub. No murmur heard. Pulmonary/Chest: Effort normal and breath sounds normal. No respiratory distress. She has no wheezes. Abdominal: Soft. Bowel sounds are normal. She exhibits no distension and no mass. There is no tenderness. There is no rebound and no guarding. Musculoskeletal: She exhibits no edema. Neurological: She is alert. Psychiatric: She has a normal mood and affect. Nursing note and vitals reviewed. ASSESSMENT and PLAN Diagnoses and all orders for this visit: 1. Lower abdominal pain -     XR ABD (KUB); Future None on exam currently 2. Frequent stools Not diarrhea per pt 
 kub ordered 3. Encounter for immunization 
-     PNEUMOCOCCAL POLYSACCHARIDE VACCINE, 23-VALENT, ADULT OR IMMUNOSUPPRESSED PT DOSE, 4. CHAMORRO Possibly some component of deconditioning--discussed

## 2019-05-23 DIAGNOSIS — F90.9 ATTENTION DEFICIT HYPERACTIVITY DISORDER (ADHD), UNSPECIFIED ADHD TYPE: ICD-10-CM

## 2019-07-01 ENCOUNTER — OFFICE VISIT (OUTPATIENT)
Dept: NEUROLOGY | Age: 84
End: 2019-07-01

## 2019-07-01 VITALS
WEIGHT: 109 LBS | OXYGEN SATURATION: 98 % | HEIGHT: 60 IN | BODY MASS INDEX: 21.4 KG/M2 | DIASTOLIC BLOOD PRESSURE: 72 MMHG | HEART RATE: 89 BPM | SYSTOLIC BLOOD PRESSURE: 116 MMHG

## 2019-07-01 DIAGNOSIS — F90.9 ATTENTION DEFICIT HYPERACTIVITY DISORDER (ADHD), UNSPECIFIED ADHD TYPE: ICD-10-CM

## 2019-07-01 DIAGNOSIS — G30.1 ALZHEIMER'S TYPE DEMENTIA WITH LATE ONSET WITHOUT BEHAVIORAL DISTURBANCE (HCC): Primary | ICD-10-CM

## 2019-07-01 DIAGNOSIS — F02.80 ALZHEIMER'S TYPE DEMENTIA WITH LATE ONSET WITHOUT BEHAVIORAL DISTURBANCE (HCC): Primary | ICD-10-CM

## 2019-07-01 NOTE — LETTER
7/1/19 Patient: Narciso Tatum YOB: 1932 Date of Visit: 7/1/2019 Peter Zazueta MD 
[address] [facility] [address] [address] VIA In Basket Dear Peter Zazueta MD, Thank you for referring Ms. Frank Deutsch to [address] [place] [address] for evaluation. My notes for this consultation are attached. If you have questions, please do not hesitate to call me. I look forward to following your patient along with you. Sincerely, Hakan Perez MD

## 2019-07-01 NOTE — PROGRESS NOTES
NEUROLOGY CLINIC NOTE    Patient ID:  Narciso Tatum  [id number]  70 y.o.  1/20/1932    Date of Consultation:  July 1, 2019    Reason for Consultation:  Dementia    Chief Complaint   Patient presents with    Follow-up       History of Present Illness:     Patient Active Problem List    Diagnosis Date Noted    Alzheimer's disease 09/10/2018    ADD (attention deficit disorder) 01/18/2017    Hyperlipidemia 07/20/2015    Mild cognitive impairment without memory loss 02/02/2015    Generalized anxiety disorder 02/02/2015    TMJ (temporomandibular joint disorder) 12/21/2011    Syncope and collapse 08/10/2011    Fever, unspecified 08/10/2011    Syncope and collapse 08/10/2011    History of skin cancer     Asthma     Hearing loss, left     Ceruminosis     Postmenopausal hormone replacement therapy     Overactive bladder      Past Medical History:   Diagnosis Date    Asthma     Dr. Jennifer Harris June 2013--doesn't see routinely--pt reports no change in allergies--no need for routine follow-up. Dr. Cullen Neal sees for mild COPD--on Symbicort since ~Nov 2013. Continued Singulair.  Ceruminosis     periodic ear cleaning at [facility]    Chronic obstructive pulmonary disease ([facility] [address]     Encounter for screening colonoscopy ~2006    Normal per pt--[facility] records requested July 2013 visit.  Fainting     Falls     Fatigue     Hearing loss, left     [facility]    History of mammogram May 2014    Normal/no change.  History of shingles 2010    Dec 2014:  Pt reports clinical history and shingles vaccine 4yrs ago. To try to obtain exact date of zostavax from /source.  History of skin cancer     melanoma 2003?, BCC & SCC- followed by Dr. Viktor Rose- twice a yr appts. July 2013-still on 6mo schedule for skin checks.  Memory disorder     Osteopenia     DEXA 8/11    Overactive bladder     detrol [place]. [facility]--sees Urology Spring/Fall. Considering procedure for incontinence.     Postmenopausal hormone replacement therapy     since hysterectomy    Right shoulder pain 2013. Mild osteopenia; mild AC osteoarthritis. Sports Med Eval Oct 2013.  Well woman exam (no gynecological exam) Aug 3, 2012    \"[facility] Screening\":  Results scanned to chart. Updated 2014-no change except noted concern for GFR--see Dec 2014 note, letter as reviewed with pt. Past Surgical History:   Procedure Laterality Date    HX CATARACT REMOVAL      HX HYSTERECTOMY  1948    MULTIPLE DELIVERY       [place] TRABECULOPLASTY BY LASER SURGERY      Dr. Leary [address]- last eye exam 2010      Prior to Admission medications    Medication Sig Start Date End Date Taking? Authorizing Provider   lisdexamfetamine (VYVANSE) 20 mg capsule Take 1 Cap by mouth daily for 30 days. Max Daily Amount: 20 mg. 19 Yes Pop Alvarenga MD   lisdexamfetamine (VYVANSE) 20 mg capsule Take 1 Cap by mouth daily for 30 days. Max Daily Amount: 20 mg. 19 Yes Pop Alvarenga MD   mirabegron ER (MYRBETRIQ) 50 mg ER tablet Take 1 Tab by mouth daily. 19  Yes Iqra Guardado MD   memantine-donepezil [facility]) 28-10 mg CSpX Take 1 Tab by mouth daily. 19  Yes Iqra Guardado MD   escitalopram oxalate (LEXAPRO) 10 mg tablet Take 1 Tab by mouth daily. 12/10/18  Yes Jessy Manuel MD   lisdexamfetamine (VYVANSE) 20 mg capsule Take 1 Cap (20 mg total) by mouth dailyIndications: Attention-Deficit Hyperactivity Disorder. May fill 18 Max Daily Amount: 20 mg 10/9/18  Yes Iqra Guardado MD   atorvastatin (LIPITOR) 10 mg tablet Take 1 Tab by mouth daily. 18  Yes Iqra Guardado MD   [facility]) 90 mcg/actuation inhaler Take 1 Puff by inhalation every four (4) hours as needed for Wheezing or Shortness of Breath. Indications: Acute Asthma Attack 18  Yes Iqra Guardado MD   montelukast (SINGULAIR) 10 mg tablet Take 10 mg by mouth daily.    Yes Provider, Historical   POLYVINYL ALCOHOL (LIQUID TEARS OP) Apply  to eye. Yes Provider, Historical   fluticasone (FLONASE) 50 mcg/actuation nasal spray 2 Sprays by Both Nostrils route daily. Allergy note June 2013. Yes Provider, Historical   traMADol (ULTRAM) 50 mg tablet Take 1 Tab by mouth every six (6) hours as needed for Pain. Max Daily Amount: 200 mg. Indications: Pain 10/13/18   Love Tucker MD   diclofenac (VOLTAREN) 1 % gel Apply 2 g to affected area four (4) times daily. Apply to hands qid 2/12/18   Ahsan Harmon MD   Nebulizer & Compressor machine 1 Each by Does Not Apply route every four (4) hours as needed. As directed  Indications: acute asthma with exacerbation 5/6/17   Alisa Arceo MD   cholecalciferol, vitamin d3, (VITAMIN D) 1,000 unit tablet Take  by mouth daily. Provider, Historical     Allergies   Allergen Reactions    Latex Unknown (comments)    Dog Dander Shortness of Breath      Social History     Tobacco Use    Smoking status: Never Smoker    Smokeless tobacco: Never Used   Substance Use Topics    Alcohol use: Yes     Alcohol/week: 0.0 oz     Frequency: 4 or more times a week     Comment: 1-2 drinks/every evening ( wine/vodka)      Family History   Problem Relation Age of Onset   [facility] Dementia Father     Hypertension Mother     Stroke Mother     Cancer Other         daughter- breast cancer        Subjective: Lashanda Gaffney is a 80 y.o. QAXG with a history of Alzheimer's dementia, ADHD, hyperlipidemia, generalized anxiety disorder, syncope and overactive bladder who is here to establish her care. Patient was previously seen by Dr. Roger Key for her dementia and was last seen 9/6/2018. Note mentions patient having no major memory changes. Sleeping well. Patient is taking Namzaric daily and denies any side effects. Patient continues to drink vodka and tonic drinks daily and possibly an additional glass of wine. Note also mentions discussion about a PET scan for Alzheimer's disease.   Patient is also taking Vyvanse for attention deficit. She was started on Lexapro 10 mg daily for mood and irritability and is doing well on it. Patient lives in [facility]. Plan was to continue Namzaric for the dementia and Vyvanse for the ADD. Continue current dosing of Lexapro. Letter was supposed to be written for patient to stop drinking any form of alcohol. Head CT was ordered and done 9/14/2018 and shows no acute intracranial process. Age-appropriate diffuse cortical atrophy with ex vacuo dilatation of the ventricular system. Stable periventricular white matter disease. Patient was last seen by her PCP 9/11/2018. TSH and free T4 done were normal.    Since the last visit on 1/21/2019, her  reports that patient's short-term memory continues to deteriorated. Nothing dramatic. Tendency to repeat things. Mainly reads. No issues with routine activities of daily living. She continues to take Namzaric. Denies any side effects. Unclear benefit to Vyvanse.  reports pain drinks an ounce of tonic and gin in the evening despite Dr. Shannan Austin telling them to abstain from any form of alcohol. Patient was seen by her PCP 5/3/2019 complaining of frequent stools. KUB was ordered and revealed no bowel obstruction. Outside reports reviewed: office notes, radiology reports    Review of Systems:    A comprehensive review of systems was performed:   Positive for shortness of breath usually when drinking red wine. Objective:     Visit Vitals  /72   Pulse 89   Ht 5' (1.524 m)   Wt 109 lb (49.4 kg)   SpO2 98%   BMI 21.29 kg/m²       PHYSICAL EXAM:    NEUROLOGICAL EXAM:    Appearance: The patient is well developed, well nourished, provides a coherent history and is in no acute distress. Mental Status: Oriented to place and person. Fluent, no aphasia or dysarthria. Mood and affect appropriate. Poor insight, poor memory   Cranial Nerves:   II - XII were intact. Motor:  5/5 strength.  Normal bulk and tone. No pronator drift. Reflexes:   Deep tendon reflexes 1+/4 and symmetrical. Downgoing toes. Sensory:   Normal to cold, pinprick and vibration. Gait:  Steady. No Romberg. Tremor:   No tremor noted. Cerebellar:  Intact FTN/JENISE/HTS. Assessment:   Alzheimer's dementia without behavioral disturbance  ADHD    Plan:   Neurological examination is unchanged. Patient with significant cognitive issues. Previous cognitive testing score was 25/30. Problems in multiple domains (time orientation, attention and concentration, short-term memory and copying design). Head CT was reviewed and did not show any acute process. Did reveal generalized atrophy as well as unchanged periventricular white matter disease. Poor insight. Continue Namzaric 28/10 mg daily. Patient was advised to do mental exercises, put structure to her day and repetitiveness. Patient was also advised to do routine physical exercises. At this point, it would not be good to deprive the patient of her simply daniele of an ounce of alcohol at night. Patient with history of ADHD. Unclear if medication is still necessary or helping.  agrees. Trial of lower dose to every other day of Vyvanse 20 mg. If not change or adverse effect, then stop and take as needed only. All questions and concerns were answered. Visit lasted 25 minutes.   Greater than 50% was spent reviewing her medical records as summarized above, discussion about her condition, etiology, prognosis, issue with regards to daily small amounts of alcohol use, discussion whether truly Vyvanse is still providing any benefit or not in trial of tapering down and off medication, mental and structured physical exercises, supervision, treatment options, medication

## 2019-07-01 NOTE — PATIENT INSTRUCTIONS
Helping a Person With Alzheimer's Disease: Care Instructions Your Care Instructions Alzheimer's disease is a type of dementia. It affects memory, intelligence, judgment, language, and behavior. It is not clear what causes this disease. But it is the most common form of dementia in older adults. It may take many years to develop. Alzheimer's disease is different than mild memory loss that occurs with aging. Family members usually notice symptoms first. But the person also may realize that something is wrong. Follow-up care is a key part of your loved one's treatment and safety. Be sure to make and go to all appointments, and call your doctor if your loved one is having problems. It's also a good idea to know your loved one's test results and keep a list of the medicines he or she takes. How can you care for your loved one at home? · Develop a routine. The person will feel less frustrated or confused with a clear, simple daily plan. Remind him or her about important facts and events. · Be patient. It may take longer for the person to complete a task than it used to. · Help the person eat a balanced diet. Serve plenty of whole grains, fruits, and vegetables every day. If the person is not eating well at mealtimes, give snacks at midmorning and in the afternoon. Offer drinks such as Boost, Ensure, or Sustacal if he or she is losing weight. · Encourage exercise. Walking and other activity may slow the decline of mental ability. Help the person keep an active mind. Encourage hobbies such as reading and crossword puzzles. · Take steps to help if the person is sundowning. This is the restless behavior and trouble with sleeping that may occur in late afternoon and at night. Try not to let the person nap during the day. Offer a glass of warm milk or caffeine-free tea before bedtime. · Ask family members and friends for help. You may need breaks where others can help care for the person. · Talk to the person's doctor about what resources are available for help in your area. · Review all of the person's medicines with his or her doctor. · For as long as the person is able, allow him or her to make decisions about activities, food, clothing, and other choices. Let the person be independent, even if tasks take more time or are not done perfectly. Tailor tasks to the person's abilities. For example, if cooking is no longer safe, ask for other help. He or she can help set the table or make simple dishes such as a salad. When the person needs help, offer it gently. Keeping safe · Make your home (or the person's home) safe. Tack down rugs, and put no-slip tape in the tub. Install handrails, and put safety switches on stoves and appliances. Keep rooms free of clutter. Make sure walkways around furniture are clear. Do not move furniture around, because the person may become confused. · Use locks on doors and cupboards. Lock up knives, scissors, medicines, cleaning supplies, and other dangerous things. · Do not let the person drive or cook if he or she cannot do it safely. A person with Alzheimer's should not drive unless he or she is able to pass an on-road driving test. Your state 's license bureau can do a driving test if there is any question. · Get medical alert jewelry for the person so you can be contacted if he or she wanders away. If possible, provide a safe place for wandering, such as an enclosed yard or garden. When should you call for help? Call 911 anytime you think you may need emergency care.  For example, call if: 
  · A person who has Alzheimer's disease has disappeared.  
  · A person who has Alzheimer's disease is seriously injured.  
[facility] your doctor now or seek immediate medical care if: 
  · The person you are caring for suddenly sees or hears things that are not there (hallucinates).  
  · The person you are caring for has a sudden, drastic change in his or her behavior.  
 Watch closely for changes in your loved one's health, and be sure to contact the doctor if: 
  · A person who has Alzheimer's disease gradually gets worse or has symptoms that could cause injury.  
  · You need help caring for a person with Alzheimer's disease.  
  · The person has problems with his or her medicine. Where can you learn more? Go to http://jelani-joyce.info/. Enter E894 in the search box to learn more about \"Helping a Person With Alzheimer's Disease: Care Instructions. \" Current as of: September 11, 2018 Content Version: 11.9 © 9635-7079 [employer]. Care instructions adapted under license by [employer] (which disclaims liability or warranty for this information). If you have questions about a medical condition or this instruction, always ask your healthcare professional. [address] any warranty or liability for your use of this information. Learning About Stimulant Medicines for Attention Deficit Hyperactivity Disorder (ADHD) How are stimulant medicines used to treat ADHD? Stimulant medicines affect certain chemicals in the brain. They can help a person with ADHD to focus better. And they can make the person less hyperactive and impulsive. ADHD is treated with medicines and behavior therapy. Stimulants are the medicines used most. 
What are some types of these medicines? Stimulant medicines may include: · Amphetamines. Examples are Adderall and Dexedrine. · Methylphenidates. Some examples are Concerta, Metadate CD, and Ritalin. How do you take them safely? · Take your medicines exactly as prescribed. Call your doctor if you think you are having a problem with your medicine. You will get more details on the specific medicines your doctor prescribes. · Do not take \"make-up\" doses. If you miss a dose, and if it's not too late in the day, it's okay to take it. But don't double up doses. · Do not misuse your medicine. Some medicines for ADHD can be misused. Some people may take a larger dose than prescribed. They may take them for their non-medical effects. Or they may share or sell them. Misuse can lead to a stimulant use disorder. Research has shown that these medicines, when taken correctly, don't cause addiction. · Keep close track of your medicine. Don't sell or give medicine to other people. What are the side effects? Common side effects include loss of appetite, a headache, and an upset stomach. You may also have mood changes or sleep problems. Or you may feel nervous. Some stimulant medicines can cause a dry mouth. If these medicines have bothersome side effects or don't work for you, your doctor might prescribe another type of medicine. How long can you expect to use these medicines? Most doctors prescribe a low dose of stimulant medicines at first. Your doctor may have you slowly increase the dose until your symptoms are managed. Or you might get a different medicine or treatment. This can take several weeks. Some doctors may advise taking a break from the medicine over some weekends or during holidays. But this depends on the type of symptoms you have. You may need to take medicine for ADHD for a long time. But your doctor will check now and then to see if you can take a lower dose and still get the benefits of the medicine. If you want to stop or reduce your use of the medicine, talk to your doctor first. Some signs that you may be able to lower or stop your dose include: 
· You have no symptoms for more than a year while you take the medicine. · You are doing better at the same dose. · Your behavior is appropriate even if you miss a dose or two. · You are newly able to concentrate. Follow-up care is a key part of your treatment and safety.  Be sure to make and go to all appointments, and call your doctor if you are having problems. It's also a good idea to know your test results and keep a list of the medicines you take. Where can you learn more? Go to http://jelnai-joyce.info/. Enter S155 in the search box to learn more about \"Learning About Stimulant Medicines for Attention Deficit Hyperactivity Disorder (ADHD). \" Current as of: September 11, 2018 Content Version: 11.9 © 5188-8690 [employer]. Care instructions adapted under license by [employer] (which disclaims liability or warranty for this information). If you have questions about a medical condition or this instruction, always ask your healthcare professional. [address] any warranty or liability for your use of this information.

## 2019-07-04 ENCOUNTER — HOSPITAL ENCOUNTER (EMERGENCY)
Age: 84
Discharge: HOME | End: 2019-07-04
Attending: EMERGENCY MEDICINE
Payer: MEDICARE

## 2019-07-04 ENCOUNTER — APPOINTMENT (OUTPATIENT)
Dept: CT IMAGING | Age: 84
End: 2019-07-04
Attending: EMERGENCY MEDICINE
Payer: MEDICARE

## 2019-07-04 VITALS
OXYGEN SATURATION: 98 % | BODY MASS INDEX: 21.4 KG/M2 | RESPIRATION RATE: 12 BRPM | HEIGHT: 60 IN | SYSTOLIC BLOOD PRESSURE: 155 MMHG | WEIGHT: 109 LBS | HEART RATE: 67 BPM | DIASTOLIC BLOOD PRESSURE: 82 MMHG

## 2019-07-04 DIAGNOSIS — H61.22 IMPACTED CERUMEN OF LEFT EAR: ICD-10-CM

## 2019-07-04 DIAGNOSIS — R42 VERTIGO: Primary | ICD-10-CM

## 2019-07-04 LAB
ANION GAP SERPL CALC-SCNC: 6 MMOL/L (ref 5–15)
APPEARANCE UR: CLEAR
BACTERIA URNS QL MICRO: NEGATIVE /HPF
BILIRUB UR QL: NEGATIVE
BUN SERPL-MCNC: 12 MG/DL (ref 6–20)
BUN/CREAT SERPL: 16 (ref 12–20)
CALCIUM SERPL-MCNC: 9 MG/DL (ref 8.5–10.1)
CHLORIDE SERPL-SCNC: 106 MMOL/L (ref 97–108)
CO2 SERPL-SCNC: 27 MMOL/L (ref 21–32)
COLOR UR: NORMAL
CREAT SERPL-MCNC: 0.73 MG/DL (ref 0.55–1.02)
EPITH CASTS URNS QL MICRO: NORMAL /LPF
ERYTHROCYTE [DISTWIDTH] IN BLOOD BY AUTOMATED COUNT: 12.2 % (ref 11.5–14.5)
GLUCOSE SERPL-MCNC: 78 MG/DL (ref 65–100)
GLUCOSE UR STRIP.AUTO-MCNC: NEGATIVE MG/DL
HCT VFR BLD AUTO: 41 % (ref 35–47)
HGB BLD-MCNC: 13.7 G/DL (ref 11.5–16)
HGB UR QL STRIP: NEGATIVE
HYALINE CASTS URNS QL MICRO: NORMAL /LPF (ref 0–5)
KETONES UR QL STRIP.AUTO: NEGATIVE MG/DL
LEUKOCYTE ESTERASE UR QL STRIP.AUTO: NEGATIVE
MCH RBC QN AUTO: 32.5 PG (ref 26–34)
MCHC RBC AUTO-ENTMCNC: 33.4 G/DL (ref 30–36.5)
MCV RBC AUTO: 97.2 FL (ref 80–99)
NITRITE UR QL STRIP.AUTO: NEGATIVE
NRBC # BLD: 0 K/UL (ref 0–0.01)
NRBC BLD-RTO: 0 PER 100 WBC
PH UR STRIP: 8 [PH] (ref 5–8)
PLATELET # BLD AUTO: 229 K/UL (ref 150–400)
PMV BLD AUTO: 9.8 FL (ref 8.9–12.9)
POTASSIUM SERPL-SCNC: 3.9 MMOL/L (ref 3.5–5.1)
PROT UR STRIP-MCNC: NEGATIVE MG/DL
RBC # BLD AUTO: 4.22 M/UL (ref 3.8–5.2)
RBC #/AREA URNS HPF: NORMAL /HPF (ref 0–5)
SODIUM SERPL-SCNC: 139 MMOL/L (ref 136–145)
SP GR UR REFRACTOMETRY: 1.01 (ref 1–1.03)
TROPONIN I SERPL-MCNC: <0.05 NG/ML
UA: UC IF INDICATED,UAUC: NORMAL
UROBILINOGEN UR QL STRIP.AUTO: 0.2 EU/DL (ref 0.2–1)
WBC # BLD AUTO: 5.2 K/UL (ref 3.6–11)
WBC URNS QL MICRO: NORMAL /HPF (ref 0–4)

## 2019-07-04 PROCEDURE — 74011250636 HC RX REV CODE- 250/636: Performed by: EMERGENCY MEDICINE

## 2019-07-04 PROCEDURE — 96360 HYDRATION IV INFUSION INIT: CPT

## 2019-07-04 PROCEDURE — 96361 HYDRATE IV INFUSION ADD-ON: CPT

## 2019-07-04 PROCEDURE — 84484 ASSAY OF TROPONIN QUANT: CPT

## 2019-07-04 PROCEDURE — 36415 COLL VENOUS BLD VENIPUNCTURE: CPT

## 2019-07-04 PROCEDURE — 81001 URINALYSIS AUTO W/SCOPE: CPT

## 2019-07-04 PROCEDURE — 70450 CT HEAD/BRAIN W/O DYE: CPT

## 2019-07-04 PROCEDURE — 85027 COMPLETE CBC AUTOMATED: CPT

## 2019-07-04 PROCEDURE — 80048 BASIC METABOLIC PNL TOTAL CA: CPT

## 2019-07-04 PROCEDURE — 99285 EMERGENCY DEPT VISIT HI MDM: CPT

## 2019-07-04 RX ORDER — MECLIZINE HYDROCHLORIDE 25 MG/1
25 TABLET ORAL
Qty: 20 TAB | Refills: 0 | Status: SHIPPED | OUTPATIENT
Start: 2019-07-04 | End: 2020-02-26

## 2019-07-04 RX ORDER — MECLIZINE HCL 12.5 MG 12.5 MG/1
25 TABLET ORAL
Status: COMPLETED | OUTPATIENT
Start: 2019-07-04 | End: 2019-07-04

## 2019-07-04 RX ORDER — PHENAZOPYRIDINE HYDROCHLORIDE 100 MG/1
100 TABLET, FILM COATED ORAL 3 TIMES DAILY
Qty: 6 TAB | Refills: 0 | Status: SHIPPED | OUTPATIENT
Start: 2019-07-04 | End: 2019-07-06

## 2019-07-04 RX ADMIN — MECLIZINE 25 MG: 12.5 TABLET ORAL at 13:46

## 2019-07-04 RX ADMIN — SODIUM CHLORIDE 1000 ML: 900 INJECTION, SOLUTION INTRAVENOUS at 11:39

## 2019-07-04 NOTE — DISCHARGE INSTRUCTIONS
Patient Education   You were seen for dizziness and vertigo. I recommended over the counter meclizine or dramamine, and resting in a quiet environment until better. Avoid driving, heights, operating machinery until better. Follow up with Neurology if symptoms do not improve in 1 week. Call if symptoms persist or worsen or you develop new neurologic symptoms. If meclizine does not improve your symptoms, follow up with Ferd Kunz:    [address] [facility] [place], [address] [id number]  [phone]    [facility]  Brianna Mckeon, [address], PT       Earwax Blockage: Care Instructions  Your Care Instructions    Earwax is a natural substance that protects the ear canal. Normally, earwax drains from the ears and does not cause problems. Sometimes earwax builds up and hardens. Earwax blockage (also called cerumen impaction) can cause some loss of hearing and pain. When wax is tightly packed, you will need to have your doctor remove it. Follow-up care is a key part of your treatment and safety. Be sure to make and go to all appointments, and call your doctor if you are having problems. It's also a good idea to know your test results and keep a list of the medicines you take. How can you care for yourself at home? · Do not try to remove earwax with cotton swabs, fingers, or other objects. This can make the blockage worse and damage the eardrum. · If your doctor recommends that you try to remove earwax at home:  ? Soften and loosen the earwax with warm mineral oil. You also can try hydrogen peroxide mixed with an equal amount of room temperature water. Place 2 drops of the fluid, warmed to body temperature, in the ear two times a day for up to 5 days. ? Once the wax is loose and soft, all that is usually needed to remove it from the ear canal is a gentle, warm shower. Direct the water into the ear, then tip your head to let the earwax drain out.  Dry your ear thoroughly with a hair dryer set on low. Hold the dryer several inches from your ear. ? If the warm mineral oil and shower do not work, use an over-the-counter wax softener. Read and follow all instructions on the label. After using the wax softener, use an ear syringe to gently flush the ear. Make sure the flushing solution is body temperature. Cool or hot fluids in the ear can cause dizziness. When should you call for help? Call your doctor now or seek immediate medical care if:    · Pus or blood drains from your ear.     · Your ears are ringing or feel full.     · You have a loss of hearing.    Watch closely for changes in your health, and be sure to contact your doctor if:    · You have pain or reduced hearing after 1 week of home treatment.     · You have any new symptoms, such as nausea or balance problems. Where can you learn more? Go to http://jelani-joyce.info/. Enter T052 in the search box to learn more about \"Earwax Blockage: Care Instructions. \"  Current as of: September 23, 2018  Content Version: 11.9  © 9807-5284 [employer]. Care instructions adapted under license by [employer] (which disclaims liability or warranty for this information). If you have questions about a medical condition or this instruction, always ask your healthcare professional. [address] any warranty or liability for your use of this information.

## 2019-07-04 NOTE — ED PROVIDER NOTES
EMERGENCY DEPARTMENT HISTORY AND PHYSICAL EXAM      Date: 7/4/2019  Patient Name: Rosendo Samuels  Patient Age and Sex: 80 y.o. female     History of Presenting Illness     Chief Complaint   Patient presents with    Dizziness     pt reports dizziness and head pressure since waking up this morning. orthostatics completed by EMS but was negative, also reports stroke scale negative. vivance recently changed. A/Ox4.  Headache     \"my head feels funny but no pain\"       History Provided By: Patient    HPI: Rosendo Samuels is an 72-year-old female with a history of dementia, poor appetite on Vyvanse, presenting for dizziness. According to EMS and patient, patient woke up this morning and when getting out of bed felt dizzy. Describes it as being lightheaded but also unstable worse with standing. States that she also feels like her head is full of cotton but denies a headache. She denies any chest pain, shortness of breath, abdominal pain, nausea, vomiting, diarrhea, constipation, fevers, numbness, weakness associated with it. Patient does state that since yesterday has been having increased urination as well as dysuria. Her  states that she had a couple of accidents on the carpet do to increased urination. Patient states that she has not been drinking water that well over the past couple of days and did not have breakfast this morning. However  did state that he gave her orange juice this morning. EMS states that her glucose in route was 66 and her Warren scale was negative. .    There are no other complaints, changes, or physical findings at this time. PCP: Naresh Rodriguez MD    No current facility-administered medications on file prior to encounter. Current Outpatient Medications on File Prior to Encounter   Medication Sig Dispense Refill    mirabegron ER (MYRBETRIQ) 50 mg ER tablet Take 1 Tab by mouth daily.  90 Tab 3    memantine-donepezil (NAMZARIC) 28-10 mg CSpX Take 1 Tab by mouth daily. 90 Cap 3    escitalopram oxalate (LEXAPRO) 10 mg tablet Take 1 Tab by mouth daily. 90 Tab 1    lisdexamfetamine (VYVANSE) 20 mg capsule Take 1 Cap (20 mg total) by mouth dailyIndications: Attention-Deficit Hyperactivity Disorder. May fill 12-25-18 Max Daily Amount: 20 mg 30 Cap 0    atorvastatin (LIPITOR) 10 mg tablet Take 1 Tab by mouth daily. 90 Tab 3    albuterol (PROAIR HFA) 90 mcg/actuation inhaler Take 1 Puff by inhalation every four (4) hours as needed for Wheezing or Shortness of Breath. Indications: Acute Asthma Attack 3 Inhaler 3    diclofenac (VOLTAREN) 1 % gel Apply 2 g to affected area four (4) times daily. Apply to hands qid 100 g 3    Nebulizer & Compressor machine 1 Each by Does Not Apply route every four (4) hours as needed. As directed  Indications: acute asthma with exacerbation 1 Each 0    montelukast (SINGULAIR) 10 mg tablet Take 10 mg by mouth daily.  lisdexamfetamine (VYVANSE) 20 mg capsule Take 1 Cap by mouth daily for 30 days. Max Daily Amount: 20 mg. 30 Cap 0    [START ON 7/23/2019] lisdexamfetamine (VYVANSE) 20 mg capsule Take 1 Cap by mouth daily for 30 days. Max Daily Amount: 20 mg. 30 Cap 0    traMADol (ULTRAM) 50 mg tablet Take 1 Tab by mouth every six (6) hours as needed for Pain. Max Daily Amount: 200 mg. Indications: Pain 10 Tab 0    POLYVINYL ALCOHOL (LIQUID TEARS OP) Apply  to eye.  fluticasone (FLONASE) 50 mcg/actuation nasal spray 2 Sprays by Both Nostrils route daily. Allergy note June 2013.  cholecalciferol, vitamin d3, (VITAMIN D) 1,000 unit tablet Take  by mouth daily. Past History     Past Medical History:  Past Medical History:   Diagnosis Date    Asthma     Dr. Denver Moras June 2013--doesn't see routinely--pt reports no change in allergies--no need for routine follow-up. Dr. Kathe Yna sees for mild COPD--on Symbicort since ~Nov 2013. Continued Singulair.     Ceruminosis     periodic ear cleaning at [facility] obstructive pulmonary disease ([facility] [address]     Encounter for screening colonoscopy ~    Normal per pt--[facility] records requested 2013 visit.  Fainting     Falls     Fatigue     Hearing loss, left     va hearing    History of mammogram May 2014    Normal/no change.  History of shingles 2010    Dec 2014:  Pt reports clinical history and shingles vaccine 4yrs ago. To try to obtain exact date of zostavax from /source.  History of skin cancer     melanoma ?, BCC & SCC- followed by Dr. Hermelindo Jo- twice a yr appts. 2013-still on 6mo schedule for skin checks.  Memory disorder     Osteopenia     DEXA     Overactive bladder     detrol LA. Vesicare--[facility]es [facility] Considering procedure for incontinence.  Postmenopausal hormone replacement therapy     since hysterectomy    Right shoulder pain 2013. Mild osteopenia; mild AC osteoarthritis. Sports Med Eval Oct 2013.  Well woman exam (no gynecological exam) Aug 3, 2012    \"[facility] Screening\":  Results scanned to chart. Updated 2014-no change except noted concern for GFR--see Dec 2014 note, letter as reviewed with pt. Past Surgical History:  Past Surgical History:   Procedure Laterality Date    HX CATARACT REMOVAL      HX HYSTERECTOMY  1948    MULTIPLE DELIVERY       [facility] TRABECULOPLASTY [facility]- last eye exam 2010       Family History:  Family History   Problem Relation Age of Onset   Burt Edison Dementia Father     Hypertension Mother     Stroke Mother     Cancer Other         daughter- breast cancer       Social History:  Social History     Tobacco Use    Smoking status: Never Smoker    Smokeless tobacco: Never Used   Substance Use Topics    Alcohol use:  Yes     Alcohol/week: 0.0 oz     Frequency: 4 or more times a week     Comment: 1-2 drinks/every evening ( wine/vodka)    Drug use: Yes     Types: Prescription, OTC Allergies: Allergies   Allergen Reactions    Latex Unknown (comments)    Dog Dander Shortness of Breath         Review of Systems   Review of Systems   Constitutional: Negative for chills and fever. Respiratory: Negative for cough and shortness of breath. Cardiovascular: Negative for chest pain. Gastrointestinal: Negative for constipation, diarrhea, nausea and vomiting. Genitourinary: Positive for dysuria and frequency. Negative for difficulty urinating. Neurological: Positive for dizziness and light-headedness. Negative for syncope, weakness and numbness. All other systems reviewed and are negative. Physical Exam   Physical Exam   Constitutional: She appears well-developed and well-nourished. Very pleasant female who is smiling and joking. HENT:   Head: Normocephalic. Slightly dry mucous membranes   Eyes: Pupils are equal, round, and reactive to light. EOM are normal.   Neck: Normal range of motion. Cardiovascular: Normal rate and regular rhythm. Pulmonary/Chest: Effort normal and breath sounds normal. No respiratory distress. She has no wheezes. Abdominal: Soft. She exhibits no distension. There is no tenderness. Neurological: She is alert. No cranial nerve deficit. CN 2-12 intact, 5/5 strength in all 4 extremities, Finger to nose intact   Skin: Skin is warm and dry. Psychiatric: She has a normal mood and affect. Nursing note and vitals reviewed.        Diagnostic Study Results     Labs -     Recent Results (from the past 12 hour(s))   CBC W/O DIFF    Collection Time: 07/04/19 11:30 AM   Result Value Ref Range    WBC 5.2 3.6 - 11.0 K/uL    RBC 4.22 3.80 - 5.20 M/uL    HGB 13.7 11.5 - 16.0 g/dL    HCT 41.0 35.0 - 47.0 %    MCV 97.2 80.0 - 99.0 FL    MCH 32.5 26.0 - 34.0 PG    MCHC 33.4 30.0 - 36.5 g/dL    RDW 12.2 11.5 - 14.5 %    PLATELET 551 997 - 421 K/uL    MPV 9.8 8.9 - 12.9 FL    NRBC 0.0 0  WBC    ABSOLUTE NRBC 0.00 0.00 - 8.14 K/uL   METABOLIC PANEL, BASIC    Collection Time: 07/04/19 11:30 AM   Result Value Ref Range    Sodium 139 136 - 145 mmol/L    Potassium 3.9 3.5 - 5.1 mmol/L    Chloride 106 97 - 108 mmol/L    CO2 27 21 - 32 mmol/L    Anion gap 6 5 - 15 mmol/L    Glucose 78 65 - 100 mg/dL    BUN 12 6 - 20 MG/DL    Creatinine 0.73 0.55 - 1.02 MG/DL    BUN/Creatinine ratio 16 12 - 20      GFR est AA >60 >60 ml/min/1.73m2    GFR est non-AA >60 >60 ml/min/1.73m2    Calcium 9.0 8.5 - 10.1 MG/DL   TROPONIN I    Collection Time: 07/04/19 11:30 AM   Result Value Ref Range    Troponin-I, Qt. <0.05 <0.05 ng/mL   URINALYSIS W/ REFLEX CULTURE    Collection Time: 07/04/19 11:43 AM   Result Value Ref Range    Color YELLOW/STRAW      Appearance CLEAR CLEAR      Specific gravity 1.007 1.003 - 1.030      pH (UA) 8.0 5.0 - 8.0      Protein NEGATIVE  NEG mg/dL    Glucose NEGATIVE  NEG mg/dL    Ketone NEGATIVE  NEG mg/dL    Bilirubin NEGATIVE  NEG      Blood NEGATIVE  NEG      Urobilinogen 0.2 0.2 - 1.0 EU/dL    Nitrites NEGATIVE  NEG      Leukocyte Esterase NEGATIVE  NEG      WBC 0-4 0 - 4 /hpf    RBC 0-5 0 - 5 /hpf    Epithelial cells FEW FEW /lpf    Bacteria NEGATIVE  NEG /hpf    UA:UC IF INDICATED CULTURE NOT INDICATED BY UA RESULT CNI      Hyaline cast 0-2 0 - 5 /lpf       Radiologic Studies -   CT HEAD WO CONT   Final Result   IMPRESSION:  No acute findings and no change since 9/14/2018. CT Results  (Last 48 hours)               07/04/19 1205  CT HEAD WO CONT Final result    Impression:  IMPRESSION:  No acute findings and no change since 9/14/2018. Narrative:  EXAMINATION:  CT HEAD WO CONT       CLINICAL INFORMATION:  Headache   COMPARISON:  9/14/2018    TECHNIQUE: Routine axial head CT was performed. IV contrast was not   administered. Sagittal and coronal reconstructions were generated. CT dose reduction was achieved through use of a standardized protocol tailored   for this examination and automatic exposure control for dose modulation. FINDINGS:   No acute infarct, hemorrhage or mass. VENTRICULAR SYSTEM:  Normal for age. BASAL CISTERNS:  Patent. BRAIN PARENCHYMA:  Unchanged hypodensity of the cerebral white matter consistent   with chronic small vessel ischemic change. MIDLINE SHIFT:  None. CALVARIUM/ SKULL BASE: Intact. PARANASAL SINUSES AND MASTOID AIR CELLS: Clear. VISUALIZED ORBITS: No significant abnormalities. SELLA: No enlargement. CXR Results  (Last 48 hours)    None            Medical Decision Making   I am the first provider for this patient. I reviewed the vital signs, available nursing notes, past medical history, past surgical history, family history and social history. Vital Signs-Reviewed the patient's vital signs. Patient Vitals for the past 12 hrs:   Pulse Resp BP SpO2   07/04/19 1114 67 12 155/82 98 %       Records Reviewed: Nursing Notes and Old Medical Records    Provider Notes (Medical Decision Making):   Pt presents with lightheadedness. DDx: dehydration, anemia, electrolyte abnormality, infection, orthostatic hypotension, medication side effect, UTI. Will get orthostatics, labs and UA      ED Course:   Initial assessment performed. The patients presenting problems have been discussed, and they are in agreement with the care plan formulated and outlined with them. I have encouraged them to ask questions as they arise throughout their visit. ED Course as of Jul 04 1408   Thu Jul 04, 2019   1254 All patient's lab work, CAT scan and urinalysis negative. After 1 L of normal saline will stand her up and see if she still feeling dizzy and lightheaded. [JS]   3986 Given patient's urinalysis was negative, did bedside vaginal exam with nurse Zhang Allen. No signs of vaginitis, candidiasis, trauma. [JS]   2445 Patient stood up out of bed, and felt like the room was spinning. Given this, did ear exam and patient has impacted cerumen in her left ear.   Patient was negative on Romberg and finger-to-nose was intact. I have a low suspicion that this is posterior stroke. More likely vertigo secondary to cerumen impaction. We will give her meclizine here and discharge her home on meclizine and Debrox. [JS]      ED Course User Index  [JS] Clifton Simmonds, MD     Critical Care Time:   0    Disposition:  Discharge Note:  The patient has been re-evaluated and is ready for discharge. Reviewed available results with patient. Counseled patient on diagnosis and care plan. Patient has expressed understanding, and all questions have been answered. Patient agrees with plan and agrees to follow up as recommended, or to return to the ED if their symptoms worsen. Discharge instructions have been provided and explained to the patient, along with reasons to return to the ED. PLAN:  Current Discharge Medication List      START taking these medications    Details   phenazopyridine (PYRIDIUM) 100 mg tablet Take 1 Tab by mouth three (3) times daily for 6 doses. Qty: 6 Tab, Refills: 0      meclizine (ANTIVERT) 25 mg tablet Take 1 Tab by mouth three (3) times daily as needed for Dizziness. Qty: 20 Tab, Refills: 0      carbamide peroxide (DEBROX) 6.5 % otic solution Administer 5 Drops in left ear two (2) times a day for 3 days. Qty: 7.5 mL, Refills: 0           2. Follow-up Information     Follow up With Specialties Details Why Contact Info    Naresh Rodriguez MD Internal Medicine  As needed [address] [facility]  [phone]          3. Return to ED if worse     Diagnosis     Clinical Impression:   1. Vertigo    2. Impacted cerumen of left ear        Attestations:    Emeli Odell M.D. Please note that this dictation was completed with [employer] ([employer]), the [employer] voice recognition software. Quite often unanticipated grammatical, syntax, homophones, and other interpretive errors are inadvertently transcribed by the computer software. Please disregard these errors. Please excuse any errors that have escaped final proofreading. Thank you.

## 2019-07-04 NOTE — ED NOTES
Dr Joshua De La Torre reviewed discharge instructions with the patient. The patient verbalized understanding. All questions and concerns were addressed. The patient in a wheelchair and is discharged in the care of family members with instructions and prescriptions in hand. Pt is alert and oriented x 4. Respirations are clear and unlabored.

## 2019-07-19 DIAGNOSIS — E78.5 DYSLIPIDEMIA: ICD-10-CM

## 2019-07-19 RX ORDER — ATORVASTATIN CALCIUM 10 MG/1
TABLET, FILM COATED ORAL
Qty: 90 TAB | Refills: 3 | Status: SHIPPED | OUTPATIENT
Start: 2019-07-19 | End: 2020-06-16

## 2019-08-19 ENCOUNTER — TELEPHONE (OUTPATIENT)
Dept: INTERNAL MEDICINE CLINIC | Age: 84
End: 2019-08-19

## 2019-08-19 NOTE — TELEPHONE ENCOUNTER
[facility] patient is requesting to get a Weeks worth of medication for all meds as she left her medications at home. Please call to discuss.  Thank you

## 2019-08-19 NOTE — TELEPHONE ENCOUNTER
Spoke with pharmacist, Emery Fernandez. Verbal OK for 5 day supply on lipitor, mybetriq, and namzaric. Emery Fernandez verbalized understanding of information discussed w/ no further questions at this time.

## 2019-08-26 ENCOUNTER — TELEPHONE (OUTPATIENT)
Dept: NEUROLOGY | Age: 84
End: 2019-08-26

## 2019-08-26 DIAGNOSIS — F41.9 ANXIETY: Primary | ICD-10-CM

## 2019-08-26 RX ORDER — ESCITALOPRAM OXALATE 20 MG/1
20 TABLET ORAL DAILY
Qty: 30 TAB | Refills: 3 | Status: SHIPPED | OUTPATIENT
Start: 2019-08-26 | End: 2019-09-05 | Stop reason: SDUPTHER

## 2019-08-26 NOTE — TELEPHONE ENCOUNTER
Please ask if patient is still take Lexapro or Escitalopram. If she is, to increase the dose from 10 mg to 20 mg daily. I will then prescribe the higher dose.

## 2019-08-26 NOTE — TELEPHONE ENCOUNTER
Spoke to patients  and he stated that she does take the lexapro but is currently out of the medication. Patients  stated that it wasn't working and she wasn't getting any relief so increasing it sounds like a good plan.

## 2019-08-26 NOTE — TELEPHONE ENCOUNTER
----- Message from Tru Gallegos [phone] sent at 8/26/2019  8:06 AM EDT -----  Regarding: Dr Manuel Ballesteros refill  Medication Refill    Caller (if not patient): Roselia Glynn       Relationship of caller (if not patient): spouse      Best contact number(s): [phone]      Name of medication and dosage if known: unknown, would be new       Is patient out of this medication (yes/no):      Pharmacy name: [facility]    Pharmacy listed in chart? (yes/no): yes  Pharmacy phone number: [phone]      Details to clarify the request: spouse stated the pt is becoming very anxious and would like an Rx to calm her nerves.        Tru Gallegos [id number]

## 2019-09-05 DIAGNOSIS — F41.9 ANXIETY: ICD-10-CM

## 2019-09-05 RX ORDER — ESCITALOPRAM OXALATE 20 MG/1
20 TABLET ORAL DAILY
Qty: 90 TAB | Refills: 1 | Status: SHIPPED | OUTPATIENT
Start: 2019-09-05 | End: 2019-12-21 | Stop reason: SDUPTHER

## 2019-09-05 NOTE — TELEPHONE ENCOUNTER
Received a90 day authorization request from [employer] for    Requested Prescriptions     Pending Prescriptions Disp Refills    escitalopram oxalate (LEXAPRO) 20 mg tablet 30 Tab 3     Sig: Take 1 Tab by mouth daily.      Future Appointments   Date Time Provider Fred Stacey   11/4/2019 10:30 AM MD Ame [address]                         Last Appointment My Department:  7/1/2019    Please review

## 2019-09-10 DIAGNOSIS — F41.9 ANXIETY: ICD-10-CM

## 2019-09-20 DIAGNOSIS — F41.9 ANXIETY: ICD-10-CM

## 2019-09-20 RX ORDER — ESCITALOPRAM OXALATE 20 MG/1
20 TABLET ORAL DAILY
Qty: 90 TAB | Refills: 1 | OUTPATIENT
Start: 2019-09-20

## 2019-09-20 NOTE — TELEPHONE ENCOUNTER
Received a 90 day authorization request from [employer] for     Requested Prescriptions     Pending Prescriptions Disp Refills    escitalopram oxalate (LEXAPRO) 20 mg tablet 90 Tab 1     Sig: Take 1 Tab by mouth daily.      Future Appointments   Date Time Provider Fred Stacey   11/4/2019 10:30 AM MD MISHA [address]                         Last Appointment My Department:  7/1/2019

## 2019-10-14 DIAGNOSIS — F43.23 ADJUSTMENT DISORDER WITH MIXED ANXIETY AND DEPRESSED MOOD: ICD-10-CM

## 2019-10-14 DIAGNOSIS — F90.9 ATTENTION DEFICIT HYPERACTIVITY DISORDER (ADHD), UNSPECIFIED ADHD TYPE: ICD-10-CM

## 2019-10-15 ENCOUNTER — TELEPHONE (OUTPATIENT)
Dept: NEUROLOGY | Age: 84
End: 2019-10-15

## 2019-10-15 ENCOUNTER — DOCUMENTATION ONLY (OUTPATIENT)
Dept: NEUROLOGY | Age: 84
End: 2019-10-15

## 2019-10-15 NOTE — TELEPHONE ENCOUNTER
Manoj Astudillo with [employer] states that she recvd a faxed. She would like to clarify if pt is on hospice.

## 2019-10-15 NOTE — TELEPHONE ENCOUNTER
Received a refill for     Requested Prescriptions     Pending Prescriptions Disp Refills    lisdexamfetamine (VYVANSE) 20 mg capsule 30 Cap 0     Sig: Take 1 Cap by mouth daily. Max Daily Amount: 20 mg.  May fill 12-25-18  Indications: Attention Deficit Disorder with Hyperactivity     Future Appointments   Date Time Provider Fred Ibarra   11/4/2019 10:30 AM Rajesh Boss MD [address]                         Last Appointment My Department:  7/1/2019    Please review

## 2019-10-21 ENCOUNTER — TELEPHONE (OUTPATIENT)
Dept: NEUROLOGY | Age: 84
End: 2019-10-21

## 2019-10-21 NOTE — TELEPHONE ENCOUNTER
Patient's  would like a call back regarding increasing the patient's medications.       [phone]

## 2019-10-22 NOTE — TELEPHONE ENCOUNTER
The reduction of Lexapro may have caused the changes. Call the physician who reduced it and see if they can increase the dose back up. If still having issues, then he can try putting her back on daily Vyvanse and see if it helps.

## 2019-10-22 NOTE — TELEPHONE ENCOUNTER
Patients  called he stated that patient has been increasing in her agitation and gets angry quicker and was wondering if decreasing the vyvanse could be affecting that. Patient also stated that the lexapro is decreased to 20MG. Patients  wants to know if either of those should be increased to improve patients agitation. Please advise.

## 2019-11-03 NOTE — PROGRESS NOTES
HISTORY OF PRESENT ILLNESS  Sandeep Julian is a 80 y.o. female. HPI     Here with  for routine f/u dementia, ADHD , HLD , OAB osteopenia and medicare wellness---------------------  Sees Dr Vivian Enciso for Dementia and ADHD  vynase was lowered to every other day dosing this year but taking again daily d/t agitation  On lexapro too and namzeric  Lives with  at [facility]  Agitation controlled now back on medicines  Takes albuterol only prn        Patient Active Problem List    Diagnosis Date Noted    Alzheimer's disease ([facility] [address] 09/10/2018    ADD (attention deficit disorder) 01/18/2017    Hyperlipidemia 07/20/2015    Mild cognitive impairment without memory loss 02/02/2015    Generalized anxiety disorder 02/02/2015    TMJ (temporomandibular joint disorder) 12/21/2011    Syncope and collapse 08/10/2011    Fever, unspecified 08/10/2011    Syncope and collapse 08/10/2011    History of skin cancer     Asthma     Hearing loss, left     Ceruminosis     Postmenopausal hormone replacement therapy     Overactive bladder      Current Outpatient Medications   Medication Sig Dispense Refill    lisdexamfetamine (VYVANSE) 20 mg capsule Take 1 Cap by mouth daily. Max Daily Amount: 20 mg. May fill 12-25-18  Indications: Attention Deficit Disorder with Hyperactivity 30 Cap 0    escitalopram oxalate (LEXAPRO) 20 mg tablet Take 1 Tab by mouth daily. 90 Tab 1    atorvastatin (LIPITOR) 10 mg tablet TAKE 1 TABLET DAILY 90 Tab 3    meclizine (ANTIVERT) 25 mg tablet Take 1 Tab by mouth three (3) times daily as needed for Dizziness. 20 Tab 0    mirabegron ER (MYRBETRIQ) 50 mg ER tablet Take 1 Tab by mouth daily. 90 Tab 3    memantine-donepezil (NAMZARIC) 28-10 mg CSpX Take 1 Tab by mouth daily. 90 Cap 3    traMADol (ULTRAM) 50 mg tablet Take 1 Tab by mouth every six (6) hours as needed for Pain. Max Daily Amount: 200 mg.  Indications: Pain 10 Tab 0    albuterol (PROAIR HFA) 90 mcg/actuation inhaler Take 1 Puff by inhalation every four (4) hours as needed for Wheezing or Shortness of Breath. Indications: Acute Asthma Attack 3 Inhaler 3    diclofenac (VOLTAREN) 1 % gel Apply 2 g to affected area four (4) times daily. Apply to hands qid 100 g 3    Nebulizer & Compressor machine 1 Each by Does Not Apply route every four (4) hours as needed. As directed  Indications: acute asthma with exacerbation 1 Each 0    montelukast (SINGULAIR) 10 mg tablet Take 10 mg by mouth daily.  POLYVINYL ALCOHOL (LIQUID TEARS OP) Apply  to eye.  fluticasone (FLONASE) 50 mcg/actuation nasal spray 2 Sprays by Both Nostrils route daily. Allergy note June 2013.  cholecalciferol, vitamin d3, (VITAMIN D) 1,000 unit tablet Take  by mouth daily. Allergies   Allergen Reactions    Latex Unknown (comments)    Dog Dander Shortness of Breath     Social History     Tobacco Use    Smoking status: Never Smoker    Smokeless tobacco: Never Used   Substance Use Topics    Alcohol use:  Yes     Alcohol/week: 0.0 standard drinks     Frequency: 4 or more times a week     Comment: 1-2 drinks/every evening ( wine/vodka)      Lab Results   Component Value Date/Time    WBC 5.2 07/04/2019 11:30 AM    HGB 13.7 07/04/2019 11:30 AM    HCT 41.0 07/04/2019 11:30 AM    PLATELET 745 24/36/0833 11:30 AM    MCV 97.2 07/04/2019 11:30 AM     Lab Results   Component Value Date/Time    Glucose 78 07/04/2019 11:30 AM    LDL, calculated 94 10/18/2016 11:41 AM    Creatinine 0.73 07/04/2019 11:30 AM      Lab Results   Component Value Date/Time    Cholesterol, total 204 (H) 10/18/2016 11:41 AM    HDL Cholesterol 95 10/18/2016 11:41 AM    LDL, calculated 94 10/18/2016 11:41 AM    Triglyceride 74 10/18/2016 11:41 AM    CHOL/HDL Ratio 2.4 08/31/2010 10:48 AM     Lab Results   Component Value Date/Time    GFR est non-AA >60 07/04/2019 11:30 AM    GFR est AA >60 07/04/2019 11:30 AM    Creatinine 0.73 07/04/2019 11:30 AM    BUN 12 07/04/2019 11:30 AM    Sodium 139 07/04/2019 11:30 AM    Potassium 3.9 07/04/2019 11:30 AM    Chloride 106 07/04/2019 11:30 AM    CO2 27 07/04/2019 11:30 AM    Magnesium 1.8 08/10/2011 05:30 PM     Lab Results   Component Value Date/Time    TSH 4.430 09/11/2018 09:10 AM    T4, Free 1.34 09/11/2018 09:10 AM         ROS    Physical Exam   Constitutional: She appears well-developed and well-nourished. Appears stated age   Cardiovascular: Normal rate, regular rhythm and normal heart sounds. Exam reveals no gallop and no friction rub. No murmur heard. Pulmonary/Chest: Effort normal and breath sounds normal. No respiratory distress. She has no wheezes. Abdominal: Soft. Bowel sounds are normal.   Musculoskeletal: She exhibits no edema. Neurological: She is alert. Psychiatric: She has a normal mood and affect. Nursing note and vitals reviewed. ASSESSMENT and PLAN  Diagnoses and all orders for this visit:    1. Late onset Alzheimer's disease with behavioral disturbance ([facility] [address]    2. Pure hypercholesterolemia  -     LIPID PANEL  -     TSH 3RD GENERATION    3. Attention deficit disorder, unspecified hyperactivity presence    4. OAB (overactive bladder)    5. Adjustment disorder with mixed anxiety and depressed mood  -     lisdexamfetamine (VYVANSE) 20 mg capsule; Take 1 Cap by mouth daily. Max Daily Amount: 20 mg. May fill 1-15-20  Indications: Attention Deficit Disorder with Hyperactivity    6. Attention deficit hyperactivity disorder (ADHD), unspecified ADHD type  -     lisdexamfetamine (VYVANSE) 20 mg capsule; Take 1 Cap by mouth daily. Max Daily Amount: 20 mg. May fill 1-15-20  Indications: Attention Deficit Disorder with Hyperactivity    7. Menopause  -     DEXA BONE DENSITY STUDY AXIAL; Future      Follow-up and Dispositions    · Return in about 6 months (around 5/4/2020) for ADHD ostoepnia HLD HTN.         This is the Subsequent Medicare Annual Wellness Exam, performed 12 months or more after the Initial AWV or the last Subsequent AWV    I have reviewed the patient's medical history in detail and updated the computerized patient record. History     Patient Active Problem List   Diagnosis Code    Postmenopausal hormone replacement therapy Z79.890    Overactive bladder N32.81    Asthma J45.909    Hearing loss, left H91.92    Ceruminosis H61.20    History of skin cancer Z85.828    Syncope and collapse R55    Fever, unspecified R50.9    Syncope and collapse R55    TMJ (temporomandibular joint disorder) M26.609    Mild cognitive impairment without memory loss G31.84    Generalized anxiety disorder F41.1    Hyperlipidemia E78.5    ADD (attention deficit disorder) F98.8    Alzheimer's disease (HCC) G30.9, F02.80     Past Medical History:   Diagnosis Date    Asthma     Dr. Ray Brown June 2013--doesn't see routinely--pt reports no change in allergies--no need for routine follow-up. Dr. Yoli Gale sees for mild COPD--on Symbicort since ~Nov 2013. Continued Singulair.  Ceruminosis     [facility]    Chronic obstructive pulmonary disease ([facility] [address]     Encounter for screening colonoscopy ~2006    Normal per pt--[facility] records requested July 2013 visit.  Fainting     Falls     Fatigue     Hearing loss, left     va hearing    History of mammogram May 2014    Normal/no change.  History of shingles 2010    Dec 2014:  Pt reports clinical history and shingles vaccine 4yrs ago. To try to obtain exact date of zostavax from /source.  History of skin cancer     melanoma 2003?, BCC & SCC- followed by Dr. Marybeth Frost- twice a yr appts. July 2013-still on 6mo schedule for skin checks.  Memory disorder     Osteopenia     DEXA 8/11    Overactive bladder     detrol LA. Vesicare--sees Urology Spring/Fall. Considering procedure for incontinence.  Postmenopausal hormone replacement therapy     since hysterectomy    Right shoulder pain Sept 2013.     Mild osteopenia; mild AC osteoarthritis. Sports Med Eval Oct 2013.  Well woman exam (no gynecological exam) Aug 3, 2012    \"[employer] Screening\":  Results scanned to chart. Updated 2014-no change except noted concern for GFR--see Dec 2014 note, letter as reviewed with pt. Past Surgical History:   Procedure Laterality Date    HX CATARACT REMOVAL      HX HYSTERECTOMY  1948    MULTIPLE DELIVERY       SC TRABECULOPLASTY BY LASER SURGERY      Dr. Stefano Simmons- last eye exam 2010     Current Outpatient Medications   Medication Sig Dispense Refill    lisdexamfetamine (VYVANSE) 20 mg capsule Take 1 Cap by mouth daily. Max Daily Amount: 20 mg. May fill 1-15-20  Indications: Attention Deficit Disorder with Hyperactivity 30 Cap 0    escitalopram oxalate (LEXAPRO) 20 mg tablet Take 1 Tab by mouth daily. 90 Tab 1    atorvastatin (LIPITOR) 10 mg tablet TAKE 1 TABLET DAILY 90 Tab 3    mirabegron ER (MYRBETRIQ) 50 mg ER tablet Take 1 Tab by mouth daily. 90 Tab 3    memantine-donepezil (NAMZARIC) 28-10 mg CSpX Take 1 Tab by mouth daily. 90 Cap 3    albuterol (PROAIR HFA) 90 mcg/actuation inhaler Take 1 Puff by inhalation every four (4) hours as needed for Wheezing or Shortness of Breath. Indications: Acute Asthma Attack 3 Inhaler 3    diclofenac (VOLTAREN) 1 % gel Apply 2 g to affected area four (4) times daily. Apply to hands qid 100 g 3    montelukast (SINGULAIR) 10 mg tablet Take 10 mg by mouth daily.  POLYVINYL ALCOHOL (LIQUID TEARS OP) Apply  to eye.  fluticasone (FLONASE) 50 mcg/actuation nasal spray 2 Sprays by Both Nostrils route daily. Allergy note 2013.  cholecalciferol, vitamin d3, (VITAMIN D) 1,000 unit tablet Take  by mouth daily.  meclizine (ANTIVERT) 25 mg tablet Take 1 Tab by mouth three (3) times daily as needed for Dizziness. 20 Tab 0    traMADol (ULTRAM) 50 mg tablet Take 1 Tab by mouth every six (6) hours as needed for Pain. Max Daily Amount: 200 mg. Indications: Pain 10 Tab 0     Allergies   Allergen Reactions    Latex Unknown (comments)    Dog Dander Shortness of Breath       Family History   Problem Relation Age of Onset   Alejo Mares Dementia Father     Hypertension Mother     Stroke Mother     Cancer Other         daughter- breast cancer     Social History     Tobacco Use    Smoking status: Former Smoker    Smokeless tobacco: Never Used   Substance Use Topics    Alcohol use: Yes     Alcohol/week: 0.0 standard drinks     Frequency: 4 or more times a week     Comment: 1-2 drinks/every evening ( wine/vodka)       Depression Risk Factor Screening:     3 most recent PHQ Screens 11/4/2019   Little interest or pleasure in doing things Not at all   Feeling down, depressed, irritable, or hopeless Not at all   Total Score PHQ 2 0   Trouble falling or staying asleep, or sleeping too much -   Feeling tired or having little energy -   Poor appetite, weight loss, or overeating -   Feeling bad about yourself - or that you are a failure or have let yourself or your family down -   Moving or speaking so slowly that other people could have noticed; or the opposite being so fidgety that others notice -   Thoughts of being better off dead, or hurting yourself in some way -   How difficult have these problems made it for you to do your work, take care of your home and get along with others -       Alcohol Risk Factor Screening:   Do you average 1 drink per night or more than 7 drinks a week:  Yes    On any one occasion in the past three months have you have had more than 3 drinks containing alcohol:  No      Functional Ability and Level of Safety:   Hearing: Hearing is good. Activities of Daily Living: The home contains: grab bars  Patient needs help with:  transportation, managing medications and managing money    Ambulation: with no difficulty    Fall Risk:  Fall Risk Assessment, last 12 mths 11/4/2019   Able to walk? Yes   Fall in past 12 months? No   Fall with injury?  - Number of falls in past 12 months -   Fall Risk Score -       Abuse Screen:  Patient is not abused    Cognitive Screening   Has your family/caregiver stated any concerns about your memory: yes - seeing neurologist Dementia  Cognitive Screening: has dementia    Patient Care Team   Patient Care Team:  Katy Gorman MD as PCP - General (Internal Medicine)  Katy Gorman MD as PCP - [facility] Empaneled Provider  Cora Mendoza MD as Consulting Provider (Urology)  Mandi Fajardo MD (Ophthalmology)  Cristina Aragon MD (Neurology)    Assessment/Plan   Education and counseling provided:  Are appropriate based on today's review and evaluation  End-of-Life planning (with patient's consent)--advised completion of AMD forns  Bone mass measurement (DEXA)-ordered    Diagnoses and all orders for this visit:    1. Late onset Alzheimer's disease with behavioral disturbance ([facility] [address]   Continue medicines and f/u neurologist  2. Pure hypercholesterolemia  -     LIPID PANEL  -     TSH 3RD GENERATION   On statin  3. Attention deficit disorder, unspecified hyperactivity presence   Refilled Vyvanse x 3 months  4. OAB (overactive bladder)    5. Adjustment disorder with mixed anxiety and depressed mood  -     lisdexamfetamine (VYVANSE) 20 mg capsule; Take 1 Cap by mouth daily. Max Daily Amount: 20 mg. May fill 1-15-20  Indications: Attention Deficit Disorder with Hyperactivity    6. Attention deficit hyperactivity disorder (ADHD), unspecified ADHD type  -     lisdexamfetamine (VYVANSE) 20 mg capsule; Take 1 Cap by mouth daily. Max Daily Amount: 20 mg. May fill 1-15-20  Indications: Attention Deficit Disorder with Hyperactivity    7. Menopause  -     DEXA BONE DENSITY STUDY AXIAL;  Future   Osteopenia    caclium and vit d bid recommened, may need to start medication--d/w pt and       Health Maintenance Due   Topic Date Due    MEDICARE YEARLY EXAM  03/14/2018    GLAUCOMA SCREENING Q2Y  03/16/2018

## 2019-11-04 ENCOUNTER — HOSPITAL ENCOUNTER (OUTPATIENT)
Dept: LAB | Age: 84
Discharge: HOME | End: 2019-11-04
Payer: MEDICARE

## 2019-11-04 ENCOUNTER — OFFICE VISIT (OUTPATIENT)
Dept: INTERNAL MEDICINE CLINIC | Age: 84
End: 2019-11-04

## 2019-11-04 VITALS
HEIGHT: 60 IN | OXYGEN SATURATION: 96 % | BODY MASS INDEX: 21.79 KG/M2 | TEMPERATURE: 97.4 F | DIASTOLIC BLOOD PRESSURE: 70 MMHG | HEART RATE: 80 BPM | WEIGHT: 111 LBS | RESPIRATION RATE: 16 BRPM | SYSTOLIC BLOOD PRESSURE: 132 MMHG

## 2019-11-04 DIAGNOSIS — F02.818 LATE ONSET ALZHEIMER'S DISEASE WITH BEHAVIORAL DISTURBANCE (HCC): Primary | ICD-10-CM

## 2019-11-04 DIAGNOSIS — Z78.0 MENOPAUSE: ICD-10-CM

## 2019-11-04 DIAGNOSIS — F43.23 ADJUSTMENT DISORDER WITH MIXED ANXIETY AND DEPRESSED MOOD: ICD-10-CM

## 2019-11-04 DIAGNOSIS — E78.00 PURE HYPERCHOLESTEROLEMIA: ICD-10-CM

## 2019-11-04 DIAGNOSIS — N32.81 OAB (OVERACTIVE BLADDER): ICD-10-CM

## 2019-11-04 DIAGNOSIS — G30.1 LATE ONSET ALZHEIMER'S DISEASE WITH BEHAVIORAL DISTURBANCE (HCC): Primary | ICD-10-CM

## 2019-11-04 DIAGNOSIS — F98.8 ATTENTION DEFICIT DISORDER, UNSPECIFIED HYPERACTIVITY PRESENCE: ICD-10-CM

## 2019-11-04 DIAGNOSIS — F90.9 ATTENTION DEFICIT HYPERACTIVITY DISORDER (ADHD), UNSPECIFIED ADHD TYPE: ICD-10-CM

## 2019-11-04 PROCEDURE — 36415 COLL VENOUS BLD VENIPUNCTURE: CPT

## 2019-11-04 PROCEDURE — 80061 LIPID PANEL: CPT

## 2019-11-04 PROCEDURE — 84443 ASSAY THYROID STIM HORMONE: CPT

## 2019-11-04 NOTE — PROGRESS NOTES
Chief Complaint   Patient presents with    Labs     Non fasting    Annual Wellness Visit     annual exam

## 2019-11-04 NOTE — PATIENT INSTRUCTIONS
Office Policies Phone calls/patient messages: Please allow up to 24 hours for someone in the office to contact you about your call or message. Be mindful your provider may be out of the office or your message may require further review. We encourage you to use [employer] for your messages as this is a faster, more efficient way to communicate with our office Medication Refills: 
         
Prescription medications require 48-72 business hours to process. We encourage you to use [employer] for your refills. For controlled medications: Please allow 72 business hours to process. Certain medications may require you to  a written prescription at our office. NO narcotic/controlled medications will be prescribed after 4pm Monday through Friday or on weekends Form/Paperwork Completion: 
         
Please note a $25 fee may incur for all paperwork for completed by our providers. We ask that you allow 7-10 business days. Pre-payment is due prior to picking up/faxing the completed form. You may also download your forms to [employer] to have your doctor print off. Medicare Wellness Visit, Female The best way to live healthy is to have a lifestyle where you eat a well-balanced diet, exercise regularly, limit alcohol use, and quit all forms of tobacco/nicotine, if applicable. Regular preventive services are another way to keep healthy. Preventive services (vaccines, screening tests, monitoring & exams) can help personalize your care plan, which helps you manage your own care. Screening tests can find health problems at the earliest stages, when they are easiest to treat. [facility] follows the current, evidence-based guidelines published by the [facility] [place] (USPSTF) when recommending preventive services for our patients.  Because we follow these guidelines, sometimes recommendations change over time as research supports it. (For example, mammograms used to be recommended annually. Even though Medicare will still pay for an annual mammogram, the newer guidelines recommend a mammogram every two years for women of average risk). Of course, you and your doctor may decide to screen more often for some diseases, based on your risk and your co-morbidities (chronic disease you are already diagnosed with). Preventive services for you include: - Medicare offers their members a free annual wellness visit, which is time for you and your primary care provider to discuss and plan for your preventive service needs. Take advantage of this benefit every year! 
-All adults over the age of 72 should receive the recommended pneumonia vaccines. Current USPSTF guidelines recommend a series of two vaccines for the best pneumonia protection.  
-All adults should have a flu vaccine yearly and a tetanus vaccine every 10 years.  
-All adults age 48 and older should receive the shingles vaccines (series of two vaccines). -All adults age 38-68 who are overweight should have a diabetes screening test once every three years.  
-All adults born between [birth year] and 1965 should be screened once for Hepatitis C. 
-Other screening tests and preventive services for persons with diabetes include: an eye exam to screen for diabetic retinopathy, a kidney function test, a foot exam, and stricter control over your cholesterol.  
-Cardiovascular screening for adults with routine risk involves an electrocardiogram (ECG) at intervals determined by your doctor.  
-Colorectal cancer screenings should be done for adults age 54-65 with no increased risk factors for colorectal cancer. There are a number of acceptable methods of screening for this type of cancer. Each test has its own benefits and drawbacks.  Discuss with your doctor what is most appropriate for you during your annual wellness visit. The different tests include: colonoscopy (considered the best screening method), a fecal occult blood test, a fecal DNA test, and sigmoidoscopy. 
 
-A bone mass density test is recommended when a woman turns 65 to screen for osteoporosis. This test is only recommended one time, as a screening. Some providers will use this same test as a disease monitoring tool if you already have osteoporosis. -Breast cancer screenings are recommended every other year for women of normal risk, age 54-69. 
-Cervical cancer screenings for women over age 72 are only recommended with certain risk factors. Here is a list of your current Health Maintenance items (your personalized list of preventive services) with a due date: 
Health Maintenance Due Topic Date Due  
 Annual Well Visit  03/14/2018  Glaucoma Screening   03/16/2018

## 2019-11-05 LAB
CHOLEST SERPL-MCNC: 202 MG/DL (ref 100–199)
HDLC SERPL-MCNC: 105 MG/DL
LDLC SERPL CALC-MCNC: 78 MG/DL (ref 0–99)
TRIGL SERPL-MCNC: 93 MG/DL (ref 0–149)
TSH SERPL DL<=0.005 MIU/L-ACNC: 3.03 UIU/ML (ref 0.45–4.5)
VLDLC SERPL CALC-MCNC: 19 MG/DL (ref 5–40)

## 2019-11-15 ENCOUNTER — HOSPITAL ENCOUNTER (OUTPATIENT)
Dept: BONE DENSITY | Age: 84
Discharge: HOME | End: 2019-11-15
Attending: INTERNAL MEDICINE
Payer: MEDICARE

## 2019-11-15 DIAGNOSIS — Z78.0 MENOPAUSE: ICD-10-CM

## 2019-11-15 PROCEDURE — 77080 DXA BONE DENSITY AXIAL: CPT

## 2019-11-16 RX ORDER — ALENDRONATE SODIUM 70 MG/1
70 TABLET ORAL
Qty: 12 TAB | Refills: 3 | Status: SHIPPED | OUTPATIENT
Start: 2019-11-16 | End: 2019-11-16 | Stop reason: CLARIF

## 2019-11-16 RX ORDER — ALENDRONATE SODIUM 70 MG/1
70 TABLET ORAL
Qty: 12 TAB | Refills: 3 | Status: SHIPPED | OUTPATIENT
Start: 2019-11-16 | End: 2020-02-26

## 2019-11-16 NOTE — PROGRESS NOTES
Discussed with pt and .  They wish to start on fosamax 70 mg weekly along with calcium and vit d--escribed

## 2019-12-21 DIAGNOSIS — F41.9 ANXIETY: ICD-10-CM

## 2019-12-23 RX ORDER — ESCITALOPRAM OXALATE 20 MG/1
20 TABLET ORAL DAILY
Qty: 90 TAB | Refills: 1 | Status: SHIPPED | OUTPATIENT
Start: 2019-12-23 | End: 2020-08-25 | Stop reason: SDUPTHER

## 2020-01-30 ENCOUNTER — HOSPITAL ENCOUNTER (OUTPATIENT)
Dept: INFUSION THERAPY | Age: 85
Discharge: HOME | End: 2020-01-30
Payer: MEDICARE

## 2020-01-30 ENCOUNTER — TELEPHONE (OUTPATIENT)
Dept: INTERNAL MEDICINE CLINIC | Age: 85
End: 2020-01-30

## 2020-01-30 VITALS
SYSTOLIC BLOOD PRESSURE: 171 MMHG | HEART RATE: 74 BPM | TEMPERATURE: 96.8 F | WEIGHT: 113.3 LBS | DIASTOLIC BLOOD PRESSURE: 89 MMHG | RESPIRATION RATE: 18 BRPM | BODY MASS INDEX: 22.24 KG/M2 | HEIGHT: 60 IN | OXYGEN SATURATION: 97 %

## 2020-01-30 LAB
ALBUMIN SERPL-MCNC: 3.8 G/DL (ref 3.5–5)
ANION GAP SERPL CALC-SCNC: 4 MMOL/L (ref 5–15)
BUN SERPL-MCNC: 14 MG/DL (ref 6–20)
BUN/CREAT SERPL: 18 (ref 12–20)
CALCIUM SERPL-MCNC: 9.1 MG/DL (ref 8.5–10.1)
CHLORIDE SERPL-SCNC: 104 MMOL/L (ref 97–108)
CO2 SERPL-SCNC: 30 MMOL/L (ref 21–32)
CREAT SERPL-MCNC: 0.8 MG/DL (ref 0.55–1.02)
GLUCOSE SERPL-MCNC: 73 MG/DL (ref 65–100)
MAGNESIUM SERPL-MCNC: 1.9 MG/DL (ref 1.6–2.4)
PHOSPHATE SERPL-MCNC: 3.1 MG/DL (ref 2.6–4.7)
POTASSIUM SERPL-SCNC: 3.8 MMOL/L (ref 3.5–5.1)
SODIUM SERPL-SCNC: 138 MMOL/L (ref 136–145)

## 2020-01-30 PROCEDURE — 96372 THER/PROPH/DIAG INJ SC/IM: CPT

## 2020-01-30 PROCEDURE — 83735 ASSAY OF MAGNESIUM: CPT

## 2020-01-30 PROCEDURE — 80069 RENAL FUNCTION PANEL: CPT

## 2020-01-30 PROCEDURE — 36415 COLL VENOUS BLD VENIPUNCTURE: CPT

## 2020-01-30 PROCEDURE — 74011250636 HC RX REV CODE- 250/636: Performed by: INTERNAL MEDICINE

## 2020-01-30 RX ADMIN — DENOSUMAB 60 MG: 60 INJECTION SUBCUTANEOUS at 12:25

## 2020-01-30 NOTE — TELEPHONE ENCOUNTER
Spoke with [place]. Notified [place] after speaking with Dr. Cain Samaniego, pt is OK to have prolia infusion. [place] verbalized understanding of information discussed w/ no further questions at this time.

## 2020-01-30 NOTE — TELEPHONE ENCOUNTER
Jojo//[facility] states she needs a call back in reference to patient there Now for Prolia Injection that patient reports having a Tooth Extraction done on 12/18/19, approx. 6 wks ago & needs to know if Ok to receive Prolia injection today. Please call to advise.  Thank you

## 2020-01-30 NOTE — PROGRESS NOTES
[facility] Progress Note    Date: 2020    Name: Lady Lozoya    MRN: [id number]         : 1932    1115. Ms. Jannie Galindo Arrived ambulatory and in no distress for Prolia Injection.  accompanying patient today. Assessment was completed, no acute issues at this time, no new complaints voiced. Patient reports she had a tooth extracted on 19- Called and spoke to Tracey Matson who reported per Dr. Genoveva Ramos, it is okay to proceed with prolia injection today. Education regarding new medication reviewed with patient and - verbalized understanding. Patient aware not to have anymore invasive dental work for 6-8 weeks. Patient Vitals for the past 12 hrs:   Temp Pulse Resp BP SpO2   20 1116 96.8 °F (36 °C) 74 18 171/89 97 %         Medications:  Prolia Subcutaneous to L arm by PHYLLIS Lai RN    9618. Ms. Jannie Galindo tolerated treatment well and was discharged from [place] [address] in stable condition. She is to return on 20 for her next appointment.     Alessia Jonas RN  2020

## 2020-02-17 ENCOUNTER — APPOINTMENT (OUTPATIENT)
Dept: CT IMAGING | Age: 85
End: 2020-02-17
Attending: EMERGENCY MEDICINE
Payer: MEDICARE

## 2020-02-17 ENCOUNTER — HOSPITAL ENCOUNTER (EMERGENCY)
Age: 85
Discharge: HOME | End: 2020-02-17
Attending: EMERGENCY MEDICINE
Payer: MEDICARE

## 2020-02-17 VITALS
HEIGHT: 62 IN | TEMPERATURE: 98.2 F | WEIGHT: 105 LBS | BODY MASS INDEX: 19.32 KG/M2 | RESPIRATION RATE: 14 BRPM | OXYGEN SATURATION: 99 % | SYSTOLIC BLOOD PRESSURE: 142 MMHG | DIASTOLIC BLOOD PRESSURE: 66 MMHG | HEART RATE: 61 BPM

## 2020-02-17 DIAGNOSIS — S32.000A COMPRESSION FRACTURE OF LUMBAR VERTEBRA, INITIAL ENCOUNTER, UNSPECIFIED LUMBAR VERTEBRAL LEVEL: Primary | ICD-10-CM

## 2020-02-17 PROCEDURE — 70450 CT HEAD/BRAIN W/O DYE: CPT

## 2020-02-17 PROCEDURE — 99284 EMERGENCY DEPT VISIT MOD MDM: CPT

## 2020-02-17 PROCEDURE — 72125 CT NECK SPINE W/O DYE: CPT

## 2020-02-17 PROCEDURE — 72131 CT LUMBAR SPINE W/O DYE: CPT

## 2020-02-17 NOTE — ED TRIAGE NOTES
Pt states she had a mechanical fall last night and is having low back pain at this time.  Pt a/o x4 at this,  at bedside

## 2020-02-17 NOTE — ED NOTES
I have reviewed discharge instructions with the patient. The patient verbalized understanding.  Pt wheeled to lobby with , no distress noted, no needs at time

## 2020-02-17 NOTE — PROGRESS NOTES
Physical Therapy Screening:  A referral to physical therapy order is indicated when the patient is medically stable. A screening was performed on this patient's chart based on their entrance into the emergency department and potential need for physical therapy identified. The patients chart was reviewed and the patient interviewed/screened and found to be appropriate for a skilled therapy evaluation. Please consult for physical therapy if you would like an evaluation to be completed. Thank you.     Pippa Rodriguez PT

## 2020-02-17 NOTE — DISCHARGE INSTRUCTIONS
Patient Education        Compression Fracture of the Spine: Care Instructions  Your Care Instructions    A compression fracture happens when the front part of a spinal bone breaks and collapses. A fall or other accident can cause it. A minor injury or moving the wrong way can cause a break if you have thin or brittle bones (osteoporosis). These types of breaks will heal in 8 to 10 weeks. You will need rest and pain medicines. Your doctor may recommend physical therapy. Some doctors recommend that certain people with compression fractures wear braces. Your doctor also may treat thin or brittle bones. You may need surgery if you have lasting pain or if the bone presses on the spinal cord or nerves. You heal best when you take good care of yourself. Eat a variety of healthy foods, and don't smoke. Follow-up care is a key part of your treatment and safety. Be sure to make and go to all appointments, and call your doctor if you are having problems. It's also a good idea to know your test results and keep a list of the medicines you take. How can you care for yourself at home? · Be safe with medicines. Read and follow all instructions on the label. ? If the doctor gave you a prescription medicine for pain, take it as prescribed. ? If you are not taking a prescription pain medicine, ask your doctor if you can take an over-the-counter medicine. · Talk to your doctor about how to make your bones stronger. You may need medicines or a change in what you eat. · Be active only as directed by your doctor. When should you call for help? Call 911 anytime you think you may need emergency care. For example, call if:    · You are unable to move an arm or a leg at all.   [facility] your doctor now or seek immediate medical care if:    · You have new or worse symptoms in your arms, legs, belly, or buttocks. Symptoms may include:  ? Numbness or tingling. ? Weakness.   ? Pain.     · You lose bladder or bowel control.     · You have belly pain, bloating, vomiting, or nausea.    Watch closely for changes in your health, and be sure to contact your doctor if:    · You do not get better as expected. Where can you learn more? Go to http://jelani-joyce.info/. Enter P445 in the search box to learn more about \"Compression Fracture of the Spine: Care Instructions. \"  Current as of: June 26, 2019  Content Version: 12.2  © 8115-7941 [employer]. Care instructions adapted under license by [employer] (which disclaims liability or warranty for this information). If you have questions about a medical condition or this instruction, always ask your healthcare professional. [address] any warranty or liability for your use of this information.

## 2020-02-18 DIAGNOSIS — F43.23 ADJUSTMENT DISORDER WITH MIXED ANXIETY AND DEPRESSED MOOD: ICD-10-CM

## 2020-02-18 DIAGNOSIS — F90.9 ATTENTION DEFICIT HYPERACTIVITY DISORDER (ADHD), UNSPECIFIED ADHD TYPE: ICD-10-CM

## 2020-02-18 NOTE — ED PROVIDER NOTES
EMERGENCY DEPARTMENT HISTORY AND PHYSICAL EXAM      Date: 2/17/2020  Patient Name: Pacheco Almeida    History of Presenting Illness     Chief Complaint   Patient presents with    Fall       History Provided By: Patient    HPI: Pacheco Almeida, 80 y.o. female with PMHx as noted below presents the emergency department for evaluation of fall and back pain. Per , patient had a mechanical ground-level fall last night. He does report that she hit her head and has been complaining of lower back since. She has been ambulatory and notes that she has been acting at baseline. He reports that she was checked by [facility] and had a negative urinalysis but was sent to the emergency department for imaging. Other than back pain, patient has no complaints although history is limited secondary to her dementia.  reports no urinary incontinence and patient is denying any saddle anesthesia or lower extremity weakness    PCP: Sam Crow MD    Current Outpatient Medications   Medication Sig Dispense Refill    escitalopram oxalate (LEXAPRO) 20 mg tablet Take 1 Tab by mouth daily. 90 Tab 1    alendronate (FOSAMAX) 70 mg tablet Take 1 Tab by mouth every seven (7) days. 12 Tab 3    lisdexamfetamine (VYVANSE) 20 mg capsule Take 1 Cap by mouth daily. Max Daily Amount: 20 mg. May fill 1-15-20  Indications: Attention Deficit Disorder with Hyperactivity 30 Cap 0    atorvastatin (LIPITOR) 10 mg tablet TAKE 1 TABLET DAILY 90 Tab 3    meclizine (ANTIVERT) 25 mg tablet Take 1 Tab by mouth three (3) times daily as needed for Dizziness. 20 Tab 0    mirabegron ER (MYRBETRIQ) 50 mg ER tablet Take 1 Tab by mouth daily. 90 Tab 3    memantine-donepezil (NAMZARIC) 28-10 mg CSpX Take 1 Tab by mouth daily. 90 Cap 3    traMADol (ULTRAM) 50 mg tablet Take 1 Tab by mouth every six (6) hours as needed for Pain. Max Daily Amount: 200 mg.  Indications: Pain 10 Tab 0    albuterol (PROAIR HFA) 90 mcg/actuation inhaler Take 1 Puff by inhalation every four (4) hours as needed for Wheezing or Shortness of Breath. Indications: Acute Asthma Attack 3 Inhaler 3    diclofenac (VOLTAREN) 1 % gel Apply 2 g to affected area four (4) times daily. Apply to hands qid 100 g 3    montelukast (SINGULAIR) 10 mg tablet Take 10 mg by mouth daily.  POLYVINYL ALCOHOL (LIQUID TEARS OP) Apply  to eye.  fluticasone (FLONASE) 50 mcg/actuation nasal spray 2 Sprays by Both Nostrils route daily. Allergy note June 2013.  cholecalciferol, vitamin d3, (VITAMIN D) 1,000 unit tablet Take  by mouth daily. Past History     Past Medical History:  Past Medical History:   Diagnosis Date    Asthma     Dr. Genet August June 2013--doesn't see routinely--pt reports no change in allergies--no need for routine follow-up. Dr. Merlin Hoffmann sees for mild COPD--on Symbicort since ~Nov 2013. Continued Singulair.  Ceruminosis     periodic [facility]    Chronic obstructive pulmonary disease ([facility] [address]     Encounter for screening colonoscopy ~2006    Normal per pt--[facility] records requested July 2013 visit.  Fainting     Falls     Fatigue     Hearing loss, left     va hearing    History of mammogram May 2014    Normal/no change.  History of shingles 2010    Dec 2014:  Pt reports clinical history and shingles vaccine 4yrs ago. To try to obtain exact date of zostavax from /source.  History of skin cancer     melanoma 2003?, BCC & SCC- followed by Dr. Marie Calle- twice a yr appts. July 2013-still on 6mo schedule for skin checks.  Memory disorder     Osteopenia     DEXA 8/11    Overactive bladder     detrol LA. Vesicare--sees Urology Spring/Fall. Considering procedure for incontinence.  Postmenopausal hormone replacement therapy     since hysterectomy    Right shoulder pain Sept 2013. Mild osteopenia; mild AC osteoarthritis. Sports Med Eval Oct 2013.     Well woman exam (no gynecological exam) Aug 3, 2012    \"LifeLine Screening\": Results scanned to chart. Updated 2014-no change except noted concern for GFR--see Dec 2014 note, letter as reviewed with pt. Past Surgical History:  Past Surgical History:   Procedure Laterality Date    HX CATARACT REMOVAL      HX HYSTERECTOMY  1948    MULTIPLE DELIVERY       MI Lisa Cooney- last eye exam 2010       Family History:  Family History   Problem Relation Age of Onset   Segundo Dementia Father     Hypertension Mother     Stroke Mother     Cancer Other         daughter- breast cancer       Social History:  Social History     Tobacco Use    Smoking status: Former Smoker    Smokeless tobacco: Never Used   Substance Use Topics    Alcohol use: Yes     Alcohol/week: 0.0 standard drinks     Frequency: 4 or more times a week     Comment: 1-2 drinks/every evening ( wine/vodka)    Drug use: Yes     Types: Prescription, OTC       Allergies: Allergies   Allergen Reactions    Latex Unknown (comments)    Dog Dander Shortness of Breath         Review of Systems   Review of Systems   Unable to perform ROS: Dementia         Physical Exam   Physical Exam    GENERAL: alert and oriented, no acute distress  EYES: PEERL, No injection, discharge or icterus. ENT: Mucous membranes pink and moist.  NECK: Supple  LUNGS: Airway patent. Non-labored respirations. Breath sounds clear with good air entry bilaterally. HEART: Regular rate and rhythm. No peripheral edema  ABDOMEN: Non-distended and non-tender, without guarding or rebound. SKIN:  warm, dry  MSK/EXTREMITIES: ext's Without swelling, tenderness or deformity, symmetric with normal ROM.   There is some midline lumbar spine tenderness  NEUROLOGICAL: Alert, oriented, strength 5/5 bilateral lower extremities, sensation intact and equal throughout to light touch       Diagnostic Study Results     Labs -   No results found for this or any previous visit (from the past 12 hour(s)). Radiologic Studies -   CT HEAD WO CONT   Final Result   IMPRESSION: No acute changes. CT SPINE CERV WO CONT   Final Result   IMPRESSION: No acute findings. CT SPINE LUMB WO CONT   Final Result   IMPRESSION: Acute compression deformity of L3 as detailed above. CT Results  (Last 48 hours)               02/17/20 1454  CT HEAD WO CONT Final result    Impression:  IMPRESSION: No acute changes. Narrative:  EXAM: CT HEAD WO CONT       INDICATION: fall       COMPARISON: 7/4/2019. CONTRAST: None. TECHNIQUE: Unenhanced CT of the head was performed using 5 mm images. Brain and   bone windows were generated. CT dose reduction was achieved through use of a   standardized protocol tailored for this examination and automatic exposure   control for dose modulation. FINDINGS:   Atrophy with nonspecific white matter changes once again demonstrated. There is   no extra-axial fluid collection hemorrhage or shift no masses. 02/17/20 1454  CT SPINE CERV WO CONT Final result    Impression:  IMPRESSION: No acute findings. Narrative:  EXAM:  CT CERVICAL SPINE WITHOUT CONTRAST       INDICATION: fall. COMPARISON: None. CONTRAST:  None. TECHNIQUE: Multislice helical CT of the cervical spine was performed without   intravenous contrast administration. Sagittal and coronal reconstructions were   generated. CT dose reduction was achieved through use of a standardized   protocol tailored for this examination and automatic exposure control for dose   modulation. FINDINGS:   There is no acute fracture. A small anterior subluxation approximately 2 mm at C4-5 is likely degenerative   in nature. Prominent disc degeneration seen at C6-C7, milder changes C4-5 C5-6   with some spondylosis. There is no bone or foraminal canal compromise.            02/17/20 1454  CT SPINE LUMB WO CONT Final result    Impression:  IMPRESSION: Acute compression deformity of L3 as detailed above. Narrative:  Clinical indication: Fall, low back pain. Axial CT scan of the lumbar sacral spine obtained without contrast with coronal   and sagittal reconstructions. CT dose reduction was achieved through the use of   a standardized protocol tailored for this examination and automatic exposure   control for dose modulation . There is a acute fracture of the superior endplate of L3. There is some the left   side a retropulsed bone fragment with a 30% canal compromise. Alignment is   otherwise satisfactory, no additional fracture. There is chronic disc   degeneration from L2 to L5. L3-L4 shows some mild central canal stenosis with facet hypertrophy. Mild canal stenosis also seen at L4-5. Facet disease without canal or foraminal compromise mainly on the right at   L5-S1. CXR Results  (Last 48 hours)    None            Medical Decision Making   I am the first provider for this patient. I reviewed the vital signs, available nursing notes, past medical history, past surgical history, family history and social history. Vital Signs-Reviewed the patient's vital signs. Records Reviewed: Nursing Notes and Old Medical Records    Provider Notes (Medical Decision Making): On presentation, the patient is well appearing, in no acute distress with normal vital signs. Based on my history and exam the differential diagnosis for this patient includes spinal fracture, closed head injury, intracranial hemorrhage. Patient acting at baseline with no other complaints per  so would not obtain further work-up as this reportedly is a mechanical fall. Imaging notable for a compression deformity of L3 with some retropulsed bone fragments and a 30% canal compromise, alignment is otherwise satisfactory. She is neurologically intact and able to ambulate unassisted so no emergent intervention indicated.   Will refer to spine specialist for further evaluation. ED Course:   Initial assessment performed. The patients presenting problems have been discussed, and they are in agreement with the care plan formulated and outlined with them. I have encouraged them to ask questions as they arise throughout their visit. PROGRESS NOTE:  The patient has been re-evaluated and is ready for discharge. Reviewed available results with patient and have counseled them on diagnosis and care plan. They have expressed understanding, and all their questions have been answered. They agree with plan and agree to have pt F/U as recommended, or return to the ED if their sxs worsen. Discharge instructions have been provided and explained to them, along with reasons to have pt return to the ED. The patient is amenable to discharge so will discharge pt at this time      Disposition:  home    PLAN:  1. Discharge Medication List as of 2/17/2020  4:02 PM        2. Follow-up Information     Follow up With Specialties Details Why Contact Info    Randi Taegue MD Internal Medicine Schedule an appointment as soon as possible for a visit in 2 days  [address] [facility]  [address] [facility]      Letitia Davidson MD Neurosurgery Schedule an appointment as soon as possible for a visit in 2 days  [address] [facility] [address] [facility]      [place]  Schedule an appointment as soon as possible for a visit in 2 days  [address]  [address] [facility],[address]  [address] [facility]  [phone]        Return to ED if worse     Diagnosis     Clinical Impression:   1. Compression fracture of lumbar vertebra, initial encounter, unspecified lumbar vertebral level ([address]        Please note that this dictation was completed with Dragon, computer voice recognition software. Quite often unanticipated grammatical, syntax, homophones, and other interpretive errors are inadvertently transcribed by the computer software.   Please disregard these errors. Additionally, please excuse any errors that have escaped final proofreading.

## 2020-02-19 RX ORDER — MIRABEGRON 50 MG/1
TABLET, FILM COATED, EXTENDED RELEASE ORAL
Qty: 90 TAB | Refills: 4 | Status: SHIPPED | OUTPATIENT
Start: 2020-02-19 | End: 2021-09-13 | Stop reason: ALTCHOICE

## 2020-02-20 RX ORDER — MEMANTINE HYDROCHLORIDE AND DONEPEZIL HYDROCHLORIDE 28; 10 MG/1; MG/1
CAPSULE ORAL
Qty: 90 CAP | Refills: 4 | Status: SHIPPED | OUTPATIENT
Start: 2020-02-20 | End: 2021-05-08

## 2020-02-25 ENCOUNTER — TELEPHONE (OUTPATIENT)
Dept: INTERNAL MEDICINE CLINIC | Age: 85
End: 2020-02-25

## 2020-02-25 NOTE — TELEPHONE ENCOUNTER
Spoke with Aviva Espinal. Contact information given for BS scheduling. Toby verbalized understanding of information discussed w/ no further questions at this time.

## 2020-02-25 NOTE — TELEPHONE ENCOUNTER
#[phone]   states he needs assistance with getting pt's MRI done so she can have her back repaired. Please call Mr. Nara Fountain for details as I didn't know if he was to make the appt or are we?   This pertains to [employer]

## 2020-02-26 ENCOUNTER — HOSPITAL ENCOUNTER (OUTPATIENT)
Dept: MRI IMAGING | Age: 85
Discharge: HOME | End: 2020-02-26
Attending: ORTHOPAEDIC SURGERY
Payer: MEDICARE

## 2020-02-26 ENCOUNTER — HOSPITAL ENCOUNTER (OUTPATIENT)
Dept: INTERVENTIONAL RADIOLOGY/VASCULAR | Age: 85
Discharge: HOME | End: 2020-02-26
Attending: ORTHOPAEDIC SURGERY
Payer: MEDICARE

## 2020-02-26 VITALS
TEMPERATURE: 98.3 F | OXYGEN SATURATION: 98 % | HEIGHT: 60 IN | WEIGHT: 107 LBS | SYSTOLIC BLOOD PRESSURE: 116 MMHG | BODY MASS INDEX: 21.01 KG/M2 | RESPIRATION RATE: 20 BRPM | DIASTOLIC BLOOD PRESSURE: 58 MMHG | HEART RATE: 68 BPM

## 2020-02-26 DIAGNOSIS — M54.50 LOW BACK PAIN: ICD-10-CM

## 2020-02-26 DIAGNOSIS — S32.030A COMPRESSION FRACTURE OF L3 VERTEBRA, INITIAL ENCOUNTER (HCC): ICD-10-CM

## 2020-02-26 PROCEDURE — 77030040175 HC TAMP SPN BN INFLT KYPH MEDT -I1

## 2020-02-26 PROCEDURE — 72148 MRI LUMBAR SPINE W/O DYE: CPT

## 2020-02-26 PROCEDURE — 74011636320 HC RX REV CODE- 636/320: Performed by: RADIOLOGY

## 2020-02-26 PROCEDURE — C1713 ANCHOR/SCREW BN/BN,TIS/BN: HCPCS

## 2020-02-26 PROCEDURE — 22514 PERQ VERTEBRAL AUGMENTATION: CPT

## 2020-02-26 PROCEDURE — 74011250636 HC RX REV CODE- 250/636: Performed by: RADIOLOGY

## 2020-02-26 PROCEDURE — 77030003666 HC NDL SPINAL BD -A

## 2020-02-26 PROCEDURE — 74011000250 HC RX REV CODE- 250: Performed by: RADIOLOGY

## 2020-02-26 RX ORDER — BUPIVACAINE HYDROCHLORIDE 5 MG/ML
10 INJECTION, SOLUTION EPIDURAL; INTRACAUDAL ONCE
Status: COMPLETED | OUTPATIENT
Start: 2020-02-26 | End: 2020-02-26

## 2020-02-26 RX ORDER — LIDOCAINE HYDROCHLORIDE 20 MG/ML
20 INJECTION, SOLUTION INFILTRATION; PERINEURAL ONCE
Status: COMPLETED | OUTPATIENT
Start: 2020-02-26 | End: 2020-02-26

## 2020-02-26 RX ORDER — KETOROLAC TROMETHAMINE 30 MG/ML
15 INJECTION, SOLUTION INTRAMUSCULAR; INTRAVENOUS ONCE
Status: COMPLETED | OUTPATIENT
Start: 2020-02-26 | End: 2020-02-26

## 2020-02-26 RX ORDER — DIPHENHYDRAMINE HYDROCHLORIDE 50 MG/ML
25-50 INJECTION, SOLUTION INTRAMUSCULAR; INTRAVENOUS ONCE
Status: DISCONTINUED | OUTPATIENT
Start: 2020-02-26 | End: 2020-02-26

## 2020-02-26 RX ORDER — HEPARIN SODIUM 200 [USP'U]/100ML
400 INJECTION, SOLUTION INTRAVENOUS ONCE
Status: COMPLETED | OUTPATIENT
Start: 2020-02-26 | End: 2020-02-26

## 2020-02-26 RX ORDER — SODIUM CHLORIDE 9 MG/ML
25 INJECTION, SOLUTION INTRAVENOUS CONTINUOUS
Status: DISCONTINUED | OUTPATIENT
Start: 2020-02-26 | End: 2020-02-26

## 2020-02-26 RX ORDER — MIDAZOLAM HYDROCHLORIDE 1 MG/ML
5 INJECTION, SOLUTION INTRAMUSCULAR; INTRAVENOUS
Status: DISCONTINUED | OUTPATIENT
Start: 2020-02-26 | End: 2020-02-26

## 2020-02-26 RX ORDER — FENTANYL CITRATE 50 UG/ML
100 INJECTION, SOLUTION INTRAMUSCULAR; INTRAVENOUS
Status: DISCONTINUED | OUTPATIENT
Start: 2020-02-26 | End: 2020-02-26

## 2020-02-26 RX ADMIN — SODIUM CHLORIDE 25 ML/HR: 900 INJECTION, SOLUTION INTRAVENOUS at 09:21

## 2020-02-26 RX ADMIN — MIDAZOLAM 1 MG: 1 INJECTION INTRAMUSCULAR; INTRAVENOUS at 10:25

## 2020-02-26 RX ADMIN — WATER 2 G: 1 INJECTION INTRAMUSCULAR; INTRAVENOUS; SUBCUTANEOUS at 09:59

## 2020-02-26 RX ADMIN — HEPARIN SODIUM IN SODIUM CHLORIDE 400 UNITS: 200 INJECTION INTRAVENOUS at 10:15

## 2020-02-26 RX ADMIN — LIDOCAINE HYDROCHLORIDE 200 MG: 20 INJECTION, SOLUTION INFILTRATION; PERINEURAL at 10:15

## 2020-02-26 RX ADMIN — MIDAZOLAM 1 MG: 1 INJECTION INTRAMUSCULAR; INTRAVENOUS at 10:21

## 2020-02-26 RX ADMIN — KETOROLAC TROMETHAMINE 15 MG: 30 INJECTION, SOLUTION INTRAMUSCULAR at 09:22

## 2020-02-26 RX ADMIN — FENTANYL CITRATE 25 MCG: 50 INJECTION, SOLUTION INTRAMUSCULAR; INTRAVENOUS at 10:21

## 2020-02-26 RX ADMIN — MIDAZOLAM 1 MG: 1 INJECTION INTRAMUSCULAR; INTRAVENOUS at 10:16

## 2020-02-26 RX ADMIN — BUPIVACAINE HYDROCHLORIDE 50 MG: 5 INJECTION, SOLUTION EPIDURAL; INTRACAUDAL; PERINEURAL at 10:15

## 2020-02-26 RX ADMIN — FENTANYL CITRATE 50 MCG: 50 INJECTION, SOLUTION INTRAMUSCULAR; INTRAVENOUS at 10:10

## 2020-02-26 RX ADMIN — MIDAZOLAM 2 MG: 1 INJECTION INTRAMUSCULAR; INTRAVENOUS at 10:10

## 2020-02-26 RX ADMIN — FENTANYL CITRATE 25 MCG: 50 INJECTION, SOLUTION INTRAMUSCULAR; INTRAVENOUS at 10:16

## 2020-02-26 RX ADMIN — IOPAMIDOL 3 ML: 612 INJECTION, SOLUTION INTRATHECAL at 10:15

## 2020-02-26 NOTE — H&P
Radiology History and Physical    Patient: Ruben Kaba 80 y.o. female       Chief Complaint: No chief complaint on file. History of Present Illness: L3 kypho    History:    Past Medical History:   Diagnosis Date    Asthma     Dr. Driscoll Guardian June 2013--doesn't see routinely--pt reports no change in allergies--no need for routine follow-up. Dr. Esteban Voss sees for mild COPD--on Symbicort since ~Nov 2013. Continued Singulair.  Ceruminosis     periodic ear cleaning at [facility]    Chronic obstructive pulmonary disease ([facility] [address]     Encounter for screening colonoscopy ~2006    Normal per pt--[facility] records requested July 2013 visit.  Fainting     Falls     Fatigue     Hearing loss, left     va hearing    History of mammogram May 2014    Normal/no change.  History of shingles 2010    Dec 2014:  Pt reports clinical history and shingles vaccine 4yrs ago. To try to obtain exact date of zostavax from /source.  History of skin cancer     melanoma 2003?, BCC & SCC- followed by Dr. Solomon Friendly- twice a yr appts. July 2013-still on 6mo schedule for skin checks.  Memory disorder     Osteopenia     DEXA 8/11    Overactive bladder     detrol LA. Vesicare--sees Urology Spring/Fall. Considering procedure for incontinence.  Postmenopausal hormone replacement therapy     since hysterectomy    Right shoulder pain Sept 2013. Mild osteopenia; mild AC osteoarthritis. Sports Med Eval Oct 2013.  Well woman exam (no gynecological exam) Aug 3, 2012    \"LifeLine Screening\":  Results scanned to chart. Updated Nov 2014-no change except noted concern for GFR--see Dec 2014 note, letter as reviewed with pt.      Family History   Problem Relation Age of Onset   Aundria Dadds Dementia Father     Hypertension Mother     Stroke Mother     Cancer Other         daughter- breast cancer     Social History     Socioeconomic History    Marital status:      Spouse name: Not on file    Number of children: Not on file    Years of education: Not on file    Highest education level: Not on file   Occupational History    Not on file   Social Needs    Financial resource strain: Not on file    Food insecurity:     Worry: Not on file     Inability: Not on file    Transportation needs:     Medical: Not on file     Non-medical: Not on file   Tobacco Use    Smoking status: Former Smoker    Smokeless tobacco: Never Used   Substance and Sexual Activity    Alcohol use: Yes     Alcohol/week: 0.0 standard drinks     Frequency: 4 or more times a week     Comment: 1-2 drinks/every evening ( wine/vodka)    Drug use: Yes     Types: Prescription, OTC    Sexual activity: Never     Birth control/protection: None   Lifestyle    Physical activity:     Days per week: Not on file     Minutes per session: Not on file    Stress: Not on file   Relationships    Social connections:     Talks on phone: Not on file     Gets together: Not on file     Attends Christian service: Not on file     Active member of club or organization: Not on file     Attends meetings of clubs or organizations: Not on file     Relationship status: Not on file    Intimate partner violence:     Fear of current or ex partner: Not on file     Emotionally abused: Not on file     Physically abused: Not on file     Forced sexual activity: Not on file   Other Topics Concern    Not on file   Social History Narrative    Not on file       Allergies: Allergies   Allergen Reactions    Latex Unknown (comments)    Dog Dander Shortness of Breath       Current Medications:  Current Outpatient Medications   Medication Sig    NAMZARIC 28-10 mg CSpX TAKE 1 CAPSULE DAILY    MYRBETRIQ 50 mg ER tablet TAKE 1 TABLET DAILY    [START ON 3/19/2020] lisdexamfetamine (VYVANSE) 20 mg capsule Take 1 Cap by mouth daily for 30 days. Max Daily Amount: 20 mg.    escitalopram oxalate (LEXAPRO) 20 mg tablet Take 1 Tab by mouth daily.     atorvastatin (LIPITOR) 10 mg tablet TAKE 1 TABLET DAILY    traMADol (ULTRAM) 50 mg tablet Take 1 Tab by mouth every six (6) hours as needed for Pain. Max Daily Amount: 200 mg. Indications: Pain    albuterol (PROAIR HFA) 90 mcg/actuation inhaler Take 1 Puff by inhalation every four (4) hours as needed for Wheezing or Shortness of Breath. Indications: Acute Asthma Attack    montelukast (SINGULAIR) 10 mg tablet Take 10 mg by mouth daily.  cholecalciferol, vitamin d3, (VITAMIN D) 1,000 unit tablet Take  by mouth daily. No current facility-administered medications for this encounter. Physical Exam:  Blood pressure 106/67, pulse 66, temperature 98.3 °F (36.8 °C), resp. rate 18, height 5' (1.524 m), weight 48.5 kg (107 lb), SpO2 100 %, not currently breastfeeding. LUNG: clear to auscultation bilaterally, HEART: regular rate and rhythm      Alerts:    Hospital Problems  Date Reviewed: 11/4/2019    None          Laboratory:    No results for input(s): HGB, HCT, WBC, PLT, INR, BUN, CREA, K, CRCLT, HGBEXT, HCTEXT, PLTEXT, INREXT in the last 72 hours. No lab exists for component: PTT, PT      Plan of Care/Planned Procedure:  Risks, benefits, and alternatives reviewed with patient and she agrees to proceed with the procedure.        Casimiro Roth MD

## 2020-02-26 NOTE — ROUTINE PROCESS
Dr. Juve Vasques into speak with pt and patients family. Procedure explained along with risks and benefits; consent obtained for Kyphoplasty of L3.

## 2020-02-26 NOTE — ROUTINE PROCESS
Discharge instructions have been reviewed with patient and present family. Copy given to patient's . Pt verbalized understand of instructions and was given  the opportunity to ask questions and receive answers prior to leaving. Pt discharged with family and assisted out by RN in wheelchair.

## 2020-02-26 NOTE — DISCHARGE INSTRUCTIONS
[address] [facility]   Department of Interventional Radiology  [phone]    Radiologist:  Dr. Alicia Arcos    Date: 02/26/2020       Kyphoplasty Discharge Instructions    Go home and rest  and restrict your activity the next 24 hours. You have been given sedating medications, so do not drive or drink alcohol today. Resume your previous diet and medications. You may take Tylenol, as directed on the label, for pain or discomfort. Avoid Ibuprofen (Advil, Motrin etc.) and Aspirin today as they may increase your risk of bleeding. Avoid heavy lifting (nothing greater than 5 pounds),  excessive bending,  and pushing or pulling movements for one week. Then begin to advance your activity as tolerated. Be sure to follow up with your physician, and let him know how you are progressing. If you have new severe pain, numbness, tingling, or weakness in your legs go to the nearest emergency department, and tell them you have had a Kyphoplasty.

## 2020-02-28 RX ORDER — CEPHALEXIN 500 MG/1
500 CAPSULE ORAL 3 TIMES DAILY
Qty: 21 CAP | Refills: 0 | Status: SHIPPED | OUTPATIENT
Start: 2020-02-28 | End: 2020-03-06

## 2020-03-06 DIAGNOSIS — S52.531A CLOSED COLLES' FRACTURE OF RIGHT RADIUS, INITIAL ENCOUNTER: ICD-10-CM

## 2020-03-06 RX ORDER — TRAMADOL HYDROCHLORIDE 50 MG/1
50 TABLET ORAL
Qty: 20 TAB | Refills: 0 | Status: SHIPPED | OUTPATIENT
Start: 2020-03-06 | End: 2020-04-05

## 2020-04-10 ENCOUNTER — VIRTUAL VISIT (OUTPATIENT)
Dept: INTERNAL MEDICINE CLINIC | Age: 85
End: 2020-04-10

## 2020-04-10 DIAGNOSIS — S32.030D COMPRESSION FRACTURE OF L3 VERTEBRA WITH ROUTINE HEALING, SUBSEQUENT ENCOUNTER: ICD-10-CM

## 2020-04-10 DIAGNOSIS — M81.0 OSTEOPOROSIS, UNSPECIFIED OSTEOPOROSIS TYPE, UNSPECIFIED PATHOLOGICAL FRACTURE PRESENCE: ICD-10-CM

## 2020-04-10 DIAGNOSIS — N32.81 OAB (OVERACTIVE BLADDER): ICD-10-CM

## 2020-04-10 DIAGNOSIS — F43.23 ADJUSTMENT DISORDER WITH MIXED ANXIETY AND DEPRESSED MOOD: ICD-10-CM

## 2020-04-10 DIAGNOSIS — E78.00 PURE HYPERCHOLESTEROLEMIA: Primary | ICD-10-CM

## 2020-04-10 DIAGNOSIS — F90.9 ATTENTION DEFICIT HYPERACTIVITY DISORDER (ADHD), UNSPECIFIED ADHD TYPE: ICD-10-CM

## 2020-04-10 PROBLEM — S32.030A COMPRESSION FRACTURE OF L3 VERTEBRA (HCC): Status: ACTIVE | Noted: 2020-04-10

## 2020-04-10 NOTE — PROGRESS NOTES
HISTORY OF PRESENT ILLNESS  Harlan Child is a 80 y.o. female. HPI     Harlan Child is a 80 y.o. female being evaluated by a Virtual Visit (video visit) encounter to address concerns as mentioned above. A caregiver was present when appropriate. Due to this being a TeleHealth encounter (During JKDLX-69 public health emergency), evaluation of the following organ systems was limited: Vitals/Constitutional/EENT/Resp/CV/GI//MS/Neuro/Skin/Heme-Lymph-Imm. Pursuant to the emergency declaration under the [facility], [address] [place] [address] authority and the [employer] Act, this Virtual Visit was conducted with patient's (and/or legal guardian's) consent, to reduce the risk of exposure to COVID-19 and provide necessary medical care. Services were provided through a video synchronous discussion virtually to substitute for in-person encounter. --Gerardo Coronado MD on 4/10/2020 at 12:05 PM    An electronic signature was used to authenticate this note.   F/u ADHD, dementia, osteoporosis HLD OAB  Started on fosamax last OV but change to prolia inj d/t forgetting to take fosamax    Had fall and LBP--s/p L3 Kyphoplasty by IR  No back pain now  Has poor short term memory per her   Still some urine frequency on myrbetriq    Will need refill oy vyvanse 4/18,5/18 and 6/18/20  Last OV     Here with  for routine f/u dementia, ADHD , HLD , OAB osteopenia and medicare wellness---------------------  Sees Dr Elana Jacobs for Dementia and ADHD  vynase was lowered to every other day dosing this year but taking again daily d/t agitation  On lexapro too and namzeric  Lives with  at [facility] independent living  Agitation controlled now back on medicines  Takes albuterol only prn       Patient Active Problem List    Diagnosis Date Noted    Alzheimer's disease ([facility] [address] 09/10/2018    ADD (attention deficit disorder) 01/18/2017    Hyperlipidemia 07/20/2015    Mild cognitive impairment without memory loss 02/02/2015    Generalized anxiety disorder 02/02/2015    TMJ (temporomandibular joint disorder) 12/21/2011    Syncope and collapse 08/10/2011    Fever, unspecified 08/10/2011    Syncope and collapse 08/10/2011    History of skin cancer     Asthma     Hearing loss, left     Ceruminosis     Postmenopausal hormone replacement therapy     Overactive bladder      Current Outpatient Medications   Medication Sig Dispense Refill    NAMZARIC 28-10 mg CSpX TAKE 1 CAPSULE DAILY 90 Cap 4    MYRBETRIQ 50 mg ER tablet TAKE 1 TABLET DAILY 90 Tab 4    escitalopram oxalate (LEXAPRO) 20 mg tablet Take 1 Tab by mouth daily. 90 Tab 1    atorvastatin (LIPITOR) 10 mg tablet TAKE 1 TABLET DAILY 90 Tab 3    albuterol (PROAIR HFA) 90 mcg/actuation inhaler Take 1 Puff by inhalation every four (4) hours as needed for Wheezing or Shortness of Breath. Indications: Acute Asthma Attack 3 Inhaler 3    montelukast (SINGULAIR) 10 mg tablet Take 10 mg by mouth daily.  cholecalciferol, vitamin d3, (VITAMIN D) 1,000 unit tablet Take  by mouth daily.  lisdexamfetamine (VYVANSE) 20 mg capsule Take 1 Cap by mouth daily for 30 days.  Max Daily Amount: 20 mg. 30 Cap 0     Allergies   Allergen Reactions    Latex Unknown (comments)    Dog Dander Shortness of Breath      Lab Results   Component Value Date/Time    WBC 5.2 07/04/2019 11:30 AM    HGB 13.7 07/04/2019 11:30 AM    HCT 41.0 07/04/2019 11:30 AM    PLATELET 489 82/42/2917 11:30 AM    MCV 97.2 07/04/2019 11:30 AM     Lab Results   Component Value Date/Time    Cholesterol, total 202 (H) 11/04/2019 11:27 AM    HDL Cholesterol 105 11/04/2019 11:27 AM    LDL, calculated 78 11/04/2019 11:27 AM    Triglyceride 93 11/04/2019 11:27 AM    CHOL/HDL Ratio 2.4 08/31/2010 10:48 AM     Lab Results   Component Value Date/Time    GFR est non-AA >60 01/30/2020 11:25 AM    GFR est AA >60 01/30/2020 11:25 AM Creatinine 0.80 01/30/2020 11:25 AM    BUN 14 01/30/2020 11:25 AM    Sodium 138 01/30/2020 11:25 AM    Potassium 3.8 01/30/2020 11:25 AM    Chloride 104 01/30/2020 11:25 AM    CO2 30 01/30/2020 11:25 AM    Magnesium 1.9 01/30/2020 11:25 AM    Phosphorus 3.1 01/30/2020 11:25 AM        ROS    Physical Exam  Constitutional:       Appearance: Normal appearance. Pulmonary:      Effort: Pulmonary effort is normal.   Neurological:      Mental Status: She is alert. ASSESSMENT and PLAN  Diagnoses and all orders for this visit:    1. Pure hypercholesterolemia   LDL at goal on statin-continue for now  2. Adjustment disorder with mixed anxiety and depressed mood   Stable , on ssri  3. Attention deficit hyperactivity disorder (ADHD), unspecified ADHD type   Refill vyvanse  4. OAB (overactive bladder)   On myrberiq  5. Osteoporosis, unspecified osteoporosis type, unspecified pathological fracture presence   On prolia, s/p L3 kyphoplasty   Fall precautions   On vit d and calcium tab  6. Compression fracture of L3 vertebra with routine healing, subsequent encounter    7.  Dementia   onb namzeric   F/u neurologist

## 2020-05-24 ENCOUNTER — DOCUMENTATION ONLY (OUTPATIENT)
Dept: INTERNAL MEDICINE CLINIC | Age: 85
End: 2020-05-24

## 2020-05-24 ENCOUNTER — E-VISIT (OUTPATIENT)
Dept: INTERNAL MEDICINE CLINIC | Age: 85
End: 2020-05-24

## 2020-05-24 DIAGNOSIS — N39.0 URINARY TRACT INFECTION WITHOUT HEMATURIA, SITE UNSPECIFIED: Primary | ICD-10-CM

## 2020-05-24 RX ORDER — CEFUROXIME AXETIL 250 MG/1
500 TABLET ORAL 2 TIMES DAILY
Qty: 10 TAB | Refills: 0 | Status: SHIPPED | OUTPATIENT
Start: 2020-05-24 | End: 2020-05-30

## 2020-05-30 RX ORDER — CEFUROXIME AXETIL 250 MG/1
TABLET ORAL
Qty: 10 TAB | Refills: 0 | Status: SHIPPED | OUTPATIENT
Start: 2020-05-30 | End: 2020-06-01

## 2020-06-01 ENCOUNTER — TELEPHONE (OUTPATIENT)
Dept: INTERNAL MEDICINE CLINIC | Age: 85
End: 2020-06-01

## 2020-06-01 DIAGNOSIS — R30.0 DYSURIA: Primary | ICD-10-CM

## 2020-06-01 DIAGNOSIS — F90.9 ATTENTION DEFICIT HYPERACTIVITY DISORDER (ADHD), UNSPECIFIED ADHD TYPE: Primary | ICD-10-CM

## 2020-06-01 RX ORDER — CEFUROXIME AXETIL 250 MG/1
500 TABLET ORAL 2 TIMES DAILY
Qty: 10 TAB | Refills: 0 | Status: SHIPPED | OUTPATIENT
Start: 2020-06-01 | End: 2020-06-01

## 2020-06-01 RX ORDER — CEFUROXIME AXETIL 250 MG/1
250 TABLET ORAL 2 TIMES DAILY
Qty: 10 TAB | Refills: 0 | Status: SHIPPED | OUTPATIENT
Start: 2020-06-01 | End: 2020-06-06

## 2020-06-01 NOTE — TELEPHONE ENCOUNTER
PCP: Marley Julio MD    Last appt: 4/10/2020  Future Appointments   Date Time Provider Fred Ibarra   7/30/2020 11:30 AM [place] INFUSION NURSE 1 [facility]   10/23/2020 10:00 AM [facility]       Requested Prescriptions     Pending Prescriptions Disp Refills    cefUROXime (CEFTIN) 250 mg tablet 10 Tab 0     Sig: Take 2 Tabs by mouth two (2) times a day.

## 2020-06-01 NOTE — TELEPHONE ENCOUNTER
----- Message from Julissa Eisenberg sent at 6/1/2020  8:43 AM EDT -----  Regarding: Dr. Porras Tg: [phone]  Caller's first and last name: Mariana Daly -    Reason for call: Pt is out of antibiotics but is still experiencing UTI symptoms and would like another antibiotic sent in   Callback required yes/no and why: Yes  Best contact number(s): [phone]      Message copied/pasted from [employer]

## 2020-06-16 DIAGNOSIS — E78.5 DYSLIPIDEMIA: ICD-10-CM

## 2020-06-16 RX ORDER — ATORVASTATIN CALCIUM 10 MG/1
TABLET, FILM COATED ORAL
Qty: 90 TAB | Refills: 3 | Status: SHIPPED | OUTPATIENT
Start: 2020-06-16 | End: 2021-06-11

## 2020-07-30 ENCOUNTER — HOSPITAL ENCOUNTER (OUTPATIENT)
Dept: INFUSION THERAPY | Age: 85
Discharge: HOME | End: 2020-07-30
Payer: MEDICARE

## 2020-07-30 VITALS
DIASTOLIC BLOOD PRESSURE: 81 MMHG | TEMPERATURE: 98 F | HEART RATE: 91 BPM | RESPIRATION RATE: 18 BRPM | SYSTOLIC BLOOD PRESSURE: 151 MMHG | OXYGEN SATURATION: 98 %

## 2020-07-30 LAB
ANION GAP BLD CALC-SCNC: 17 MMOL/L (ref 10–20)
BUN BLD-MCNC: 19 MG/DL (ref 9–20)
CA-I BLD-MCNC: 1.2 MMOL/L (ref 1.12–1.32)
CHLORIDE BLD-SCNC: 104 MMOL/L (ref 98–107)
CO2 BLD-SCNC: 25 MMOL/L (ref 21–32)
CREAT BLD-MCNC: 0.8 MG/DL (ref 0.6–1.3)
GLUCOSE BLD-MCNC: 78 MG/DL (ref 65–100)
HCT VFR BLD CALC: 41 % (ref 35–47)
MAGNESIUM SERPL-MCNC: 2.1 MG/DL (ref 1.6–2.4)
PHOSPHATE SERPL-MCNC: 3.4 MG/DL (ref 2.6–4.7)
POTASSIUM BLD-SCNC: 4 MMOL/L (ref 3.5–5.1)
SERVICE CMNT-IMP: NORMAL
SODIUM BLD-SCNC: 141 MMOL/L (ref 136–145)

## 2020-07-30 PROCEDURE — 84100 ASSAY OF PHOSPHORUS: CPT

## 2020-07-30 PROCEDURE — 74011250636 HC RX REV CODE- 250/636: Performed by: INTERNAL MEDICINE

## 2020-07-30 PROCEDURE — 83735 ASSAY OF MAGNESIUM: CPT

## 2020-07-30 PROCEDURE — 96372 THER/PROPH/DIAG INJ SC/IM: CPT

## 2020-07-30 PROCEDURE — 36415 COLL VENOUS BLD VENIPUNCTURE: CPT

## 2020-07-30 PROCEDURE — 80047 BASIC METABLC PNL IONIZED CA: CPT

## 2020-07-30 RX ADMIN — DENOSUMAB 60 MG: 60 INJECTION SUBCUTANEOUS at 11:45

## 2020-07-30 NOTE — PROGRESS NOTES
Pt arrived at [facility] ambulatory and in no acute distress for injection, accompanied by . Patient Vitals for the past 12 hrs:   Temp Pulse Resp BP SpO2   07/30/20 1129 98 °F (36.7 °C) 91 18 151/81 98 %     Recent Results (from the past 12 hour(s))   POC CHEM8    Collection Time: 07/30/20 11:41 AM   Result Value Ref Range    Calcium, ionized (POC) 1.20 1. 12 - 1.32 mmol/L    Sodium (POC) 141 136 - 145 mmol/L    Potassium (POC) 4.0 3.5 - 5.1 mmol/L    Chloride (POC) 104 98 - 107 mmol/L    CO2 (POC) 25 21 - 32 mmol/L    Anion gap (POC) 17 10 - 20 mmol/L    Glucose (POC) 78 65 - 100 mg/dL    BUN (POC) 19 9 - 20 mg/dL    Creatinine (POC) 0.8 0.6 - 1.3 mg/dL    GFRAA, POC >60 >60 ml/min/1.73m2    GFRNA, POC >60 >60 ml/min/1.73m2    Hematocrit (POC) 41 35.0 - 47.0 %    Comment Comment Not Indicated. Medications Administered     denosumab (PROLIA) injection 60 mg     Admin Date  07/30/2020 Action  Given Dose  60 mg Route  SubCUTAneous Administered By  Neeru Avendano RN                Pt tolerated injection well, no adverse reaction noted. D/Cd from [facility] ambulatory and in no acute distress.     Future Appointments   Date Time Provider Fred Ibarra   10/23/2020 10:00 AM Mayda Richardson MD [facility] BS AMB   2/5/2021 10:30 AM [place] INFUSION NURSE 1 [facility]

## 2020-07-31 DIAGNOSIS — F90.9 ATTENTION DEFICIT HYPERACTIVITY DISORDER (ADHD), UNSPECIFIED ADHD TYPE: ICD-10-CM

## 2020-08-16 ENCOUNTER — HOSPITAL ENCOUNTER (INPATIENT)
Age: 85
LOS: 3 days | Discharge: HOME | DRG: 312 | End: 2020-08-19
Attending: EMERGENCY MEDICINE | Admitting: INTERNAL MEDICINE
Payer: MEDICARE

## 2020-08-16 ENCOUNTER — APPOINTMENT (OUTPATIENT)
Dept: CT IMAGING | Age: 85
DRG: 312 | End: 2020-08-16
Attending: EMERGENCY MEDICINE
Payer: MEDICARE

## 2020-08-16 DIAGNOSIS — R56.9 SYNCOPAL SEIZURE (HCC): ICD-10-CM

## 2020-08-16 DIAGNOSIS — R55 SYNCOPAL SEIZURE (HCC): ICD-10-CM

## 2020-08-16 DIAGNOSIS — I65.23 BILATERAL CAROTID ARTERY STENOSIS: ICD-10-CM

## 2020-08-16 DIAGNOSIS — F02.80 ALZHEIMER'S DEMENTIA WITHOUT BEHAVIORAL DISTURBANCE, UNSPECIFIED TIMING OF DEMENTIA ONSET: ICD-10-CM

## 2020-08-16 DIAGNOSIS — R55 SYNCOPE AND COLLAPSE: Primary | ICD-10-CM

## 2020-08-16 DIAGNOSIS — G30.9 ALZHEIMER'S DEMENTIA WITHOUT BEHAVIORAL DISTURBANCE, UNSPECIFIED TIMING OF DEMENTIA ONSET: ICD-10-CM

## 2020-08-16 LAB
ALBUMIN SERPL-MCNC: 3.5 G/DL (ref 3.5–5)
ALBUMIN/GLOB SERPL: 0.9 {RATIO} (ref 1.1–2.2)
ALP SERPL-CCNC: 62 U/L (ref 45–117)
ALT SERPL-CCNC: 22 U/L (ref 12–78)
ANION GAP SERPL CALC-SCNC: 6 MMOL/L (ref 5–15)
APPEARANCE UR: CLEAR
AST SERPL-CCNC: 19 U/L (ref 15–37)
BACTERIA URNS QL MICRO: NEGATIVE /HPF
BASOPHILS # BLD: 0 K/UL (ref 0–0.1)
BASOPHILS NFR BLD: 0 % (ref 0–1)
BILIRUB SERPL-MCNC: 0.4 MG/DL (ref 0.2–1)
BILIRUB UR QL: NEGATIVE
BUN SERPL-MCNC: 18 MG/DL (ref 6–20)
BUN/CREAT SERPL: 26 (ref 12–20)
CALCIUM SERPL-MCNC: 9.3 MG/DL (ref 8.5–10.1)
CHLORIDE SERPL-SCNC: 104 MMOL/L (ref 97–108)
CO2 SERPL-SCNC: 27 MMOL/L (ref 21–32)
COLOR UR: ABNORMAL
CREAT SERPL-MCNC: 0.68 MG/DL (ref 0.55–1.02)
DIFFERENTIAL METHOD BLD: ABNORMAL
EOSINOPHIL # BLD: 0 K/UL (ref 0–0.4)
EOSINOPHIL NFR BLD: 0 % (ref 0–7)
EPITH CASTS URNS QL MICRO: ABNORMAL /LPF
ERYTHROCYTE [DISTWIDTH] IN BLOOD BY AUTOMATED COUNT: 13.2 % (ref 11.5–14.5)
GLOBULIN SER CALC-MCNC: 4 G/DL (ref 2–4)
GLUCOSE SERPL-MCNC: 89 MG/DL (ref 65–100)
GLUCOSE UR STRIP.AUTO-MCNC: NEGATIVE MG/DL
HCT VFR BLD AUTO: 38.9 % (ref 35–47)
HGB BLD-MCNC: 12.9 G/DL (ref 11.5–16)
HGB UR QL STRIP: NEGATIVE
IMM GRANULOCYTES # BLD AUTO: 0 K/UL (ref 0–0.04)
IMM GRANULOCYTES NFR BLD AUTO: 0 % (ref 0–0.5)
KETONES UR QL STRIP.AUTO: ABNORMAL MG/DL
LEUKOCYTE ESTERASE UR QL STRIP.AUTO: ABNORMAL
LYMPHOCYTES # BLD: 0.9 K/UL (ref 0.8–3.5)
LYMPHOCYTES NFR BLD: 13 % (ref 12–49)
MCH RBC QN AUTO: 32.5 PG (ref 26–34)
MCHC RBC AUTO-ENTMCNC: 33.2 G/DL (ref 30–36.5)
MCV RBC AUTO: 98 FL (ref 80–99)
MONOCYTES # BLD: 0.5 K/UL (ref 0–1)
MONOCYTES NFR BLD: 7 % (ref 5–13)
NEUTS SEG # BLD: 5.6 K/UL (ref 1.8–8)
NEUTS SEG NFR BLD: 80 % (ref 32–75)
NITRITE UR QL STRIP.AUTO: NEGATIVE
NRBC # BLD: 0 K/UL (ref 0–0.01)
NRBC BLD-RTO: 0 PER 100 WBC
OTHER,OTHU: ABNORMAL
PH UR STRIP: 7 [PH] (ref 5–8)
PLATELET # BLD AUTO: 278 K/UL (ref 150–400)
PMV BLD AUTO: 9.7 FL (ref 8.9–12.9)
POTASSIUM SERPL-SCNC: 3.6 MMOL/L (ref 3.5–5.1)
PROT SERPL-MCNC: 7.5 G/DL (ref 6.4–8.2)
PROT UR STRIP-MCNC: NEGATIVE MG/DL
RBC # BLD AUTO: 3.97 M/UL (ref 3.8–5.2)
RBC #/AREA URNS HPF: ABNORMAL /HPF (ref 0–5)
SODIUM SERPL-SCNC: 137 MMOL/L (ref 136–145)
SP GR UR REFRACTOMETRY: 1.03 (ref 1–1.03)
TROPONIN I SERPL-MCNC: <0.05 NG/ML
UROBILINOGEN UR QL STRIP.AUTO: 1 EU/DL (ref 0.2–1)
WBC # BLD AUTO: 7.1 K/UL (ref 3.6–11)
WBC URNS QL MICRO: ABNORMAL /HPF (ref 0–4)

## 2020-08-16 PROCEDURE — 65660000000 HC RM CCU STEPDOWN

## 2020-08-16 PROCEDURE — 70450 CT HEAD/BRAIN W/O DYE: CPT

## 2020-08-16 PROCEDURE — 85025 COMPLETE CBC W/AUTO DIFF WBC: CPT

## 2020-08-16 PROCEDURE — 74011250636 HC RX REV CODE- 250/636: Performed by: INTERNAL MEDICINE

## 2020-08-16 PROCEDURE — 93005 ELECTROCARDIOGRAM TRACING: CPT

## 2020-08-16 PROCEDURE — 36415 COLL VENOUS BLD VENIPUNCTURE: CPT

## 2020-08-16 PROCEDURE — 81001 URINALYSIS AUTO W/SCOPE: CPT

## 2020-08-16 PROCEDURE — 80053 COMPREHEN METABOLIC PANEL: CPT

## 2020-08-16 PROCEDURE — 84484 ASSAY OF TROPONIN QUANT: CPT

## 2020-08-16 PROCEDURE — 99285 EMERGENCY DEPT VISIT HI MDM: CPT

## 2020-08-16 RX ORDER — LEVETIRACETAM 5 MG/ML
500 INJECTION INTRAVASCULAR EVERY 12 HOURS
Status: DISCONTINUED | OUTPATIENT
Start: 2020-08-16 | End: 2020-08-18

## 2020-08-16 RX ORDER — MONTELUKAST SODIUM 10 MG/1
10 TABLET ORAL DAILY
Status: DISCONTINUED | OUTPATIENT
Start: 2020-08-17 | End: 2020-08-19 | Stop reason: HOSPADM

## 2020-08-16 RX ORDER — LORAZEPAM 2 MG/ML
0.5 INJECTION INTRAMUSCULAR
Status: DISCONTINUED | OUTPATIENT
Start: 2020-08-16 | End: 2020-08-19 | Stop reason: HOSPADM

## 2020-08-16 RX ORDER — ENOXAPARIN SODIUM 100 MG/ML
40 INJECTION SUBCUTANEOUS DAILY
Status: DISCONTINUED | OUTPATIENT
Start: 2020-08-17 | End: 2020-08-17

## 2020-08-16 RX ORDER — ALBUTEROL SULFATE 90 UG/1
1 AEROSOL, METERED RESPIRATORY (INHALATION)
Status: DISCONTINUED | OUTPATIENT
Start: 2020-08-16 | End: 2020-08-16

## 2020-08-16 RX ORDER — SODIUM CHLORIDE 0.9 % (FLUSH) 0.9 %
5-40 SYRINGE (ML) INJECTION AS NEEDED
Status: DISCONTINUED | OUTPATIENT
Start: 2020-08-16 | End: 2020-08-19 | Stop reason: HOSPADM

## 2020-08-16 RX ORDER — POLYETHYLENE GLYCOL 3350 17 G/17G
17 POWDER, FOR SOLUTION ORAL DAILY PRN
Status: DISCONTINUED | OUTPATIENT
Start: 2020-08-16 | End: 2020-08-19 | Stop reason: HOSPADM

## 2020-08-16 RX ORDER — ACETAMINOPHEN 325 MG/1
650 TABLET ORAL
Status: DISCONTINUED | OUTPATIENT
Start: 2020-08-16 | End: 2020-08-17

## 2020-08-16 RX ORDER — ONDANSETRON 2 MG/ML
4 INJECTION INTRAMUSCULAR; INTRAVENOUS
Status: DISCONTINUED | OUTPATIENT
Start: 2020-08-16 | End: 2020-08-19 | Stop reason: HOSPADM

## 2020-08-16 RX ORDER — SODIUM CHLORIDE 0.9 % (FLUSH) 0.9 %
5-40 SYRINGE (ML) INJECTION EVERY 8 HOURS
Status: DISCONTINUED | OUTPATIENT
Start: 2020-08-16 | End: 2020-08-19 | Stop reason: HOSPADM

## 2020-08-16 RX ORDER — ACETAMINOPHEN 650 MG/1
650 SUPPOSITORY RECTAL
Status: DISCONTINUED | OUTPATIENT
Start: 2020-08-16 | End: 2020-08-19 | Stop reason: HOSPADM

## 2020-08-16 RX ORDER — PROMETHAZINE HYDROCHLORIDE 25 MG/1
12.5 TABLET ORAL
Status: DISCONTINUED | OUTPATIENT
Start: 2020-08-16 | End: 2020-08-19 | Stop reason: HOSPADM

## 2020-08-16 RX ORDER — ALBUTEROL SULFATE 0.83 MG/ML
2.5 SOLUTION RESPIRATORY (INHALATION)
Status: DISCONTINUED | OUTPATIENT
Start: 2020-08-16 | End: 2020-08-19 | Stop reason: HOSPADM

## 2020-08-16 RX ORDER — ACETAMINOPHEN 325 MG/1
650 TABLET ORAL
Status: DISCONTINUED | OUTPATIENT
Start: 2020-08-16 | End: 2020-08-19 | Stop reason: HOSPADM

## 2020-08-16 RX ORDER — ATORVASTATIN CALCIUM 10 MG/1
10 TABLET, FILM COATED ORAL DAILY
Status: DISCONTINUED | OUTPATIENT
Start: 2020-08-17 | End: 2020-08-19 | Stop reason: HOSPADM

## 2020-08-16 RX ADMIN — Medication 10 ML: at 23:18

## 2020-08-16 RX ADMIN — LORAZEPAM 0.5 MG: 2 INJECTION INTRAMUSCULAR; INTRAVENOUS at 21:50

## 2020-08-16 RX ADMIN — LEVETIRACETAM 500 MG: 5 INJECTION INTRAVENOUS at 22:14

## 2020-08-16 RX ADMIN — Medication 10 ML: at 21:51

## 2020-08-16 NOTE — ED PROVIDER NOTES
EMERGENCY DEPARTMENT HISTORY AND PHYSICAL EXAM      Date: 8/16/2020  Patient Name: Shayla Forbes    History of Presenting Illness     Chief Complaint   Patient presents with    Syncope     arrived by EMS after  had concerns of possible seizures; + syncope; patient walked from stretcher to ED 33 bed without difficulty. History Provided By: Patient and Patient's     HPI: Shayla Forbes, 80 y.o. female presents to the ED with cc of syncope. Patient's  reports fainting spells x3 today. No history of prior. 2 in AM, EMS came and patient declined transport due to normal vitals. 3rd episode this afternoon.  reports she felt nausea, tensed up, while sitting and was lowered to ground. No trauma.  noted her muscles were tense and she was tremoring. < 5 min. No memory of episode. No loss of bladder or bowel control. Patient without complaints. Patient's  reports she slept in until 9 AM. History limited from patient 2/2 dementia. There are no other complaints, changes, or physical findings at this time. PCP: Jacob Gardner MD    No current facility-administered medications on file prior to encounter. Current Outpatient Medications on File Prior to Encounter   Medication Sig Dispense Refill    lisdexamfetamine (VYVANSE) 20 mg capsule Take 1 Cap by mouth daily for 90 days. Max Daily Amount: 20 mg. 90 Cap 0    atorvastatin (LIPITOR) 10 mg tablet TAKE 1 TABLET DAILY 90 Tab 3    lisdexamfetamine (VYVANSE) 20 mg capsule Take 1 Cap by mouth daily for 29 days. Max Daily Amount: 20 mg. 30 Cap 0    NAMZARIC 28-10 mg CSpX TAKE 1 CAPSULE DAILY 90 Cap 4    MYRBETRIQ 50 mg ER tablet TAKE 1 TABLET DAILY 90 Tab 4    escitalopram oxalate (LEXAPRO) 20 mg tablet Take 1 Tab by mouth daily. 90 Tab 1    albuterol (PROAIR HFA) 90 mcg/actuation inhaler Take 1 Puff by inhalation every four (4) hours as needed for Wheezing or Shortness of Breath.  Indications: Acute Asthma Attack 3 Inhaler 3    montelukast (SINGULAIR) 10 mg tablet Take 10 mg by mouth daily.  cholecalciferol, vitamin d3, (VITAMIN D) 1,000 unit tablet Take  by mouth daily. Past History     Past Medical History:  Past Medical History:   Diagnosis Date    Asthma     Dr. Srinath Cummings 2013--doesn't see routinely--pt reports no change in allergies--no need for routine follow-up. Dr. Elizabeth Lord sees for mild COPD--on Symbicort since ~2013. Continued Singulair.  Ceruminosis     periodic [facility]    Chronic obstructive pulmonary disease ([facility] [address]     Encounter for screening colonoscopy ~    Normal per pt--[facility] records requested 2013 visit.  Fainting     Falls     Fatigue     Hearing loss, left     [facility]    History of mammogram May 2014    Normal/no change.  History of shingles 2010    Dec 2014:  Pt reports clinical history and shingles vaccine 4yrs ago. To try to obtain exact date of zostavax from /source.  History of skin cancer     melanoma ?, BCC & SCC- followed by Dr. Mariana Yee- twice a yr appts. 2013-still on 6mo schedule for skin checks.  Memory disorder     Osteopenia     DEXA     Overactive bladder     detrol LA. Vesicare--sees [facility] Considering procedure for incontinence.  Postmenopausal hormone replacement therapy     since hysterectomy    Right shoulder pain 2013. Mild osteopenia; mild AC osteoarthritis. Sports Med Eval Oct 2013.  Well woman exam (no gynecological exam) Aug 3, 2012    \"[employer] Screening\":  Results scanned to chart. Updated 2014-no change except noted concern for GFR--see Dec 2014 note, letter as reviewed with pt.        Past Surgical History:  Past Surgical History:   Procedure Laterality Date    HX CATARACT REMOVAL      HX HYSTERECTOMY  1948    IR KYPHOPLASTY LUMBAR  2020    MULTIPLE DELIVERY       MT TRABECULOPLASTY Terry Pineda - [facility]- last eye exam 4/2010       Family History:  Family History   Problem Relation Age of Onset   [address] [facility] Dementia Father     Hypertension Mother     Stroke Mother     Cancer Other         daughter- breast cancer       Social History:  Social History     Tobacco Use    Smoking status: Former Smoker    Smokeless tobacco: Never Used   Substance Use Topics    Alcohol use: Yes     Alcohol/week: 0.0 standard drinks     Frequency: 4 or more times a week     Comment: 1-2 drinks/every evening ( wine/vodka)    Drug use: Yes     Types: Prescription, OTC       Allergies: Allergies   Allergen Reactions    Latex Unknown (comments)    Dog Dander Shortness of Breath         Review of Systems   Review of Systems   Constitutional: Negative for activity change, appetite change, chills, fatigue and fever. HENT: Negative for facial swelling, hearing loss, trouble swallowing and voice change. Eyes: Negative for redness. Respiratory: Negative for cough, chest tightness, shortness of breath and wheezing. Cardiovascular: Negative for chest pain and leg swelling. Gastrointestinal: Negative for abdominal pain, diarrhea, nausea and vomiting. Genitourinary: Positive for frequency. Negative for decreased urine volume. Musculoskeletal: Negative for gait problem. Skin: Negative for pallor and rash. Neurological: Positive for syncope. Negative for dizziness, tremors, facial asymmetry, speech difficulty, weakness, numbness and headaches. Psychiatric/Behavioral: Negative for agitation. All other systems reviewed and are negative. Physical Exam   Physical Exam  Vitals signs and nursing note reviewed. Constitutional:       Comments: 26-year-old female, resting in bed, no distress   HENT:      Head: Normocephalic and atraumatic. Neck:      Musculoskeletal: Normal range of motion. Cardiovascular:      Rate and Rhythm: Normal rate and regular rhythm. Heart sounds: No murmur. No friction rub.  No gallop. Pulmonary:      Effort: Pulmonary effort is normal.      Breath sounds: Normal breath sounds. Abdominal:      Palpations: Abdomen is soft. Tenderness: There is no abdominal tenderness. Musculoskeletal: Normal range of motion. Skin:     General: Skin is warm. Capillary Refill: Capillary refill takes less than 2 seconds. Neurological:      General: No focal deficit present. Mental Status: She is alert. Comments: CN grossly intact   Psychiatric:         Mood and Affect: Mood normal.         Diagnostic Study Results     Labs -     Recent Results (from the past 12 hour(s))   CBC WITH AUTOMATED DIFF    Collection Time: 08/16/20  6:09 PM   Result Value Ref Range    WBC 7.1 3.6 - 11.0 K/uL    RBC 3.97 3.80 - 5.20 M/uL    HGB 12.9 11.5 - 16.0 g/dL    HCT 38.9 35.0 - 47.0 %    MCV 98.0 80.0 - 99.0 FL    MCH 32.5 26.0 - 34.0 PG    MCHC 33.2 30.0 - 36.5 g/dL    RDW 13.2 11.5 - 14.5 %    PLATELET 916 751 - 413 K/uL    MPV 9.7 8.9 - 12.9 FL    NRBC 0.0 0  WBC    ABSOLUTE NRBC 0.00 0.00 - 0.01 K/uL    NEUTROPHILS 80 (H) 32 - 75 %    LYMPHOCYTES 13 12 - 49 %    MONOCYTES 7 5 - 13 %    EOSINOPHILS 0 0 - 7 %    BASOPHILS 0 0 - 1 %    IMMATURE GRANULOCYTES 0 0.0 - 0.5 %    ABS. NEUTROPHILS 5.6 1.8 - 8.0 K/UL    ABS. LYMPHOCYTES 0.9 0.8 - 3.5 K/UL    ABS. MONOCYTES 0.5 0.0 - 1.0 K/UL    ABS. EOSINOPHILS 0.0 0.0 - 0.4 K/UL    ABS. BASOPHILS 0.0 0.0 - 0.1 K/UL    ABS. IMM.  GRANS. 0.0 0.00 - 0.04 K/UL    DF AUTOMATED     METABOLIC PANEL, COMPREHENSIVE    Collection Time: 08/16/20  6:09 PM   Result Value Ref Range    Sodium 137 136 - 145 mmol/L    Potassium 3.6 3.5 - 5.1 mmol/L    Chloride 104 97 - 108 mmol/L    CO2 27 21 - 32 mmol/L    Anion gap 6 5 - 15 mmol/L    Glucose 89 65 - 100 mg/dL    BUN 18 6 - 20 MG/DL    Creatinine 0.68 0.55 - 1.02 MG/DL    BUN/Creatinine ratio 26 (H) 12 - 20      GFR est AA >60 >60 ml/min/1.73m2    GFR est non-AA >60 >60 ml/min/1.73m2    Calcium 9.3 8.5 - 10.1 MG/DL    Bilirubin, total 0.4 0.2 - 1.0 MG/DL    ALT (SGPT) 22 12 - 78 U/L    AST (SGOT) 19 15 - 37 U/L    Alk. phosphatase 62 45 - 117 U/L    Protein, total 7.5 6.4 - 8.2 g/dL    Albumin 3.5 3.5 - 5.0 g/dL    Globulin 4.0 2.0 - 4.0 g/dL    A-G Ratio 0.9 (L) 1.1 - 2.2     TROPONIN I    Collection Time: 08/16/20  6:09 PM   Result Value Ref Range    Troponin-I, Qt. <0.05 <0.05 ng/mL   EKG, 12 LEAD, INITIAL    Collection Time: 08/16/20  6:18 PM   Result Value Ref Range    Ventricular Rate 63 BPM    Atrial Rate 63 BPM    P-R Interval 158 ms    QRS Duration 74 ms    Q-T Interval 446 ms    QTC Calculation (Bezet) 456 ms    Calculated P Axis 4 degrees    Calculated R Axis 62 degrees    Calculated T Axis 33 degrees    Diagnosis       Normal sinus rhythm  Normal ECG  When compared with ECG of 06-MAY-2017 08:31,  Questionable change in QRS axis     URINALYSIS W/ RFLX MICROSCOPIC    Collection Time: 08/16/20  6:39 PM   Result Value Ref Range    Color YELLOW/STRAW      Appearance CLEAR CLEAR      Specific gravity 1.029 1.003 - 1.030      pH (UA) 7.0 5.0 - 8.0      Protein Negative NEG mg/dL    Glucose Negative NEG mg/dL    Ketone TRACE (A) NEG mg/dL    Bilirubin Negative NEG      Blood Negative NEG      Urobilinogen 1.0 0.2 - 1.0 EU/dL    Nitrites Negative NEG      Leukocyte Esterase SMALL (A) NEG      WBC 10-20 0 - 4 /hpf    RBC 5-10 0 - 5 /hpf    Epithelial cells FEW FEW /lpf    Bacteria Negative NEG /hpf    Other: Renal Epithelial cells Present         Radiologic Studies -   CT HEAD WO CONT   Final Result   IMPRESSION:    No acute intracranial abnormality. CT Results  (Last 48 hours)               08/16/20 1734  CT HEAD WO CONT Final result    Impression:  IMPRESSION:    No acute intracranial abnormality. Narrative:  EXAM:  CT HEAD WO CONT       INDICATION: Seizure. COMPARISON: CT 2/17/2020       TECHNIQUE: Axial noncontrast head CT from foramen magnum to vertex.  Coronal and   sagittal reformatted images were obtained. CT dose reduction was achieved   through use of a standardized protocol tailored for this examination and   automatic exposure control for dose modulation. FINDINGS:  There is diffuse age-related parenchymal volume loss. The ventricles   and sulci are age-appropriate without hydrocephalus. There is no mass effect or   midline shift. There is no intracranial hemorrhage or extra-axial fluid   collection. Areas of low attenuation in the periventricular white matter   represent stable chronic microvascular ischemic changes. The gray-white matter   differentiation is maintained. The basal cisterns are patent. The osseous structures are intact. The visualized paranasal sinuses and mastoid   air cells are clear. CXR Results  (Last 48 hours)    None          Medical Decision Making   I am the first provider for this patient. I reviewed the vital signs, available nursing notes, past medical history, past surgical history, family history and social history. Vital Signs-Reviewed the patient's vital signs. Patient Vitals for the past 12 hrs:   Temp Pulse Resp BP SpO2   08/16/20 1702 97.9 °F (36.6 °C) 79 16 138/61 100 %         Records Reviewed: Nursing Notes and Old Medical Records    Provider Notes (Medical Decision Making):     79-year-old female presents emergency department with 3 episodes of loss of consciousness. Vital signs are WNL. Pulse ox not hypoxic. DDx is broad, includes convulsive syncope vs. Seizure although history is very difficult given patient's dementia. Given patient's age and non-prodromal episode while seated this is higher risk for non-benign cause of syncope. Check EKG and basic labs and UA. Check CT H.    ED Course:   Initial assessment performed. The patients presenting problems have been discussed, and they are in agreement with the care plan formulated and outlined with them.   I have encouraged them to ask questions as they arise throughout their visit. ED Course as of Aug 16 2209   Sun Aug 16, 2020   1830 EKG interpreted by me. Shows sinus rhythm with a HR of 63. No ST elevations or depressions concerning for ischemia. Normal intervals. No predisposition for syncope. [MB]   2047 Patient's family was upset regarding wait for bed. Provided updates and turned on the lights. [MB]      ED Course User Index  [MB] Dennie Gimenez, MD     Updates as above. UA shows few leukoesterase and bacteria, we will await culture prior to treating. Labs otherwise unremarkable. Discussed with hospitalist for admission. Walter Garcia MD      Disposition:    Hospitalization      Diagnosis     Clinical Impression:   1. Syncope and collapse        Attestations:    Walter Garcia MD    Please note that this dictation was completed with [employer], the computer voice recognition software. Quite often unanticipated grammatical, syntax, homophones, and other interpretive errors are inadvertently transcribed by the computer software. Please disregard these errors. Please excuse any errors that have escaped final proofreading. Thank you.

## 2020-08-17 ENCOUNTER — APPOINTMENT (OUTPATIENT)
Dept: NON INVASIVE DIAGNOSTICS | Age: 85
DRG: 312 | End: 2020-08-17
Attending: INTERNAL MEDICINE
Payer: MEDICARE

## 2020-08-17 ENCOUNTER — APPOINTMENT (OUTPATIENT)
Dept: VASCULAR SURGERY | Age: 85
DRG: 312 | End: 2020-08-17
Attending: PSYCHIATRY & NEUROLOGY
Payer: MEDICARE

## 2020-08-17 PROBLEM — I65.23 BILATERAL CAROTID ARTERY STENOSIS: Status: ACTIVE | Noted: 2020-08-17

## 2020-08-17 LAB
ALBUMIN SERPL-MCNC: 3 G/DL (ref 3.5–5)
ALBUMIN/GLOB SERPL: 0.8 {RATIO} (ref 1.1–2.2)
ALP SERPL-CCNC: 51 U/L (ref 45–117)
ALT SERPL-CCNC: 20 U/L (ref 12–78)
ANION GAP SERPL CALC-SCNC: 4 MMOL/L (ref 5–15)
AST SERPL-CCNC: 14 U/L (ref 15–37)
ATRIAL RATE: 63 BPM
BASOPHILS # BLD: 0 K/UL (ref 0–0.1)
BASOPHILS NFR BLD: 0 % (ref 0–1)
BILIRUB SERPL-MCNC: 0.4 MG/DL (ref 0.2–1)
BUN SERPL-MCNC: 15 MG/DL (ref 6–20)
BUN/CREAT SERPL: 22 (ref 12–20)
CALCIUM SERPL-MCNC: 8.5 MG/DL (ref 8.5–10.1)
CALCULATED P AXIS, ECG09: 4 DEGREES
CALCULATED R AXIS, ECG10: 62 DEGREES
CALCULATED T AXIS, ECG11: 33 DEGREES
CHLORIDE SERPL-SCNC: 108 MMOL/L (ref 97–108)
CO2 SERPL-SCNC: 27 MMOL/L (ref 21–32)
CREAT SERPL-MCNC: 0.68 MG/DL (ref 0.55–1.02)
DIAGNOSIS, 93000: NORMAL
DIFFERENTIAL METHOD BLD: ABNORMAL
ECHO AO ROOT DIAM: 2.41 CM
ECHO AV AREA PEAK VELOCITY: 1.89 CM2
ECHO AV AREA/BSA PEAK VELOCITY: 1.3 CM2/M2
ECHO AV CUSP MM: 1.53 CM
ECHO AV PEAK GRADIENT: 5.79 MMHG
ECHO AV PEAK VELOCITY: 120.3 CM/S
ECHO EST RA PRESSURE: 10 MMHG
ECHO LA MAJOR AXIS: 2.7 CM
ECHO LA MINOR AXIS: 1.89 CM
ECHO LA TO AORTIC ROOT RATIO: 1.18
ECHO LA TO AORTIC ROOT RATIO: 1.18
ECHO LV E' LATERAL VELOCITY: 6.48 CM/S
ECHO LV E' SEPTAL VELOCITY: 6.8 CM/S
ECHO LV INTERNAL DIMENSION DIASTOLIC: 3.36 CM (ref 3.9–5.3)
ECHO LV INTERNAL DIMENSION SYSTOLIC: 2.12 CM
ECHO LV IVSD: 1.5 CM (ref 0.6–0.9)
ECHO LV IVSS: 1.61 CM
ECHO LV MASS 2D: 161.6 G (ref 67–162)
ECHO LV MASS INDEX 2D: 113 G/M2 (ref 43–95)
ECHO LV POSTERIOR WALL DIASTOLIC: 1.28 CM (ref 0.6–0.9)
ECHO LV POSTERIOR WALL SYSTOLIC: 1.55 CM
ECHO LVOT DIAM: 1.78 CM
ECHO LVOT PEAK GRADIENT: 3.36 MMHG
ECHO LVOT PEAK VELOCITY: 91.6 CM/S
ECHO MV A VELOCITY: 133.86 CM/S
ECHO MV E DECELERATION TIME (DT): 0.2 S
ECHO MV E VELOCITY: 106.57 CM/S
ECHO MV E/A RATIO: 0.8
ECHO MV E/E' LATERAL: 16.45
ECHO MV E/E' RATIO (AVERAGED): 16.06
ECHO MV E/E' SEPTAL: 15.67
ECHO MV MAX VELOCITY: 143.25 CM/S
ECHO MV MEAN GRADIENT: 2.95 MMHG
ECHO MV PEAK GRADIENT: 8.21 MMHG
ECHO MV VTI: 32.53 CM
ECHO PV MAX VELOCITY: 95.92 CM/S
ECHO PV PEAK INSTANTANEOUS GRADIENT SYSTOLIC: 3.69 MMHG
ECHO PV REGURGITANT MAX VELOCITY: 473.39 CM/S
ECHO RIGHT VENTRICULAR SYSTOLIC PRESSURE (RVSP): 29.72 MMHG
ECHO RV INTERNAL DIMENSION: 3.29 CM
ECHO TV REGURGITANT MAX VELOCITY: 220.3 CM/S
ECHO TV REGURGITANT PEAK GRADIENT: 19.72 MMHG
EOSINOPHIL # BLD: 0 K/UL (ref 0–0.4)
EOSINOPHIL NFR BLD: 0 % (ref 0–7)
ERYTHROCYTE [DISTWIDTH] IN BLOOD BY AUTOMATED COUNT: 13.2 % (ref 11.5–14.5)
GLOBULIN SER CALC-MCNC: 3.6 G/DL (ref 2–4)
GLUCOSE SERPL-MCNC: 98 MG/DL (ref 65–100)
HCT VFR BLD AUTO: 36.7 % (ref 35–47)
HGB BLD-MCNC: 11.9 G/DL (ref 11.5–16)
IMM GRANULOCYTES # BLD AUTO: 0 K/UL (ref 0–0.04)
IMM GRANULOCYTES NFR BLD AUTO: 1 % (ref 0–0.5)
LEFT CCA DIST DIAS: 15.1 CM/S
LEFT CCA DIST SYS: 72.2 CM/S
LEFT CCA PROX DIAS: 17.3 CM/S
LEFT CCA PROX SYS: 83.1 CM/S
LEFT ECA DIAS: 0 CM/S
LEFT ECA SYS: 89.7 CM/S
LEFT ICA DIST DIAS: 21.9 CM/S
LEFT ICA DIST SYS: 76.3 CM/S
LEFT ICA MID DIAS: 21.7 CM/S
LEFT ICA MID SYS: 72.2 CM/S
LEFT ICA PROX DIAS: 15.1 CM/S
LEFT ICA PROX SYS: 83.1 CM/S
LEFT ICA/CCA SYS: 1.2
LEFT SUBCLAVIAN DIAS: 0 CM/S
LEFT SUBCLAVIAN SYS: 155.5 CM/S
LEFT VERTEBRAL DIAS: 0 CM/S
LEFT VERTEBRAL SYS: 41.4 CM/S
LYMPHOCYTES # BLD: 0.9 K/UL (ref 0.8–3.5)
LYMPHOCYTES NFR BLD: 13 % (ref 12–49)
MCH RBC QN AUTO: 31.9 PG (ref 26–34)
MCHC RBC AUTO-ENTMCNC: 32.4 G/DL (ref 30–36.5)
MCV RBC AUTO: 98.4 FL (ref 80–99)
MONOCYTES # BLD: 0.6 K/UL (ref 0–1)
MONOCYTES NFR BLD: 8 % (ref 5–13)
NEUTS SEG # BLD: 5.7 K/UL (ref 1.8–8)
NEUTS SEG NFR BLD: 78 % (ref 32–75)
NRBC # BLD: 0 K/UL (ref 0–0.01)
NRBC BLD-RTO: 0 PER 100 WBC
P-R INTERVAL, ECG05: 158 MS
PLATELET # BLD AUTO: 264 K/UL (ref 150–400)
PMV BLD AUTO: 10.1 FL (ref 8.9–12.9)
POTASSIUM SERPL-SCNC: 3.7 MMOL/L (ref 3.5–5.1)
PROT SERPL-MCNC: 6.6 G/DL (ref 6.4–8.2)
Q-T INTERVAL, ECG07: 446 MS
QRS DURATION, ECG06: 74 MS
QTC CALCULATION (BEZET), ECG08: 456 MS
RBC # BLD AUTO: 3.73 M/UL (ref 3.8–5.2)
RIGHT CCA DIST DIAS: 19.5 CM/S
RIGHT CCA DIST SYS: 83.2 CM/S
RIGHT CCA PROX DIAS: 19.5 CM/S
RIGHT CCA PROX SYS: 85.4 CM/S
RIGHT ECA DIAS: 0 CM/S
RIGHT ECA SYS: 59 CM/S
RIGHT ICA DIST DIAS: 17.9 CM/S
RIGHT ICA DIST SYS: 68.7 CM/S
RIGHT ICA MID DIAS: 19.5 CM/S
RIGHT ICA MID SYS: 65.6 CM/S
RIGHT ICA PROX DIAS: 15.1 CM/S
RIGHT ICA PROX SYS: 48.1 CM/S
RIGHT ICA/CCA SYS: 0.8
RIGHT SUBCLAVIAN DIAS: 0 CM/S
RIGHT SUBCLAVIAN SYS: 135.8 CM/S
RIGHT VERTEBRAL DIAS: 10.8 CM/S
RIGHT VERTEBRAL SYS: 56.8 CM/S
SODIUM SERPL-SCNC: 139 MMOL/L (ref 136–145)
TSH SERPL DL<=0.05 MIU/L-ACNC: 1.9 UIU/ML (ref 0.36–3.74)
VENTRICULAR RATE, ECG03: 63 BPM
WBC # BLD AUTO: 7.2 K/UL (ref 3.6–11)

## 2020-08-17 PROCEDURE — 95816 EEG AWAKE AND DROWSY: CPT | Performed by: INTERNAL MEDICINE

## 2020-08-17 PROCEDURE — 74011250637 HC RX REV CODE- 250/637: Performed by: INTERNAL MEDICINE

## 2020-08-17 PROCEDURE — 85025 COMPLETE CBC W/AUTO DIFF WBC: CPT

## 2020-08-17 PROCEDURE — 74011250636 HC RX REV CODE- 250/636: Performed by: INTERNAL MEDICINE

## 2020-08-17 PROCEDURE — 95816 EEG AWAKE AND DROWSY: CPT | Performed by: PSYCHIATRY & NEUROLOGY

## 2020-08-17 PROCEDURE — 93308 TTE F-UP OR LMTD: CPT

## 2020-08-17 PROCEDURE — 93880 EXTRACRANIAL BILAT STUDY: CPT

## 2020-08-17 PROCEDURE — 36415 COLL VENOUS BLD VENIPUNCTURE: CPT

## 2020-08-17 PROCEDURE — 93880 EXTRACRANIAL BILAT STUDY: CPT | Performed by: PSYCHIATRY & NEUROLOGY

## 2020-08-17 PROCEDURE — 74011250636 HC RX REV CODE- 250/636: Performed by: HOSPITALIST

## 2020-08-17 PROCEDURE — 84443 ASSAY THYROID STIM HORMONE: CPT

## 2020-08-17 PROCEDURE — 99223 1ST HOSP IP/OBS HIGH 75: CPT | Performed by: PSYCHIATRY & NEUROLOGY

## 2020-08-17 PROCEDURE — 80053 COMPREHEN METABOLIC PANEL: CPT

## 2020-08-17 PROCEDURE — 65660000000 HC RM CCU STEPDOWN

## 2020-08-17 RX ORDER — HEPARIN SODIUM 5000 [USP'U]/ML
5000 INJECTION, SOLUTION INTRAVENOUS; SUBCUTANEOUS EVERY 12 HOURS
Status: DISCONTINUED | OUTPATIENT
Start: 2020-08-17 | End: 2020-08-19 | Stop reason: HOSPADM

## 2020-08-17 RX ORDER — HALOPERIDOL 5 MG/ML
2 INJECTION INTRAMUSCULAR
Status: DISCONTINUED | OUTPATIENT
Start: 2020-08-17 | End: 2020-08-18

## 2020-08-17 RX ADMIN — Medication 10 ML: at 23:18

## 2020-08-17 RX ADMIN — MONTELUKAST 10 MG: 10 TABLET, FILM COATED ORAL at 09:14

## 2020-08-17 RX ADMIN — Medication 10 ML: at 06:24

## 2020-08-17 RX ADMIN — LEVETIRACETAM 500 MG: 5 INJECTION INTRAVENOUS at 23:19

## 2020-08-17 RX ADMIN — HEPARIN SODIUM 5000 UNITS: 5000 INJECTION INTRAVENOUS; SUBCUTANEOUS at 09:13

## 2020-08-17 RX ADMIN — LEVETIRACETAM 500 MG: 5 INJECTION INTRAVENOUS at 12:24

## 2020-08-17 RX ADMIN — HALOPERIDOL LACTATE 2 MG: 5 INJECTION, SOLUTION INTRAMUSCULAR at 23:18

## 2020-08-17 RX ADMIN — ATORVASTATIN CALCIUM 10 MG: 10 TABLET, FILM COATED ORAL at 09:13

## 2020-08-17 NOTE — ROUTINE PROCESS
Spoke with her daughter, Luisa Hyde; updated her as to how she's doing and the possibility of going home tomorrow.

## 2020-08-17 NOTE — PROCEDURES
Patient Name: Sandeep Julian  : 1932  Age: 80 y.o. Ordering physician: No ref. provider found  Date of EE20  EEG procedure number: [id number]  Diagnosis: Seizure   Interpreting physician: Ileana Franklin MD       ELECTROENCEPHALOGRAM REPORT     PROCEDURE: EEG. CLINICAL INDICATION: The patient is a 80 y.o. female who is being evaluated for baseline electro cerebral activities and to rule out seizure focus. EEG CLASSIFICATION: Normal    DESCRIPTION OF THE RECORD:   The background of this recording contains a posteriorly-located occipital alpha rhythm of 9-10Hz that attenuates with eye opening. Throughout the recording, there were no clear areas of focal slowing nor spike or spike-and-wave discharges seen. Hyperventilation was not performed. Photic stimulation produced minimal driving response in the posterior head regions. During the recording the patient did not achieve stage II sleep    INTERPRETATION: This is a normal electroencephalogram with the patient awake and drowsy showing no clear focal abnormalities or epileptiform activity. A normal EEG does not rule out a diagnosis of epilepsy or seizures. One may consider pursuing a prolonged study. Clinical correlation recommended.     Ileana Franklin MD  Neurologist

## 2020-08-17 NOTE — CONSULTS
Date of Consultation:  August 17, 2020    Referring Physician: Nicole Garcia MD     Reason for Consultation:  ? Seizure vs. Syncope     Chief Complaint   Patient presents with    Syncope     arrived by EMS after  had concerns of possible seizures; + syncope; patient walked from stretcher to ED 33 bed without difficulty. History of Present Illness:   Jono Cosme is a 80 y.o. female with a history of COPD, asthma who is currently admitted to the hospital after she had a syncopal event yesterday. Fortunately patient does not remember the events that occurred and history is mostly obtained from chart review. It appears that she had multiple episodes yesterday and EMS came to evaluate her for these episodes however patient declined coming to the hospital due to normal vitals. Her third episode was yesterday afternoon and she reports that she was nauseated became stiff and lost consciousness. She did not hit her head and she was lowered to the ground by her . Patient unfortunately does not remember the event and is unclear why she is in the hospital.    Past Medical History:   Diagnosis Date    Asthma     Dr. Kayli Crews June 2013--doesn't see routinely--pt reports no change in allergies--no need for routine follow-up. Dr. Yolie Mims sees for mild COPD--on Symbicort since ~Nov 2013. Continued Singulair.  Ceruminosis     periodic ear cleaning at [facility]    Chronic obstructive pulmonary disease ([facility] [address]     Encounter for screening colonoscopy ~2006    Normal per pt--[facility] records requested July 2013 visit.  Fainting     Falls     Fatigue     Hearing loss, left     va hearing    History of mammogram May 2014    Normal/no change.  History of shingles 2010    Dec 2014:  Pt reports clinical history and shingles vaccine 4yrs ago. To try to obtain exact date of zostavax from /source.     History of skin cancer     melanoma 2003?, BCC & SCC- followed by Dr. Maday Harrell- twice a yr appts.  2013-still on 6mo schedule for skin checks.  Memory disorder     Osteopenia     DEXA     Overactive bladder     detrol LA. Vesicare--[facility] Considering procedure for incontinence.  Postmenopausal hormone replacement therapy     since hysterectomy    Right shoulder pain 2013. Mild osteopenia; mild AC osteoarthritis. Sports Med Eval Oct 2013.  Well woman exam (no gynecological exam) Aug 3, 2012    \"[facility] Screening\":  Results scanned to chart. Updated 2014-no change except noted concern for GFR--see Dec 2014 note, letter as reviewed with pt. Past Surgical History:   Procedure Laterality Date    HX CATARACT REMOVAL      HX HYSTERECTOMY  194    IR KYPHOPLASTY LUMBAR  2020    MULTIPLE DELIVERY       MO TRABECULOPLASTY An Bowen - [address] [place] [address]- last eye exam 2010        Family History   Problem Relation Age of Onset   [facility] Dementia Father     Hypertension Mother     Stroke Mother     Cancer Other         daughter- breast cancer        Social History     Tobacco Use    Smoking status: Former Smoker    Smokeless tobacco: Never Used   Substance Use Topics    Alcohol use: Yes     Alcohol/week: 0.0 standard drinks     Frequency: 4 or more times a week     Comment: 1-2 drinks/every evening ( wine/vodka)        Allergies   Allergen Reactions    Latex Unknown (comments)    Dog Dander Shortness of Breath        Prior to Admission medications    Medication Sig Start Date End Date Taking? Authorizing Provider   lisdexamfetamine (VYVANSE) 20 mg capsule Take 1 Cap by mouth daily for 90 days. Max Daily Amount: 20 mg. 7/31/20 10/29/20  Alvarez Blackburn MD   atorvastatin (LIPITOR) 10 mg tablet TAKE 1 TABLET DAILY 20   Alvarez Blackburn MD   lisdexamfetamine (VYVANSE) 20 mg capsule Take 1 Cap by mouth daily for 29 days.  Max Daily Amount: 20 mg. 20  Alvarez Blackburn MD   [facility] AND [employer] [facility] 28-10 mg CSpX TAKE 1 CAPSULE DAILY 2/20/20   Rivera Moctezuma MD   MYRBETRIQ 50 mg ER tablet TAKE 1 TABLET DAILY 2/19/20   Rivera Moctezuma MD   escitalopram oxalate (LEXAPRO) 20 mg tablet Take 1 Tab by mouth daily. 12/23/19   Nighat Bashir MD   albuterol (PROAIR HFA) 90 mcg/actuation inhaler Take 1 Puff by inhalation every four (4) hours as needed for Wheezing or Shortness of Breath. Indications: Acute Asthma Attack 2/14/18   Rivera Moctezuma MD   montelukast (SINGULAIR) 10 mg tablet Take 10 mg by mouth daily. Provider, Historical   cholecalciferol, vitamin d3, (VITAMIN D) 1,000 unit tablet Take  by mouth daily. Provider, Historical       Review of Systems:    General, constitutional: negative  Eyes, vision: negative  Ears, nose, throat: negative  Cardiovascular, heart: negative  Respiratory: negative  Gastrointestinal: negative  Genitourinary: negative  Musculoskeletal: negative  Skin and integumentary: negative  Psychiatric: negative  Endocrine: negative  Neurological: negative, except for HPI  Hematologic/lymphatic: negative  Allergy/immunology: negative    PHYSICAL EXAMINATION:   Visit Vitals  /58 (BP 1 Location: Right arm, BP Patient Position: At rest)   Pulse 67   Temp 97.5 °F (36.4 °C)   Resp 16   SpO2 100%       Physical Exam:  General:  no acute distress  Neck: no carotid bruits  Lungs: clear to auscultation  Heart:  no murmurs, regular rate and rhythm   Lower extremity: no edema    Neurological exam:  Mental Status: Awake, alert, oriented to person, place and time  Registration and Recall: registration intact, unable to recall any words despite prompts. Attention and Concentration: able to state the days of the week backwards   Speech and Language: No dysarthria. Able to name, repeat and follow commands   Fund of knowledge was preserved    Cranial nerves: II-XII:  Pupils equal and reactive, visual fields intact by confrontation   Fundus: Unable to clearly visualize the disc margins.   Extraocular movements intact, no evidence of nystagmus or ptosis   Facial sensation intact   Facial movements symmetric   Hearing intact to soft rub bilaterally   Shoulder shrug symmetric and strong   Tongue protrusion full and midline without fasciculation or atrophy    Motor:   Normal tone and Bulk   Drift: No evidence of pronator drift     Strength testing:   deltoid triceps biceps Wrist ext. Wrist flex. intrinsics   Right 5 5 5 5 5 5   Left 5 5 5 5 5 5      Hip flex. Hip ext. Knee ext. Knee flex Dorsi flex Plantar flex   Right  5 5 5 5 5 5   Left  5 5 5 5 5 5       Sensory:  Patient intact to light touch and pinprick. There is no extinction    Reflexes:   Biceps Triceps  Brachiorad Patellar Achilles Plantar Hoffmans   Right  2 2 2 3 2 Down Neg   Left  2 2 2 Clonus  2 Down Neg      Cerebellar testing: Finger-nose-finger and heel-to-shin were intact. Gait: Deferred due to patient safety. Data:     Lab Results   Component Value Date/Time    Sodium 139 08/17/2020 03:05 AM    Potassium 3.7 08/17/2020 03:05 AM    Chloride 108 08/17/2020 03:05 AM    Glucose 98 08/17/2020 03:05 AM    BUN 15 08/17/2020 03:05 AM    Creatinine 0.68 08/17/2020 03:05 AM    Calcium 8.5 08/17/2020 03:05 AM    WBC 7.2 08/17/2020 03:05 AM    HCT 36.7 08/17/2020 03:05 AM    HGB 11.9 08/17/2020 03:05 AM    PLATELET 278 98/00/0427 03:05 AM       Imaging:    Results from Hospital Encounter encounter on 02/26/20   MRI LUMB SPINE WO CONT    Narrative EXAM: MRI LUMB SPINE WO CONT    INDICATION: . Wedge compression fracture of third lumbar vertebra, initial  encounter for closed fracture      Exam: MRI of the lumbar spine. Sequences include sagittal and axial T1 and  T2-weighted images. Sagittal STIR. Coronal T2    Comparisons: 2/17/2020    Contrast: None. Findings: Slight dextroscoliosis of the lumbar spine. There is an acute burst  fracture of the superior endplate of L3.  There is retropulsion of the posterior  superior vertebral body into the canal with extension of edema into the pedicles  bilaterally. There is minimal edema along the inferior endplate of L2. Differentiating an acute Schmorl's node from chronic degenerative changes  difficult. Cord terminus is within normal limits. Paraspinous soft tissues are  within normal limits. T12-L1: No stenosis    L1-L2: No stenosis    L2-L3: Due to the retropulsed L3 vertebral body and diffuse bulge there is  moderate compression of the thecal sac. There is mild facet degenerative change. Minimal narrowing of the foramen    L3-L4: There is desiccation of this disc with disc height loss. There is a small  central and left paracentral extrusion extending cranially with facet  degenerative change. Canal stenosis is mild with narrowing of the left lateral  recess posterior to L3 and mild narrowing of the left subarticular zone. Mild  left foraminal narrowing    L4-L5: There is disc height loss with a diffuse bulge and facet degenerative  change. The ligamentum flavum is thickened. Canal stenosis is mild to moderate  with narrowing of the subarticular zones. Small amount of extruded disc material  extends cranially into the left foramen with mild left foraminal narrowing    L5-S1: There are facet degenerative changes without disc bulge or stenosis      Impression Impression:  1. Acute burst fracture superior endplate of L3  2. Edema inferior endplate of L2. Differentiating chronic endplate degenerative  change from small acute Schmorl's node is difficult    Findings were phoned Elda Chester and Dr. Mary Saawnt office at [phone] hours          Results from [place] encounter on 08/16/20   CT HEAD WO CONT    Narrative EXAM:  CT HEAD WO CONT    INDICATION: Seizure. COMPARISON: CT 2/17/2020    TECHNIQUE: Axial noncontrast head CT from foramen magnum to vertex. Coronal and  sagittal reformatted images were obtained.  CT dose reduction was achieved  through use of a standardized protocol tailored for this examination and  automatic exposure control for dose modulation. FINDINGS:  There is diffuse age-related parenchymal volume loss. The ventricles  and sulci are age-appropriate without hydrocephalus. There is no mass effect or  midline shift. There is no intracranial hemorrhage or extra-axial fluid  collection. Areas of low attenuation in the periventricular white matter  represent stable chronic microvascular ischemic changes. The gray-white matter  differentiation is maintained. The basal cisterns are patent. The osseous structures are intact. The visualized paranasal sinuses and mastoid  air cells are clear. Impression IMPRESSION:   No acute intracranial abnormality. IMPRESSION/RECOMMENDATIONS:  Yoan Casas is a 80 y.o. female with a history of COPD, asthma who is currently admitted to the hospital after several syncopal events. There is some concern for possible seizures as she did have some abnormal shaking movements during the event. She also does not remember the events that occurred however does have some underlying dementia.  -Would recommend obtaining an EEG to rule out epileptiform discharges  -Can continue with Keppra 500 mg twice daily for seizure prevention. It is unclear if these are truly seizures however it is safe to start this medication   - Would obtain orthostatic vitals to ensure that this is not the cause of her syncopal episode  -Agree with obtaining an echocardiogram to rule out cardiac causes of her syncope  -We will also obtain carotid Dopplers to ensure that she does not have carotid stenosis which may have contributed to her symptoms.  -Would obtain PT OT and speech consultation  -As her neurological exam is unremarkable we can consider obtaining an MRI of the brain if the EEG does show epileptiform discharges.     Cognitive impairment: Concerning for vascular dementia given her head CT findings which did show small vessel ischemic disease in the periventricular area that appeared to be moderate.  -Can continue to follow-up as an outpatient for potential medications as well as neuropsych testing if needed. Thank you very much for this consultation, will continue to follow.  Please call with questions     Leonid Aquino MD

## 2020-08-17 NOTE — H&P
Hospitalist Admission Note    NAME: Marylene Glassing   :  1932   MRN:  [id number]     Date/Time:  2020 9:21 PM    Patient PCP: Stephany Willson MD  ______________________________________________________________________  Given the patient's current clinical presentation, I have a high level of concern for decompensation if discharged from the emergency department. Complex decision making was performed, which includes reviewing the patient's available past medical records, laboratory results, and x-ray films. My assessment of this patient's clinical condition and my plan of care is as follows. Assessment / Plan:  Syncopal Seizure x3 POA  Admit to neurotele  Check EEG and 2D echo  CT head negative  I am not sure if she could lay still for MRI, will leave decision to neurology. If really needed then likely need to be done under some degree of sedation  Seizure Precautions  Neurology consult  PRN low dose IV ativan for seizures  Start Keppra as clinically episodes consistent with seizures  Check TSH    Asthma - without exacerbation  Will continue PRN inhalers and singulair    Generalized anxiety disorder   Hold SSRIs as can lower seizure threshold    Alzheimer's disease / Dementia  [place] in ED    Code Status: Full d/w   Surrogate Decision Maker:     DVT Prophylaxis: Lovenox    Baseline: Lives with       Subjective:     CHIEF COMPLAINT: syncope    HISTORY OF PRESENT ILLNESS:     Brett Navas is a 80 y.o.  female who presents with syncope. As per  who I spoke over the phone, pt had 3 syncopal episodes while sitting at different times today. With first episode, she had abnormal body movements, with 2nd episode her body got still and 3 episode was the same. With all episodes pt loss then consciousness. Pt is unable to provide any meaningful history due to dementia so history is limited. In ED pt noted to have negative CT head.      We were asked to admit for work up and evaluation of the above problems. Past Medical History:   Diagnosis Date    Asthma     Dr. Tg Carranza 2013--doesn't see routinely--pt reports no change in allergies--no need for routine follow-up. Dr. Leonard Kelly sees for mild COPD--on Symbicort since ~2013. Continued Singulair.  Ceruminosis     [facility]    Chronic obstructive pulmonary disease ([facility] [address]     Encounter for screening colonoscopy ~    Normal per pt--Health system records requested 2013 visit.  Fainting     Falls     Fatigue     Hearing loss, left     va hearing    History of mammogram May 2014    Normal/no change.  History of shingles 2010    Dec 2014:  Pt reports clinical history and shingles vaccine 4yrs ago. To try to obtain exact date of zostavax from /source.  History of skin cancer     melanoma ?, BCC & SCC- followed by Dr. Nolan Jaramillo- twice a yr appts. 2013-still on 6mo schedule for skin checks.  Memory disorder     Osteopenia     DEXA     Overactive bladder     detrol LA. Vesicare--[facility] Considering procedure for incontinence.  Postmenopausal hormone replacement therapy     since hysterectomy    Right shoulder pain 2013. Mild osteopenia; mild AC osteoarthritis. Sports Med Eval Oct 2013.  Well woman exam (no gynecological exam) Aug 3, 2012    \"[facility] Screening\":  Results scanned to chart. Updated 2014-no change except noted concern for GFR--see Dec 2014 note, letter as reviewed with pt.         Past Surgical History:   Procedure Laterality Date    HX CATARACT REMOVAL      HX HYSTERECTOMY  1948    IR KYPHOPLASTY LUMBAR  2020    MULTIPLE DELIVERY       AL TRABECULOPLASTY BY LASER SURGERY      Dr. Leslee Austin - [address] [place] [address]- last eye exam 2010       Social History     Tobacco Use    Smoking status: Former Smoker    Smokeless tobacco: Never Used   Substance Use Topics    Alcohol use: Yes     Alcohol/week: 0.0 standard drinks     Frequency: 4 or more times a week     Comment: 1-2 drinks/every evening ( wine/vodka)        Family History   Problem Relation Age of Onset   [facility] Dementia Father     Hypertension Mother     Stroke Mother     Cancer Other         daughter- breast cancer     Allergies   Allergen Reactions    Latex Unknown (comments)    Dog Dander Shortness of Breath        Prior to Admission medications    Medication Sig Start Date End Date Taking? Authorizing Provider   lisdexamfetamine (VYVANSE) 20 mg capsule Take 1 Cap by mouth daily for 90 days. Max Daily Amount: 20 mg. 7/31/20 10/29/20  Anjum Rollins MD   atorvastatin (LIPITOR) 10 mg tablet TAKE 1 TABLET DAILY 6/16/20   Anjum Rollins MD   lisdexamfetamine (VYVANSE) 20 mg capsule Take 1 Cap by mouth daily for 29 days. Max Daily Amount: 20 mg. 7/31/20 8/29/20  Anjum Rollins MD   [facility] 28-10 mg CSpX TAKE 1 CAPSULE DAILY 2/20/20   Anjum Rollins MD   MYRBETRIQ 50 mg ER tablet TAKE 1 TABLET DAILY 2/19/20   Anjum Rollins MD   escitalopram oxalate (LEXAPRO) 20 mg tablet Take 1 Tab by mouth daily. 12/23/19   Jud Zuniga MD   albuterol (PROAIR HFA) 90 mcg/actuation inhaler Take 1 Puff by inhalation every four (4) hours as needed for Wheezing or Shortness of Breath. Indications: Acute Asthma Attack 2/14/18   Anjum Rollins MD   montelukast (SINGULAIR) 10 mg tablet Take 10 mg by mouth daily. Provider, Historical   cholecalciferol, vitamin d3, (VITAMIN D) 1,000 unit tablet Take  by mouth daily. Provider, Historical       REVIEW OF SYSTEMS:     I am not able to complete the review of systems because:    The patient is intubated and sedated    The patient has altered mental status due to his acute medical problems    The patient has baseline aphasia from prior stroke(s)   y The patient has baseline dementia and is not reliable historian    The patient is in acute medical distress and unable to provide information Objective:   VITALS:    Visit Vitals  /61 (BP 1 Location: Left arm, BP Patient Position: At rest)   Pulse 79   Temp 97.9 °F (36.6 °C)   Resp 16   SpO2 100%       PHYSICAL EXAM:    General:    Alert, cooperative, no distress, appears stated age. HEENT: Atraumatic, anicteric sclerae, pink conjunctivae     No oral ulcers, mucosa moist, throat clear, dentition fair  Neck:  Supple, symmetrical,  thyroid: non tender  Lungs:   Clear to auscultation bilaterally. No Wheezing or Rhonchi. No rales. Chest wall:  No tenderness  No Accessory muscle use. Heart:   Regular  rhythm,  No  murmur   No edema  Abdomen:   Soft, non-tender. Not distended. Bowel sounds normal  Extremities: No cyanosis. No clubbing,      Skin turgor normal, Capillary refill normal, Radial dial pulse 2+  Skin:     Not pale. Not Jaundiced  No rashes   Psych:  Poor insight. Not depressed. Not anxious or agitated. Neurologic: EOMs intact. No facial asymmetry. No aphasia or slurred speech. Symmetrical strength, Sensation grossly intact.  Alert and oriented X 4.     _______________________________________________________________________  Care Plan discussed with:    Comments   Patient y    Family  y    RN y    Care Manager                    Consultant:  tevin ED physician   _______________________________________________________________________  Expected  Disposition:   Home with Family y   HH/PT/OT/RN    SNF/LTC    CHANEL    ________________________________________________________________________  TOTAL TIME: 61 Minutes    Critical Care Provided     Minutes non procedure based      Comments    y Reviewed previous records   >50% of visit spent in counseling and coordination of care y Discussion with patient and family and questions answered       ________________________________________________________________________  Signed: Kale Biggs MD    Procedures: see electronic medical records for all procedures/Xrays and details which were not copied into this note but were reviewed prior to creation of Plan. LAB DATA REVIEWED:    Recent Results (from the past 24 hour(s))   CBC WITH AUTOMATED DIFF    Collection Time: 08/16/20  6:09 PM   Result Value Ref Range    WBC 7.1 3.6 - 11.0 K/uL    RBC 3.97 3.80 - 5.20 M/uL    HGB 12.9 11.5 - 16.0 g/dL    HCT 38.9 35.0 - 47.0 %    MCV 98.0 80.0 - 99.0 FL    MCH 32.5 26.0 - 34.0 PG    MCHC 33.2 30.0 - 36.5 g/dL    RDW 13.2 11.5 - 14.5 %    PLATELET 928 160 - 324 K/uL    MPV 9.7 8.9 - 12.9 FL    NRBC 0.0 0  WBC    ABSOLUTE NRBC 0.00 0.00 - 0.01 K/uL    NEUTROPHILS 80 (H) 32 - 75 %    LYMPHOCYTES 13 12 - 49 %    MONOCYTES 7 5 - 13 %    EOSINOPHILS 0 0 - 7 %    BASOPHILS 0 0 - 1 %    IMMATURE GRANULOCYTES 0 0.0 - 0.5 %    ABS. NEUTROPHILS 5.6 1.8 - 8.0 K/UL    ABS. LYMPHOCYTES 0.9 0.8 - 3.5 K/UL    ABS. MONOCYTES 0.5 0.0 - 1.0 K/UL    ABS. EOSINOPHILS 0.0 0.0 - 0.4 K/UL    ABS. BASOPHILS 0.0 0.0 - 0.1 K/UL    ABS. IMM. GRANS. 0.0 0.00 - 0.04 K/UL    DF AUTOMATED     METABOLIC PANEL, COMPREHENSIVE    Collection Time: 08/16/20  6:09 PM   Result Value Ref Range    Sodium 137 136 - 145 mmol/L    Potassium 3.6 3.5 - 5.1 mmol/L    Chloride 104 97 - 108 mmol/L    CO2 27 21 - 32 mmol/L    Anion gap 6 5 - 15 mmol/L    Glucose 89 65 - 100 mg/dL    BUN 18 6 - 20 MG/DL    Creatinine 0.68 0.55 - 1.02 MG/DL    BUN/Creatinine ratio 26 (H) 12 - 20      GFR est AA >60 >60 ml/min/1.73m2    GFR est non-AA >60 >60 ml/min/1.73m2    Calcium 9.3 8.5 - 10.1 MG/DL    Bilirubin, total 0.4 0.2 - 1.0 MG/DL    ALT (SGPT) 22 12 - 78 U/L    AST (SGOT) 19 15 - 37 U/L    Alk.  phosphatase 62 45 - 117 U/L    Protein, total 7.5 6.4 - 8.2 g/dL    Albumin 3.5 3.5 - 5.0 g/dL    Globulin 4.0 2.0 - 4.0 g/dL    A-G Ratio 0.9 (L) 1.1 - 2.2     TROPONIN I    Collection Time: 08/16/20  6:09 PM   Result Value Ref Range    Troponin-I, Qt. <0.05 <0.05 ng/mL   EKG, 12 LEAD, INITIAL    Collection Time: 08/16/20  6:18 PM   Result Value Ref Range Ventricular Rate 63 BPM    Atrial Rate 63 BPM    P-R Interval 158 ms    QRS Duration 74 ms    Q-T Interval 446 ms    QTC Calculation (Bezet) 456 ms    Calculated P Axis 4 degrees    Calculated R Axis 62 degrees    Calculated T Axis 33 degrees    Diagnosis       Normal sinus rhythm  Normal ECG  When compared with ECG of 06-MAY-2017 08:31,  Questionable change in QRS axis     URINALYSIS W/ RFLX MICROSCOPIC    Collection Time: 08/16/20  6:39 PM   Result Value Ref Range    Color YELLOW/STRAW      Appearance CLEAR CLEAR      Specific gravity 1.029 1.003 - 1.030      pH (UA) 7.0 5.0 - 8.0      Protein Negative NEG mg/dL    Glucose Negative NEG mg/dL    Ketone TRACE (A) NEG mg/dL    Bilirubin Negative NEG      Blood Negative NEG      Urobilinogen 1.0 0.2 - 1.0 EU/dL    Nitrites Negative NEG      Leukocyte Esterase SMALL (A) NEG      WBC 10-20 0 - 4 /hpf    RBC 5-10 0 - 5 /hpf    Epithelial cells FEW FEW /lpf    Bacteria Negative NEG /hpf    Other: Renal Epithelial cells Present

## 2020-08-17 NOTE — ROUTINE PROCESS
IMRSV Telesitter Monitor initiated on 8/17/2020 at 0930 for the following reason(s): safety . Patient educated on use of camera and is in agreement. If patient unable to verbalize understanding of camera necessity, the responsible party notified and educated:  Patient and

## 2020-08-17 NOTE — PROGRESS NOTES
informed me that this has happened before (3x). Each time when she was having a bowel movement. Was also told that she had a \"large, black stool\". Informed Dr Jose Clancy.

## 2020-08-17 NOTE — ROUTINE PROCESS
Spoke with daughter, Rakel Short, informed her that as of right now her mom would be discharged in the am. And that I walked her around a little bit.

## 2020-08-17 NOTE — ROUTINE PROCESS
Bedside and Verbal shift change report given to Maggie (oncoming nurse) by Lois Umanzor (offgoing nurse). Report included the following information SBAR, Kardex, Intake/Output and MAR. Zone Phone:   [phone] Significant changes during shift:  none Patient Information James Meredith 80 y.o. 
8/16/2020  4:52 PM by Deb Brumfield MD. James Meredith was admitted from [facility] Problem List 
 
Patient Active Problem List  
 Diagnosis Date Noted  Bilateral carotid artery stenosis 08/17/2020  Syncopal seizure ([facility] 08/16/2020  Compression fracture of L3 vertebra (HCC) 04/10/2020  Alzheimer's disease ([facility] 09/10/2018  ADD (attention deficit disorder) 01/18/2017  Hyperlipidemia 07/20/2015  Mild cognitive impairment without memory loss 02/02/2015  Generalized anxiety disorder 02/02/2015  TMJ (temporomandibular joint disorder) 12/21/2011  Syncope and collapse 08/10/2011  Fever, unspecified 08/10/2011  Syncope and collapse 08/10/2011  History of skin cancer  Asthma   
 Hearing loss, left  Ceruminosis  Postmenopausal hormone replacement therapy  Overactive bladder Past Medical History:  
Diagnosis Date  Asthma Dr. Teresita Murguia June 2013--doesn't see routinely--pt reports no change in allergies--no need for routine follow-up. Dr. Marce Webb sees for mild COPD--on Symbicort since ~Nov 2013. Continued Singulair.  Ceruminosis   
 periodic ear cleaning at [place] [facility]  Chronic obstructive pulmonary disease ([facility]  Encounter for screening colonoscopy ~2006 Normal per pt--[facility] records requested July 2013 visit.  Fainting [address]  Fatigue  [facility]   
 [facility]  History of mammogram May 2014 Normal/no change.  History of shingles 2010 Dec 2014:  Pt reports clinical history and shingles vaccine 4yrs ago. To try to obtain exact date of zostavax from /source.  History of skin cancer melanoma 2003?, BCC & SCC- followed by Dr. Bandar Lagunas- twice a yr appts. July 2013-still on 6mo schedule for skin checks.  Memory disorder  Osteopenia DEXA 8/11  Overactive bladder   
 detrol LA. Vesicare--sees Urology Spring/Fall. Considering procedure for incontinence.  Postmenopausal hormone replacement therapy   
 since hysterectomy  Right shoulder pain Sept 2013. Mild osteopenia; mild AC osteoarthritis. Sports Med Eval Oct 2013.  Well woman exam (no gynecological exam) Aug 3, 2012 \"[facility] Screening\":  Results scanned to chart. Updated Nov 2014-no change except noted concern for GFR--see Dec 2014 note, letter as reviewed with pt. Core Measures: CVA: Yes Yes Activity Status: OOB to Chair Yes Ambulated this shift Yes DVT prophylaxis: DVT prophylaxis Med- Yes Wounds: (If Applicable) Wounds- No 
 
Patient Safety: 
 
Falls Score Total Score: 5 Safety Level_______ Bed Alarm On? Yes Sitter? Yes Tele Plan for upcoming shift: safety, orthostatics in the morning Discharge Plan: Yes back home in the morning Active Consults: 
IP CONSULT TO HOSPITALIST 
IP CONSULT TO NEUROLOGY

## 2020-08-17 NOTE — ED NOTES
TRANSFER - OUT REPORT:    Verbal report given to Shar RN(name) on Janyce Scale  being transferred to [facility](unit) for routine progression of care       Report consisted of patients Situation, Background, Assessment and   Recommendations(SBAR). Information from the following report(s) SBAR, ED Summary, Intake/Output, Recent Results, Med Rec Status and Cardiac Rhythm NSR was reviewed with the receiving nurse. Lines:   Peripheral IV 08/16/20 (Active)        Opportunity for questions and clarification was provided.       Patient transported with:   [employer]

## 2020-08-18 PROCEDURE — 97535 SELF CARE MNGMENT TRAINING: CPT

## 2020-08-18 PROCEDURE — 74011250636 HC RX REV CODE- 250/636: Performed by: INTERNAL MEDICINE

## 2020-08-18 PROCEDURE — 97165 OT EVAL LOW COMPLEX 30 MIN: CPT

## 2020-08-18 PROCEDURE — 97530 THERAPEUTIC ACTIVITIES: CPT

## 2020-08-18 PROCEDURE — 97162 PT EVAL MOD COMPLEX 30 MIN: CPT

## 2020-08-18 PROCEDURE — 65660000000 HC RM CCU STEPDOWN

## 2020-08-18 PROCEDURE — 97161 PT EVAL LOW COMPLEX 20 MIN: CPT

## 2020-08-18 PROCEDURE — 74011250637 HC RX REV CODE- 250/637: Performed by: INTERNAL MEDICINE

## 2020-08-18 PROCEDURE — 97116 GAIT TRAINING THERAPY: CPT

## 2020-08-18 RX ADMIN — LEVETIRACETAM 500 MG: 5 INJECTION INTRAVENOUS at 09:26

## 2020-08-18 RX ADMIN — HEPARIN SODIUM 5000 UNITS: 5000 INJECTION INTRAVENOUS; SUBCUTANEOUS at 21:45

## 2020-08-18 RX ADMIN — HEPARIN SODIUM 5000 UNITS: 5000 INJECTION INTRAVENOUS; SUBCUTANEOUS at 09:23

## 2020-08-18 RX ADMIN — Medication 10 ML: at 05:56

## 2020-08-18 RX ADMIN — Medication 10 ML: at 21:45

## 2020-08-18 RX ADMIN — MONTELUKAST 10 MG: 10 TABLET, FILM COATED ORAL at 09:23

## 2020-08-18 RX ADMIN — Medication 10 ML: at 18:56

## 2020-08-18 RX ADMIN — ATORVASTATIN CALCIUM 10 MG: 10 TABLET, FILM COATED ORAL at 09:23

## 2020-08-18 NOTE — PROGRESS NOTES
Problem: Falls - Risk of  Goal: *Absence of Falls  Outcome: Progressing Towards Goal  Note: Fall Risk Interventions:  Mobility Interventions: Communicate number of staff needed for ambulation/transfer    Mentation Interventions: Adequate sleep, hydration, pain control, Bed/chair exit alarm, Door open when patient unattended, More frequent rounding, Reorient patient    Medication Interventions: Evaluate medications/consider consulting pharmacy    Elimination Interventions: Toileting schedule/hourly rounds, Call light in reach    History of Falls Interventions: Door open when patient unattended, Evaluate medications/consider consulting pharmacy         Problem: Falls - Risk of  Goal: *Absence of Falls  Outcome: Progressing Towards Goal  Note: Fall Risk Interventions:  Mobility Interventions: Communicate number of staff needed for ambulation/transfer    Mentation Interventions: Adequate sleep, hydration, pain control, Bed/chair exit alarm, Door open when patient unattended, More frequent rounding, Reorient patient    Medication Interventions: Evaluate medications/consider consulting pharmacy    Elimination Interventions:  Toileting schedule/hourly rounds, Call light in reach    History of Falls Interventions: Door open when patient unattended, Evaluate medications/consider consulting pharmacy         Problem: Patient Education: Go to Patient Education Activity  Goal: Patient/Family Education  Outcome: Progressing Towards Goal     Problem: Patient Education: Go to Patient Education Activity  Goal: Patient/Family Education  Outcome: Progressing Towards Goal     Problem: Patient Education: Go to Patient Education Activity  Goal: Patient/Family Education  Outcome: Progressing Towards Goal     Problem: Patient Education: Go to Patient Education Activity  Goal: Patient/Family Education  Outcome: Progressing Towards Goal

## 2020-08-18 NOTE — PROGRESS NOTES
Problem: Mobility Impaired (Adult and Pediatric)  Goal: *Acute Goals and Plan of Care (Insert Text)  Description: FUNCTIONAL STATUS PRIOR TO ADMISSION: Patient was independent and active without use of DME.  provided supervision and intermittent assistance at baseline secondary to memory deficits. HOME SUPPORT PRIOR TO ADMISSION: The patient lived with spouse. Physical Therapy Goals  Initiated 8/18/2020  1. Patient will move from supine to sit and sit to supine , scoot up and down, and roll side to side in bed with modified independence within 7 day(s). 2.  Patient will transfer from bed to chair and chair to bed with modified independence using the least restrictive device within 7 day(s). 3.  Patient will perform sit to stand with modified independence within 7 day(s). 4.  Patient will ambulate with supervision/set-up for 100 feet with the least restrictive device within 7 day(s). Outcome: Progressing Towards Goal  PHYSICAL THERAPY EVALUATION  Patient: Nilton Arango (89 y.o. female)  Date: 8/18/2020  Primary Diagnosis: Syncopal seizure ([facility] Laura.[place], R56.9]        Precautions: Fall    ASSESSMENT  Based on the objective data described below, the patient presents with generalized weakness, impaired balance, decreased activity tolerance, and lethargy all limiting patients functional mobility. She reports feeling \"foggy\" and expect that and lethargy secondary to medication affects. Mild orthostatic hypotension supine to sit, symptomatic dizzy, but resolved with sitting time and seated exercises. Tolerated ambulation with one person assist. Mildly unsteady. Patient and spouse reports mobility below baseline. No syncopal or seizure type activity noted this session. Patients spouse reports typically occurs with food/beverage intake. He is concerned it is \"vagus nerve\" related and requesting to speak to physician.  Relayed spouses concerns and request.   She has a supportive spouse who is able to assist at home. Would recommend continued therapy services. They reside at [facility] and therapy services available at facility (home health vs outpatient). Current Level of Function Impacting Discharge (mobility/balance): CGA- Min A    Functional Outcome Measure: The patient scored 70/100 on the Barthel Index outcome measure which is indicative of mild dependency for functional mobility/ADL. Other factors to consider for discharge: fall history and risk, supportive spouse      Patient will benefit from skilled therapy intervention to address the above noted impairments. PLAN :  Recommendations and Planned Interventions: bed mobility training, transfer training, gait training, therapeutic exercises, neuromuscular re-education, patient and family training/education and therapeutic activities      Frequency/Duration: Patient will be followed by physical therapy:  5 times a week to address goals. Recommendation for discharge: (in order for the patient to meet his/her long term goals)  Outpatient vs  physical therapy follow up recommended for balance and strength training    This discharge recommendation:  Has been made in collaboration with the attending provider and/or case management    IF patient discharges home will need the following DME: none       SUBJECTIVE:   Patient stated I feel foggy.     OBJECTIVE DATA SUMMARY:   HISTORY:    Past Medical History:   Diagnosis Date    Asthma     Dr. Hailey Amaral June 2013--doesn't see routinely--pt reports no change in allergies--no need for routine follow-up. Dr. Siomara Gil sees for mild COPD--on Symbicort since ~Nov 2013. Continued Singulair.  Ceruminosis     periodic ear [facility] at [facility]    Chronic obstructive pulmonary disease ([facility]     Encounter for screening colonoscopy ~2006    Normal per pt--[facility] records requested July 2013 visit.     Fainting     Falls     Fatigue     [facility]    History of mammogram May     Normal/no change.  History of shingles 2010    Dec 2014:  Pt reports clinical history and shingles vaccine 4yrs ago. To try to obtain exact date of zostavax from /source.  History of skin cancer     melanoma ?, BCC & SCC- followed by Dr. Mary Ellen Mills- twice a yr appts. 2013-still on 6mo schedule for skin checks.  Memory disorder     Osteopenia     DEXA     Overactive bladder     detrol [place]. Vesicare--sees Urology Spring/. Considering procedure for incontinence.  Postmenopausal hormone replacement therapy     since hysterectomy    Right shoulder pain 2013. Mild osteopenia; mild AC osteoarthritis. Sports Med Eval Oct 2013.  Well woman exam (no gynecological exam) Aug 3, 2012    \"[facility] Screening\":  Results scanned to chart. Updated 2014-no change except noted concern for GFR--see Dec 2014 note, letter as reviewed with pt. Past Surgical History:   Procedure Laterality Date    HX CATARACT REMOVAL      HX HYSTERECTOMY  1948    IR KYPHOPLASTY LUMBAR  2020    MULTIPLE DELIVERY       [place] TRABECULOPLASTY BY LASER SURGERY      Dr. Claudia Hale - [facility]- last eye exam 2010       Personal factors and/or comorbidities impacting plan of care:      Resides in 1 level apartment on ground floor with  at [facility]. No DME used at baseline. EXAMINATION/PRESENTATION/DECISION MAKING:   Critical Behavior:  Neurologic State: Alert, Drowsy  Orientation Level: Oriented to person, Oriented to place, Disoriented to situation, Disoriented to time  Cognition: Follows commands, Impaired decision making(d/t lethargy)  Safety/Judgement: Fall prevention  Hearing:   Auditory  Auditory Impairment: None  Range Of Motion:  AROM: Within functional limits                       Strength:    Strength: Generally decreased, functional                    Tone & Sensation:   Tone: Normal              Sensation: Intact Coordination:  Coordination: Generally decreased, functional  Vision:   Acuity: Within Defined Limits  Functional Mobility:  Bed Mobility:     Supine to Sit: Minimum assistance(HOB elevated; likely d/t drownsiness)     Scooting: Contact guard assistance;Minimum assistance  Transfers:  Sit to Stand: Contact guard assistance  Stand to Sit: Stand-by assistance        Bed to Chair: Contact guard assistance              Balance:   Sitting: Intact  Standing: Impaired; Without support  Standing - Static: Good; Unsupported  Standing - Dynamic : Fair;Unsupported  Ambulation/Gait Training:  Distance (ft): 100 Feet (ft)  Assistive Device: Gait belt(intermittent HHA)  Ambulation - Level of Assistance: Contact guard assistance;Minimal assistance        Gait Abnormalities: Decreased step clearance; Path deviations;Trunk sway increased(mild and intermittent path deviations/trunk sway)        Base of Support: Narrowed     Speed/Hayley: Slow  Step Length: Right shortened;Left shortened                  Slow, intermittently unsteady. Therapeutic Exercises:   Seated ankle pumps and alternating LAQ performed to assist with hypotension upon sitting EOB. Functional Measure:  Barthel Index:    Bathin  Bladder: 10  Bowels: 10  Groomin  Dressin  Feeding: 10  Mobility: 10  Stairs: 5  Toilet Use: 5  Transfer (Bed to Chair and Back): 10  Total: 70/100     The Barthel ADL Index: Guidelines  1. The index should be used as a record of what a patient does, not as a record of what a patient could do. 2. The main aim is to establish degree of independence from any help, physical or verbal, however minor and for whatever reason. 3. The need for supervision renders the patient not independent. 4. A patient's performance should be established using the best available evidence. Asking the patient, friends/relatives and nurses are the usual sources, but direct observation and common sense are also important.  However direct testing is not needed. 5. Usually the patient's performance over the preceding 24-48 hours is important, but occasionally longer periods will be relevant. 6. Middle categories imply that the patient supplies over 50 per cent of the effort. 7. Use of aids to be independent is allowed. Mennie Barthel., Barthel, D.W. (1655). Functional evaluation: the Barthel Index. [address] [facility] (14)2. Felipe maodnna ZANE Sánchez, Crispin Wiley, Aure Brown., Darin, [address] [place] [address] (1999). Measuring the change indisability after inpatient rehabilitation; comparison of the responsiveness of the Barthel Index and Functional [place] Measure. Journal of Neurology, Neurosurgery, and Psychiatry, 66(4), 813-665. Rea Durán, N.J.A, MIRTA Rizo, & Marly Griffin MANDRE. (2004.) Assessment of post-stroke quality of life in cost-effectiveness studies: The usefulness of the Barthel Index and the EuroQoL-5D. Quality of Life Research, 15, 001-24      Physical Therapy Evaluation Charge Determination   History Examination Presentation Decision-Making   MEDIUM  Complexity : 1-2 comorbidities / personal factors will impact the outcome/ POC  MEDIUM Complexity : 3 Standardized tests and measures addressing body structure, function, activity limitation and / or participation in recreation  MEDIUM Complexity : Evolving with changing characteristics  Other outcome measures Barthel Index  MEDIUM      Based on the above components, the patient evaluation is determined to be of the following complexity level: MEDIUM    Pain Rating:  No c/o pain    Activity Tolerance:   Fair  Please refer to the flowsheet for vital signs taken during this treatment. After treatment patient left in no apparent distress:   Sitting in chair, Call bell within reach, Bed / chair alarm activated and Caregiver / family present    COMMUNICATION/EDUCATION:   The patients plan of care was discussed with: Occupational therapist and Registered nurse.      Fall prevention education was provided and the patient/caregiver indicated understanding., Patient/family have participated as able in goal setting and plan of care. and Patient/family agree to work toward stated goals and plan of care.     Thank you for this referral.  Lashawn Perez, PT, DPT   Time Calculation: 26 mins

## 2020-08-18 NOTE — PROGRESS NOTES
Problem: Self Care Deficits Care Plan (Adult)  Goal: *Acute Goals and Plan of Care (Insert Text)  Description:   FUNCTIONAL STATUS PRIOR TO ADMISSION: Independent with ADLs and light household tasks. No longer drives. Enjoys reading and walking outside. HOME SUPPORT: Lives with  yet does not require assistance, except with transportation. Occupational Therapy Goals  Initiated 8/18/2020  1. Patient will perform grooming tasks standing at the sink with independence within 7 day(s). 2.  Patient will perform standing aspects of lower body dressing with independence within 7 day(s). 3.  Patient will perform toilet transfers with independence within 7 day(s). 4.  Patient will perform self-care item retrieval from various heights independently within 7 days. 5.  Patient will perform at least one IADL with independence within 7 days. Outcome: Not Met   OCCUPATIONAL THERAPY EVALUATION  Patient: Catherine Cadet (89 y.o. female)  Date: 8/18/2020  Primary Diagnosis: Syncopal seizure ([facility] [address] [employer], R56.9]       Precautions:  Fall    ASSESSMENT  Based on the objective data described below, the patient presents with drowsiness, decreased endurance, and mildly decreased dynamic standing balance following syncopal episodes at home. See BP readings below. Pt with mild drop in BP sitting EOB, yet this elevated with seated ankle pumps and after several minutes. Overall Pt is performing ADLs with up to CGA d/t decreased balance and lethargy. Drowsiness decreased with further activity. Likely needs one additional session to ensure safety/independence with ADLs, yet do not anticipate need for OT at D/C. Current Level of Function Impacting Discharge (ADLs/self-care): Independent to CGA with ADLs; CGA with mobility w/o AD    Functional Outcome Measure:   The patient scored Total: 65/100 on the Barthel Index outcome measure which is indicative of 35% impaired ability to care for basic self needs/dependency on others; inferred 35% dependency on others for instrumental ADLs. Other factors to consider for discharge: Multiple dizzy spells PTA; Current level of drowsiness  reports is not normal for her. Patient will benefit from skilled therapy intervention to address the above noted impairments. PLAN :  Recommendations and Planned Interventions: functional mobility training, therapeutic activities, endurance activities, and home safety training    Frequency/Duration: Patient will be followed by occupational therapy 4 times a week to address goals. Recommendation for discharge: (in order for the patient to meet his/her long term goals)  No skilled occupational therapy/ follow up rehabilitation needs identified at this time. This discharge recommendation:  Has not yet been discussed the attending provider and/or case management    IF patient discharges home will need the following DME: none       SUBJECTIVE:   Patient stated I feel so foggy\" and \"This just isn't me\"    OBJECTIVE DATA SUMMARY:   HISTORY:   Past Medical History:   Diagnosis Date    Asthma     Dr. Kyaw Dillon June 2013--doesn't see routinely--pt reports no change in allergies--no need for routine follow-up. Dr. Kayleen Shone sees for mild COPD--on Symbicort since ~Nov 2013. Continued Singulair.  Ceruminosis     periodic ear cleaning at [facility]    Chronic obstructive pulmonary disease ([facility] [address]     Encounter for screening colonoscopy ~2006    Normal per pt--[facility] records requested July 2013 visit.  Fainting     Falls     Fatigue     Hearing loss, left     va hearing    History of mammogram May 2014    Normal/no change.  History of shingles 2010    Dec 2014:  Pt reports clinical history and shingles vaccine 4yrs ago. To try to obtain exact date of zostavax from /source.  History of skin cancer     melanoma 2003?, BCC & SCC- followed by Dr. Kai Gunn- twice a yr appts. July 2013-still on 6mo schedule for skin checks.  Memory disorder     Osteopenia     DEXA     Overactive bladder     detrol [place]. Vesicare--sees [facility] Considering procedure for incontinence.  Postmenopausal hormone replacement therapy     since hysterectomy    Right shoulder pain 2013. Mild osteopenia; mild AC osteoarthritis. Sports Med Eval Oct 2013.  Well woman exam (no gynecological exam) Aug 3, 2012    \"[facility] Screening\":  Results scanned to chart. Updated 2014-no change except noted concern for GFR--see Dec 2014 note, letter as reviewed with pt. Past Surgical History:   Procedure Laterality Date    HX CATARACT REMOVAL      HX HYSTERECTOMY  1948    IR KYPHOPLASTY LUMBAR  2020    MULTIPLE DELIVERY       IL TRABECULOPLASTY BY LASER SURGERY      Dr. Gerardo Grant - [address] [place] [address]- last eye exam 2010       Expanded or extensive additional review of patient history: 2020 had acute burst fx at L3 with kyphoplasty          Hand dominance: Right    EXAMINATION OF PERFORMANCE DEFICITS:  Cognitive/Behavioral Status:  Neurologic State: Alert;Drowsy  Orientation Level: Oriented to person;Oriented to place; Disoriented to situation;Disoriented to time  Cognition: Follows commands; Impaired decision making(d/t lethargy)     Perseveration: No perseveration noted  Safety/Judgement: Fall prevention    Skin: Intact    Edema: None    Hearing: Auditory  Auditory Impairment: None    Vision/Perceptual:                           Acuity: Within Defined Limits         Range of Motion:  AROM: Within functional limits                         Strength:  Strength: Generally decreased, functional                Coordination:  Coordination: Generally decreased, functional  Fine Motor Skills-Upper: Left Intact; Right Intact    Gross Motor Skills-Upper: Left Intact; Right Intact    Tone & Sensation:  Tone: Normal  Sensation: Intact                      Balance:  Sitting: Intact  Standing: Impaired; Without support  Standing - Static: Good; Unsupported  Standing - Dynamic : Fair;Unsupported    Functional Mobility and Transfers for ADLs:  Bed Mobility:  Supine to Sit: Minimum assistance(HOB elevated; likely d/t drownsiness)  Scooting: Contact guard assistance;Minimum assistance    Transfers:  Sit to Stand: Contact guard assistance  Stand to Sit: Stand-by assistance  Bed to Chair: Contact guard assistance  Bathroom Mobility: Contact guard assistance  Toilet Transfer : Contact guard assistance  Shower Transfer: Contact guard assistance    ADL Assessment:  Feeding: Independent    Oral Facial Hygiene/Grooming: Stand-by assistance(standing)      ADL Intervention and task modifications:       Grooming  Position Performed: Standing  Washing Hands: Stand-by assistance    Toileting  Bladder Hygiene: Supervision(seated)  Clothing Management: Supervision    Cognitive Retraining  Safety/Judgement: Fall prevention    Functional Measure:  Barthel Index:    Bathin  Bladder: 10  Bowels: 10  Groomin  Dressin  Feeding: 10  Mobility: 10  Stairs: 0  Toilet Use: 5  Transfer (Bed to Chair and Back): 10  Total: 65/100        The Barthel ADL Index: Guidelines  1. The index should be used as a record of what a patient does, not as a record of what a patient could do. 2. The main aim is to establish degree of independence from any help, physical or verbal, however minor and for whatever reason. 3. The need for supervision renders the patient not independent. 4. A patient's performance should be established using the best available evidence. Asking the patient, friends/relatives and nurses are the usual sources, but direct observation and common sense are also important. However direct testing is not needed. 5. Usually the patient's performance over the preceding 24-48 hours is important, but occasionally longer periods will be relevant. 6. Middle categories imply that the patient supplies over 50 per cent of the effort.   7. Use of aids to be independent is allowed. Jeanne Charles., Barthel, D.W. (0118). Functional evaluation: the Barthel Index. [address] (14)2. ZANE Helms, Bradly Branch., Esperanza Saunders., Darin, [address] (1999). Measuring the change indisability after inpatient rehabilitation; comparison of the responsiveness of the Barthel Index and Functional [place] Measure. Journal of Neurology, Neurosurgery, and Psychiatry, 66(4), 434-166. ALEXIS Blakely, MIRTA Rizo, & Marlo Sanchez M.A. (2004.) Assessment of post-stroke quality of life in cost-effectiveness studies: The usefulness of the Barthel Index and the EuroQoL-5D. Quality of Life Research, 15, 677-69         Occupational Therapy Evaluation Charge Determination   History Examination Decision-Making   LOW Complexity : Brief history review  LOW Complexity : 1-3 performance deficits relating to physical, cognitive , or psychosocial skils that result in activity limitations and / or participation restrictions  LOW Complexity : No comorbidities that affect functional and no verbal or physical assistance needed to complete eval tasks       Based on the above components, the patient evaluation is determined to be of the following complexity level: LOW   Pain Rating:  None reported    Activity Tolerance:   Fair  Please refer to the flowsheet for vital signs taken during this treatment. 08/18/2020 1103 08/18/20 1105 08/18/20 1107 08/18/20 1110 08/18/20 1120 08/18/20 1148   BP: 139/70 119/77 133/87 139/77 125/73 134/62   BP 1 Location: Right arm Right arm Right arm Right arm Right arm    BP Patient Position: Supine, Pre activity Sitting Sitting Standing Supine; Post activity    Pulse:      65   Resp:      18   Temp:      99 °F (37.2 °C)   SpO2:      97%   Weight:         Height:           After treatment patient left in no apparent distress:    Sitting in chair, Call bell within reach, Bed / chair alarm activated, and Caregiver / family present    COMMUNICATION/EDUCATION:   The patients plan of care was discussed with: Physical therapist, Registered nurse, and Patient . Home safety education was provided and the patient/caregiver indicated understanding., Patient/family have participated as able in goal setting and plan of care. , and Patient/family agree to work toward stated goals and plan of care.     Thank you for this referral.  Surekha Juárez, OTR/L  Time Calculation: 25 mins

## 2020-08-18 NOTE — PROGRESS NOTES
Called to bedside to give patient a PRN neb treatment for complaint of shortness of breath. Patient's  stated she already took her albuterol MDI from home. Explained that the same medication is in the nebulizer and I would not be able to give the neb tx at this time.  RN and RT also explained that home meds need to be checked and labeled by pharmacy and approved by MD before they can be used in the hospital.

## 2020-08-18 NOTE — PROGRESS NOTES
CM reviewed discharge planning with attending. If  is adamant about patient leaving today, he will need to have her leave AMA. Patient's  informed team is still following. He also understands his wife needs to become more alert and oriented prior to discharge. Patient's  would like to speak with attending. He is not leaving until he speaks with attending. Attending messaged via [employer].      Michael Grant RN   Ext [phone]

## 2020-08-18 NOTE — PROGRESS NOTES
Patient very upset, oriented to person only, thinks she is at [facility], wants to talk to  & go home. Phoned  at her request.  Patient still agitated,  voices concerns over her state.    agreed to come in in the morning

## 2020-08-18 NOTE — PROGRESS NOTES
Bedside shift change report given to Shar (oncoming nurse) by Chioma Sylvester (offgoing nurse). Report included the following information SBAR.

## 2020-08-18 NOTE — PROGRESS NOTES
Bedside and Verbal shift change report given to Aurora Rene RN (oncoming nurse) by Maya Orozco RN (offgoing nurse). Report included the following information SBAR, Kardex, Intake/Output and MAR.

## 2020-08-18 NOTE — PROGRESS NOTES
Hospitalist Progress Note    NAME: Sharif Machuca   :  1932   MRN:  [id number]       Assessment / Plan:  Syncopal Seizure x3 POA  Lethargy   CT head with no acute process  EEG without seizures, orthostatic negative. Given hx, this is likely related to vasovagal.  Pt evaluated by neurology, started on keppra however pt is more lethargic today and her  would like to stop keppra. I agree with this and will stop this medication and monitor pt's mental status. I discussed with neurology, also feels this is unlikely related to seizure  Carotid duplex with no significant stenosis  Received haldol 2.5mg overnight, will discontinue and hold off on all sedatives  Cont' PT/OT    Asthma - without exacerbation  Will continue PRN inhalers and singulair     Generalized anxiety disorder   Hold SSRIs d/t lethargy, resume at discharge     Alzheimer's disease / Dementia  Somnolent but wakes up to voice. She knows her location and the current president when she wakes up    Code Status: Full d/w   Surrogate Decision Maker:   Discussed care with  and patient at bedside   DVT Prophylaxis: Lovenox   Baseline: Lives with      Subjective:     Chief Complaint / Reason for Physician Visit  Pt seen, somnolent, but arouse to loud voice. She falls asleep during conversation but is AAOx2 when wakes up. Denies loss of bladder or bowel continence, no speech changes or facial droop per     Discussed with RN events overnight. Review of Systems:  Symptom Y/N Comments  Symptom Y/N Comments   Fever/Chills n   Chest Pain n    Poor Appetite    Edema n    Cough n   Abdominal Pain n    Sputum n   Joint Pain     SOB/CHAMORRO n   Pruritis/Rash     Nausea/vomit n   Tolerating PT/OT     Diarrhea    Tolerating Diet y    Constipation    Other       Could NOT obtain due to:      Objective:     VITALS:   Last 24hrs VS reviewed since prior progress note.  Most recent are:  Patient Vitals for the past 24 hrs:   Temp Pulse Resp BP SpO2   08/18/20 1148 99 °F (37.2 °C) 65 18 134/62 97 %   08/18/20 1120    125/73    08/18/20 1110    139/77    08/18/20 1107    133/87    08/18/20 1105    119/77    08/18/20 1103    139/70    08/18/20 0640 97.4 °F (36.3 °C) 68 16 113/62 99 %   08/18/20 0400 97.4 °F (36.3 °C) 80 16 121/66 98 %   08/17/20 2341 97.5 °F (36.4 °C) 79 16 118/63 98 %   08/17/20 1947  78  136/62 100 %   08/17/20 1946  77  133/57 100 %   08/17/20 1945 97.3 °F (36.3 °C) 73 16 135/64 93 %   08/17/20 1541 97.6 °F (36.4 °C) 70 16 110/72 93 %       Intake/Output Summary (Last 24 hours) at 8/18/2020 1503  Last data filed at 8/17/2020 1956  Gross per 24 hour   Intake 200 ml   Output    Net 200 ml      Ct Head Wo Cont    Result Date: 8/16/2020  IMPRESSION: No acute intracranial abnormality. PHYSICAL EXAM:  General: WD, WN. Alert, pleasant, mildly confused when awake. somnolent  EENT:  EOMI. Anicteric sclerae. MMM  Resp:  CTA bilaterally, no wheezing or rales. No accessory muscle use  CV:  Regular  rhythm,  No edema  GI:  Soft, Non distended, Non tender.  +Bowel sounds  Neurologic:  Alert and oriented X 2, normal speech, moving all exts  Psych:   Not anxious nor agitated  Skin:  No rashes. No jaundice    Reviewed most current lab test results and cultures  YES  Reviewed most current radiology test results   YES  Review and summation of old records today    NO  Reviewed patient's current orders and MAR    YES  PMH/SH reviewed - no change compared to H&P  ________________________________________________________________________  Care Plan discussed with:    Comments   Patient x    Family  x     RN x    Care Manager x    Consultant  x                     x Multidiciplinary team rounds were held today with , nursing, pharmacist and clinical coordinator. Patient's plan of care was discussed; medications were reviewed and discharge planning was addressed. ________________________________________________________________________  Total NON critical care TIME: 35  Minutes    Total CRITICAL CARE TIME Spent:   Minutes non procedure based      Comments   >50% of visit spent in counseling and coordination of care x    ________________________________________________________________________  Dewey Mccauley MD     Procedures: see electronic medical records for all procedures/Xrays and details which were not copied into this note but were reviewed prior to creation of Plan. LABS:  I reviewed today's most current labs and imaging studies.   Pertinent labs include:  Recent Labs     08/17/20  0305 08/16/20  1809   WBC 7.2 7.1   HGB 11.9 12.9   HCT 36.7 38.9    278     Recent Labs     08/17/20  0305 08/16/20  1809    137   K 3.7 3.6    104   CO2 27 27   GLU 98 89   BUN 15 18   CREA 0.68 0.68   CA 8.5 9.3   ALB 3.0* 3.5   TBILI 0.4 0.4   ALT 20 22       Signed: Dewey Mccauley MD

## 2020-08-18 NOTE — PROGRESS NOTES
Hospitalist Progress Note    NAME: Pete Linn   :  1932   MRN:  [id number]       Assessment / Plan:  Syncopal Seizure x3 POA  -CTH no acute process  -EEG pending  -Started on Keppra 500 mg twice daily. Continue  -TTE pending  -PT/OT/speech consults  -Check orthostatics  -Carotid Dopplers ordered    Asthma - without exacerbation  -Will continue PRN inhalers and singulair     Generalized anxiety disorder   -Hold SSRIs as can lower seizure threshold     Alzheimer's disease / Dementia  -Pleasantly confused during encounter. High risk for owning      Code Status: Full d/w   Surrogate Decision Maker:     Discussed care with  and patient at bedside     DVT Prophylaxis: Lovenox     Baseline: Lives with      Subjective:     Chief Complaint / Reason for Physician Visit  Patient gives limited history and dose not remember events leading to hospitalization. Per , 3 separate episodes yesterday when she became unresponsive and her muscles \"tensed\".  notes prior remote episode of (presumed) vasovagal syncope while stooling. Denies loss of bladder or bowel continence, no speech changes or facial droop per     Discussed with RN events overnight. Review of Systems:  Symptom Y/N Comments  Symptom Y/N Comments   Fever/Chills n   Chest Pain n    Poor Appetite    Edema n    Cough n   Abdominal Pain n    Sputum n   Joint Pain     SOB/CHAMORRO n   Pruritis/Rash     Nausea/vomit n   Tolerating PT/OT     Diarrhea    Tolerating Diet y    Constipation    Other       Could NOT obtain due to:      Objective:     VITALS:   Last 24hrs VS reviewed since prior progress note.  Most recent are:  Patient Vitals for the past 24 hrs:   Temp Pulse Resp BP SpO2   20  78  136/62 100 %   20  77  133/57 100 %   20 194 97.3 °F (36.3 °C) 73 16 135/64 93 %   20 1541 97.6 °F (36.4 °C) 70 16 110/72 93 %   20 1220 97.4 °F (36.3 °C) 82 16 124/67 99 % 08/17/20 0642 97.5 °F (36.4 °C) 67 16 109/58 100 %   08/17/20 0303 97.3 °F (36.3 °C) 80 16 117/57 100 %       Intake/Output Summary (Last 24 hours) at 8/17/2020 2318  Last data filed at 8/17/2020 1956  Gross per 24 hour   Intake 200 ml   Output    Net 200 ml      Ct Head Wo Cont    Result Date: 8/16/2020  IMPRESSION: No acute intracranial abnormality. PHYSICAL EXAM:  General: WD, WN. Alert, pleasant, confused  EENT:  EOMI. Anicteric sclerae. MMM  Resp:  CTA bilaterally, no wheezing or rales. No accessory muscle use  CV:  Regular  rhythm,  No edema  GI:  Soft, Non distended, Non tender.  +Bowel sounds  Neurologic:  Alert and oriented X 2, normal speech,   Psych:   Not anxious nor agitated  Skin:  No rashes. No jaundice    Reviewed most current lab test results and cultures  YES  Reviewed most current radiology test results   YES  Review and summation of old records today    NO  Reviewed patient's current orders and MAR    YES  PMH/SH reviewed - no change compared to H&P  ________________________________________________________________________  Care Plan discussed with:    Comments   Patient x    Family  x     RN x    Care Manager     Consultant  x                     x Multidiciplinary team rounds were held today with , nursing, pharmacist and clinical coordinator. Patient's plan of care was discussed; medications were reviewed and discharge planning was addressed.      ________________________________________________________________________  Total NON critical care TIME: 30  Minutes    Total CRITICAL CARE TIME Spent:   Minutes non procedure based      Comments   >50% of visit spent in counseling and coordination of care x    ________________________________________________________________________  Magalys Edwards DO     Procedures: see electronic medical records for all procedures/Xrays and details which were not copied into this note but were reviewed prior to creation of Plan.      LABS:  I reviewed today's most current labs and imaging studies.   Pertinent labs include:  Recent Labs     08/17/20  0305 08/16/20  1809   WBC 7.2 7.1   HGB 11.9 12.9   HCT 36.7 38.9    278     Recent Labs     08/17/20  0305 08/16/20  1809    137   K 3.7 3.6    104   CO2 27 27   GLU 98 89   BUN 15 18   CREA 0.68 0.68   CA 8.5 9.3   ALB 3.0* 3.5   TBILI 0.4 0.4   ALT 20 22       Signed: Jinny Soliman, DO

## 2020-08-18 NOTE — PROGRESS NOTES
KAMILA  Return to ILF at [facility]  Follow up appointments    Reason for Admission:   Syncopal Dezre                   RUR Score:      11%               Plan for utilizing home health:      CM discussed possibility of [place] with . Patient is not alert and oriented to make a decision at this time.  has refused HH. PCP: First and Last name:  Ryann Griffiths MD   Name of Practice:   [facility]   Are you a current patient: Yes/No:   yes   Approximate date of last visit:   Not sure   Can you participate in a virtual visit with your PCP:   Not sure                    Current Advanced Directive/Advance Care Plan:   Full.  is Becky Myers. CM met with patient to discuss discharge planning. Patient is drowsy at this time, able to confirm name.  is at bedside and completed assessment with CM. Upon introduction  informed CM that \"I would like her to discharge today\". Later during assessment,  informed CM \"she needs to be more alert and oriented before discharging\". CM has informed Nursing. CM informed Attending via [address] [place] [address]. ADL's/IADL's - independent pta. Patient does not drive.  provides transportation. DME - none  Preferred Rx - [facility] and [employer]. CM discussed [place] with patient's . CM asked if it was recommended if he would consider. Mr. Janet Garcia informed CM \"No\".  will provide transportation at discharge. CM confirmed Shavontae at [facility] that patient is presently living in [place] [address]. If there is no recommendation for SNF patient can discharge to private setting. [facility] [phone]    Care Management Interventions  PCP Verified by CM: Yes  Mode of Transport at Discharge:  Other (see comment)( will provide transportation at discharge)  Physical Therapy Consult: Yes  Occupational Therapy Consult: Yes  Current Support Network: Lives with Spouse(Joselito Bazan)  Confirm Follow Up Transport: Family  Discharge Location  Discharge Placement: [address] [place] [address], RN CM  Ext [phone]

## 2020-08-19 ENCOUNTER — APPOINTMENT (OUTPATIENT)
Dept: NON INVASIVE DIAGNOSTICS | Age: 85
DRG: 312 | End: 2020-08-19
Attending: INTERNAL MEDICINE
Payer: MEDICARE

## 2020-08-19 VITALS
RESPIRATION RATE: 16 BRPM | DIASTOLIC BLOOD PRESSURE: 61 MMHG | BODY MASS INDEX: 20.99 KG/M2 | SYSTOLIC BLOOD PRESSURE: 134 MMHG | OXYGEN SATURATION: 99 % | TEMPERATURE: 99.4 F | HEART RATE: 85 BPM | HEIGHT: 60 IN | WEIGHT: 106.92 LBS

## 2020-08-19 PROCEDURE — 74011250637 HC RX REV CODE- 250/637: Performed by: INTERNAL MEDICINE

## 2020-08-19 PROCEDURE — 97116 GAIT TRAINING THERAPY: CPT | Performed by: PHYSICAL THERAPIST

## 2020-08-19 PROCEDURE — 93270 REMOTE 30 DAY ECG REV/REPORT: CPT

## 2020-08-19 PROCEDURE — 92610 EVALUATE SWALLOWING FUNCTION: CPT

## 2020-08-19 PROCEDURE — 74011250636 HC RX REV CODE- 250/636: Performed by: INTERNAL MEDICINE

## 2020-08-19 PROCEDURE — 94760 N-INVAS EAR/PLS OXIMETRY 1: CPT

## 2020-08-19 RX ADMIN — ATORVASTATIN CALCIUM 10 MG: 10 TABLET, FILM COATED ORAL at 08:46

## 2020-08-19 RX ADMIN — MONTELUKAST 10 MG: 10 TABLET, FILM COATED ORAL at 08:46

## 2020-08-19 RX ADMIN — HEPARIN SODIUM 5000 UNITS: 5000 INJECTION INTRAVENOUS; SUBCUTANEOUS at 08:46

## 2020-08-19 RX ADMIN — Medication 10 ML: at 03:56

## 2020-08-19 NOTE — PROGRESS NOTES
SPEECH PATHOLOGY BEDSIDE SWALLOW EVALUATION/DISCHARGE  Patient: Dania Colon (12 y.o. female)  Date: 8/19/2020  Primary Diagnosis: Syncopal seizure ([facility] Nicholas, R56.9]       Precautions:Fall    ASSESSMENT :  Based on the objective data described below, the patient presents with no oral or pharyngeal dysphagia. Timely and complete mastication, suspected timely swallow initiation and adequate hyolaryngeal elevation/excursion via palpation. No overt s/s aspiration with any consistencies. Assessment utilized both water and carbonated beverage as  report concern that she had a vasovagal episode after drinking soda before. He has many questions about his wife's GI system and encouraged him to discuss with MD.    Skilled acute therapy provided by a speech-language pathologist is not indicated at this time. PLAN :  Recommendations:  Regular/thin  Discharge Recommendations: None     SUBJECTIVE:   Patient alert, pleasant. OBJECTIVE:     Past Medical History:   Diagnosis Date    Asthma     Dr. Mariusz Casiano June 2013--doesn't see routinely--pt reports no change in allergies--no need for routine follow-up. Dr. Nahum Mendez sees for mild COPD--on Symbicort since ~Nov 2013. Continued Singulair.  Ceruminosis     periodic ear cleaning at [place] [facility]    Chronic obstructive pulmonary disease ([facility]     Encounter for screening colonoscopy ~2006    Normal per pt--[facility] records requested July 2013 visit.  Fainting     Falls     Fatigue     Hearing loss, left     va hearing    History of mammogram May 2014    Normal/no change.  History of shingles 2010    Dec 2014:  Pt reports clinical history and shingles vaccine 4yrs ago. To try to obtain exact date of zostavax from /source.  History of skin cancer     melanoma 2003?, BCC & SCC- followed by Dr. Gaetano Lozano- twice a yr appts. July 2013-still on 6mo schedule for skin checks.     Memory disorder     Osteopenia     DEXA 8/11    Overactive bladder detrol LA. Vesicare--sees [facility] Considering procedure for incontinence.  Postmenopausal hormone replacement therapy     since hysterectomy    Right shoulder pain 2013. Mild osteopenia; mild AC osteoarthritis. Sports Med Eval Oct 2013.  Well woman exam (no gynecological exam) Aug 3, 2012    \"[employer] Screening\":  Results scanned to chart. Updated 2014-no change except noted concern for GFR--see Dec 2014 note, letter as reviewed with pt. Past Surgical History:   Procedure Laterality Date    HX CATARACT REMOVAL      HX HYSTERECTOMY  1948    IR KYPHOPLASTY LUMBAR  2020    MULTIPLE DELIVERY       MI TRABECULOPLASTY Jeana Hashimoto Dr. Hessie [address]- last eye exam 2010     Prior Level of Function/Home Situation:      Diet prior to admission: regular/thin  Current Diet:  Regular/thin   Cognitive and Communication Status:  Neurologic State: Confused  Orientation Level: Disoriented to situation, Disoriented to time  Cognition: Impulsive, Decreased command following     Perseveration: No perseveration noted  Safety/Judgement: Fall prevention  Oral Assessment:  Oral Assessment  Labial: No impairment  Dentition: Natural  Oral Hygiene: (wfl)  Lingual: No impairment  Velum: Unable to visualize  Mandible: No impairment  P.O. Trials:  Patient Position: (up in chair)  Vocal quality prior to P.O.: No impairment  Consistency Presented: Thin liquid; Solid  How Presented: Successive swallows;Straw;Self-fed/presented;Spoon     Bolus Acceptance: No impairment  Bolus Formation/Control: No impairment     Propulsion: No impairment  Oral Residue: None  Initiation of Swallow: No impairment  Laryngeal Elevation: Functional  Aspiration Signs/Symptoms: None  Pharyngeal Phase Characteristics: No impairment, issues, or problems   Effective Modifications: None  Cues for Modifications: None       Oral Phase Severity: No impairment  Pharyngeal Phase Severity : No impairment  NOMS:   The NOMS functional outcome measure was used to quantify this patient's level of swallowing impairment. Based on the NOMS, the patient was determined to be at level 7 for swallow function     NOMS Swallowing Levels:  Level 1 (CN): NPO  Level 2 (CM): NPO but takes consistency in therapy  Level 3 (CL): Takes less than 50% of nutrition p.o. and continues with nonoral feedings; and/or safe with mod cues; and/or max diet restriction  Level 4 (CK): Safe swallow but needs mod cues; and/or mod diet restriction; and/or still requires some nonoral feeding/supplements  Level 5 (CJ): Safe swallow with min diet restriction; and/or needs min cues  Level 6 (CI): Independent with p.o.; rare cues; usually self cues; may need to avoid some foods or needs extra time  Level 7 ([address] [place] [address]): Independent for all p.o.  TOD. (2003). National Outcomes Measurement System (NOMS): Adult Speech-Language Pathology User's Guide. Pain:  Pain Scale 1: Numeric (0 - 10)  Pain Intensity 1: 0     After treatment:   Patient left in no apparent distress sitting up in chair, Call bell within reach and Nursing notified    COMMUNICATION/EDUCATION:       The patient's plan of care including recommendations, planned interventions, and recommended diet changes were discussed with: Registered nurse and Physician.      Thank you for this referral.  Jimmie Ingram SLP  Time Calculation: 19 mins

## 2020-08-19 NOTE — PROGRESS NOTES
Problem: Mobility Impaired (Adult and Pediatric)  Goal: *Acute Goals and Plan of Care (Insert Text)  Description: FUNCTIONAL STATUS PRIOR TO ADMISSION: Patient was independent and active without use of DME.  provided supervision and intermittent assistance at baseline secondary to memory deficits. HOME SUPPORT PRIOR TO ADMISSION: The patient lived with spouse. Physical Therapy Goals  Initiated 8/18/2020  1. Patient will move from supine to sit and sit to supine , scoot up and down, and roll side to side in bed with modified independence within 7 day(s). 2.  Patient will transfer from bed to chair and chair to bed with modified independence using the least restrictive device within 7 day(s). 3.  Patient will perform sit to stand with modified independence within 7 day(s). 4.  Patient will ambulate with supervision/set-up for 100 feet with the least restrictive device within 7 day(s). Outcome: Progressing Towards Goal  Note:   PHYSICAL THERAPY TREATMENT  Patient: Yahaira Novak (76 y.o. female)  Date: 8/19/2020  Diagnosis: Syncopal seizure ([facility] [R55, R56.9]   <principal problem not specified>       Precautions: Fall  Chart, physical therapy assessment, plan of care and goals were reviewed. ASSESSMENT  Patient continues with skilled PT services and is progressing towards goals. Patient with increased gait distance and improved balance. Up in chair upon arrival.  Sit to stand with CGA. Patient ambulated 80' with HHA x 1 with narrowed ANNA and varied pace. No LOB noted. Practiced sit to stand x 2 after ambulating. Up in chair at end of session. Current Level of Function Impacting Discharge (mobility/balance): CGA    Other factors to consider for discharge: safety issues         PLAN :  Patient continues to benefit from skilled intervention to address the above impairments. Continue treatment per established plan of care. to address goals.     Recommendation for discharge: (in order for the patient to meet his/her long term goals)  Physical therapy at least 2 days/week in the home AND ensure assist and/or supervision for safety with mobility    This discharge recommendation:  Has been made in collaboration with the attending provider and/or case management    IF patient discharges home will need the following DME: patient owns DME required for discharge       SUBJECTIVE:   Patient stated I think i'm going home.     OBJECTIVE DATA SUMMARY:   Critical Behavior:  Neurologic State: Confused  Orientation Level: Disoriented to situation, Disoriented to time  Cognition: Impulsive, Decreased command following  Safety/Judgement: Fall prevention  Functional Mobility Training:  Bed Mobility:                    Transfers:  Sit to Stand: Contact guard assistance  Stand to Sit: Contact guard assistance                             Balance:  Sitting: Intact  Standing: Intact; With support  Standing - Static: Good;Constant support  Standing - Dynamic : Constant support;Good  Ambulation/Gait Training:  Distance (ft): 160 Feet (ft)  Assistive Device: Gait belt(HHA)  Ambulation - Level of Assistance: Contact guard assistance        Gait Abnormalities: Decreased step clearance        Base of Support: Narrowed     Speed/Hayley: Fluctuations  Step Length: Left shortened;Right shortened                      Pain Rating:  None reported    Activity Tolerance:   Good  Please refer to the flowsheet for vital signs taken during this treatment. After treatment patient left in no apparent distress:   Sitting in chair, Call bell within reach, Bed / chair alarm activated, and Caregiver / family present    COMMUNICATION/COLLABORATION:   The patients plan of care was discussed with: Registered nurse.      Lucía Blanco, PT   Time Calculation: 11 mins

## 2020-08-19 NOTE — ROUTINE PROCESS
PCP KAMILA apt scheduled with Dr. Wilbur Moreland on 9/1/20 at 8:45AM.  Left message for nurse who will contact patient with earlier apt.   Info added to AVS

## 2020-08-19 NOTE — PROGRESS NOTES
Hospitalist Progress Note    NAME: Melvin Hope   :  1932   MRN:  [id number]       Assessment / Plan:  Syncopal Seizure x3 POA  Lethargy   CT head with no acute process  EEG without seizures, orthostatic negative. Given hx, this is likely related to vasovagal.  Pt evaluated by neurology, started on keppra however pt is more lethargic today and her  would like to stop keppra. I agree with this and will stop this medication and monitor pt's mental status. I discussed with neurology, also feels this is unlikely related to seizure  Carotid duplex with no significant stenosis  Received haldol 2.5mg overnight, will discontinue and hold off on all sedatives  Cont' PT/OT     Dysphagia, new per   SLP to eval    Asthma - without exacerbation  Will continue PRN inhalers and singulair     Generalized anxiety disorder   Hold SSRIs d/t lethargy, resume at discharge     Alzheimer's disease / Dementia  Somnolent but wakes up to voice. She knows her location and the current president when she wakes up    Code Status: Full d/w   Surrogate Decision Maker:   Discussed care with  and patient at bedside   DVT Prophylaxis: Lovenox   Baseline: Lives with      Subjective:     Chief Complaint / Reason for Physician Visit  Pt seen, up in chair, awake, conversant. Mentation much better today.  states this is her baseline.  said pt has difficulty swallowing today. Discussed with RN events overnight. Review of Systems:  Symptom Y/N Comments  Symptom Y/N Comments   Fever/Chills n   Chest Pain n    Poor Appetite y   Edema n    Cough n   Abdominal Pain n    Sputum n   Joint Pain     SOB/CHAMORRO n   Pruritis/Rash     Nausea/vomit n   Tolerating PT/OT     Diarrhea    Tolerating Diet     Constipation    Other       Could NOT obtain due to:      Objective:     VITALS:   Last 24hrs VS reviewed since prior progress note.  Most recent are:  Patient Vitals for the past 24 hrs:   Temp Pulse Resp BP SpO2   08/19/20 0639 97.6 °F (36.4 °C) 65 18 98/62 98 %   08/19/20 0345 97.9 °F (36.6 °C) 83 18 127/81 95 %   08/18/20 2311 98 °F (36.7 °C) 75 18 136/66 98 %   08/18/20 1850 98.3 °F (36.8 °C) 72 16 138/67 98 %   08/18/20 1533 97.8 °F (36.6 °C) 75 16 112/73 98 %   08/18/20 1148 99 °F (37.2 °C) 65 18 134/62 97 %   08/18/20 1120    125/73    08/18/20 1110    139/77    08/18/20 1107    133/87    08/18/20 1105    119/77    08/18/20 1103    139/70      No intake or output data in the 24 hours ending 08/19/20 0952   Ct Head Wo Cont    Result Date: 8/16/2020  IMPRESSION: No acute intracranial abnormality. PHYSICAL EXAM:  General: Awake up in chair, NAD  EENT:  EOMI. Anicteric sclerae. MMM  Resp:  CTA bilaterally, no wheezing or rales. No accessory muscle use  CV:  Regular  rhythm,  No edema  GI:  Soft, Non distended, Non tender.  +Bowel sounds  Neurologic:  Alert and oriented X 3, normal speech, moving all exts  Psych:   Not anxious nor agitated  Skin:  No rashes. No jaundice    Reviewed most current lab test results and cultures  YES  Reviewed most current radiology test results   YES  Review and summation of old records today    NO  Reviewed patient's current orders and MAR    YES  PMH/SH reviewed - no change compared to H&P  ________________________________________________________________________  Care Plan discussed with:    Comments   Patient x    Family  x     RN x    Care Manager x    Consultant  x                     x Multidiciplinary team rounds were held today with , nursing, pharmacist and clinical coordinator. Patient's plan of care was discussed; medications were reviewed and discharge planning was addressed.      ________________________________________________________________________  Total NON critical care TIME: 35  Minutes    Total CRITICAL CARE TIME Spent:   Minutes non procedure based      Comments   >50% of visit spent in counseling and coordination of care x    ________________________________________________________________________  Marilee May MD     Procedures: see electronic medical records for all procedures/Xrays and details which were not copied into this note but were reviewed prior to creation of Plan. LABS:  I reviewed today's most current labs and imaging studies.   Pertinent labs include:  Recent Labs     08/17/20  0305 08/16/20  1809   WBC 7.2 7.1   HGB 11.9 12.9   HCT 36.7 38.9    278     Recent Labs     08/17/20  0305 08/16/20  1809    137   K 3.7 3.6    104   CO2 27 27   GLU 98 89   BUN 15 18   CREA 0.68 0.68   CA 8.5 9.3   ALB 3.0* 3.5   TBILI 0.4 0.4   ALT 20 22       Signed: Marilee May MD

## 2020-08-19 NOTE — ROUTINE PROCESS
Reviewed discharge instructions, follow up orders and medications with patient and . All questions answered.

## 2020-08-19 NOTE — DISCHARGE SUMMARY
Discharge Summary      Name: Jono Cosme  [id number]  YOB: 1932 (Age: 80 y.o.)   Date of Admission: 8/16/2020  Date of Discharge: 8/19/2020  Attending Physician: Maribel Corea MD    Discharge Diagnosis:   Syncopal Seizure x3 POA  Lethargy   Dysphagia  Asthma  SHRUTHI  Dysphagia    Consultations:  IP CONSULT TO HOSPITALIST  IP CONSULT TO NEUROLOGY    Brief Admission History/Reason for Admission Per Chinmay Kapoor MD:   Wilfrid Dunn is a 80 y.o.  female who presents with syncope. As per  who I spoke over the phone, pt had 3 syncopal episodes while sitting at different times today. With first episode, she had abnormal body movements, with 2nd episode her body got still and 3 episode was the same. With all episodes pt loss then consciousness. Pt is unable to provide any meaningful history due to dementia so history is limited. In ED pt noted to have negative CT head.      We were asked to admit for work up and evaluation of the above problems  Brief Hospital Course by Main Problems:   Syncopal  R/o seizure  Lethargy, resolved  CT head with no acute process  EEG without seizures, orthostatic negative. Given hx, this is likely related to vasovagal.  Pt evaluated by neurology, started on keppra however pt is more lethargic today and her  would like to stop keppra. I agree with this and will stop this medication and monitor pt's mental status. I discussed with neurology, also feels this is unlikely related to seizure. Carotid duplex with no significant stenosis. Pt is much more alert and awake today. She has poor appetite but does not like the food here. PT/OT and HH at discharge. She is medically stable for discharge today. No new meds added. Given the reoccurrence of syncopal episode, will discharge with Holter monitor x30 days as per neurology's rec. F/u with neuro in 2 weeks.      Dysphagia, new per   SLP evaluated, recommended regular/thin diet.     Asthma - without exacerbation  Will continue PRN inhalers and singulair     Generalized anxiety disorder   Hold SSRIs d/t lethargy, resume at discharge     Alzheimer's disease / Dementia  Somnolent but wakes up to voice. She knows her location and the current president when she wakes up       Discharge Exam:  Patient seen and examined by me on discharge day. Pertinent Findings:  Visit Vitals  /61 (BP 1 Location: Right arm, BP Patient Position: Sitting)   Pulse 85   Temp 99.4 °F (37.4 °C)   Resp 16   Ht 5' (1.524 m)   Wt 48.5 kg (106 lb 14.8 oz)   SpO2 99%   BMI 20.88 kg/m²     Gen:    Not in distress  Chest: Clear lungs  CVS:   Regular rhythm. No edema  Abd:  Soft, not distended, not tender    Discharge/Recent Laboratory Results:  Recent Labs     08/17/20  0305      K 3.7      CO2 27   BUN 15   GLU 98   CA 8.5     Recent Labs     08/17/20  0305   HGB 11.9   HCT 36.7   WBC 7.2          Discharge Medications:  Current Discharge Medication List      CONTINUE these medications which have NOT CHANGED    Details   !! lisdexamfetamine (VYVANSE) 20 mg capsule Take 1 Cap by mouth daily for 90 days. Max Daily Amount: 20 mg.  Qty: 90 Cap, Refills: 0    Associated Diagnoses: Attention deficit hyperactivity disorder (ADHD), unspecified ADHD type      atorvastatin (LIPITOR) 10 mg tablet TAKE 1 TABLET DAILY  Qty: 90 Tab, Refills: 3    Associated Diagnoses: Dyslipidemia      !! lisdexamfetamine (VYVANSE) 20 mg capsule Take 1 Cap by mouth daily for 29 days. Max Daily Amount: 20 mg.  Qty: 30 Cap, Refills: 0    Associated Diagnoses: Attention deficit hyperactivity disorder (ADHD), unspecified ADHD type      NAMZARIC 28-10 mg CSpX TAKE 1 CAPSULE DAILY  Qty: 90 Cap, Refills: 4      MYRBETRIQ 50 mg ER tablet TAKE 1 TABLET DAILY  Qty: 90 Tab, Refills: 4      escitalopram oxalate (LEXAPRO) 20 mg tablet Take 1 Tab by mouth daily.   Qty: 90 Tab, Refills: 1    Associated Diagnoses: Anxiety albuterol (PROAIR HFA) 90 mcg/actuation inhaler Take 1 Puff by inhalation every four (4) hours as needed for Wheezing or Shortness of Breath. Indications: Acute Asthma Attack  Qty: 3 Inhaler, Refills: 3      montelukast (SINGULAIR) 10 mg tablet Take 10 mg by mouth daily. cholecalciferol, vitamin d3, (VITAMIN D) 1,000 unit tablet Take  by mouth daily. !! - Potential duplicate medications found. Please discuss with provider. Patient Follow Up Instructions: Activity: Activity as tolerated  Diet: regular/thin Diet  Wound: None needed  Code: Full    DISPOSITION:    Home with Family:    Home with HH/PT/OT/RN: x   SNF/LTC:    CHANEL:    OTHER:          Follow up with:   PCP : Kevin Witt MD  Follow-up Information     Follow up With Specialties Details Why Contact Info    Kevin Witt MD Internal Medicine On 9/1/2020 For hospital follow up appointment at 8:45AM.  The nurse will contact you with sooner appointment [address]  [facility] [address]  [address] [id number] [facility],Third Floor Urgent Care, In-Home Clinical Assessments   [facility] [address] [facility]  [address] Tami Councilman, MD Neurology In 2 weeks  [address] [id number] [phone]              Total time in minutes spent coordinating this discharge (includes going over instructions, follow-up, prescriptions, and preparing report for sign off to her PCP) :  35  minutes

## 2020-08-19 NOTE — PROGRESS NOTES
Problem: Falls - Risk of  Goal: *Absence of Falls  Description: Document [place] Fall Risk and appropriate interventions in the flowsheet. Outcome: Progressing Towards Goal  Note: Fall Risk Interventions:  Mobility Interventions: Bed/chair exit alarm    Mentation Interventions: Adequate sleep, hydration, pain control, Bed/chair exit alarm, Door open when patient unattended, Evaluate medications/consider consulting pharmacy, Reorient patient, More frequent rounding, Room close to nurse's station, Toileting rounds    Medication Interventions: Bed/chair exit alarm, Evaluate medications/consider consulting pharmacy, Patient to call before getting OOB, Teach patient to arise slowly, Assess postural VS orthostatic hypotension    Elimination Interventions: Bed/chair exit alarm, Call light in reach, Patient to call for help with toileting needs, Stay With Me (per policy), Toileting schedule/hourly rounds    History of Falls Interventions: Bed/chair exit alarm, Consult care management for discharge planning, Door open when patient unattended, Evaluate medications/consider consulting pharmacy         Problem: Patient Education: Go to Patient Education Activity  Goal: Patient/Family Education  Outcome: Progressing Towards Goal     Problem: Patient Education: Go to Patient Education Activity  Goal: Patient/Family Education  Outcome: Progressing Towards Goal     Problem: Patient Education: Go to Patient Education Activity  Goal: Patient/Family Education  Outcome: Progressing Towards Goal     Problem: Pressure Injury - Risk of  Goal: *Prevention of pressure injury  Description: Document Hernan Scale and appropriate interventions in the flowsheet.   Outcome: Progressing Towards Goal  Note: Pressure Injury Interventions:  Sensory Interventions: Assess changes in LOC, Assess need for specialty bed, Avoid rigorous massage over bony prominences, Check visual cues for pain, Discuss PT/OT consult with provider, Float heels, Maintain/enhance activity level, Keep linens dry and wrinkle-free, Minimize linen layers, Monitor skin under medical devices, Pressure redistribution bed/mattress (bed type), Pad between skin to skin, Turn and reposition approx. every two hours (pillows and wedges if needed)    Moisture Interventions: Absorbent underpads, Apply protective barrier, creams and emollients, Assess need for specialty bed, Check for incontinence Q2 hours and as needed, Internal/External urinary devices, Minimize layers, Moisture barrier, Offer toileting Q_hr, Maintain skin hydration (lotion/cream)    Activity Interventions: Increase time out of bed, Pressure redistribution bed/mattress(bed type), PT/OT evaluation    Mobility Interventions: Assess need for specialty bed, HOB 30 degrees or less, Pressure redistribution bed/mattress (bed type), PT/OT evaluation, Turn and reposition approx.  every two hours(pillow and wedges)    Nutrition Interventions: Document food/fluid/supplement intake, Offer support with meals,snacks and hydration                     Problem: Patient Education: Go to Patient Education Activity  Goal: Patient/Family Education  Outcome: Progressing Towards Goal

## 2020-08-19 NOTE — PROGRESS NOTES
KAMILA Plan:    * Return to [facility] with f/u apts    > CM faxed pt's d/c summary to [facility] for reference (f: [phone])  > PCP f/u apt secured for d/c; details reflected in AVS for reference  > Pt's  to transport at d/c    11:30 AM: CM met with pt and her  at bedside to check in and review KAMILA plan for d/c re: return to [facility] with f/u apts. CM confirmed both pt and  are agreeable to plan; no additional questions or concerns identified. [facility] services were offered 8/18/2020;  refused intervention. Pt's  to provide transportation at d/c. Medicare pt's  has received, reviewed, and signed 2nd IM letter informing them of the pt's right to appeal the discharge. Signed copy has been placed on pt bedside chart. No further CM needs identified. CM notified pt's nurse of d/c. Initial note: CM acknowledged d/c. CM reviewed pt's chart and noted updates prior to moving forward with d/c planning. CM staffed pt's medical status with attending MD; per MD, pt will d/c pending SLP evaluation. CM contacted the clinical nurse Sheryle Sato: [phone]) at [facility] to check in and confirm facility's COVID-19 protocol for pt to return. Per Malissa MARES, pt will not need a COVID-19 test completed prior to returning to facility. [facility]; Timoteontdavid confirmed understanding & requested for CM to fax clinical updates to facility for reference (f: [phone]); CM will complete request. CM will meet with pt and her  Clarissa Carter: [phone]) at bedside to check in and review KAMILA plan for projected d/c. Care Management Interventions  PCP Verified by CM: Yes  Mode of Transport at Discharge:  Other (see comment)(Pt's  to transport at d/c)  Transition of Care Consult (CM Consult): Discharge Planning  Discharge Durable Medical Equipment: No  Physical Therapy Consult: Yes  Occupational Therapy Consult: Yes  Speech Therapy Consult: Yes  Current Support Network: Lives with Spouse, Other, Own Home, Family Lives Nearby(Pt lives with spouse in independent living at [facility]; family lives nearby)  Confirm Follow Up Transport: [address] Provided?: No  Discharge Location  Discharge Placement: Home(Return to independent living at [facility] with f/u apts)    Steven Fox, [address] [facility], [address] [facility]

## 2020-08-20 ENCOUNTER — PATIENT OUTREACH (OUTPATIENT)
Dept: CASE MANAGEMENT | Age: 85
End: 2020-08-20

## 2020-08-20 NOTE — PROGRESS NOTES
Patient was admitted to [facility] on 2020 and discharged on 2020 for syncopal seizure. Patient was contacted within 1 business days of discharge. Top Discharge Challenges to be reviewed by the provider   Additional needs identified to be addressed with provider no    Discussed COVID-19 related testing which was not done at this time. Test results were not done. Patient informed of results, if available? n/a   Method of communication with provider : none       Advance Care Planning:   Does patient have an Advance Directive:  not on file     Inpatient Readmission Risk score:   Was this a readmission? no   Patient stated reason for the admission: passed out  Patients top risk factors for readmission: medical condition  Interventions to address risk factors: folow up with pcp and neuro    Care Transition Nurse (CTN) contacted the patient by telephone to perform post hospital discharge assessment. Verified name and  with patient as identifiers. Provided introduction to self, and explanation of the CTN role. CTN reviewed discharge instructions, medical action plan and red flags with patient who verbalized understanding. Patient given an opportunity to ask questions and does not have any further questions or concerns at this time. The patient agrees to contact the PCP office for questions related to their healthcare. Medication reconciliation was performed with patient, who verbalizes understanding of administration of home medications. Advised obtaining a 90-day supply of all daily and as-needed medications. Referral to Pharm D needed: no     Home Health/Outpatient orders at discharge: refused  [address]: n/a  Date of initial visit: [facility] ordered at discharge: none  [address] received: n/a    Covid Risk Education    Patient has following risk factors of: asthma.  Education provided regarding infection prevention, and signs and symptoms of COVID-19 and when to seek medical attention with patient who verbalized understanding. Discussed exposure protocols and quarantine From CDC: Are you at higher risk for severe illness?  and given an opportunity for questions and concerns. The patient agrees to contact the COVID-19 hotline [phone] or PCP office for questions related to COVID-19. For more information on steps you can take to protect yourself, see CDC's How to Protect Yourself     Patient/family/caregiver given information for [employer] and agrees to enroll no  Patient's preferred e-mail: declines  Patient's preferred phone number: declines    Discussed follow-up appointments. If no appointment was previously scheduled, appointment scheduling offered: [facility] follow up appointment(s):   Future Appointments   Date Time Provider Fred Ibarra   9/1/2020  8:45 AM Jud Griffin MD [facility] BS AMB   10/23/2020 10:00 AM Jud Griffin MD [facility] BS AMB   2/5/2021 10:30 AM Coy INFUSION NURSE [address] [facility] follow up appointment(s):   Plan for follow-up call in 7-10 days based on severity of symptoms and risk factors. CTN provided contact information for future needs. Goals Addressed                 This Visit's Progress     Prevent complications post hospitalization. 8/20/2020 Patient reports she has all medications. PCP appointment is 9/1/2020. Neuro TBD. Denies chest pain, SOB, fever, N/V/D. Patient will report neuro appointment details at next outreach and any changes to plan of care.  KIT

## 2020-08-25 DIAGNOSIS — F41.9 ANXIETY: ICD-10-CM

## 2020-08-25 RX ORDER — ESCITALOPRAM OXALATE 20 MG/1
20 TABLET ORAL DAILY
Qty: 90 TAB | Refills: 1 | Status: SHIPPED | OUTPATIENT
Start: 2020-08-25 | End: 2021-02-18 | Stop reason: SDUPTHER

## 2020-08-25 NOTE — TELEPHONE ENCOUNTER
Patient need transfer of care to Vikas Mims NP at neurology [facility] and be set up for a follow up appointment.

## 2020-09-02 ENCOUNTER — OFFICE VISIT (OUTPATIENT)
Dept: NEUROLOGY | Age: 85
End: 2020-09-02
Payer: MEDICARE

## 2020-09-02 VITALS
SYSTOLIC BLOOD PRESSURE: 120 MMHG | TEMPERATURE: 98.2 F | HEART RATE: 82 BPM | DIASTOLIC BLOOD PRESSURE: 78 MMHG | WEIGHT: 112 LBS | BODY MASS INDEX: 21.99 KG/M2 | HEIGHT: 60 IN | OXYGEN SATURATION: 98 % | RESPIRATION RATE: 18 BRPM

## 2020-09-02 DIAGNOSIS — F02.80 DEMENTIA OF THE ALZHEIMER'S TYPE, WITH LATE ONSET, UNCOMPLICATED (HCC): Primary | ICD-10-CM

## 2020-09-02 DIAGNOSIS — R55 SYNCOPAL SEIZURE (HCC): ICD-10-CM

## 2020-09-02 DIAGNOSIS — G30.1 DEMENTIA OF THE ALZHEIMER'S TYPE, WITH LATE ONSET, UNCOMPLICATED (HCC): Primary | ICD-10-CM

## 2020-09-02 DIAGNOSIS — R56.9 SYNCOPAL SEIZURE (HCC): ICD-10-CM

## 2020-09-02 PROCEDURE — 99215 OFFICE O/P EST HI 40 MIN: CPT | Performed by: PSYCHIATRY & NEUROLOGY

## 2020-09-02 NOTE — PROGRESS NOTES
Reason for Consultation:  ? Seizure vs. Syncope     CC: follow up for hospitalization     History of Present Illness:   Nilton Arango is a 80 y.o. female with a history of COPD, asthma, dementia who presents to the neurology clinic for follow-up after recent hospitalization for syncope. She was admitted to the hospital on 8/14/2020 when she had 3 episodes of loss of consciousness. Her  reports that she was at her baseline state of health however may have been dehydrated and in the heat and had possibly a vasovagal response. During 1 of the events there were some concern for shaking of the extremities for which she was placed on Keppra 500 mg twice a day. She was admitted to the hospital for further evaluation and underwent an EEG which showed no acute abnormalities. Additionally underwent CT of the head which showed small vessel ischemic disease otherwise no other abnormalities. She was seen by neurology during the admission who felt that the symptom was likely due to a syncopal event. The Keppra was stopped as she did not have any abnormalities on her EEG and became very lethargic on a low dose. Interval hx:   Since discharge patient has been doing well. She has no complaints today and is thankful for being here. Has recently started wearing an event monitor looking for arrhythmias. Has not had any further episodes of syncope or seizures. She denies any weakness numbness tingling speech changes vision changes or difficulty with balance. She denies any falls. Her  reports her last fall was in 2016 where she broke her right wrist.  Patient had been previously followed by neurology for ongoing issues with her memory. Appears that she had neuropsych testing done more than 5 years ago and was found to have a mild cognitive impairment. Additionally she was found to have ADHD and she was started on Vyvanse.   Her  talks about these issues and states that her memory has been progressing in the last several years. He reports she is still able to take care of her ADLs however he does most of the cooking as well as the finances. Her  does inquire about the need for Vyvanse however would like to wait before considering weaning this medication. Review of previous neurology notes does reveal that she has had a slow progression of her memory in the last several years. Past Medical History:   Diagnosis Date    Asthma     Dr. Shaquille Ortega June 2013--doesn't see routinely--pt reports no change in allergies--no need for routine follow-up. Dr. Christi Shields sees for mild COPD--on Symbicort since ~Nov 2013. Continued Singulair.  Ceruminosis     periodic ear cleaning at [facility]    Chronic obstructive pulmonary disease ([facility] [address]     Encounter for screening colonoscopy ~2006    Normal per pt--[facility] records requested July 2013 visit.  Fainting     Falls     Fatigue     Hearing loss, left     va hearing    History of mammogram May 2014    Normal/no change.  History of shingles 2010    Dec 2014:  Pt reports clinical history and shingles vaccine 4yrs ago. To try to obtain exact date of zostavax from /source.  History of skin cancer     melanoma 2003?, BCC & SCC- followed by Dr. Percy Verma- twice a yr appts. July 2013-still on 6mo schedule for skin checks.  Memory disorder     Osteopenia     DEXA 8/11    Overactive bladder     detrol LA. Vesicare--sees Urology Spring/Fall. Considering procedure for incontinence.  Postmenopausal hormone replacement therapy     since hysterectomy    Right shoulder pain Sept 2013. Mild osteopenia; mild AC osteoarthritis. Sports Med Eval Oct 2013.  Well woman exam (no gynecological exam) Aug 3, 2012    \"[employer] Screening\":  Results scanned to chart. Updated Nov 2014-no change except noted concern for GFR--see Dec 2014 note, letter as reviewed with pt.         Past Surgical History:   Procedure Laterality Date    HX CATARACT REMOVAL      HX HYSTERECTOMY  1948    IR KYPHOPLASTY LUMBAR  2020    MULTIPLE DELIVERY       FL TRABECULOPLASTY BY LASER SURGERY      Dr. Deanne Rasheed- last eye exam 2010        Family History   Problem Relation Age of Onset   Mina Andrew Dementia Father     Hypertension Mother     Stroke Mother     Cancer Other         daughter- breast cancer        Social History     Tobacco Use    Smoking status: Former Smoker    Smokeless tobacco: Never Used   Substance Use Topics    Alcohol use: Yes     Alcohol/week: 0.0 standard drinks     Frequency: 4 or more times a week     Comment: 1-2 drinks/every evening ( wine/vodka)        Allergies   Allergen Reactions    Latex Unknown (comments)    Dog Dander Shortness of Breath        Prior to Admission medications    Medication Sig Start Date End Date Taking? Authorizing Provider   escitalopram oxalate (LEXAPRO) 20 mg tablet Take 1 Tab by mouth daily. 20  Yes Cira Reyes MD   lisdexamfetamine (VYVANSE) 20 mg capsule Take 1 Cap by mouth daily for 90 days. Max Daily Amount: 20 mg. 7/31/20 10/29/20 Yes Rod Sim MD   atorvastatin (LIPITOR) 10 mg tablet TAKE 1 TABLET DAILY 20  Yes Rod Sim MD   [facility] 28-10 mg CSpX TAKE 1 CAPSULE DAILY 20  Yes Rod Sim MD   MYRBETRIQ 50 mg ER tablet TAKE 1 TABLET DAILY 20  Yes Rod Sim MD   albuterol (PROAIR HFA) 90 mcg/actuation inhaler Take 1 Puff by inhalation every four (4) hours as needed for Wheezing or Shortness of Breath. Indications: Acute Asthma Attack 18  Yes Rod Sim MD   montelukast (SINGULAIR) 10 mg tablet Take 10 mg by mouth daily. Yes Provider, Historical   cholecalciferol, vitamin d3, (VITAMIN D) 1,000 unit tablet Take  by mouth daily.    Yes Provider, Historical       Review of Systems:    General, constitutional: negative  Eyes, vision: negative  Ears, nose, throat: negative  Cardiovascular, heart: negative  Respiratory: negative  Gastrointestinal: negative  Genitourinary: negative  Musculoskeletal: negative  Skin and integumentary: negative  Psychiatric: negative  Endocrine: negative  Neurological: negative, except for HPI  Hematologic/lymphatic: negative  Allergy/immunology: negative    PHYSICAL EXAMINATION:   Visit Vitals  Temp 98.2 °F (36.8 °C)       Physical Exam:  General:  no acute distress  Neck: no carotid bruits  Lungs: clear to auscultation  Heart:  no murmurs, regular rate and rhythm   Lower extremity: no edema    Neurological exam:  Mental Status: Awake, alert, oriented to person, place and time  Registration and Recall: registration intact, unable to recall any words despite prompts. Attention and Concentration: able to state the days of the week backwards, however she initially said them forwards. Speech and Language: No dysarthria. Able to name, repeat and follow commands   Fund of knowledge was preserved    Cranial nerves: II-XII:  Pupils equal and reactive, visual fields intact by confrontation   Fundus: Unable to clearly visualize the disc margins. Extraocular movements intact, no evidence of nystagmus or ptosis   Facial sensation intact   Facial movements symmetric   Hearing intact to soft rub bilaterally   Shoulder shrug symmetric and strong   Tongue protrusion full and midline without fasciculation or atrophy    Motor:   Normal tone and Bulk   Drift: No evidence of pronator drift     Strength testing:   deltoid triceps biceps Wrist ext. Wrist flex. intrinsics   Right 5 5 5 5 5 5   Left 5 5 5 5 5 5      Hip flex. Hip ext. Knee ext. Knee flex Dorsi flex Plantar flex   Right  5 5 5 5 5 5   Left  5 5 5 5 5 5       Sensory:  Patient intact to light touch and pinprick. There is no extinction    Reflexes:   Biceps Triceps  Brachiorad Patellar Achilles Plantar Hoffmans   Right  2 2 2 3 2 Down Neg   Left  2 2 2 Clonus  2 Down Neg      Cerebellar testing: Finger-nose-finger and heel-to-shin were intact.     Gait: Steady    Data:     Lab Results   Component Value Date/Time    Sodium 139 08/17/2020 03:05 AM    Potassium 3.7 08/17/2020 03:05 AM    Chloride 108 08/17/2020 03:05 AM    Glucose 98 08/17/2020 03:05 AM    BUN 15 08/17/2020 03:05 AM    Creatinine 0.68 08/17/2020 03:05 AM    Calcium 8.5 08/17/2020 03:05 AM    WBC 7.2 08/17/2020 03:05 AM    HCT 36.7 08/17/2020 03:05 AM    HGB 11.9 08/17/2020 03:05 AM    PLATELET 615 86/63/0841 03:05 AM       Imaging:    Results from Hospital Encounter encounter on 02/26/20   MRI LUMB SPINE WO CONT    Narrative EXAM: MRI LUMB SPINE WO CONT    INDICATION: . Wedge compression fracture of third lumbar vertebra, initial  encounter for closed fracture      Exam: MRI of the lumbar spine. Sequences include sagittal and axial T1 and  T2-weighted images. Sagittal STIR. Coronal T2    Comparisons: 2/17/2020    Contrast: None. Findings: Slight dextroscoliosis of the lumbar spine. There is an acute burst  fracture of the superior endplate of L3. There is retropulsion of the posterior  superior vertebral body into the canal with extension of edema into the pedicles  bilaterally. There is minimal edema along the inferior endplate of L2. Differentiating an acute Schmorl's node from chronic degenerative changes  difficult. Cord terminus is within normal limits. Paraspinous soft tissues are  within normal limits. T12-L1: No stenosis    L1-L2: No stenosis    L2-L3: Due to the retropulsed L3 vertebral body and diffuse bulge there is  moderate compression of the thecal sac. There is mild facet degenerative change. Minimal narrowing of the foramen    L3-L4: There is desiccation of this disc with disc height loss. There is a small  central and left paracentral extrusion extending cranially with facet  degenerative change. Canal stenosis is mild with narrowing of the left lateral  recess posterior to L3 and mild narrowing of the left subarticular zone.  Mild  left foraminal narrowing    L4-L5: There is disc height loss with a diffuse bulge and facet degenerative  change. The ligamentum flavum is thickened. Canal stenosis is mild to moderate  with narrowing of the subarticular zones. Small amount of extruded disc material  extends cranially into the left foramen with mild left foraminal narrowing    L5-S1: There are facet degenerative changes without disc bulge or stenosis      Impression Impression:  1. Acute burst fracture superior endplate of L3  2. Edema inferior endplate of L2. Differentiating chronic endplate degenerative  change from small acute Schmorl's node is difficult    Findings were phoned Merari Kirk and Dr. Chelo Salcedo office at [phone] hours          Results from [place] encounter on 08/16/20   CT HEAD WO CONT    Narrative EXAM:  CT HEAD WO CONT    INDICATION: Seizure. COMPARISON: CT 2/17/2020    TECHNIQUE: Axial noncontrast head CT from foramen magnum to vertex. Coronal and  sagittal reformatted images were obtained. CT dose reduction was achieved  through use of a standardized protocol tailored for this examination and  automatic exposure control for dose modulation. FINDINGS:  There is diffuse age-related parenchymal volume loss. The ventricles  and sulci are age-appropriate without hydrocephalus. There is no mass effect or  midline shift. There is no intracranial hemorrhage or extra-axial fluid  collection. Areas of low attenuation in the periventricular white matter  represent stable chronic microvascular ischemic changes. The gray-white matter  differentiation is maintained. The basal cisterns are patent. The osseous structures are intact. The visualized paranasal sinuses and mastoid  air cells are clear. Impression IMPRESSION:   No acute intracranial abnormality. IMPRESSION/RECOMMENDATIONS:  Yahaira Novak is a 80 y.o. female with a history of COPD, asthma, dementia who presents to the neurology clinic for follow-up after recent hospitalization for syncope.   She has been doing well and is currently on Holter monitor. There have been no further episodes. She also has had some progression of her underlying dementia which is possibly vascular in nature as her head CT does show small vessel ischemic disease. Her last neuropsych testing was done more than 5 years ago and did not show any evidence of dementia at that time. Syncopal episode: Likely vasovagal due to dehydration as patient was in the heat during the episode. Her EEG was unremarkable and she had a head CT that showed small vessel ischemic disease however no areas of encephalomalacia which may trigger a seizure-like event. Patient also had carotid Dopplers performed while in the hospital and showed no flow-limiting stenosis. -As her event is not clearly based in seizure, I would recommend that she not continue any AEDs at this time.  -She is currently has an event monitor looking for arrhythmias. We discussed this extensively and while I stated that the longer she is monitor the more likely we are going to find something he prefers to only continue this for 14 days. Cognitive impairment: Concerning for vascular dementia given her head CT findings which did show small vessel ischemic disease in the periventricular area that appeared to be moderate. Appears that her symptoms have been progressing in the last 5 years.   -May benefit from repeat neuropsych testing however patient and  like would like to wait on this.  -She is also on medication for ADHD as she previously had trouble with focusing, we will continue Vyvanse for now however may consider weaning this at the next visit.  -Would recommend that she continue Namzaric as this has been shown some benefit in the setting of both Alzheimer's and vascular dementia.  -We discussed the importance of following a Mediterranean-DASH diet as this has been shown to slow the rate of progression of Alzheimer's and other neurodegenerative diseases.  -Also discussed that it would be important to continue with physical activity at least 30 minutes 3 times a day as this has been shown to improve memory as well.  -I did recommend that she should continue to read, do crossword puzzles and or sudoku as this will help reduce her rate of progression of her disease. Return to clinic in 3 months    I have discussed the diagnosis with the patient and the intended plan as seen in the above orders. Patient is in agreement. The patient has received an after-visit summary and questions were answered concerning future plans. I have discussed medication side effects and warnings with the patient as well.       Miles Morrison MD

## 2020-09-02 NOTE — PATIENT INSTRUCTIONS
Vasovagal Syncope: Care Instructions Your Care Instructions Vasovagal syncope (say \"xyc-tie-ODCMarcela MCCOY-kuh-pee\")is sudden dizziness or fainting that can be set off by things such as pain, stress, fear, or trauma. You may sweat or feel lightheaded, sick to your stomach, or tingly. The problem causes the heart rate to slow and the blood vessels to widen, or dilate, for a short time. When this happens, blood pools in the lower body, and less blood goes to the brain. You can usually get relief by lying down with your legs raised (elevated). This helps more blood to flow to your brain and may help relieve symptoms like feeling dizzy. Some doctors may recommend a technique that involves tensing your fists and arms. This type of fainting is often easy to predict. For example, it happens to some people when they see blood or have to get a shot. They may feel symptoms before they faint. An episode of vasovagal syncope usually responds well to self-care. Other treatment often isn't needed. But if the fainting keeps happening, your doctor may suggest further treatments. Follow-up care is a key part of your treatment and safety. Be sure to make and go to all appointments, and call your doctor if you are having problems. It's also a good idea to know your test results and keep a list of the medicines you take. How can you care for yourself at home? · Drink plenty of fluids to prevent dehydration. If you have kidney, heart, or liver disease and have to limit fluids, talk with your doctor before you increase your fluid intake. · Try to avoid things that you think may set off vasovagal syncope. · Talk to your doctor about any medicines you take. Some medicines may increase the chance of this condition occurring. · If you feel symptoms, lie down with your legs raised. Talk to your doctor about what to do if your symptoms come back. When should you call for help? [id number] anytime you think you may need emergency care. For example, call if: 
· You have symptoms of a heart problem. These may include: 
? Chest pain or pressure. ? Severe trouble breathing. ? A fast or irregular heartbeat. Watch closely for changes in your health, and be sure to contact your doctor if: 
· You have more episodes of fainting at home. · You do not get better as expected. Where can you learn more? Go to http://www.gray.com/ Enter L754 in the search box to learn more about \"Vasovagal Syncope: Care Instructions. \" Current as of: June 26, 2019               Content Version: 12.5 © 7779-8972 [employer]. Care instructions adapted under license by [employer] (which disclaims liability or warranty for this information). If you have questions about a medical condition or this instruction, always ask your healthcare professional. [address] any warranty or liability for your use of this information.

## 2020-09-07 NOTE — PROGRESS NOTES
HISTORY OF PRESENT ILLNESS  Yahaira Novak is a 80 y.o. female. HPI     Yahaira Novak is a 80 y.o. female being evaluated by a Virtual Visit (video visit) encounter to address concerns as mentioned above. A caregiver was present when appropriate. Due to this being a TeleHealth encounter (During FKUQU-14 public health emergency), evaluation of the following organ systems was limited: Vitals/Constitutional/EENT/Resp/CV/GI//MS/Neuro/Skin/Heme-Lymph-Imm. Pursuant to the emergency declaration under the Formerly named [facility], [address] [place] [address] authority and the [employer] Act, this Virtual Visit was conducted with patient's (and/or legal guardian's) consent, to reduce the risk of exposure to COVID-19 and provide necessary medical care. Services were provided through a video synchronous discussion virtually to substitute for in-person encounter. --Brittany Quiñonez MD on 9/7/2020 at 7:14 PM    An electronic signature was used to authenticate this note. F/u ADHD, dementia, osteoporosis HLD OAB  Had recent f/u with neurologist Dr Poonam Goldberg after hospitalization for syncope. d/t dehydration. keppra was initiated but stopped .  No seiures on eeg   Head CT-small vessel dz  No recurrence of syncope since return to home  Wearing event monitor  Some progression of mild dementia deemed vascular  On lexapro anxiety  On prolia q6 months     Last OV  Started on fosamax last OV but change to prolia inj d/t forgetting to take fosamax     Had fall and LBP--s/p L3 Kyphoplasty by IR  No back pain now  Has poor short term memory per her   Still some urine frequency on myrbetriq     Will need refill oy vyvanse 4/18,5/18 and 6/18/20  Last OV     Here with  for routine f/u dementia, ADHD , [place] wellness---------------------  Sees Dr Juan Meadows for Dementia and ADHD  vynase was lowered to every other day dosing this year but taking again daily d/t agitation  On lexapro too and namzeric  L[facility] with  at [facility]  Agitation controlled now back on medicines  Takes albuterol only prn    Patient Active Problem List    Diagnosis Date Noted    Bilateral carotid artery stenosis 08/17/2020    Syncopal seizure ([facility] [address] 08/16/2020    Compression fracture of L3 vertebra ([facility] [address] 04/10/2020    Alzheimer's disease ([facility] [address] 09/10/2018    ADD (attention deficit disorder) 01/18/2017    Hyperlipidemia 07/20/2015    Mild cognitive impairment without memory loss 02/02/2015    Generalized anxiety disorder 02/02/2015    TMJ (temporomandibular joint disorder) 12/21/2011    Syncope and collapse 08/10/2011    Fever, unspecified 08/10/2011    Syncope and collapse 08/10/2011    History of skin cancer     Asthma     Hearing loss, left     Ceruminosis     Postmenopausal hormone replacement therapy     Overactive bladder      Current Outpatient Medications   Medication Sig Dispense Refill    escitalopram oxalate (LEXAPRO) 20 mg tablet Take 1 Tab by mouth daily. 90 Tab 1    lisdexamfetamine (VYVANSE) 20 mg capsule Take 1 Cap by mouth daily for 90 days. Max Daily Amount: 20 mg. 90 Cap 0    atorvastatin (LIPITOR) 10 mg tablet TAKE 1 TABLET DAILY 90 Tab 3    NAMZARIC 28-10 mg CSpX TAKE 1 CAPSULE DAILY 90 Cap 4    MYRBETRIQ 50 mg ER tablet TAKE 1 TABLET DAILY 90 Tab 4    albuterol (PROAIR HFA) 90 mcg/actuation inhaler Take 1 Puff by inhalation every four (4) hours as needed for Wheezing or Shortness of Breath. Indications: Acute Asthma Attack 3 Inhaler 3    montelukast (SINGULAIR) 10 mg tablet Take 10 mg by mouth daily.  cholecalciferol, vitamin d3, (VITAMIN D) 1,000 unit tablet Take  by mouth daily.        Allergies   Allergen Reactions    Latex Unknown (comments)    Dog Dander Shortness of Breath      Lab Results   Component Value Date/Time    Glucose 98 08/17/2020 03:05 AM    Glucose (POC) 78 07/30/2020 11:41 AM LDL, calculated 78 11/04/2019 11:27 AM    Creatinine (POC) 0.8 07/30/2020 11:41 AM    Creatinine 0.68 08/17/2020 03:05 AM      Lab Results   Component Value Date/Time    Cholesterol, total 202 (H) 11/04/2019 11:27 AM    HDL Cholesterol 105 11/04/2019 11:27 AM    LDL, calculated 78 11/04/2019 11:27 AM    Triglyceride 93 11/04/2019 11:27 AM    CHOL/HDL Ratio 2.4 08/31/2010 10:48 AM     Lab Results   Component Value Date/Time    ALT (SGPT) 20 08/17/2020 03:05 AM    Alk. phosphatase 51 08/17/2020 03:05 AM    Bilirubin, total 0.4 08/17/2020 03:05 AM    Albumin 3.0 (L) 08/17/2020 03:05 AM    Protein, total 6.6 08/17/2020 03:05 AM    INR 1.0 07/14/2014 09:33 AM    Prothrombin time 10.3 07/14/2014 09:33 AM    PLATELET 548 00/63/2850 03:05 AM     Lab Results   Component Value Date/Time    GFR est non-AA >60 08/17/2020 03:05 AM    GFRNA, POC >60 07/30/2020 11:41 AM    GFR est AA >60 08/17/2020 03:05 AM    GFRAA, POC >60 07/30/2020 11:41 AM    Creatinine 0.68 08/17/2020 03:05 AM    Creatinine (POC) 0.8 07/30/2020 11:41 AM    BUN 15 08/17/2020 03:05 AM    BUN (POC) 19 07/30/2020 11:41 AM    Sodium 139 08/17/2020 03:05 AM    Sodium (POC) 141 07/30/2020 11:41 AM    Potassium 3.7 08/17/2020 03:05 AM    Potassium (POC) 4.0 07/30/2020 11:41 AM    Chloride 108 08/17/2020 03:05 AM    Chloride (POC) 104 07/30/2020 11:41 AM    CO2 27 08/17/2020 03:05 AM    Magnesium 2.1 07/30/2020 11:36 AM    Phosphorus 3.4 07/30/2020 11:36 AM        ROS    Physical Exam  Constitutional:       Appearance: Normal appearance. Pulmonary:      Effort: Pulmonary effort is normal.   Neurological:      Mental Status: She is alert. ASSESSMENT and PLAN  Diagnoses and all orders for this visit:    1. Attention deficit hyperactivity disorder (ADHD), unspecified ADHD type   Controlled    will call for refills next month  2.  Late onset Alzheimer's disease without behavioral disturbance (HCC)   Mild, slowly progressive   F/u neurolgoist   On namzaric for vascular dementia and AD  3. Syncope, unspecified syncope type   Event recorder on   hydration  4.  Pure hypercholesterolemia   On statin, labs next ov      rtc 6 months medicare wellness

## 2020-09-08 ENCOUNTER — VIRTUAL VISIT (OUTPATIENT)
Dept: INTERNAL MEDICINE CLINIC | Age: 85
End: 2020-09-08
Payer: MEDICARE

## 2020-09-08 DIAGNOSIS — R55 SYNCOPE, UNSPECIFIED SYNCOPE TYPE: ICD-10-CM

## 2020-09-08 DIAGNOSIS — G30.1 LATE ONSET ALZHEIMER'S DISEASE WITHOUT BEHAVIORAL DISTURBANCE (HCC): ICD-10-CM

## 2020-09-08 DIAGNOSIS — F90.9 ATTENTION DEFICIT HYPERACTIVITY DISORDER (ADHD), UNSPECIFIED ADHD TYPE: Primary | ICD-10-CM

## 2020-09-08 DIAGNOSIS — F02.80 LATE ONSET ALZHEIMER'S DISEASE WITHOUT BEHAVIORAL DISTURBANCE (HCC): ICD-10-CM

## 2020-09-08 DIAGNOSIS — E78.00 PURE HYPERCHOLESTEROLEMIA: ICD-10-CM

## 2020-09-08 PROBLEM — F01.50 VASCULAR DEMENTIA WITHOUT BEHAVIORAL DISTURBANCE (HCC): Status: ACTIVE | Noted: 2020-09-08

## 2020-09-08 PROCEDURE — 99213 OFFICE O/P EST LOW 20 MIN: CPT | Performed by: INTERNAL MEDICINE

## 2020-09-08 NOTE — PATIENT INSTRUCTIONS
This is an established visit conducted via telemedicine. The patient has been instructed that this meets HIPAA criteria and acknowledges and agrees to this method of visitation. Jessi Roberson [place] 
83/[birth year]/73 
6:71 PM 
Chief Complaint Patient presents with  Medication Refill  Follow-up ED visit 8/16/2020

## 2020-09-28 ENCOUNTER — PATIENT OUTREACH (OUTPATIENT)
Dept: CASE MANAGEMENT | Age: 85
End: 2020-09-28

## 2020-09-28 NOTE — PROGRESS NOTES
Goals Addressed                 This Visit's Progress     COMPLETED: Prevent complications post hospitalization. 8/20/2020 Patient reports she has all medications. PCP appointment is 9/1/2020. Neuro TBD. Denies chest pain, SOB, fever, N/V/D. Patient will report neuro appointment details at next outreach and any changes to plan of care. KIT    09/28/20 Attended PCP and neuro follow up per chart review. Resolving KAMILA.  APM

## 2020-11-10 DIAGNOSIS — F90.9 ATTENTION DEFICIT HYPERACTIVITY DISORDER (ADHD), UNSPECIFIED ADHD TYPE: Primary | ICD-10-CM

## 2020-11-11 ENCOUNTER — TELEPHONE (OUTPATIENT)
Dept: INTERNAL MEDICINE CLINIC | Age: 85
End: 2020-11-11

## 2020-11-11 NOTE — TELEPHONE ENCOUNTER
----- Message from Lorrene Bumpers sent at 11/11/2020 10:56 AM EST -----  Regarding: Dr Megan Herrera (if not patient): Mr. Coral Leahy      Relationship of caller (if not patient): n/a      Best contact number(s):[phone]      Name of medication and dosage if known: Vivance 20mg      Is patient out of this medication (yes/no): no, tomorrow      Pharmacy name: [address] [place] [address] listed in chart? (yes/no): yes    Pharmacy phone number: n/a      Details to clarify the request: Pt tried to fill on Monday and hasn't gotten a response. Pt is requesting a 90 day supply.        Message from [facility] [place]

## 2020-12-09 ENCOUNTER — OFFICE VISIT (OUTPATIENT)
Dept: NEUROLOGY | Age: 85
End: 2020-12-09
Payer: MEDICARE

## 2020-12-09 VITALS
TEMPERATURE: 97.3 F | BODY MASS INDEX: 22.65 KG/M2 | WEIGHT: 116 LBS | HEART RATE: 78 BPM | OXYGEN SATURATION: 98 % | DIASTOLIC BLOOD PRESSURE: 79 MMHG | SYSTOLIC BLOOD PRESSURE: 148 MMHG

## 2020-12-09 DIAGNOSIS — F01.50 VASCULAR DEMENTIA WITHOUT BEHAVIORAL DISTURBANCE (HCC): Primary | ICD-10-CM

## 2020-12-09 PROCEDURE — 99214 OFFICE O/P EST MOD 30 MIN: CPT | Performed by: PSYCHIATRY & NEUROLOGY

## 2020-12-09 NOTE — PATIENT INSTRUCTIONS
Alzheimer's Disease: Care Instructions Your Care Instructions Alzheimer's disease is a type of dementia. It causes memory loss and affects judgment, language, and behavior. You may have trouble making decisions or may get lost in places that you used to know well. Alzheimer's disease is different than mild memory loss that occurs with aging. It is not clear what causes Alzheimer's disease, but it is the most common form of dementia in older adults. Finding out that you have this disease is a shock. You may be afraid and worried about how the condition will change your life. Although there is no cure at this time, medicine in some cases may slow memory loss for a while. Other medicines may be able to help you sleep or cope with depression and behavior changes. Alzheimer's disease is different for everyone. It may take many years to develop. In some cases, people can function well for a long time. In the early stage of the disease, you can do things at home to make life easier and safer. You also can keep doing your hobbies and other activities. Many people find comfort in planning now for their future needs. Follow-up care is a key part of your treatment and safety. Be sure to make and go to all appointments, and call your doctor if you are having problems. It's also a good idea to know your test results and keep a list of the medicines you take. How can you care for yourself at home? Taking care of yourself · If your doctor gives you medicines, take them exactly as prescribed. Call your doctor if you think you are having a problem with your medicine. You will get more details on the medicines your doctor prescribes. · Eat a balanced diet. Get plenty of whole grains, fruits, and vegetables every day. If you are not hungry at mealtimes, eat snacks at midmorning and in the afternoon. Try drinks such as Boost, Ensure, or Sustacal if you are having trouble keeping your weight up. · Stay active. Exercise such as walking may slow the decline of your mental abilities. Try to stay active mentally too. Read and work crossword puzzles if you enjoy these activities. · If you have trouble sleeping, do not nap during the day. Get regular exercise (but not within several hours of bedtime). Drink a glass of warm milk or caffeine-free herbal tea before going to bed. · Ask your doctor about support groups and other resources in your area. They can help people who have Alzheimer's disease and their families. · Be patient. You may find that a task takes you longer than it used to. · If you have not already done so, make a list of advance directives. Advance directives are instructions to your doctor and family members about what kind of care you want if you become unable to speak or express yourself. Talk to a  about making a will, if you do not already have one. Keeping schedules · Develop a routine. You will feel less frustrated or confused if you have a clear, simple plan of what to do every day. ? Make lists of your medicines and when to take them. ? Write down appointments and other tasks in a calendar. ? Put sticky notes around the house to help you remember events and other things you have to do. ? Schedule activities and tasks for times of the day when you are best able to handle them. Staying safe · Tell someone when you are going out and where you are going. Let the person know when you will be back. Before you go out alone, write down where you are going, how to get there, and how to get back home. Do this even if you have gone there many times before. Take someone along with you when possible. · Make your home safe. Tack down rugs, put no-slip tape in the tub, use handrails, and put safety switches on stoves and appliances. · Have a family member or other caregiver tell you whether you are driving badly. Deciding to stop driving is very hard for many people.  Driving helps you feel independent. Your state 's license bureau can do a driving test if there is any question. Plan for other means of getting around when you are no longer able to drive. · Use strong lighting, especially at night. Put night-lights in bedrooms, hallways, and bathrooms. · Lower the hot water temperature setting to 120°F or lower to avoid burns. When should you call for help? Call 911 anytime you think you may need emergency care. For example, call if: 
  · You are lost and do not know whom to call.  
  · You are injured and do not know whom to call. Call your doctor now or seek immediate medical care if: 
  · Your symptoms suddenly get much worse. Watch closely for changes in your health, and be sure to contact your doctor if: 
  · You want more information about how you can take care of yourself. Where can you learn more? Go to http://www.gray.com/ Enter Y179 in the search box to learn more about \"Alzheimer's Disease: Care Instructions. \" Current as of: January 31, 2020               Content Version: 12.6 © 6350-7949 [employer]. Care instructions adapted under license by [employer] (which disclaims liability or warranty for this information). If you have questions about a medical condition or this instruction, always ask your healthcare professional. [address] any warranty or liability for your use of this information.

## 2020-12-09 NOTE — PROGRESS NOTES
Reason for Consultation:  ? Seizure vs. Syncope     CC: follow up for hospitalization     History of Present Illness:   Bascom Apley is a 80 y.o. female with a history of COPD, asthma, dementia who presents to the neurology clinic for follow-up after recent hospitalization for syncope. She was admitted to the hospital on 8/14/2020 when she had 3 episodes of loss of consciousness. Her  reports that she was at her baseline state of health however may have been dehydrated and in the heat and had possibly a vasovagal response. During 1 of the events there were some concern for shaking of the extremities for which she was placed on Keppra 500 mg twice a day. She was admitted to the hospital for further evaluation and underwent an EEG which showed no acute abnormalities. Additionally underwent CT of the head which showed small vessel ischemic disease otherwise no other abnormalities. She was seen by neurology during the admission who felt that the symptom was likely due to a syncopal event. The Keppra was stopped as she did not have any abnormalities on her EEG and became very lethargic on a low dose. Interval hx:   Patient was last seen approximately 3 months ago and since that time her  reports that she is worsening. He reports that in the last month or 2 she has been forgetting where the bathroom is or where the closet is. She is usually able to find where she is going on her own however does need redirection at times.  reports that she is still able to take care of all her ADLs and has been compliant with her medication. He does report that she is more forgetful and frequently repeats questions or comments. He is also concerned about her alcohol consumption, he reports she drinks a glass of wine as well as a mixed drink at dinner. He does not report any adverse behaviors or effects with this however patient expresses regret after doing this.   Unfortunately she does not remember the regret the following day. And again questions the use of Vyvanse in her situation. Past Medical History:   Diagnosis Date    Asthma     Dr. Leigh Ann Sam 2013--doesn't see routinely--pt reports no change in allergies--no need for routine follow-up. Dr. Michelle Finn sees for mild COPD--on Symbicort since ~2013. Continued Singulair.  Ceruminosis     periodic ear cleaning at [facility]    Chronic obstructive pulmonary disease ([facility] [address]     Encounter for screening colonoscopy ~    Normal per pt--[facility] records requested 2013 visit.  Fainting     Falls     Fatigue     Hearing loss, left     va hearing    History of mammogram May 2014    Normal/no change.  History of shingles 2010    Dec 2014:  Pt reports clinical history and shingles vaccine 4yrs ago. To try to obtain exact date of zostavax from /source.  History of skin cancer     melanoma ?, BCC & SCC- followed by Dr. Sary Hou- twice a yr appts. 2013-still on 6mo schedule for skin checks.  Memory disorder     Osteopenia     DEXA     Overactive bladder     detrol LA. Vesicare--[facility]es [facility] Considering procedure for incontinence.  Postmenopausal hormone replacement therapy     since hysterectomy    Right shoulder pain 2013. Mild osteopenia; mild AC osteoarthritis. Sports Med Eval Oct 2013.  Well woman exam (no gynecological exam) Aug 3, 2012    \"[facility] Screening\":  Results scanned to chart. Updated 2014-no change except noted concern for GFR--see Dec 2014 note, letter as reviewed with pt.         Past Surgical History:   Procedure Laterality Date    HX CATARACT REMOVAL      HX HYSTERECTOMY  1948    IR KYPHOPLASTY LUMBAR  2020    MULTIPLE DELIVERY       [place] TRABECULOPLASTY [facility] - [address] [place] [address]- last eye exam 2010        Family History   Problem Relation Age of Onset   Saima Fraser Dementia Father     Hypertension Mother    Saima Fraser Stroke Mother     Cancer Other         daughter- breast cancer        Social History     Tobacco Use    Smoking status: Former Smoker    Smokeless tobacco: Never Used   Substance Use Topics    Alcohol use: Yes     Alcohol/week: 0.0 standard drinks     Frequency: 4 or more times a week     Comment: 1-2 drinks/every evening ( wine/vodka)        Allergies   Allergen Reactions    Latex Unknown (comments)    Dog Dander Shortness of Breath        Prior to Admission medications    Medication Sig Start Date End Date Taking? Authorizing Provider   lisdexamfetamine (Vyvanse) 20 mg capsule Take 1 Cap by mouth daily. Max Daily Amount: 20 mg. 11/10/20   Shahab Vargas MD   escitalopram oxalate (LEXAPRO) 20 mg tablet Take 1 Tab by mouth daily. 8/25/20   Jessica Corado MD   atorvastatin (LIPITOR) 10 mg tablet TAKE 1 TABLET DAILY 6/16/20   Shahab Vargas MD   [facility] 28-10 mg CSpX TAKE 1 CAPSULE DAILY 2/20/20   Shahab Vargas MD   MYRBETRIQ 50 mg ER tablet TAKE 1 TABLET DAILY 2/19/20   Shahab Vargas MD   albuterol [facility] HFA) 90 mcg/actuation inhaler Take 1 Puff by inhalation every four (4) hours as needed for Wheezing or Shortness of Breath. Indications: Acute Asthma Attack 2/14/18   Shahab Vargas MD   montelukast (SINGULAIR) 10 mg tablet Take 10 mg by mouth daily. Provider, Historical   cholecalciferol, vitamin d3, (VITAMIN D) 1,000 unit tablet Take  by mouth daily.     Provider, Historical       Review of Systems:    General, constitutional: negative  Eyes, vision: negative  Ears, nose, throat: negative  Cardiovascular, heart: negative  Respiratory: negative  Gastrointestinal: negative  Genitourinary: negative  Musculoskeletal: negative  Skin and integumentary: negative  Psychiatric: negative  Endocrine: negative  Neurological: negative, except for HPI  Hematologic/lymphatic: negative  Allergy/immunology: negative    PHYSICAL EXAMINATION:   Visit Vitals  BP (!) 148/79   Pulse 78   Temp 97.3 °F (36.3 °C)   Wt 116 lb (52.6 kg)   SpO2 98%   BMI 22.65 kg/m²       Physical Exam:  General:  no acute distress  Neck: no carotid bruits  Lungs: clear to auscultation  Heart:  no murmurs, regular rate and rhythm   Lower extremity: no edema    Neurological exam:  Mental Status: Awake, alert, oriented to person, hospital but did not know which 1 or city, not know the year  Registration and Recall: registration intact, unable to recall any words despite prompts. Attention and Concentration: able to state the days of the week backwards, however she initially said them forwards. Speech and Language: No dysarthria. Able to name, repeat and follow commands   Fund of knowledge was preserved    Cranial nerves: II-XII:  Pupils equal and reactive, visual fields intact by confrontation   Extraocular movements intact, no evidence of nystagmus or ptosis   Facial sensation intact   Facial movements symmetric   Hearing intact to soft rub bilaterally   Shoulder shrug symmetric and strong   Tongue protrusion full and midline without fasciculation or atrophy    Motor:   Normal tone and Bulk   Drift: No evidence of pronator drift     Strength testing:   deltoid triceps biceps Wrist ext. Wrist flex. intrinsics   Right 5 5 5 5 5 5   Left 5 5 5 5 5 5      Hip flex. Hip ext. Knee ext. Knee flex Dorsi flex Plantar flex   Right  5 5 5 5 5 5   Left  5 5 5 5 5 5       Sensory:  Patient intact to light touch and pinprick. There is no extinction    Reflexes:   Biceps Triceps  Brachiorad Patellar Achilles Plantar Hoffmans   Right  2 2 2 3 2 Down Neg   Left  2 2 2 Clonus  2 Down Neg      Cerebellar testing: Finger-nose-finger and heel-to-shin were intact.     Gait: Steady    Data:     Lab Results   Component Value Date/Time    Sodium 139 08/17/2020 03:05 AM    Potassium 3.7 08/17/2020 03:05 AM    Chloride 108 08/17/2020 03:05 AM    Glucose 98 08/17/2020 03:05 AM    BUN 15 08/17/2020 03:05 AM    Creatinine 0.68 08/17/2020 03:05 AM    Calcium 8.5 08/17/2020 03:05 AM WBC 7.2 08/17/2020 03:05 AM    HCT 36.7 08/17/2020 03:05 AM    HGB 11.9 08/17/2020 03:05 AM    PLATELET 234 56/20/7707 03:05 AM       Imaging:    Results from [place] encounter on 02/26/20   MRI LUMB SPINE WO CONT    Narrative EXAM: MRI LUMB SPINE WO CONT    INDICATION: . Wedge compression fracture of third lumbar vertebra, initial  encounter for closed fracture      Exam: MRI of the lumbar spine. Sequences include sagittal and axial T1 and  T2-weighted images. Sagittal STIR. Coronal T2    Comparisons: 2/17/2020    Contrast: None. Findings: Slight dextroscoliosis of the lumbar spine. There is an acute burst  fracture of the superior endplate of L3. There is retropulsion of the posterior  superior vertebral body into the canal with extension of edema into the pedicles  bilaterally. There is minimal edema along the inferior endplate of L2. Differentiating an acute Schmorl's node from chronic degenerative changes  difficult. Cord terminus is within normal limits. Paraspinous soft tissues are  within normal limits. T12-L1: No stenosis    L1-L2: No stenosis    L2-L3: Due to the retropulsed L3 vertebral body and diffuse bulge there is  moderate compression of the thecal sac. There is mild facet degenerative change. Minimal narrowing of the foramen    L3-L4: There is desiccation of this disc with disc height loss. There is a small  central and left paracentral extrusion extending cranially with facet  degenerative change. Canal stenosis is mild with narrowing of the left lateral  recess posterior to L3 and mild narrowing of the left subarticular zone. Mild  left foraminal narrowing    L4-L5: There is disc height loss with a diffuse bulge and facet degenerative  change. The ligamentum flavum is thickened. Canal stenosis is mild to moderate  with narrowing of the subarticular zones.  Small amount of extruded disc material  extends cranially into the left foramen with mild left foraminal narrowing    L5-S1: There are facet degenerative changes without disc bulge or stenosis      Impression Impression:  1. Acute burst fracture superior endplate of L3  2. Edema inferior endplate of L2. Differentiating chronic endplate degenerative  change from small acute Schmorl's node is difficult    Findings were phoned Jacinta Bermudez and Dr. Sarina Del Rosario office at [phone] hours          Results from [place] encounter on 08/16/20   CT HEAD WO CONT    Narrative EXAM:  CT HEAD WO CONT    INDICATION: Seizure. COMPARISON: CT 2/17/2020    TECHNIQUE: Axial noncontrast head CT from foramen magnum to vertex. Coronal and  sagittal reformatted images were obtained. CT dose reduction was achieved  through use of a standardized protocol tailored for this examination and  automatic exposure control for dose modulation. FINDINGS:  There is diffuse age-related parenchymal volume loss. The ventricles  and sulci are age-appropriate without hydrocephalus. There is no mass effect or  midline shift. There is no intracranial hemorrhage or extra-axial fluid  collection. Areas of low attenuation in the periventricular white matter  represent stable chronic microvascular ischemic changes. The gray-white matter  differentiation is maintained. The basal cisterns are patent. The osseous structures are intact. The visualized paranasal sinuses and mastoid  air cells are clear. Impression IMPRESSION:   No acute intracranial abnormality. IMPRESSION/RECOMMENDATIONS:  Grzegorz Oliveros is a 80 y.o. female with a history of COPD, asthma, dementia who presents to the neurology clinic for follow-up after recent hospitalization for syncope. She has been doing well and is currently on Holter monitor. There have been no further episodes. She also has had some progression of her underlying dementia which is possibly vascular in nature as her head CT does show small vessel ischemic disease.   Her last neuropsych testing was done more than 5 years ago and did not show any evidence of dementia at that time. Syncopal episode: Likely vasovagal due to dehydration as patient was in the heat during the episode. Her EEG was unremarkable and she had a head CT that showed small vessel ischemic disease however no areas of encephalomalacia which may trigger a seizure-like event. Patient also had carotid Dopplers performed while in the hospital and showed no flow-limiting stenosis. -As her event is not clearly based in seizure, I would recommend that she not continue any AEDs at this time.  -She is currently has an event monitor looking for arrhythmias. We discussed this extensively and while I stated that the longer she is monitor the more likely we are going to find something he prefers to only continue this for 14 days. Cognitive impairment: Concerning for vascular dementia given her head CT findings which did show small vessel ischemic disease in the periventricular area that appeared to be moderate. Appears that her symptoms have been progressing in the last 5 years.   -May benefit from repeat neuropsych testing however patient and  like would like to wait on this.  -She is also on medication for ADHD as she previously had trouble with focusing, we trial weaning this medication over the next few weeks to determine if there has been any benefit with the use of this medication.  -Would recommend that she continue Namzaric as this has been shown some benefit in the setting of both Alzheimer's and vascular dementia.  -We discussed the importance of following a Mediterranean-DASH diet as this has been shown to slow the rate of progression of Alzheimer's and other neurodegenerative diseases.  -Also discussed that it would be important to continue with physical activity at least 30 minutes 3 times a day as this has been shown to improve memory as well.  -I did recommend that she should continue to read, do crossword puzzles and or sudoku as this will help reduce her rate of progression of her disease. Return to clinic in 3 months    I have discussed the diagnosis with the patient and the intended plan as seen in the above orders. Patient is in agreement. The patient has received an after-visit summary and questions were answered concerning future plans. I have discussed medication side effects and warnings with the patient as well.       Mile Fletcher MD

## 2021-01-29 DIAGNOSIS — F41.9 ANXIETY: ICD-10-CM

## 2021-01-29 RX ORDER — ESCITALOPRAM OXALATE 20 MG/1
TABLET ORAL
Qty: 90 TAB | Refills: 3 | OUTPATIENT
Start: 2021-01-29

## 2021-02-05 ENCOUNTER — HOSPITAL ENCOUNTER (OUTPATIENT)
Dept: INFUSION THERAPY | Age: 86
End: 2021-02-05

## 2021-02-16 ENCOUNTER — HOSPITAL ENCOUNTER (OUTPATIENT)
Dept: INFUSION THERAPY | Age: 86
Discharge: HOME | End: 2021-02-16
Payer: MEDICARE

## 2021-02-16 VITALS
SYSTOLIC BLOOD PRESSURE: 152 MMHG | TEMPERATURE: 98 F | DIASTOLIC BLOOD PRESSURE: 79 MMHG | HEART RATE: 78 BPM | WEIGHT: 115.5 LBS | RESPIRATION RATE: 18 BRPM | BODY MASS INDEX: 22.56 KG/M2 | OXYGEN SATURATION: 96 %

## 2021-02-16 LAB
ANION GAP BLD CALC-SCNC: 14 MMOL/L (ref 10–20)
BUN BLD-MCNC: 10 MG/DL (ref 9–20)
CA-I BLD-MCNC: 1.13 MMOL/L (ref 1.12–1.32)
CHLORIDE BLD-SCNC: 101 MMOL/L (ref 98–107)
CO2 BLD-SCNC: 28 MMOL/L (ref 21–32)
CREAT BLD-MCNC: 0.7 MG/DL (ref 0.6–1.3)
GLUCOSE BLD-MCNC: 78 MG/DL (ref 65–100)
HCT VFR BLD CALC: 37 % (ref 35–47)
MAGNESIUM SERPL-MCNC: 1.8 MG/DL (ref 1.6–2.4)
PHOSPHATE SERPL-MCNC: 3.5 MG/DL (ref 2.6–4.7)
POTASSIUM BLD-SCNC: 4 MMOL/L (ref 3.5–5.1)
SERVICE CMNT-IMP: NORMAL
SODIUM BLD-SCNC: 138 MMOL/L (ref 136–145)

## 2021-02-16 PROCEDURE — 84100 ASSAY OF PHOSPHORUS: CPT

## 2021-02-16 PROCEDURE — 83735 ASSAY OF MAGNESIUM: CPT

## 2021-02-16 PROCEDURE — 80047 BASIC METABLC PNL IONIZED CA: CPT

## 2021-02-16 PROCEDURE — 74011250636 HC RX REV CODE- 250/636: Performed by: INTERNAL MEDICINE

## 2021-02-16 PROCEDURE — 36415 COLL VENOUS BLD VENIPUNCTURE: CPT

## 2021-02-16 PROCEDURE — 96372 THER/PROPH/DIAG INJ SC/IM: CPT

## 2021-02-16 RX ADMIN — DENOSUMAB 60 MG: 60 INJECTION SUBCUTANEOUS at 11:54

## 2021-02-16 NOTE — PROGRESS NOTES
[facility] Note    1130 - Pt admit to [facility] for [facility] in stable condition. Assessment completed. Patient denied having any symptoms of COVID-19, i.e. SOB, coughing, fever, or generally not feeling well. Also denies having been exposed to COVID-19 recently or having had any recent contact with family/friend that has a pending COVID test.     Patient able to answer all questions without spouse's assistance. Labs drawn peripherally from [facility]. Patient Vitals for the past 12 hrs:   Temp Pulse Resp BP SpO2   02/16/21 1134 98 °F (36.7 °C) 78 18 (!) 152/79 96 %        Recent Results (from the past 12 hour(s))   POC CHEM8    Collection Time: 02/16/21 11:46 AM   Result Value Ref Range    Calcium, ionized (POC) 1.13 1.12 - 1.32 mmol/L    Sodium (POC) 138 136 - 145 mmol/L    Potassium (POC) 4.0 3.5 - 5.1 mmol/L    Chloride (POC) 101 98 - 107 mmol/L    CO2 (POC) 28 21 - 32 mmol/L    Anion gap (POC) 14 10 - 20 mmol/L    Glucose (POC) 78 65 - 100 mg/dL    BUN (POC) 10 9 - 20 mg/dL    Creatinine (POC) 0.7 0.6 - 1.3 mg/dL    GFRAA, POC >60 >60 ml/min/1.73m2    GFRNA, POC >60 >60 ml/min/1.73m2    Hematocrit (POC) 37 35.0 - 47.0 %    Comment Comment Not Indicated. Medications:  Medications Administered     denosumab (PROLIA) injection 60 mg     Admin Date  02/16/2021 Action  Given Dose  60 mg Route  SubCUTAneous Administered By  Carlos Heredia                 Pt denies recent or upcoming dental work. Pt tolerated treatment well. 1200 - D/c home in no distress. Pt aware of next [facility] appointment scheduled for: 8/17/2021 at 1130.      Future Appointments   Date Time Provider Fred Ibarra   3/8/2021 10:30 AM Varghese Juarez MD [facility] BS AMB   6/9/2021  9:40 AM MD VERITO Wheeler BS AMB

## 2021-02-18 DIAGNOSIS — F41.9 ANXIETY: ICD-10-CM

## 2021-02-18 DIAGNOSIS — F90.9 ATTENTION DEFICIT HYPERACTIVITY DISORDER (ADHD), UNSPECIFIED ADHD TYPE: ICD-10-CM

## 2021-02-18 RX ORDER — ESCITALOPRAM OXALATE 20 MG/1
20 TABLET ORAL DAILY
Qty: 90 TAB | Refills: 1 | OUTPATIENT
Start: 2021-02-18

## 2021-02-23 RX ORDER — ESCITALOPRAM OXALATE 20 MG/1
20 TABLET ORAL DAILY
Qty: 90 TAB | Refills: 1 | Status: SHIPPED | OUTPATIENT
Start: 2021-02-23 | End: 2021-02-24 | Stop reason: SDUPTHER

## 2021-02-24 DIAGNOSIS — F41.9 ANXIETY: ICD-10-CM

## 2021-02-24 RX ORDER — ESCITALOPRAM OXALATE 20 MG/1
20 TABLET ORAL DAILY
Qty: 90 TAB | Refills: 1 | Status: SHIPPED | OUTPATIENT
Start: 2021-02-24 | End: 2021-06-10 | Stop reason: DRUGHIGH

## 2021-03-07 NOTE — PROGRESS NOTES
HISTORY OF PRESENT ILLNESS  Giovanni Hahn is a 80 y.o. female. HPI   F/u ADHD, vascular dementia, osteoporosis HLD OAB and medicare wellness-------------here with     Last refill of Vyvanse 2/18/21-- reviewed  On fosamax since 11/2019 but now prolia    Memory loss-sees Dr [place]--progressive on [employer]. On vyvanse for ADHD--trial of weaning off med per Dr Bonnie Josue to have vascular dementia-has small vessel dz on CT      Last OV       Had recent f/u with neurologist Dr Arnoldo Zhu after hospitalization for syncope. d/t dehydration. keppra was initiated but stopped . No seizures on eeg   Head CT-small vessel dz  No recurrence of syncope since return to home  Wearing event monitor  Some progression of mild dementia deemed vascular  On lexapro anxiety  On prolia q6 months        Patient Active Problem List    Diagnosis Date Noted    Vascular dementia without behavioral disturbance ([facility] 09/08/2020    Bilateral carotid artery stenosis 08/17/2020    Syncopal seizure ([facility] [address] [facility] 08/16/2020    Compression fracture of L3 vertebra ([facility] [address] [facility] 04/10/2020    Alzheimer's disease ([facility] 09/10/2018    ADD (attention deficit disorder) 01/18/2017    Hyperlipidemia 07/20/2015    Mild cognitive impairment without memory loss 02/02/2015    Generalized anxiety disorder 02/02/2015    TMJ (temporomandibular joint disorder) 12/21/2011    Syncope and collapse 08/10/2011    Fever, unspecified 08/10/2011    Syncope and collapse 08/10/2011    History of skin cancer     Asthma     Hearing loss, left     Ceruminosis     Postmenopausal hormone replacement therapy     Overactive bladder      Current Outpatient Medications   Medication Sig Dispense Refill    escitalopram oxalate (LEXAPRO) 20 mg tablet Take 1 Tab by mouth daily. 90 Tab 1    lisdexamfetamine (Vyvanse) 20 mg capsule Take 1 Cap by mouth daily.  Max Daily Amount: 20 mg. 90 Cap 0    atorvastatin (LIPITOR) 10 mg tablet TAKE 1 TABLET DAILY 90 Tab 3    NAMZARIC 28-10 mg CSpX TAKE 1 CAPSULE DAILY 90 Cap 4    MYRBETRIQ 50 mg ER tablet TAKE 1 TABLET DAILY 90 Tab 4    albuterol (PROAIR HFA) 90 mcg/actuation inhaler Take 1 Puff by inhalation every four (4) hours as needed for Wheezing or Shortness of Breath. Indications: Acute Asthma Attack 3 Inhaler 3    montelukast (SINGULAIR) 10 mg tablet Take 10 mg by mouth daily.  cholecalciferol, vitamin d3, (VITAMIN D) 1,000 unit tablet Take  by mouth daily.        Allergies   Allergen Reactions    Latex Unknown (comments)    Dog Dander Shortness of Breath      Lab Results   Component Value Date/Time    WBC 7.2 08/17/2020 03:05 AM    HGB 11.9 08/17/2020 03:05 AM    HCT 36.7 08/17/2020 03:05 AM    Hematocrit (POC) 37 02/16/2021 11:46 AM    PLATELET 744 70/98/6532 03:05 AM    MCV 98.4 08/17/2020 03:05 AM     Lab Results   Component Value Date/Time    Glucose 98 08/17/2020 03:05 AM    Glucose (POC) 78 02/16/2021 11:46 AM    LDL, calculated 78 11/04/2019 11:27 AM    Creatinine (POC) 0.7 02/16/2021 11:46 AM    Creatinine 0.68 08/17/2020 03:05 AM      Lab Results   Component Value Date/Time    Cholesterol, total 202 (H) 11/04/2019 11:27 AM    HDL Cholesterol 105 11/04/2019 11:27 AM    LDL, calculated 78 11/04/2019 11:27 AM    Triglyceride 93 11/04/2019 11:27 AM    CHOL/HDL Ratio 2.4 08/31/2010 10:48 AM     Lab Results   Component Value Date/Time    GFR est non-AA >60 08/17/2020 03:05 AM    GFRNA, POC >60 02/16/2021 11:46 AM    GFR est AA >60 08/17/2020 03:05 AM    GFRAA, POC >60 02/16/2021 11:46 AM    Creatinine 0.68 08/17/2020 03:05 AM    Creatinine (POC) 0.7 02/16/2021 11:46 AM    BUN 15 08/17/2020 03:05 AM    BUN (POC) 10 02/16/2021 11:46 AM    Sodium 139 08/17/2020 03:05 AM    Sodium (POC) 138 02/16/2021 11:46 AM    Potassium 3.7 08/17/2020 03:05 AM    Potassium (POC) 4.0 02/16/2021 11:46 AM    Chloride 108 08/17/2020 03:05 AM    Chloride (POC) 101 02/16/2021 11:46 AM    CO2 27 08/17/2020 03:05 AM    Magnesium 1.8 02/16/2021 11:43 AM Phosphorus 3.5 02/16/2021 11:43 AM     Lab Results   Component Value Date/Time    TSH 1.90 08/17/2020 03:05 AM    T4, Free 1.34 09/11/2018 09:10 AM         ROS    Physical Exam  Vitals signs and nursing note reviewed. Constitutional:       Appearance: She is well-developed. Comments: Appears stated age, elderly frail   Cardiovascular:      Rate and Rhythm: Normal rate and regular rhythm. Heart sounds: Normal heart sounds. No murmur. No friction rub. No gallop. Pulmonary:      Effort: Pulmonary effort is normal. No respiratory distress. Breath sounds: Normal breath sounds. No wheezing. Abdominal:      General: Bowel sounds are normal.      Palpations: Abdomen is soft. Neurological:      Mental Status: She is alert. ASSESSMENT and PLAN  Diagnoses and all orders for this visit:    1. Pure hypercholesterolemia  -     LIPID PANEL  -     METABOLIC PANEL, COMPREHENSIVE    2. Attention deficit hyperactivity disorder (ADHD), unspecified ADHD type    3. Osteoporosis, unspecified osteoporosis type, unspecified pathological fracture presence    4. Vascular dementia without behavioral disturbance (HCC)    5. Elevated blood pressure reading    6. Medicare annual wellness visit, subsequent      Follow-up and Dispositions    · Return in about 6 months (around 9/8/2021) for fu hld memory loss/CPE x 30 min. This is the Subsequent Medicare Annual Wellness Exam, performed 12 months or more after the Initial AWV or the last Subsequent AWV    I have reviewed the patient's medical history in detail and updated the computerized patient record.      Depression Risk Factor Screening:     3 most recent PHQ Screens 3/8/2021   Little interest or pleasure in doing things Not at all   Feeling down, depressed, irritable, or hopeless Not at all   Total Score PHQ 2 0   Trouble falling or staying asleep, or sleeping too much -   Feeling tired or having little energy -   Poor appetite, weight loss, or overeating - Feeling bad about yourself - or that you are a failure or have let yourself or your family down -   Moving or speaking so slowly that other people could have noticed; or the opposite being so fidgety that others notice -   Thoughts of being better off dead, or hurting yourself in some way -   How difficult have these problems made it for you to do your work, take care of your home and get along with others -       Alcohol Risk Screen    Do you average more than 1 drink per night or more than 7 drinks a week:  No    On any one occasion in the past three months have you have had more than 3 drinks containing alcohol:  No        Functional Ability and Level of Safety:    Hearing: Hearing is good. Activities of Daily Living: The home contains: handrails and grab bars  Patient needs help with:  transportation, managing medications and managing money      Ambulation: with no difficulty     Fall Risk:  Fall Risk Assessment, last 12 mths 3/8/2021   Able to walk? Yes   Fall in past 12 months? 0   Do you feel unsteady? 0   Are you worried about falling 0   Number of falls in past 12 months -   Fall with injury? -      Abuse Screen:  Patient is not abused       Cognitive Screening    Has your family/caregiver stated any concerns about your memory: yes - dementia     Cognitive Screening: Normal - serial 3    Assessment/Plan   Education and counseling provided:  Are appropriate based on today's review and evaluation  End-of-Life planning (with patient's consent)  Screening for glaucoma    Diagnoses and all orders for this visit:    1. Pure hypercholesterolemia  -     LIPID PANEL  -     METABOLIC PANEL, COMPREHENSIVE   On statin  2. Attention deficit hyperactivity disorder (ADHD), unspecified ADHD type   Stable , refill vyvanse in May when needed    3. Osteoporosis, unspecified osteoporosis type, unspecified pathological fracture presence   On prolia  4.  [facility]  5. Elevated blood pressure   Low sodium diet   [facility] for now   To call if SBP consistently > 140/90 discussed    Health Maintenance Due     Health Maintenance Due   Topic Date Due    GLAUCOMA SCREENING Q2Y  03/16/2018    Medicare Yearly Exam  11/04/2020    Lipid Screen  11/04/2020       Patient Care Team   Patient Care Team:  Evelyn Larsen MD as PCP - General (Internal Medicine)  Evelyn Larsen MD as PCP - [facility] Empaneled Provider  Yulissa Molina MD as Consulting Provider (Urology)  Josette Maxwell MD (Ophthalmology)  Tashia Jung MD (Neurology)    History     Patient Active Problem List   Diagnosis Code    Postmenopausal hormone replacement therapy Z79.890    Overactive bladder N32.81    Asthma J45.909    Hearing loss, left H91.92    Ceruminosis H61.20    History of skin cancer Z85.828    Syncope and collapse R55    Fever, unspecified R50.9    Syncope and collapse R55    TMJ (temporomandibular joint disorder) M26.609    Mild cognitive impairment without memory loss G31.84    Generalized anxiety disorder F41.1    Hyperlipidemia E78.5    ADD (attention deficit disorder) F98.8    Alzheimer's disease ([address] G30.9, F02.80    Compression fracture of L3 vertebra ([address] S32.030A    Syncopal seizure ([address] R55, R56.9    Bilateral carotid artery stenosis I65.23    Vascular dementia without behavioral disturbance ([address] F01.50     Past Medical History:   Diagnosis Date    Asthma     Dr. Maverick Logan June 2013--doesn't see routinely--pt reports no change in allergies--no need for routine follow-up. Dr. Stevenson Meyers sees for mild COPD--on Symbicort since ~Nov 2013. Continued Singulair.  Ceruminosis     periodic ear cleaning at [place] [facility]    Chronic obstructive pulmonary disease ([address]     Encounter for screening colonoscopy ~2006    Normal per pt--[facility] records requested July 2013 visit.     Fainting     Falls     Fatigue     Hearing loss, left     [facility]    History of mammogram May 2014    Normal/no change.  History of shingles 2010    Dec 2014:  Pt reports clinical history and shingles vaccine 4yrs ago. To try to obtain exact date of zostavax from /source.  History of skin cancer     melanoma ?, BCC & SCC- followed by Dr. Ramirez- twice a yr appts. 2013-still on 6mo schedule for skin checks.  Memory disorder     Osteopenia     DEXA     Overactive bladder     detrol LA. Vesicare--sees [facility] Considering procedure for incontinence.  Postmenopausal hormone replacement therapy     since hysterectomy    Right shoulder pain 2013. Mild osteopenia; mild AC osteoarthritis. Sports Med Eval Oct 2013.  Well woman exam (no gynecological exam) Aug 3, 2012    \"[employer] Screening\":  Results scanned to chart. Updated 2014-no change except noted concern for GFR--see Dec 2014 note, letter as reviewed with pt. Past Surgical History:   Procedure Laterality Date    HX CATARACT REMOVAL      HX HYSTERECTOMY  1948    IR KYPHOPLASTY LUMBAR  2020    MULTIPLE DELIVERY       IL TRABECULOPLASTY BY LASER SURGERY      Dr. Zoraida [facility]- last eye exam 2010     Current Outpatient Medications   Medication Sig Dispense Refill    escitalopram oxalate (LEXAPRO) 20 mg tablet Take 1 Tab by mouth daily. 90 Tab 1    lisdexamfetamine (Vyvanse) 20 mg capsule Take 1 Cap by mouth daily. Max Daily Amount: 20 mg. 90 Cap 0    atorvastatin (LIPITOR) 10 mg tablet TAKE 1 TABLET DAILY 90 Tab 3    NAMZARIC 28-10 mg CSpX TAKE 1 CAPSULE DAILY 90 Cap 4    MYRBETRIQ 50 mg ER tablet TAKE 1 TABLET DAILY 90 Tab 4    cholecalciferol, vitamin d3, (VITAMIN D) 1,000 unit tablet Take  by mouth daily.  albuterol (PROAIR HFA) 90 mcg/actuation inhaler Take 1 Puff by inhalation every four (4) hours as needed for Wheezing or Shortness of Breath.  Indications: Acute Asthma Attack 3 Inhaler 3    montelukast (SINGULAIR) 10 mg tablet Take 10 mg by mouth daily. Allergies   Allergen Reactions    Latex Unknown (comments)    Dog Dander Shortness of Breath       Family History   Problem Relation Age of Onset   Marley Stabs Dementia Father     Hypertension Mother     Stroke Mother     Cancer Other         daughter- breast cancer     Social History     Tobacco Use    Smoking status: Former Smoker    Smokeless tobacco: Never Used   Substance Use Topics    Alcohol use:  Yes     Alcohol/week: 0.0 standard drinks     Frequency: 4 or more times a week     Comment: 1-2 drinks/every evening ( wine/vodka)

## 2021-03-08 ENCOUNTER — OFFICE VISIT (OUTPATIENT)
Dept: INTERNAL MEDICINE CLINIC | Age: 86
End: 2021-03-08
Payer: MEDICARE

## 2021-03-08 VITALS
RESPIRATION RATE: 16 BRPM | BODY MASS INDEX: 22.58 KG/M2 | TEMPERATURE: 96.1 F | DIASTOLIC BLOOD PRESSURE: 84 MMHG | HEART RATE: 88 BPM | OXYGEN SATURATION: 99 % | WEIGHT: 115 LBS | HEIGHT: 60 IN | SYSTOLIC BLOOD PRESSURE: 148 MMHG

## 2021-03-08 DIAGNOSIS — R03.0 ELEVATED BLOOD PRESSURE READING: ICD-10-CM

## 2021-03-08 DIAGNOSIS — E78.00 PURE HYPERCHOLESTEROLEMIA: Primary | ICD-10-CM

## 2021-03-08 DIAGNOSIS — F01.50 VASCULAR DEMENTIA WITHOUT BEHAVIORAL DISTURBANCE (HCC): ICD-10-CM

## 2021-03-08 DIAGNOSIS — F90.9 ATTENTION DEFICIT HYPERACTIVITY DISORDER (ADHD), UNSPECIFIED ADHD TYPE: ICD-10-CM

## 2021-03-08 DIAGNOSIS — M81.0 OSTEOPOROSIS, UNSPECIFIED OSTEOPOROSIS TYPE, UNSPECIFIED PATHOLOGICAL FRACTURE PRESENCE: ICD-10-CM

## 2021-03-08 DIAGNOSIS — Z00.00 MEDICARE ANNUAL WELLNESS VISIT, SUBSEQUENT: ICD-10-CM

## 2021-03-08 PROCEDURE — 99214 OFFICE O/P EST MOD 30 MIN: CPT | Performed by: INTERNAL MEDICINE

## 2021-03-08 PROCEDURE — G8536 NO DOC ELDER MAL SCRN: HCPCS | Performed by: INTERNAL MEDICINE

## 2021-03-08 PROCEDURE — G8420 CALC BMI NORM PARAMETERS: HCPCS | Performed by: INTERNAL MEDICINE

## 2021-03-08 PROCEDURE — G0463 HOSPITAL OUTPT CLINIC VISIT: HCPCS | Performed by: INTERNAL MEDICINE

## 2021-03-08 PROCEDURE — 1101F PT FALLS ASSESS-DOCD LE1/YR: CPT | Performed by: INTERNAL MEDICINE

## 2021-03-08 PROCEDURE — G8427 DOCREV CUR MEDS BY ELIG CLIN: HCPCS | Performed by: INTERNAL MEDICINE

## 2021-03-08 PROCEDURE — G0439 PPPS, SUBSEQ VISIT: HCPCS | Performed by: INTERNAL MEDICINE

## 2021-03-08 PROCEDURE — G8510 SCR DEP NEG, NO PLAN REQD: HCPCS | Performed by: INTERNAL MEDICINE

## 2021-03-08 PROCEDURE — 1090F PRES/ABSN URINE INCON ASSESS: CPT | Performed by: INTERNAL MEDICINE

## 2021-03-08 NOTE — PATIENT INSTRUCTIONS
Office Policies Phone calls/patient messages: Please allow up to 24 hours for someone in the office to contact you about your call or message. Be mindful your provider may be out of the office or your message may require further review. We encourage you to use [employer] for your messages as this is a faster, more efficient way to communicate with our office Medication Refills: 
         
Prescription medications require 48-72 business hours to process. We encourage you to use [employer] for your refills. For controlled medications: Please allow 72 business hours to process. Certain medications may require you to  a written prescription at our office. NO narcotic/controlled medications will be prescribed after 4pm Monday through Friday or on weekends Form/Paperwork Completion: 
         
Please note a $25 fee may incur for all paperwork for completed by our providers. We ask that you allow 7-10 business days. Pre-payment is due prior to picking up/faxing the completed form. You may also download your forms to [employer] to have your doctor print off. Medicare Wellness Visit, Female The best way to live healthy is to have a lifestyle where you eat a well-balanced diet, exercise regularly, limit alcohol use, and quit all forms of tobacco/nicotine, if applicable. Regular preventive services are another way to keep healthy. Preventive services (vaccines, screening tests, monitoring & exams) can help personalize your care plan, which helps you manage your own care. Screening tests can find health problems at the earliest stages, when they are easiest to treat. [facility] follows the current, evidence-based guidelines published by the [facility] ([facility]) when recommending preventive services for our patients. Because we follow these guidelines, sometimes recommendations change over time as research supports it. (For example, mammograms used to be recommended annually. Even though Medicare will still pay for an annual mammogram, the newer guidelines recommend a mammogram every two years for women of average risk). Of course, you and your doctor may decide to screen more often for some diseases, based on your risk and your co-morbidities (chronic disease you are already diagnosed with). Preventive services for you include: - Medicare offers their members a free annual wellness visit, which is time for you and your primary care provider to discuss and plan for your preventive service needs. Take advantage of this benefit every year! 
-All adults over the age of 72 should receive the recommended pneumonia vaccines. Current USPSTF guidelines recommend a series of two vaccines for the best pneumonia protection.  
-All adults should have a flu vaccine yearly and a tetanus vaccine every 10 years.  
-All adults age 48 and older should receive the shingles vaccines (series of two vaccines). -All adults age 38-68 who are overweight should have a diabetes screening test once every three years.  
-All adults born between [birth year] and 1965 should be screened once for Hepatitis C. 
-Other screening tests and preventive services for persons with diabetes include: an eye exam to screen for diabetic retinopathy, a kidney function test, a foot exam, and stricter control over your cholesterol.  
-Cardiovascular screening for adults with routine risk involves an electrocardiogram (ECG) at intervals determined by your doctor. -Colorectal cancer screenings should be done for adults age 54-65 with no increased risk factors for colorectal cancer. There are a number of acceptable methods of screening for this type of cancer. Each test has its own benefits and drawbacks. Discuss with your doctor what is most appropriate for you during your annual wellness visit. The different tests include: colonoscopy (considered the best screening method), a fecal occult blood test, a fecal DNA test, and sigmoidoscopy. 
 
-A bone mass density test is recommended when a woman turns 65 to screen for osteoporosis. This test is only recommended one time, as a screening. Some providers will use this same test as a disease monitoring tool if you already have osteoporosis. -Breast cancer screenings are recommended every other year for women of normal risk, age 54-69. 
-Cervical cancer screenings for women over age 72 are only recommended with certain risk factors. Here is a list of your current Health Maintenance items (your personalized list of preventive services) with a due date: 
Health Maintenance Due Topic Date Due  Glaucoma Screening   03/16/2018  Cholesterol Test   11/04/2020

## 2021-03-09 LAB
ALBUMIN SERPL-MCNC: 4.3 G/DL (ref 3.6–4.6)
ALBUMIN/GLOB SERPL: 1.7 {RATIO} (ref 1.2–2.2)
ALP SERPL-CCNC: 64 IU/L (ref 39–117)
ALT SERPL-CCNC: 21 IU/L (ref 0–32)
AST SERPL-CCNC: 25 IU/L (ref 0–40)
BILIRUB SERPL-MCNC: 0.3 MG/DL (ref 0–1.2)
BUN SERPL-MCNC: 13 MG/DL (ref 8–27)
BUN/CREAT SERPL: 16 (ref 12–28)
CALCIUM SERPL-MCNC: 9.8 MG/DL (ref 8.7–10.3)
CHLORIDE SERPL-SCNC: 104 MMOL/L (ref 96–106)
CHOLEST SERPL-MCNC: 195 MG/DL (ref 100–199)
CO2 SERPL-SCNC: 25 MMOL/L (ref 20–29)
CREAT SERPL-MCNC: 0.81 MG/DL (ref 0.57–1)
GLOBULIN SER CALC-MCNC: 2.5 G/DL (ref 1.5–4.5)
GLUCOSE SERPL-MCNC: 81 MG/DL (ref 65–99)
HDLC SERPL-MCNC: 93 MG/DL
LDLC SERPL CALC-MCNC: 89 MG/DL (ref 0–99)
POTASSIUM SERPL-SCNC: 4.6 MMOL/L (ref 3.5–5.2)
PROT SERPL-MCNC: 6.8 G/DL (ref 6–8.5)
SODIUM SERPL-SCNC: 143 MMOL/L (ref 134–144)
TRIGL SERPL-MCNC: 71 MG/DL (ref 0–149)
VLDLC SERPL CALC-MCNC: 13 MG/DL (ref 5–40)

## 2021-04-19 ENCOUNTER — OFFICE VISIT (OUTPATIENT)
Dept: INTERNAL MEDICINE CLINIC | Age: 86
End: 2021-04-19
Payer: MEDICARE

## 2021-04-19 VITALS
DIASTOLIC BLOOD PRESSURE: 74 MMHG | OXYGEN SATURATION: 95 % | BODY MASS INDEX: 22.58 KG/M2 | SYSTOLIC BLOOD PRESSURE: 132 MMHG | HEART RATE: 80 BPM | TEMPERATURE: 96.5 F | WEIGHT: 115 LBS | HEIGHT: 60 IN | RESPIRATION RATE: 16 BRPM

## 2021-04-19 DIAGNOSIS — R11.2 NAUSEA AND VOMITING, INTRACTABILITY OF VOMITING NOT SPECIFIED, UNSPECIFIED VOMITING TYPE: Primary | ICD-10-CM

## 2021-04-19 PROCEDURE — G0463 HOSPITAL OUTPT CLINIC VISIT: HCPCS | Performed by: INTERNAL MEDICINE

## 2021-04-19 PROCEDURE — G8420 CALC BMI NORM PARAMETERS: HCPCS | Performed by: INTERNAL MEDICINE

## 2021-04-19 PROCEDURE — G8427 DOCREV CUR MEDS BY ELIG CLIN: HCPCS | Performed by: INTERNAL MEDICINE

## 2021-04-19 PROCEDURE — G8536 NO DOC ELDER MAL SCRN: HCPCS | Performed by: INTERNAL MEDICINE

## 2021-04-19 PROCEDURE — G8432 DEP SCR NOT DOC, RNG: HCPCS | Performed by: INTERNAL MEDICINE

## 2021-04-19 PROCEDURE — 99213 OFFICE O/P EST LOW 20 MIN: CPT | Performed by: INTERNAL MEDICINE

## 2021-04-19 PROCEDURE — 1090F PRES/ABSN URINE INCON ASSESS: CPT | Performed by: INTERNAL MEDICINE

## 2021-04-19 PROCEDURE — 1101F PT FALLS ASSESS-DOCD LE1/YR: CPT | Performed by: INTERNAL MEDICINE

## 2021-04-19 RX ORDER — ONDANSETRON 4 MG/1
4 TABLET, ORALLY DISINTEGRATING ORAL
Qty: 8 TAB | Refills: 0 | Status: SHIPPED | OUTPATIENT
Start: 2021-04-19 | End: 2021-09-13 | Stop reason: ALTCHOICE

## 2021-04-19 NOTE — PATIENT INSTRUCTIONS
Office Policies Phone calls/patient messages: Please allow up to 24 hours for someone in the office to contact you about your call or message. Be mindful your provider may be out of the office or your message may require further review. We encourage you to use [employer] for your messages as this is a faster, more efficient way to communicate with our office Medication Refills: 
         
Prescription medications require 48-72 business hours to process. We encourage you to use [employer] for your refills. For controlled medications: Please allow 72 business hours to process. Certain medications may require you to  a written prescription at our office. NO narcotic/controlled medications will be prescribed after 4pm Monday through Friday or on weekends Form/Paperwork Completion: 
         
Please note a $25 fee may incur for all paperwork for completed by our providers. We ask that you allow 7-10 business days. Pre-payment is due prior to picking up/faxing the completed form. You may also download your forms to [employer] to have your doctor print off.

## 2021-04-20 NOTE — PROGRESS NOTES
HISTORY OF PRESENT ILLNESS  Pete Linn is a 80 y.o. female. HPI   Pt here with he  c/o emesis after eating dinner last week 4 days ago. No recurrence but has been quesy but eating normal diet since then  No weight loss  normal BMs  No pain f/c dysuria  No chest pain or SOB  No use of NSAIDS  Patient Active Problem List    Diagnosis Date Noted    Vascular dementia without behavioral disturbance ([facility] 09/08/2020    Bilateral carotid artery stenosis 08/17/2020    Syncopal seizure ([facility] 08/16/2020    Compression fracture of L3 vertebra ([facility] 04/10/2020    Alzheimer's disease ([facility] 09/10/2018    ADD (attention deficit disorder) 01/18/2017    Hyperlipidemia 07/20/2015    Mild cognitive impairment without memory loss 02/02/2015    Generalized anxiety disorder 02/02/2015    TMJ (temporomandibular joint disorder) 12/21/2011    Syncope and collapse 08/10/2011    Fever, unspecified 08/10/2011    Syncope and collapse 08/10/2011    History of skin cancer     Asthma     Hearing loss, left     Ceruminosis     Postmenopausal hormone replacement therapy     Overactive bladder      Current Outpatient Medications   Medication Sig Dispense Refill    ondansetron (ZOFRAN ODT) 4 mg disintegrating tablet Take 1 Tab by mouth every eight (8) hours as needed for Nausea or Vomiting. 8 Tab 0    escitalopram oxalate (LEXAPRO) 20 mg tablet Take 1 Tab by mouth daily. 90 Tab 1    lisdexamfetamine (Vyvanse) 20 mg capsule Take 1 Cap by mouth daily. Max Daily Amount: 20 mg. 90 Cap 0    atorvastatin (LIPITOR) 10 mg tablet TAKE 1 TABLET DAILY 90 Tab 3    NAMZARIC 28-10 mg CSpX TAKE 1 CAPSULE DAILY 90 Cap 4    MYRBETRIQ 50 mg ER tablet TAKE 1 TABLET DAILY 90 Tab 4    albuterol (PROAIR HFA) 90 mcg/actuation inhaler Take 1 Puff by inhalation every four (4) hours as needed for Wheezing or Shortness of Breath.  Indications: Acute Asthma Attack 3 Inhaler 3    montelukast (SINGULAIR) 10 mg tablet Take 10 mg by mouth daily.      cholecalciferol, vitamin d3, (VITAMIN D) 1,000 unit tablet Take  by mouth daily. Allergies   Allergen Reactions    Latex Unknown (comments)    Dog Dander Shortness of Breath      Lab Results   Component Value Date/Time    WBC 7.2 08/17/2020 03:05 AM    HGB 11.9 08/17/2020 03:05 AM    HCT 36.7 08/17/2020 03:05 AM    Hematocrit (POC) 37 02/16/2021 11:46 AM    PLATELET 279 30/95/7529 03:05 AM    MCV 98.4 08/17/2020 03:05 AM     Lab Results   Component Value Date/Time    GFR est non-AA 65 03/08/2021 11:57 AM    GFRNA, POC >60 02/16/2021 11:46 AM    GFR est AA 74 03/08/2021 11:57 AM    GFRAA, POC >60 02/16/2021 11:46 AM    Creatinine 0.81 03/08/2021 11:57 AM    Creatinine (POC) 0.7 02/16/2021 11:46 AM    BUN 13 03/08/2021 11:57 AM    BUN (POC) 10 02/16/2021 11:46 AM    Sodium 143 03/08/2021 11:57 AM    Sodium (POC) 138 02/16/2021 11:46 AM    Potassium 4.6 03/08/2021 11:57 AM    Potassium (POC) 4.0 02/16/2021 11:46 AM    Chloride 104 03/08/2021 11:57 AM    Chloride (POC) 101 02/16/2021 11:46 AM    CO2 25 03/08/2021 11:57 AM    Magnesium 1.8 02/16/2021 11:43 AM    Phosphorus 3.5 02/16/2021 11:43 AM        ROS    Physical Exam  Constitutional:       Appearance: Normal appearance. Cardiovascular:      Rate and Rhythm: Normal rate and regular rhythm. Pulses: Normal pulses. Pulmonary:      Effort: Pulmonary effort is normal.   Abdominal:      General: Abdomen is flat. Bowel sounds are normal. There is no distension. Palpations: Abdomen is soft. There is no mass. Tenderness: There is no abdominal tenderness. There is no right CVA tenderness, left CVA tenderness, guarding or rebound. Hernia: No hernia is present. Neurological:      Mental Status: She is alert. ASSESSMENT and PLAN  Diagnoses and all orders for this visit:    1. Nausea and vomiting, intractability of vomiting not specified, unspecified vomiting type   ?  Viral process   Seems to have improved, slight quesiness   [place] soft diet   zofran prn   To call or ER if symptoms of n/v recurr--d/w pt and   Other orders  -     ondansetron (ZOFRAN ODT) 4 mg disintegrating tablet; Take 1 Tab by mouth every eight (8) hours as needed for Nausea or Vomiting.

## 2021-05-08 RX ORDER — MEMANTINE HYDROCHLORIDE AND DONEPEZIL HYDROCHLORIDE 28; 10 MG/1; MG/1
CAPSULE ORAL
Qty: 90 CAP | Refills: 3 | Status: SHIPPED | OUTPATIENT
Start: 2021-05-08 | End: 2022-06-08

## 2021-06-10 ENCOUNTER — OFFICE VISIT (OUTPATIENT)
Dept: NEUROLOGY | Age: 86
End: 2021-06-10
Payer: MEDICARE

## 2021-06-10 VITALS
HEIGHT: 60 IN | TEMPERATURE: 97.9 F | BODY MASS INDEX: 21.99 KG/M2 | DIASTOLIC BLOOD PRESSURE: 73 MMHG | HEART RATE: 79 BPM | SYSTOLIC BLOOD PRESSURE: 130 MMHG | OXYGEN SATURATION: 98 % | WEIGHT: 112 LBS

## 2021-06-10 DIAGNOSIS — G30.1 DEMENTIA OF THE ALZHEIMER'S TYPE, WITH LATE ONSET, UNCOMPLICATED (HCC): Primary | ICD-10-CM

## 2021-06-10 DIAGNOSIS — F02.80 DEMENTIA OF THE ALZHEIMER'S TYPE, WITH LATE ONSET, UNCOMPLICATED (HCC): Primary | ICD-10-CM

## 2021-06-10 PROCEDURE — G8432 DEP SCR NOT DOC, RNG: HCPCS | Performed by: PSYCHIATRY & NEUROLOGY

## 2021-06-10 PROCEDURE — G8536 NO DOC ELDER MAL SCRN: HCPCS | Performed by: PSYCHIATRY & NEUROLOGY

## 2021-06-10 PROCEDURE — G8420 CALC BMI NORM PARAMETERS: HCPCS | Performed by: PSYCHIATRY & NEUROLOGY

## 2021-06-10 PROCEDURE — 1090F PRES/ABSN URINE INCON ASSESS: CPT | Performed by: PSYCHIATRY & NEUROLOGY

## 2021-06-10 PROCEDURE — 1101F PT FALLS ASSESS-DOCD LE1/YR: CPT | Performed by: PSYCHIATRY & NEUROLOGY

## 2021-06-10 PROCEDURE — 99214 OFFICE O/P EST MOD 30 MIN: CPT | Performed by: PSYCHIATRY & NEUROLOGY

## 2021-06-10 PROCEDURE — G8427 DOCREV CUR MEDS BY ELIG CLIN: HCPCS | Performed by: PSYCHIATRY & NEUROLOGY

## 2021-06-10 RX ORDER — ESCITALOPRAM OXALATE 10 MG/1
10 TABLET ORAL DAILY
Qty: 90 TABLET | Refills: 0 | Status: SHIPPED | OUTPATIENT
Start: 2021-06-10 | End: 2021-09-13

## 2021-06-10 RX ORDER — ESCITALOPRAM OXALATE 10 MG/1
10 TABLET ORAL DAILY
Qty: 30 TABLET | Refills: 0 | Status: SHIPPED | OUTPATIENT
Start: 2021-06-10 | End: 2021-09-13

## 2021-06-10 NOTE — PATIENT INSTRUCTIONS
Alzheimer's Disease: Care Instructions Overview Alzheimer's disease is a type of dementia. It causes memory loss and affects judgment, language, and behavior. You may have trouble making decisions or may get lost in places that you used to know well. Alzheimer's disease is different than mild memory loss that occurs with aging. It's not clear what causes Alzheimer's disease. It's the most common form of dementia in older adults. Although there is no cure at this time, medicine in some cases may slow memory loss for a while. Other medicines may help with sleep, depression, or behavior changes. Alzheimer's disease is different for everyone. Some people can function well for a long time. In the early stage of the disease, you can do things at home to make life easier and safer. You also can keep doing your hobbies and other activities. Many people find comfort in planning now for their future needs. Follow-up care is a key part of your treatment and safety. Be sure to make and go to all appointments, and call your doctor if you are having problems. It's also a good idea to know your test results and keep a list of the medicines you take. How can you care for yourself at home? Taking care of yourself · If your doctor gives you medicines, take them exactly as prescribed. Call your doctor if you think you are having a problem with your medicine. You will get more details on the medicines your doctor prescribes. · Eat a balanced diet. Get plenty of whole grains, fruits, and vegetables every day. If you are not hungry at mealtimes, eat snacks at midmorning and in the afternoon. Try drinks such as Boost, Ensure, or Sustacal if you are having trouble keeping your weight up. · Stay active. Exercise such as walking may slow the decline of your mental abilities. Try to stay active mentally too. Read and work crossword puzzles if you enjoy these activities.  
· If you have trouble sleeping, do not nap during the day. Get regular exercise (but not within several hours of bedtime). Drink a glass of warm milk or caffeine-free herbal tea before going to bed. · Ask your doctor about support groups and other resources in your area. They can help people who have Alzheimer's disease and their families. · Be patient. You may find that a task takes you longer than it used to. · If you have not already done so, make a list of advance directives. Advance directives are instructions to your doctor and family members about what kind of care you want if you become unable to speak or express yourself. Talk to a  about making a will, if you do not already have one. Keeping schedules · Develop a routine. You will feel less frustrated or confused if you have a clear, simple plan of what to do every day. ? Make lists of your medicines and when to take them. ? Write down appointments and other tasks in a calendar. ? Put sticky notes around the house to help you remember events and other things you have to do. ? Schedule activities and tasks for times of the day when you are best able to handle them. Staying safe · Tell someone when you are going out and where you are going. Let the person know when you will be back. Before you go out alone, write down where you are going, how to get there, and how to get back home. Do this even if you have gone there many times before. Take someone along with you when possible. · Make your home safe. Tack down rugs, put no-slip tape in the tub, use handrails, and put safety switches on stoves and appliances. · Have a family member or other caregiver tell you whether you are driving badly. Deciding to stop driving is very hard for many people. Driving helps you feel independent. Your state 's license bureau can do a driving test if there is any question. Plan for other means of getting around when you are no longer able to drive. · Use strong lighting, especially at night.  Put night-lights in bedrooms, hallways, and bathrooms. · Lower the hot water temperature setting to 120°F or lower to avoid burns. When should you call for help? Call 911 anytime you think you may need emergency care. For example, call if: 
  · You are lost and do not know whom to call.  
  · You are injured and do not know whom to call. Call your doctor now or seek immediate medical care if: 
  · Your symptoms suddenly get much worse. Watch closely for changes in your health, and be sure to contact your doctor if: 
  · You want more information about how you can take care of yourself. Where can you learn more? Go to http://www.gray.com/ Enter Y179 in the search box to learn more about \"Alzheimer's Disease: Care Instructions. \" Current as of: September 23, 2020               Content Version: 12.8 © 6736-8112 [employer]. Care instructions adapted under license by [employer] (which disclaims liability or warranty for this information). If you have questions about a medical condition or this instruction, always ask your healthcare professional. [address] any warranty or liability for your use of this information.

## 2021-06-10 NOTE — PROGRESS NOTES
CC: follow up dementia    History of Present Illness:   Mariel Alfaro is a 80 y.o. female with a history of COPD, asthma, dementia who presents to the neurology clinic for follow-up after recent hospitalization for syncope. She was admitted to the hospital on 8/14/2020 when she had 3 episodes of loss of consciousness. Her  reports that she was at her baseline state of health however may have been dehydrated and in the heat and had possibly a vasovagal response. During 1 of the events there were some concern for shaking of the extremities for which she was placed on Keppra 500 mg twice a day. She was admitted to the hospital for further evaluation and underwent an EEG which showed no acute abnormalities. Additionally underwent CT of the head which showed small vessel ischemic disease otherwise no other abnormalities. She was seen by neurology during the admission who felt that the symptom was likely due to a syncopal event. The Keppra was stopped as she did not have any abnormalities on her EEG and became very lethargic on a low dose. Interval hx:   Since patient was last seen, her  reports that she has been having more trouble with her memory. She is also had more difficulty with following instructions. He reports that the assisted living facility where they are living has opened up completely since Covid. They are able to do other activities and try to stay very active during the day. He has no other complaints. She has had no further syncopal episodes. Past Medical History:   Diagnosis Date    Asthma     Dr. Dylan Gonzáles June 2013--doesn't see routinely--pt reports no change in allergies--no need for routine follow-up. Dr. Mariah Spangler sees for mild COPD--on Symbicort since ~Nov 2013. Continued Singulair.     Ceruminosis     [facility]    Chronic obstructive pulmonary disease ([facility] [address]     Encounter for screening colonoscopy ~2006    Normal per pt--[facility] records requested 2013 visit.  Fainting     Falls     Fatigue     Hearing loss, left     va hearing    History of mammogram May 2014    Normal/no change.  History of shingles 2010    Dec 2014:  Pt reports clinical history and shingles vaccine 4yrs ago. To try to obtain exact date of zostavax from /source.  History of skin cancer     melanoma ?, BCC & SCC- followed by Dr. Abe Zelaya- twice a yr appts. 2013-still on 6mo schedule for skin checks.  Memory disorder     Osteopenia     DEXA     Overactive bladder     detrol LA. Vesicare--[facility]es [facility] Considering procedure for incontinence.  Postmenopausal hormone replacement therapy     since hysterectomy    Right shoulder pain 2013. Mild osteopenia; mild AC osteoarthritis. Sports Med Eval Oct 2013.  Well woman exam (no gynecological exam) Aug 3, 2012    \"[facility] Screening\":  Results scanned to chart. Updated 2014-no change except noted concern for GFR--see Dec 2014 note, letter as reviewed with pt. Past Surgical History:   Procedure Laterality Date    HX CATARACT REMOVAL      HX HYSTERECTOMY  1948    IR KYPHOPLASTY LUMBAR  2020    MULTIPLE DELIVERY       SC TRABECULOPLASTY Adan Gilbert Dr. Ceil  - [facility]- last eye exam 2010        Family History   Problem Relation Age of Onset   [place] Dementia Father     Hypertension Mother     Stroke Mother     Cancer Other         daughter- breast cancer        Social History     Tobacco Use    Smoking status: Former Smoker    Smokeless tobacco: Never Used   Substance Use Topics    Alcohol use: Yes     Alcohol/week: 0.0 standard drinks     Comment: 1-2 drinks/every evening ( wine/vodka)        Allergies   Allergen Reactions    Latex Unknown (comments)    Dog Dander Shortness of Breath        Prior to Admission medications    Medication Sig Start Date End Date Taking?  Authorizing Provider   Namzaric 28-10 mg CSpX TAKE 1 CAPSULE DAILY 5/8/21  Yes Huey Jarvis MD   ondansetron (ZOFRAN ODT) 4 mg disintegrating tablet Take 1 Tab by mouth every eight (8) hours as needed for Nausea or Vomiting. 4/19/21  Yes Huey Jarvis MD   escitalopram oxalate (LEXAPRO) 20 mg tablet Take 1 Tab by mouth daily. 2/24/21  Yes Tank Eagle MD   lisdexamfetamine (Vyvanse) 20 mg capsule Take 1 Cap by mouth daily. Max Daily Amount: 20 mg. 2/18/21  Yes Huey Jarvis MD   atorvastatin (LIPITOR) 10 mg tablet TAKE 1 TABLET DAILY 6/16/20  Yes Huey Jarvis MD   albuterol [facility]) 90 mcg/actuation inhaler Take 1 Puff by inhalation every four (4) hours as needed for Wheezing or Shortness of Breath. Indications: Acute Asthma Attack 2/14/18  Yes Huey Jarvis MD   montelukast (SINGULAIR) 10 mg tablet Take 10 mg by mouth daily. Yes Provider, Historical   cholecalciferol, vitamin d3, (VITAMIN D) 1,000 unit tablet Take  by mouth daily.    Yes Provider, Historical   MYRBETRIQ 50 mg ER tablet TAKE 1 TABLET DAILY  Patient not taking: Reported on 6/10/2021 2/19/20   Huey Jarvis MD       Review of Systems:    General, constitutional: negative  Eyes, vision: negative  Ears, nose, throat: negative  Cardiovascular, heart: negative  Respiratory: negative  Gastrointestinal: negative  Genitourinary: negative  Musculoskeletal: negative  Skin and integumentary: negative  Psychiatric: negative  Endocrine: negative  Neurological: negative, except for HPI  Hematologic/lymphatic: negative  Allergy/immunology: negative    PHYSICAL EXAMINATION:   Visit Vitals  /73   Pulse 79   Temp 97.9 °F (36.6 °C)   Ht 5' (1.524 m)   Wt 112 lb (50.8 kg)   SpO2 98%   BMI 21.87 kg/m²       Physical Exam:  General:  no acute distress  Neck: no carotid bruits  Lungs: clear to auscultation  Heart:  no murmurs, regular rate and rhythm   Lower extremity: no edema    Neurological exam:  Mental Status: Awake, alert, oriented to person, hospital but did not know which 1 or city, not know the year  Registration and Recall: registration intact, unable to recall any words despite prompts. Attention and Concentration: able to state the days of the week backwards, however she initially said them forwards. Speech and Language: No dysarthria. Able to name, repeat and follow commands   Fund of knowledge was preserved    Cranial nerves: II-XII:  Pupils equal and reactive, visual fields intact by confrontation   Extraocular movements intact, no evidence of nystagmus or ptosis   Facial sensation intact   Facial movements symmetric   Hearing intact to soft rub bilaterally   Shoulder shrug symmetric and strong   Tongue protrusion full and midline without fasciculation or atrophy    Motor:   Normal tone and Bulk   Drift: No evidence of pronator drift     Strength testing:   deltoid triceps biceps Wrist ext. Wrist flex. intrinsics   Right 5 5 5 5 5 5   Left 5 5 5 5 5 5      Hip flex. Hip ext. Knee ext. Knee flex Dorsi flex Plantar flex   Right  5 5 5 5 5 5   Left  5 5 5 5 5 5       Sensory:  Patient intact to light touch and pinprick. There is no extinction    Reflexes:   Biceps Triceps  Brachiorad Patellar Achilles Plantar Hoffmans   Right  2 2 2 3 2 Down Neg   Left  2 2 2 Clonus  2 Down Neg      Cerebellar testing: Finger-nose-finger and heel-to-shin were intact.     Gait: Steady    Data:     Lab Results   Component Value Date/Time    Sodium 143 03/08/2021 11:57 AM    Potassium 4.6 03/08/2021 11:57 AM    Chloride 104 03/08/2021 11:57 AM    Glucose 81 03/08/2021 11:57 AM    BUN 13 03/08/2021 11:57 AM    Creatinine 0.81 03/08/2021 11:57 AM    Calcium 9.8 03/08/2021 11:57 AM    WBC 7.2 08/17/2020 03:05 AM    HCT 36.7 08/17/2020 03:05 AM    HGB 11.9 08/17/2020 03:05 AM    PLATELET 111 54/00/3033 03:05 AM       Imaging:    Results from Hospital Encounter encounter on 02/26/20    MRI LUMB SPINE WO CONT    Narrative  EXAM: MRI LUMB SPINE WO CONT    INDICATION: . Wedge compression fracture of third lumbar vertebra, initial  encounter for closed fracture      Exam: MRI of the lumbar spine. Sequences include sagittal and axial T1 and  T2-weighted images. Sagittal STIR. Coronal T2    Comparisons: 2/17/2020    Contrast: None. Findings: Slight dextroscoliosis of the lumbar spine. There is an acute burst  fracture of the superior endplate of L3. There is retropulsion of the posterior  superior vertebral body into the canal with extension of edema into the pedicles  bilaterally. There is minimal edema along the inferior endplate of L2. Differentiating an acute Schmorl's node from chronic degenerative changes  difficult. Cord terminus is within normal limits. Paraspinous soft tissues are  within normal limits. T12-L1: No stenosis    L1-L2: No stenosis    L2-L3: Due to the retropulsed L3 vertebral body and diffuse bulge there is  moderate compression of the thecal sac. There is mild facet degenerative change. Minimal narrowing of the foramen    L3-L4: There is desiccation of this disc with disc height loss. There is a small  central and left paracentral extrusion extending cranially with facet  degenerative change. Canal stenosis is mild with narrowing of the left lateral  recess posterior to L3 and mild narrowing of the left subarticular zone. Mild  left foraminal narrowing    L4-L5: There is disc height loss with a diffuse bulge and facet degenerative  change. The ligamentum flavum is thickened. Canal stenosis is mild to moderate  with narrowing of the subarticular zones. Small amount of extruded disc material  extends cranially into the left foramen with mild left foraminal narrowing    L5-S1: There are facet degenerative changes without disc bulge or stenosis    Impression  Impression:  1. Acute burst fracture superior endplate of L3  2. Edema inferior endplate of L2.  Differentiating chronic endplate degenerative  change from small acute Schmorl's node is difficult    Findings were phoned Edson Palmer and Dr. Arya Ott office at [phone] hours      Results from [place] encounter on 08/16/20    CT HEAD WO CONT    Narrative  EXAM:  CT HEAD WO CONT    INDICATION: Seizure. COMPARISON: CT 2/17/2020    TECHNIQUE: Axial noncontrast head CT from foramen magnum to vertex. Coronal and  sagittal reformatted images were obtained. CT dose reduction was achieved  through use of a standardized protocol tailored for this examination and  automatic exposure control for dose modulation. FINDINGS:  There is diffuse age-related parenchymal volume loss. The ventricles  and sulci are age-appropriate without hydrocephalus. There is no mass effect or  midline shift. There is no intracranial hemorrhage or extra-axial fluid  collection. Areas of low attenuation in the periventricular white matter  represent stable chronic microvascular ischemic changes. The gray-white matter  differentiation is maintained. The basal cisterns are patent. The osseous structures are intact. The visualized paranasal sinuses and mastoid  air cells are clear. Impression  IMPRESSION:  No acute intracranial abnormality. IMPRESSION/RECOMMENDATIONS:  Fidel Pride is a 80 y.o. female with a history of COPD, asthma, dementia who presents to the neurology clinic for follow-up after recent hospitalization for syncope. She has been doing well and is currently on Holter monitor. There have been no further episodes. She also has had some progression of her underlying dementia which is possibly vascular in nature as her head CT does show small vessel ischemic disease. Her last neuropsych testing was done more than 5 years ago and did not show any evidence of dementia at that time. Syncopal episode: Likely vasovagal due to dehydration as patient was in the heat during the episode. Her EEG was unremarkable and she had a head CT that showed small vessel ischemic disease however no areas of encephalomalacia which may trigger a seizure-like event.   Patient also had carotid Dopplers performed while in the hospital and showed no flow-limiting stenosis. -As her event is not clearly based in seizure, I would recommend that she not continue any AEDs at this time.  -She is currently has an event monitor looking for arrhythmias. We discussed this extensively and while I stated that the longer she is monitor the more likely we are going to find something he prefers to only continue this for 14 days. Cognitive impairment: Concerning for vascular dementia given her head CT findings which did show small vessel ischemic disease in the periventricular area that appeared to be moderate. Appears that her symptoms have been progressing in the last 5 years. -May benefit from repeat neuropsych testing however patient and  like would like to wait on this.  -She is also on medication for ADHD as she previously had trouble with focusing, we will continue this medication for now as she has had some decline in her cognition over the last 6 months.  -Would recommend that she continue Namzaric as this has been shown some benefit in the setting of both Alzheimer's and vascular dementia.  -We discussed the importance of following a Mediterranean-DASH diet as this has been shown to slow the rate of progression of Alzheimer's and other neurodegenerative diseases.  -Also discussed that it would be important to continue with physical activity at least 30 minutes 3 times a day as this has been shown to improve memory as well.  -I did recommend that she should continue to read, do crossword puzzles and or sudoku as this will help reduce her rate of progression of her disease. Return to clinic in 6 months    I have discussed the diagnosis with the patient and the intended plan as seen in the above orders. Patient is in agreement. The patient has received an after-visit summary and questions were answered concerning future plans.  I have discussed medication side effects and warnings with the patient as tatyana Castellano MD

## 2021-06-11 DIAGNOSIS — E78.5 DYSLIPIDEMIA: ICD-10-CM

## 2021-06-11 RX ORDER — ATORVASTATIN CALCIUM 10 MG/1
TABLET, FILM COATED ORAL
Qty: 90 TABLET | Refills: 3 | Status: SHIPPED | OUTPATIENT
Start: 2021-06-11 | End: 2022-10-29

## 2021-07-07 RX ORDER — ESCITALOPRAM OXALATE 10 MG/1
TABLET ORAL
Qty: 30 TABLET | Refills: 2 | Status: SHIPPED | OUTPATIENT
Start: 2021-07-07 | End: 2021-07-31 | Stop reason: SDUPTHER

## 2021-08-02 RX ORDER — ESCITALOPRAM OXALATE 10 MG/1
10 TABLET ORAL DAILY
Qty: 90 TABLET | Refills: 0 | Status: SHIPPED | OUTPATIENT
Start: 2021-08-02 | End: 2021-08-18 | Stop reason: SDUPTHER

## 2021-08-17 ENCOUNTER — HOSPITAL ENCOUNTER (OUTPATIENT)
Dept: INFUSION THERAPY | Age: 86
Discharge: HOME | End: 2021-08-17
Payer: MEDICARE

## 2021-08-17 VITALS
OXYGEN SATURATION: 97 % | BODY MASS INDEX: 22.52 KG/M2 | DIASTOLIC BLOOD PRESSURE: 79 MMHG | TEMPERATURE: 98 F | RESPIRATION RATE: 16 BRPM | SYSTOLIC BLOOD PRESSURE: 132 MMHG | HEART RATE: 94 BPM | WEIGHT: 115.3 LBS

## 2021-08-17 LAB
ANION GAP BLD CALC-SCNC: 12 MMOL/L (ref 10–20)
CA-I BLD-MCNC: 1.2 MMOL/L (ref 1.12–1.32)
CHLORIDE BLD-SCNC: 103 MMOL/L (ref 98–107)
CO2 BLD-SCNC: 27 MMOL/L (ref 21–32)
CREAT BLD-MCNC: 0.64 MG/DL (ref 0.6–1.3)
GLUCOSE BLD-MCNC: 139 MG/DL (ref 65–100)
MAGNESIUM SERPL-MCNC: 1.8 MG/DL (ref 1.6–2.4)
PHOSPHATE SERPL-MCNC: 3.1 MG/DL (ref 2.6–4.7)
POTASSIUM BLD-SCNC: 3.9 MMOL/L (ref 3.5–5.1)
SERVICE CMNT-IMP: ABNORMAL
SODIUM BLD-SCNC: 141 MMOL/L (ref 136–145)

## 2021-08-17 PROCEDURE — 84100 ASSAY OF PHOSPHORUS: CPT

## 2021-08-17 PROCEDURE — 83735 ASSAY OF MAGNESIUM: CPT

## 2021-08-17 PROCEDURE — 74011250636 HC RX REV CODE- 250/636: Performed by: INTERNAL MEDICINE

## 2021-08-17 PROCEDURE — 96372 THER/PROPH/DIAG INJ SC/IM: CPT

## 2021-08-17 PROCEDURE — 80047 BASIC METABLC PNL IONIZED CA: CPT

## 2021-08-17 PROCEDURE — 36415 COLL VENOUS BLD VENIPUNCTURE: CPT

## 2021-08-17 RX ADMIN — DENOSUMAB 60 MG: 60 INJECTION SUBCUTANEOUS at 11:17

## 2021-08-17 NOTE — PROGRESS NOTES
[facility] Visit Note:  Arrived - 1    Patient denied having any symptoms of COVID-19, i.e. SOB, coughing, fever, or generally not feeling well. Also denies having been exposed to COVID-19 recently or having had any recent contact with family/friend that has a pending COVID test.    Labs Obtained - Chem8, Mag, Phos  Recent Results (from the past 12 hour(s))   POC CHEM8    Collection Time: 08/17/21 11:09 AM   Result Value Ref Range    Calcium, ionized (POC) 1.20 1. 12 - 1.32 mmol/L    Sodium (POC) 141 136 - 145 mmol/L    Potassium (POC) 3.9 3.5 - 5.1 mmol/L    Chloride (POC) 103 98 - 107 mmol/L    CO2 (POC) 27.0 21 - 32 mmol/L    Anion gap (POC) 12 10 - 20 mmol/L    Glucose (POC) 139 (H) 65 - 100 mg/dL    Creatinine (POC) 0.64 0.6 - 1.3 mg/dL    GFRAA, POC >60 >60 ml/min/1.73m2    GFRNA, POC >60 >60 ml/min/1.73m2    Comment Comment Not Indicated. Visit Vitals  /79   Pulse 94   Temp 98 °F (36.7 °C)   Resp 16   Wt 52.3 kg (115 lb 4.8 oz)   SpO2 97%   Breastfeeding No   BMI 22.52 kg/m²       Assessment - unremarkable. Reviewed Prolia info. Pt denies any recent or upcoming dental work. Prolia 60mg SQ slowly in left arm.    1120 - Tolerated well. No reaction noted. Reviewed Prolia D/C instructions. Verbalized understanding. Pt denies any acute problems/changes. Discharged from [facility] ambulatory. No distress.  Next appt: 2/14/22

## 2021-08-18 RX ORDER — ESCITALOPRAM OXALATE 10 MG/1
10 TABLET ORAL DAILY
Qty: 10 TABLET | Refills: 0 | Status: SHIPPED | OUTPATIENT
Start: 2021-08-18 | End: 2021-08-28

## 2021-08-18 NOTE — TELEPHONE ENCOUNTER
----- Message from Tommie Dueñas sent at 8/18/2021 11:28 AM EDT -----  Regarding: Dr. Hernandez Patch: [phone]  General Message/Vendor Calls    Caller's first and last name: Stephani Driver, . Reason for call: Pt called in Rx refill, has coming through mail in pharmacy. Rx is delayed and pt it out of medication. Needs a 10 day supply of \"Lexapro\" 10 mg, needs sent to [place], phone: [phone]. Callback required yes/no and why: Yes, confirm emergency supply.        Best contact number(s): [phone]      Message from [facility]

## 2021-08-28 RX ORDER — ESCITALOPRAM OXALATE 10 MG/1
TABLET ORAL
Qty: 10 TABLET | Refills: 0 | Status: SHIPPED | OUTPATIENT
Start: 2021-08-28 | End: 2021-09-04

## 2021-09-04 RX ORDER — ESCITALOPRAM OXALATE 10 MG/1
TABLET ORAL
Qty: 10 TABLET | Refills: 0 | Status: SHIPPED | OUTPATIENT
Start: 2021-09-04 | End: 2021-09-13 | Stop reason: SDUPTHER

## 2021-09-12 NOTE — PROGRESS NOTES
HISTORY OF PRESENT ILLNESS  Krystle Hicks is a 80 y.o. female. HPI     F/u ADHD, vascular dementia, osteoporosis s/p L3 kypholasty,HLD OAB SHRUTHI mild asthma/copd -------------here with    saw Dr Bronson Salvador for vasovagal syncope and vascular dementia on Namzaric-no recrrrent syncope  Memory loss--slightly worse short term memory losss and sometimes agitation in evenings  On vyvanse for ADHD   On prolia for osteoporosis-due for dexa in a few months--  Forgets to take calcium pills  No falls  On lexapro for  Anxiety but still gets anxious and agitated in PM     Last OV  Last refill of Vyvanse 2/18/21-- reviewed  On fosamax since 11/2019 but now prolia     Memory loss-sees Dr Cuba--progressive on [employer]. On vyvanse for ADHD--trial of weaning off med per Dr Mickie Jaime to have vascular dementia-has small vessel dz on CT          Patient Active Problem List    Diagnosis Date Noted    Vascular dementia without behavioral disturbance ([address] 09/08/2020    Bilateral carotid artery stenosis 08/17/2020    Syncopal seizure ([facility] 08/16/2020    Compression fracture of L3 vertebra ([facility] 04/10/2020    Alzheimer's disease ([facility] 09/10/2018    ADD (attention deficit disorder) 01/18/2017    Hyperlipidemia 07/20/2015    Mild cognitive impairment without memory loss 02/02/2015    Generalized anxiety disorder 02/02/2015    TMJ (temporomandibular joint disorder) 12/21/2011    Fever, unspecified 08/10/2011    Syncope and collapse 08/10/2011    History of skin cancer     Asthma     Hearing loss, left     Ceruminosis     Postmenopausal hormone replacement therapy     Overactive bladder      Current Outpatient Medications   Medication Sig Dispense Refill    escitalopram oxalate (LEXAPRO) 10 mg tablet TAKE 1 TABLET BY MOUTH EVERY DAY 10 Tablet 0    atorvastatin (LIPITOR) 10 mg tablet TAKE 1 TABLET DAILY 90 Tablet 3    lisdexamfetamine (Vyvanse) 20 mg capsule Take 1 Capsule by mouth daily.  Max Daily Amount: 20 mg. 90 Capsule 0    Namzaric 28-10 mg CSpX TAKE 1 CAPSULE DAILY 90 Cap 3    montelukast (SINGULAIR) 10 mg tablet Take 10 mg by mouth daily.  cholecalciferol, vitamin d3, (VITAMIN D) 1,000 unit tablet Take  by mouth daily.  ondansetron (ZOFRAN ODT) 4 mg disintegrating tablet Take 1 Tab by mouth every eight (8) hours as needed for Nausea or Vomiting. 8 Tab 0    MYRBETRIQ 50 mg ER tablet TAKE 1 TABLET DAILY (Patient not taking: Reported on 6/10/2021) 90 Tab 4    albuterol (PROAIR HFA) 90 mcg/actuation inhaler Take 1 Puff by inhalation every four (4) hours as needed for Wheezing or Shortness of Breath. Indications: Acute Asthma Attack 3 Inhaler 3     Allergies   Allergen Reactions    Latex Unknown (comments)    Dog Dander Shortness of Breath           ROS    Physical Exam  Vitals and nursing note reviewed. Constitutional:       Appearance: She is well-developed. Comments: Appears stated age   Cardiovascular:      Rate and Rhythm: Normal rate and regular rhythm. Heart sounds: Normal heart sounds. No murmur heard. No friction rub. No gallop. Pulmonary:      Effort: Pulmonary effort is normal. No respiratory distress. Breath sounds: Normal breath sounds. No wheezing. Abdominal:      General: Bowel sounds are normal.      Palpations: Abdomen is soft. Neurological:      Mental Status: She is alert. ASSESSMENT and PLAN  Diagnoses and all orders for this visit:    1. Dementia without behavioral disturbance, unspecified dementia type ([address]    2. Anxiety    3. Pure hypercholesterolemia  -     CBC W/O DIFF; Future  -     TSH 3RD GENERATION; Future    4. Attention deficit hyperactivity disorder (ADHD), unspecified ADHD type    5. Late onset Alzheimer's disease with behavioral disturbance ([address]    6. Syncopal seizure ([address]    7. Dementia of the Alzheimer's type, with late onset, uncomplicated ([address]  -     lisdexamfetamine (Vyvanse) 20 mg capsule; Take 1 Capsule by mouth daily.  Max Daily Amount: 20 mg.    8. Medicare annual wellness visit, subsequent    Other orders  -     escitalopram oxalate (LEXAPRO) 20 mg tablet; Take 0.5 Tablets by mouth daily. Follow-up and Dispositions    · Return in about 6 months (around 3/13/2022) for ADHD anxiety dementia. 1. Dementia without behavioral disturbance, unspecified dementia type ([facility]   Stable , fu Neurologist on namzaric  2. Anxiety   Worse in PM, increase lexapro 20 mg qd  3. Pure hypercholesterolemia  -     CBC W/O DIFF; Future  -     TSH 3RD GENERATION; Future   Continue lipitor  4. Attention deficit hyperactivity disorder (ADHD), unspecified ADHD type   Refill vyvanse  5. Late onset Alzheimer's disease with behavioral disturbance ([facility]  6. Syncopal seizure ([facility]   No recurrence  7. Dementia of the Alzheimer's type, with late onset, uncomplicated ([facility]     8.  Osteoporosis   On prolia, try to take calcium 500mg bid--discussed   Dexa next year    Depression Risk Factor Screening     3 most recent PHQ Screens 3/8/2021   Little interest or pleasure in doing things Not at all   Feeling down, depressed, irritable, or hopeless Not at all   Total Score PHQ 2 0   Trouble falling or staying asleep, or sleeping too much -   Feeling tired or having little energy -   Poor appetite, weight loss, or overeating -   Feeling bad about yourself - or that you are a failure or have let yourself or your family down -   Moving or speaking so slowly that other people could have noticed; or the opposite being so fidgety that others notice -   Thoughts of being better off dead, or hurting yourself in some way -   How difficult have these problems made it for you to do your work, take care of your home and get along with others -       Alcohol Risk Screen    Do you average more than 1 drink per night or more than 7 drinks a week:  No    On any one occasion in the past three months have you have had more than 3 drinks containing alcohol:  No        Functional Ability and Level of Safety    Hearing: Hearing is good. Activities of Daily Living: The home contains: handrails and grab bars  Patient needs help with:  transportation, managing medications and managing money      Ambulation: with no difficulty     Fall Risk:  Fall Risk Assessment, last 12 mths 3/8/2021   Able to walk? Yes   Fall in past 12 months? 0   Do you feel unsteady? 0   Are you worried about falling 0   Number of falls in past 12 months -   Fall with injury? -      Abuse Screen:  Patient is not abused       Cognitive Screening    Has your family/caregiver stated any concerns about your memory: yes - dementia         Health Maintenance Due     Health Maintenance Due   Topic Date Due    Flu Vaccine (1) 09/01/2021       Patient Care Team   Patient Care Team:  Andreina Oliver MD as PCP - General (Internal Medicine)  Andreina Oliver MD as PCP - [facility] Empaneled Provider  Gurpreet Marroquin MD as Consulting Provider (Urology)  Mahsa Boykin MD (Ophthalmology)  Tahmina Garcia MD (Neurology)    History     Patient Active Problem List   Diagnosis Code    Postmenopausal hormone replacement therapy Z79.890    Overactive bladder N32.81    Asthma J45.909    Hearing loss, left H91.92    Ceruminosis H61.20    History of skin cancer Z85.828    Fever, unspecified R50.9    Syncope and collapse R55    TMJ (temporomandibular joint disorder) M26.609    Mild cognitive impairment without memory loss G31.84    Generalized anxiety disorder F41.1    Hyperlipidemia E78.5    ADD (attention deficit disorder) F98.8    Alzheimer's disease ([address] G30.9, F02.80    Compression fracture of L3 vertebra ([address] S32.030A    Syncopal seizure ([address] R55, R56.9    Bilateral carotid artery stenosis I65.23    Vascular dementia without behavioral disturbance ([address] F01.50     Past Medical History:   Diagnosis Date    Asthma     Dr. Trista Jacobs June 2013--doesn't see routinely--pt reports no change in allergies--no need for routine follow-up. Dr. Manjit Tran sees for mild COPD--on Symbicort since ~2013. Continued Singulair.  Ceruminosis     [facility] ear [facility]    Chronic obstructive pulmonary disease ([facility]     Encounter for screening colonoscopy ~    Normal per pt--[facility] records requested 2013 visit.  Fainting     Falls     Fatigue     Hearing loss, left     va hearing    History of mammogram May 2014    Normal/no change.  History of shingles 2010    Dec 2014:  Pt reports clinical history and shingles vaccine 4yrs ago. To try to obtain exact date of zostavax from /source.  History of skin cancer     melanoma ?, BCC & SCC- followed by Dr. Pedro Gillespie- twice a yr appts. 2013-still on 6mo schedule for skin checks.  Memory disorder     Osteopenia     DEXA     Overactive bladder     detrol LA. Vesicare--sees [facility] Considering procedure for incontinence.  Postmenopausal hormone replacement therapy     since hysterectomy    Right shoulder pain 2013. Mild osteopenia; mild AC osteoarthritis. Sports Med Eval Oct 2013.  Well woman exam (no gynecological exam) Aug 3, 2012    \"[employer] Screening\":  Results scanned to chart. Updated 2014-no change except noted concern for GFR--see Dec 2014 note, letter as reviewed with pt. Past Surgical History:   Procedure Laterality Date    HX CATARACT REMOVAL      HX HYSTERECTOMY  1948    IR KYPHOPLASTY LUMBAR  2020    MULTIPLE DELIVERY       OR TRABECULOPLASTY BY LASER SURGERY      [facility] exam 2010     Current Outpatient Medications   Medication Sig Dispense Refill    escitalopram oxalate (LEXAPRO) 10 mg tablet TAKE 1 TABLET BY MOUTH EVERY DAY 10 Tablet 0    atorvastatin (LIPITOR) 10 mg tablet TAKE 1 TABLET DAILY 90 Tablet 3    lisdexamfetamine (Vyvanse) 20 mg capsule Take 1 Capsule by mouth daily.  Max Daily Amount: 20 mg. 90 Capsule 0    Namzaric 28-10 mg CSpX TAKE 1 CAPSULE DAILY 90 Cap 3    montelukast (SINGULAIR) 10 mg tablet Take 10 mg by mouth daily.  cholecalciferol, vitamin d3, (VITAMIN D) 1,000 unit tablet Take  by mouth daily.  ondansetron (ZOFRAN ODT) 4 mg disintegrating tablet Take 1 Tab by mouth every eight (8) hours as needed for Nausea or Vomiting. 8 Tab 0    MYRBETRIQ 50 mg ER tablet TAKE 1 TABLET DAILY (Patient not taking: Reported on 6/10/2021) 90 Tab 4    albuterol (PROAIR HFA) 90 mcg/actuation inhaler Take 1 Puff by inhalation every four (4) hours as needed for Wheezing or Shortness of Breath. Indications: Acute Asthma Attack 3 Inhaler 3     Allergies   Allergen Reactions    Latex Unknown (comments)    Dog Dander Shortness of Breath       Family History   Problem Relation Age of Onset   [facility] Dementia Father     Hypertension Mother     Stroke Mother     Cancer Other         daughter- breast cancer     Social History     Tobacco Use    Smoking status: Former Smoker    Smokeless tobacco: Never Used   Substance Use Topics    Alcohol use:  Yes     Alcohol/week: 0.0 standard drinks     Comment: 1-2 drinks/every evening ( wine/vodka)         Sera Guerrero MD

## 2021-09-13 ENCOUNTER — OFFICE VISIT (OUTPATIENT)
Dept: INTERNAL MEDICINE CLINIC | Age: 86
End: 2021-09-13
Payer: MEDICARE

## 2021-09-13 VITALS
HEIGHT: 60 IN | TEMPERATURE: 98.5 F | BODY MASS INDEX: 22.58 KG/M2 | RESPIRATION RATE: 16 BRPM | SYSTOLIC BLOOD PRESSURE: 110 MMHG | DIASTOLIC BLOOD PRESSURE: 60 MMHG | WEIGHT: 115 LBS | HEART RATE: 70 BPM | OXYGEN SATURATION: 98 %

## 2021-09-13 DIAGNOSIS — R56.9 SYNCOPAL SEIZURE (HCC): ICD-10-CM

## 2021-09-13 DIAGNOSIS — F02.80 DEMENTIA OF THE ALZHEIMER'S TYPE, WITH LATE ONSET, UNCOMPLICATED (HCC): ICD-10-CM

## 2021-09-13 DIAGNOSIS — Z00.00 MEDICARE ANNUAL WELLNESS VISIT, SUBSEQUENT: ICD-10-CM

## 2021-09-13 DIAGNOSIS — R55 SYNCOPAL SEIZURE (HCC): ICD-10-CM

## 2021-09-13 DIAGNOSIS — F02.818 LATE ONSET ALZHEIMER'S DISEASE WITH BEHAVIORAL DISTURBANCE (HCC): ICD-10-CM

## 2021-09-13 DIAGNOSIS — G30.1 DEMENTIA OF THE ALZHEIMER'S TYPE, WITH LATE ONSET, UNCOMPLICATED (HCC): ICD-10-CM

## 2021-09-13 DIAGNOSIS — E78.00 PURE HYPERCHOLESTEROLEMIA: ICD-10-CM

## 2021-09-13 DIAGNOSIS — F41.9 ANXIETY: ICD-10-CM

## 2021-09-13 DIAGNOSIS — F90.9 ATTENTION DEFICIT HYPERACTIVITY DISORDER (ADHD), UNSPECIFIED ADHD TYPE: ICD-10-CM

## 2021-09-13 DIAGNOSIS — G30.1 LATE ONSET ALZHEIMER'S DISEASE WITH BEHAVIORAL DISTURBANCE (HCC): ICD-10-CM

## 2021-09-13 DIAGNOSIS — F03.90 DEMENTIA WITHOUT BEHAVIORAL DISTURBANCE, UNSPECIFIED DEMENTIA TYPE: Primary | ICD-10-CM

## 2021-09-13 PROBLEM — E11.9 TYPE 2 DIABETES MELLITUS (HCC): Status: ACTIVE | Noted: 2021-09-13

## 2021-09-13 PROCEDURE — 99214 OFFICE O/P EST MOD 30 MIN: CPT | Performed by: INTERNAL MEDICINE

## 2021-09-13 PROCEDURE — G0463 HOSPITAL OUTPT CLINIC VISIT: HCPCS | Performed by: INTERNAL MEDICINE

## 2021-09-13 PROCEDURE — 1090F PRES/ABSN URINE INCON ASSESS: CPT | Performed by: INTERNAL MEDICINE

## 2021-09-13 PROCEDURE — G8420 CALC BMI NORM PARAMETERS: HCPCS | Performed by: INTERNAL MEDICINE

## 2021-09-13 PROCEDURE — 1101F PT FALLS ASSESS-DOCD LE1/YR: CPT | Performed by: INTERNAL MEDICINE

## 2021-09-13 PROCEDURE — G8536 NO DOC ELDER MAL SCRN: HCPCS | Performed by: INTERNAL MEDICINE

## 2021-09-13 PROCEDURE — G8432 DEP SCR NOT DOC, RNG: HCPCS | Performed by: INTERNAL MEDICINE

## 2021-09-13 PROCEDURE — G8427 DOCREV CUR MEDS BY ELIG CLIN: HCPCS | Performed by: INTERNAL MEDICINE

## 2021-09-13 RX ORDER — ESCITALOPRAM OXALATE 20 MG/1
10 TABLET ORAL DAILY
Qty: 90 TABLET | Refills: 3 | Status: SHIPPED | OUTPATIENT
Start: 2021-09-13 | End: 2022-02-05 | Stop reason: SDUPTHER

## 2021-09-13 NOTE — PATIENT INSTRUCTIONS
Office Policies    Phone calls/patient messages:            Please allow up to 24 hours for someone in the office to contact you about your call or message. Be mindful your provider may be out of the office or your message may require further review. We encourage you to use [employer] for your messages as this is a faster, more efficient way to communicate with our office                         Medication Refills:            Prescription medications require 48-72 business hours to process. We encourage you to use [employer] for your refills. For controlled medications: Please allow 72 business hours to process. Certain medications may require you to  a written prescription at our office. NO narcotic/controlled medications will be prescribed after 4pm Monday through Friday or on weekends              Form/Paperwork Completion:            Please note a $25 fee may incur for all paperwork for completed by our providers. We ask that you allow 7-10 business days. Pre-payment is due prior to picking up/faxing the completed form. You may also download your forms to [employer] to have your doctor print off. Medicare Wellness Visit, Female     The best way to live healthy is to have a lifestyle where you eat a well-balanced diet, exercise regularly, limit alcohol use, and quit all forms of tobacco/nicotine, if applicable. Regular preventive services are another way to keep healthy. Preventive services (vaccines, screening tests, monitoring & exams) can help personalize your care plan, which helps you manage your own care. Screening tests can find health problems at the earliest stages, when they are easiest to treat. [facility] follows the current, evidence-based guidelines published by the [facility] and [employer] (USPSTF) when recommending preventive services for our patients.  Because we follow these guidelines, sometimes recommendations change over time as research supports it. (For example, mammograms used to be recommended annually. Even though Medicare will still pay for an annual mammogram, the newer guidelines recommend a mammogram every two years for women of average risk). Of course, you and your doctor may decide to screen more often for some diseases, based on your risk and your co-morbidities (chronic disease you are already diagnosed with). Preventive services for you include:  - Medicare offers their members a free annual wellness visit, which is time for you and your primary care provider to discuss and plan for your preventive service needs. Take advantage of this benefit every year!  -All adults over the age of 72 should receive the recommended pneumonia vaccines. Current USPSTF guidelines recommend a series of two vaccines for the best pneumonia protection.   -All adults should have a flu vaccine yearly and a tetanus vaccine every 10 years.   -All adults age 48 and older should receive the shingles vaccines (series of two vaccines). -All adults age 38-68 who are overweight should have a diabetes screening test once every three years.   -All adults born between [birth year] and 1965 should be screened once for Hepatitis C.  -Other screening tests and preventive services for persons with diabetes include: an eye exam to screen for diabetic retinopathy, a kidney function test, a foot exam, and stricter control over your cholesterol.   -Cardiovascular screening for adults with routine risk involves an electrocardiogram (ECG) at intervals determined by your doctor.   -Colorectal cancer screenings should be done for adults age 54-65 with no increased risk factors for colorectal cancer. There are a number of acceptable methods of screening for this type of cancer. Each test has its own benefits and drawbacks. Discuss with your doctor what is most appropriate for you during your annual wellness visit.  The different tests include: colonoscopy (considered the best screening method), a fecal occult blood test, a fecal DNA test, and sigmoidoscopy.    -A bone mass density test is recommended when a woman turns 65 to screen for osteoporosis. This test is only recommended one time, as a screening. Some providers will use this same test as a disease monitoring tool if you already have osteoporosis. -Breast cancer screenings are recommended every other year for women of normal risk, age 54-69.  -Cervical cancer screenings for women over age 72 are only recommended with certain risk factors.      Here is a list of your current Health Maintenance items (your personalized list of preventive services) with a due date:  Health Maintenance Due   Topic Date Due    Yearly Flu Vaccine (1) 09/01/2021

## 2021-09-14 LAB
ERYTHROCYTE [DISTWIDTH] IN BLOOD BY AUTOMATED COUNT: 12.6 % (ref 11.5–14.5)
HCT VFR BLD AUTO: 41.3 % (ref 35–47)
HGB BLD-MCNC: 12.6 G/DL (ref 11.5–16)
MCH RBC QN AUTO: 32.3 PG (ref 26–34)
MCHC RBC AUTO-ENTMCNC: 30.5 G/DL (ref 30–36.5)
MCV RBC AUTO: 105.9 FL (ref 80–99)
NRBC # BLD: 0 K/UL (ref 0–0.01)
NRBC BLD-RTO: 0 PER 100 WBC
PLATELET # BLD AUTO: 243 K/UL (ref 150–400)
PMV BLD AUTO: 10.6 FL (ref 8.9–12.9)
RBC # BLD AUTO: 3.9 M/UL (ref 3.8–5.2)
TSH SERPL DL<=0.05 MIU/L-ACNC: 3.17 UIU/ML (ref 0.36–3.74)
WBC # BLD AUTO: 6.3 K/UL (ref 3.6–11)

## 2021-11-04 RX ORDER — ESCITALOPRAM OXALATE 10 MG/1
TABLET ORAL
Qty: 90 TABLET | Refills: 3 | Status: SHIPPED | OUTPATIENT
Start: 2021-11-04 | End: 2022-03-01 | Stop reason: ALTCHOICE

## 2021-11-04 NOTE — TELEPHONE ENCOUNTER
PCP: Nora Dunn MD    Last appt: 6/10/2021  Future Appointments   Date Time Provider Fred Ibarra   11/10/2021  9:30 AM NONI Aguilar BS AMB   2/14/2022 11:30 AM MAGNOLIA LEBLANC [address] [place] [address]   3/14/2022  9:45 AM Nora Dunn MD [facility] BS AMB       Requested Prescriptions     Pending Prescriptions Disp Refills    escitalopram oxalate (LEXAPRO) 10 mg tablet [Pharmacy Med Name: ESCITALOPRAM TABS 10MG] 90 Tablet 3     Sig: TAKE 1 TABLET DAILY       Prior labs and Blood pressures:  BP Readings from Last 3 Encounters:   09/13/21 110/60   08/17/21 132/79   06/10/21 130/73     Lab Results   Component Value Date/Time    Sodium 143 03/08/2021 11:57 AM    Potassium 4.6 03/08/2021 11:57 AM    Chloride 104 03/08/2021 11:57 AM    CO2 25 03/08/2021 11:57 AM    Anion gap 4 (L) 08/17/2020 03:05 AM    Glucose 81 03/08/2021 11:57 AM    BUN 13 03/08/2021 11:57 AM    Creatinine 0.81 03/08/2021 11:57 AM    BUN/Creatinine ratio 16 03/08/2021 11:57 AM    GFR est AA 74 03/08/2021 11:57 AM    GFR est non-AA 65 03/08/2021 11:57 AM    Calcium 9.8 03/08/2021 11:57 AM     No results found for: HBA1C, KOS2SUJP, XNT7VOBU  Lab Results   Component Value Date/Time    Cholesterol, total 195 03/08/2021 11:57 AM    HDL Cholesterol 93 03/08/2021 11:57 AM    LDL, calculated 89 03/08/2021 11:57 AM    LDL, calculated 78 11/04/2019 11:27 AM    VLDL, calculated 13 03/08/2021 11:57 AM    VLDL, calculated 19 11/04/2019 11:27 AM    Triglyceride 71 03/08/2021 11:57 AM    CHOL/HDL Ratio 2.4 08/31/2010 10:48 AM     Lab Results   Component Value Date/Time    Vitamin D 25-Hydroxy 30.5 (L) 08/04/2011 08:50 AM    VITAMIN D, 25-HYDROXY 34.9 07/14/2014 09:33 AM       Lab Results   Component Value Date/Time    TSH 3.17 09/13/2021 03:14 PM

## 2021-12-27 ENCOUNTER — VIRTUAL VISIT (OUTPATIENT)
Dept: INTERNAL MEDICINE CLINIC | Age: 86
End: 2021-12-27
Payer: MEDICARE

## 2021-12-27 DIAGNOSIS — E11.9 TYPE 2 DIABETES MELLITUS WITHOUT COMPLICATION, WITHOUT LONG-TERM CURRENT USE OF INSULIN (HCC): ICD-10-CM

## 2021-12-27 DIAGNOSIS — M54.31 SCIATICA OF RIGHT SIDE: Primary | ICD-10-CM

## 2021-12-27 PROCEDURE — 99442 PR PHYS/QHP TELEPHONE EVALUATION 11-20 MIN: CPT | Performed by: INTERNAL MEDICINE

## 2021-12-27 RX ORDER — METHYLPREDNISOLONE 4 MG/1
TABLET ORAL
Qty: 1 DOSE PACK | Refills: 0 | Status: SHIPPED | OUTPATIENT
Start: 2021-12-27 | End: 2022-03-01 | Stop reason: ALTCHOICE

## 2021-12-27 NOTE — PATIENT INSTRUCTIONS
This is an established visit conducted via telemedicine. The patient has been instructed that this meets HIPAA criteria and acknowledges and agrees to this method of visitation.     Toya Gilbert, [place]  84[birth year]  5:25 PM

## 2021-12-27 NOTE — PROGRESS NOTES
HISTORY OF PRESENT ILLNESS  Harjit Hoang is a 80 y.o. female. HPI   Harjit Hoang, who was evaluated through a synchronous (real-time) audio only encounter, and/or her healthcare decision maker, is aware that it is a billable service, with coverage as determined by her insurance carrier. She provided verbal consent to proceed: Yes, and patient identification was verified. This visit was conducted pursuant to the emergency declaration under the [facility], [address] [place] [address] and the [employer] Act. A caregiver was present when appropriate. Ability to conduct physical exam was limited. The patient was located in a state where the provider was credentialed to provide care. --Abena Lanier MD on 12/27/2021 at 1:45 PM      TC x 11 minutes            C/o right buttock pain radiating to posterior thigh x 2 days-dull pain  No lpw back pain  ?  Fall recently but  says not true  She denies any leg weakness or bowel/bladder dysfunction  Pt applied a lidoderm patch today and states the pain is receding    S/p L3 kyphoplasty 2020  Hx mild-mod lumbar stenosis L4/L5  Last OV  F/u ADHD, vascular dementia, osteoporosis s/p L3 kypholasty,HLD OAB SHRUTHI mild asthma/copd and medicare wellness-------------here with    saw Dr Edwin Hassan for vasovagal syncope and vascular dementia on Namzaric-no recrrrent syncope  Memory loss--slightly worse short term memory losss and sometimes agitation in evenings  On vyvanse for ADHD   On prolia for osteoporosis-due for dexa in a few months--  Forgets to take calcium pills  No falls  On lexapro for  Anxiety but still gets anxious and agitated in PM     Patient Active Problem List    Diagnosis Date Noted    Type 2 diabetes mellitus 09/13/2021    Vascular dementia without behavioral disturbance ([facility] 09/08/2020    Bilateral carotid artery stenosis 08/17/2020    Syncopal seizure ([facility] 08/16/2020  Compression fracture of L3 vertebra ([facility]) 04/10/2020    Alzheimer's disease ([facility] 09/10/2018    ADD (attention deficit disorder) 01/18/2017    Hyperlipidemia 07/20/2015    Mild cognitive impairment without memory loss 02/02/2015    Generalized anxiety disorder 02/02/2015    TMJ (temporomandibular joint disorder) 12/21/2011    Fever, unspecified 08/10/2011    Syncope and collapse 08/10/2011    History of skin cancer     Asthma     Hearing loss, left     Ceruminosis     Postmenopausal hormone replacement therapy     Overactive bladder      Current Outpatient Medications   Medication Sig Dispense Refill    escitalopram oxalate (LEXAPRO) 10 mg tablet TAKE 1 TABLET DAILY 90 Tablet 3    lisdexamfetamine (Vyvanse) 20 mg capsule Take 1 Capsule by mouth daily. Max Daily Amount: 20 mg. 90 Capsule 0    atorvastatin (LIPITOR) 10 mg tablet TAKE 1 TABLET DAILY 90 Tablet 3    Namzaric 28-10 mg CSpX TAKE 1 CAPSULE DAILY 90 Cap 3    albuterol (PROAIR HFA) 90 mcg/actuation inhaler Take 1 Puff by inhalation every four (4) hours as needed for Wheezing or Shortness of Breath. Indications: Acute Asthma Attack 3 Inhaler 3    montelukast (SINGULAIR) 10 mg tablet Take 10 mg by mouth daily.  cholecalciferol, vitamin d3, (VITAMIN D) 1,000 unit tablet Take  by mouth daily.  escitalopram oxalate (LEXAPRO) 20 mg tablet Take 0.5 Tablets by mouth daily.  (Patient not taking: Reported on 12/27/2021) 90 Tablet 3     Allergies   Allergen Reactions    Latex Unknown (comments)    Dog Dander Shortness of Breath      Lab Results   Component Value Date/Time    WBC 6.3 09/13/2021 03:14 PM    HGB 12.6 09/13/2021 03:14 PM    HCT 41.3 09/13/2021 03:14 PM    Hematocrit (POC) 37 02/16/2021 11:46 AM    PLATELET 069 32/76/0461 03:14 PM    .9 (H) 09/13/2021 03:14 PM     Lab Results   Component Value Date/Time    GFR est non-AA 65 03/08/2021 11:57 AM    GFRNA, POC >60 08/17/2021 11:09 AM    GFR est AA 74 03/08/2021 11:57 AM GFRAA, POC >60 08/17/2021 11:09 AM    Creatinine 0.81 03/08/2021 11:57 AM    Creatinine (POC) 0.64 08/17/2021 11:09 AM    BUN 13 03/08/2021 11:57 AM    BUN (POC) 10 02/16/2021 11:46 AM    Sodium 143 03/08/2021 11:57 AM    Sodium (POC) 141 08/17/2021 11:09 AM    Potassium 4.6 03/08/2021 11:57 AM    Potassium (POC) 3.9 08/17/2021 11:09 AM    Chloride 104 03/08/2021 11:57 AM    Chloride (POC) 103 08/17/2021 11:09 AM    CO2 25 03/08/2021 11:57 AM    Magnesium 1.8 08/17/2021 11:07 AM    Phosphorus 3.1 08/17/2021 11:07 AM        ROS    Physical Exam    ASSESSMENT and PLAN  Diagnoses and all orders for this visit:    1. Sciatica of right side   Medrol gray   To call if not improved, in that care lumbar MRI  can be ordered for spinal stenosis and acute  sciatica    Other orders  -     methylPREDNISolone (MEDROL DOSEPACK) 4 mg tablet;  As direceted

## 2021-12-30 ENCOUNTER — TELEPHONE (OUTPATIENT)
Dept: INTERNAL MEDICINE CLINIC | Age: 86
End: 2021-12-30

## 2021-12-30 DIAGNOSIS — M54.32 BILATERAL SCIATICA: Primary | ICD-10-CM

## 2021-12-30 DIAGNOSIS — M54.31 BILATERAL SCIATICA: Primary | ICD-10-CM

## 2021-12-30 NOTE — TELEPHONE ENCOUNTER
Patient's  call concerning patient's back pain. Patient 's pain is worse per . Hansa Kamara. Requesting X-ray since it is and old injury to her back.

## 2021-12-30 NOTE — TELEPHONE ENCOUNTER
Mr. Silvia Davis has been notified concerning Mrs. Sandhu MRI request. Dr. Miguel Valencia place an order. Scheduling will notify them with an appointment. I advised to give them to Noon to call with appointment. He is to call them   with the number given for scheduling an appointment if they failed to call him before noon today.

## 2022-01-03 ENCOUNTER — HOSPITAL ENCOUNTER (OUTPATIENT)
Dept: MRI IMAGING | Age: 87
Discharge: HOME | End: 2022-01-03
Attending: INTERNAL MEDICINE
Payer: MEDICARE

## 2022-01-03 DIAGNOSIS — M54.31 RIGHT SIDED SCIATICA: Primary | ICD-10-CM

## 2022-01-03 DIAGNOSIS — M54.32 BILATERAL SCIATICA: ICD-10-CM

## 2022-01-03 DIAGNOSIS — M54.31 BILATERAL SCIATICA: ICD-10-CM

## 2022-01-03 PROCEDURE — 72148 MRI LUMBAR SPINE W/O DYE: CPT

## 2022-01-04 NOTE — PROGRESS NOTES
Discussed MRI results--refer to Neurosurgery.  Pts  reports some improvement in the right leg sciatica symtpoms

## 2022-02-07 RX ORDER — ESCITALOPRAM OXALATE 20 MG/1
10 TABLET ORAL DAILY
Qty: 90 TABLET | Refills: 3 | Status: SHIPPED | OUTPATIENT
Start: 2022-02-07

## 2022-02-07 NOTE — TELEPHONE ENCOUNTER
Future Appointments:  Future Appointments   Date Time Provider Fred Leonei   2/14/2022 11:30 AM MAGNOLIA LEBLANC 2 [address] REG   3/14/2022  9:45 AM Stacey Berrios MD [facility] BS AMB        Last Appointment With Me:  12/27/2021     Requested Prescriptions     Pending Prescriptions Disp Refills    escitalopram oxalate (LEXAPRO) 20 mg tablet 90 Tablet 3     Sig: Take 0.5 Tablets by mouth daily.

## 2022-02-14 ENCOUNTER — HOSPITAL ENCOUNTER (OUTPATIENT)
Dept: INFUSION THERAPY | Age: 87
Discharge: HOME | End: 2022-02-14
Payer: MEDICARE

## 2022-02-14 VITALS
SYSTOLIC BLOOD PRESSURE: 140 MMHG | TEMPERATURE: 98.2 F | RESPIRATION RATE: 18 BRPM | HEIGHT: 60 IN | BODY MASS INDEX: 22.58 KG/M2 | DIASTOLIC BLOOD PRESSURE: 78 MMHG | HEART RATE: 70 BPM | OXYGEN SATURATION: 99 % | WEIGHT: 115 LBS

## 2022-02-14 LAB
ANION GAP BLD CALC-SCNC: 12 MMOL/L (ref 10–20)
CA-I BLD-MCNC: 1.18 MMOL/L (ref 1.12–1.32)
CHLORIDE BLD-SCNC: 103 MMOL/L (ref 98–107)
CO2 BLD-SCNC: 28.2 MMOL/L (ref 21–32)
CREAT BLD-MCNC: 0.6 MG/DL (ref 0.6–1.3)
GLUCOSE BLD-MCNC: 77 MG/DL (ref 65–100)
MAGNESIUM SERPL-MCNC: 2 MG/DL (ref 1.6–2.4)
PHOSPHATE SERPL-MCNC: 3.8 MG/DL (ref 2.6–4.7)
POTASSIUM BLD-SCNC: 4.1 MMOL/L (ref 3.5–5.1)
SERVICE CMNT-IMP: NORMAL
SODIUM BLD-SCNC: 142 MMOL/L (ref 136–145)

## 2022-02-14 PROCEDURE — 74011250636 HC RX REV CODE- 250/636: Performed by: INTERNAL MEDICINE

## 2022-02-14 PROCEDURE — 83735 ASSAY OF MAGNESIUM: CPT

## 2022-02-14 PROCEDURE — 36415 COLL VENOUS BLD VENIPUNCTURE: CPT

## 2022-02-14 PROCEDURE — 84100 ASSAY OF PHOSPHORUS: CPT

## 2022-02-14 PROCEDURE — 96372 THER/PROPH/DIAG INJ SC/IM: CPT

## 2022-02-14 PROCEDURE — 80047 BASIC METABLC PNL IONIZED CA: CPT

## 2022-02-14 RX ADMIN — DENOSUMAB 60 MG: 60 INJECTION SUBCUTANEOUS at 11:38

## 2022-02-14 NOTE — PROGRESS NOTES
[facility] Visit Note:  Arrived - 1120    Patient denied having any symptoms of COVID-19, i.e. SOB, coughing, fever, or generally not feeling well. Also denies having been exposed to COVID-19 recently or having had any recent contact with family/friend that has a pending COVID test.    Patient Vitals for the past 12 hrs:   Temp Pulse Resp BP SpO2   02/14/22 1122 98.2 °F (36.8 °C) 70 18 (!) 140/78 99 %     Recent Results (from the past 12 hour(s))   PHOSPHORUS    Collection Time: 02/14/22 11:26 AM   Result Value Ref Range    Phosphorus 3.8 2.6 - 4.7 MG/DL   POC CHEM8    Collection Time: 02/14/22 11:33 AM   Result Value Ref Range    Calcium, ionized (POC) 1.18 1.12 - 1.32 mmol/L    Sodium (POC) 142 136 - 145 mmol/L    Potassium (POC) 4.1 3.5 - 5.1 mmol/L    Chloride (POC) 103 98 - 107 mmol/L    CO2 (POC) 28.2 21 - 32 mmol/L    Anion gap (POC) 12 10 - 20 mmol/L    Glucose (POC) 77 65 - 100 mg/dL    Creatinine (POC) 0.60 0.6 - 1.3 mg/dL    GFRAA, POC >60 >60 ml/min/1.73m2    GFRNA, POC >60 >60 ml/min/1.73m2    Comment Comment Not Indicated. Assessment - unchanged. Reviewed Prolia info. Pt denies any recent or upcoming dental work. Prolia 60mg SQ slowly in left arm. 1140- Tolerated well. No reaction noted. Reviewed Prolia D/C instructions. Verbalized understanding. Pt denies any acute problems/changes. Discharged from [facility] ambulatory. No distress.  Next appt: 8/15/2022

## 2022-02-28 ENCOUNTER — TELEPHONE (OUTPATIENT)
Dept: INTERNAL MEDICINE CLINIC | Age: 87
End: 2022-02-28

## 2022-02-28 DIAGNOSIS — H81.10 BENIGN PAROXYSMAL POSITIONAL VERTIGO, UNSPECIFIED LATERALITY: Primary | ICD-10-CM

## 2022-02-28 NOTE — TELEPHONE ENCOUNTER
----- Message from Carrie Lui sent at 2/28/2022  8:52 AM EST -----  Subject: Referral Request    QUESTIONS   Reason for referral request? Needs to see ENT, she was having vertigo   dizziness. She was prescribed medication and it cleared up but now they   want to see ENT   Has the physician seen you for this condition before? No   Preferred Specialist (if applicable)? Do you already have an appointment scheduled? Additional Information for Provider?   ---------------------------------------------------------------------------  --------------  CALL BACK INFO  What is the best way for the office to contact you? OK to leave message on   voicemail  Preferred Call Back Phone Number?  [phone]

## 2022-03-01 ENCOUNTER — OFFICE VISIT (OUTPATIENT)
Dept: INTERNAL MEDICINE CLINIC | Age: 87
End: 2022-03-01
Payer: MEDICARE

## 2022-03-01 VITALS
HEIGHT: 60 IN | HEART RATE: 68 BPM | WEIGHT: 115.2 LBS | OXYGEN SATURATION: 98 % | RESPIRATION RATE: 16 BRPM | DIASTOLIC BLOOD PRESSURE: 80 MMHG | BODY MASS INDEX: 22.62 KG/M2 | TEMPERATURE: 98.2 F | SYSTOLIC BLOOD PRESSURE: 120 MMHG

## 2022-03-01 DIAGNOSIS — F02.818 LATE ONSET ALZHEIMER'S DISEASE WITH BEHAVIORAL DISTURBANCE (HCC): ICD-10-CM

## 2022-03-01 DIAGNOSIS — R56.9 SYNCOPAL SEIZURE (HCC): ICD-10-CM

## 2022-03-01 DIAGNOSIS — G30.1 LATE ONSET ALZHEIMER'S DISEASE WITH BEHAVIORAL DISTURBANCE (HCC): ICD-10-CM

## 2022-03-01 DIAGNOSIS — R55 SYNCOPAL SEIZURE (HCC): ICD-10-CM

## 2022-03-01 DIAGNOSIS — H81.10 BENIGN PAROXYSMAL POSITIONAL VERTIGO, UNSPECIFIED LATERALITY: Primary | ICD-10-CM

## 2022-03-01 PROCEDURE — G8432 DEP SCR NOT DOC, RNG: HCPCS | Performed by: INTERNAL MEDICINE

## 2022-03-01 PROCEDURE — G8427 DOCREV CUR MEDS BY ELIG CLIN: HCPCS | Performed by: INTERNAL MEDICINE

## 2022-03-01 PROCEDURE — 99213 OFFICE O/P EST LOW 20 MIN: CPT | Performed by: INTERNAL MEDICINE

## 2022-03-01 PROCEDURE — 1090F PRES/ABSN URINE INCON ASSESS: CPT | Performed by: INTERNAL MEDICINE

## 2022-03-01 PROCEDURE — 1101F PT FALLS ASSESS-DOCD LE1/YR: CPT | Performed by: INTERNAL MEDICINE

## 2022-03-01 PROCEDURE — G8536 NO DOC ELDER MAL SCRN: HCPCS | Performed by: INTERNAL MEDICINE

## 2022-03-01 PROCEDURE — G8420 CALC BMI NORM PARAMETERS: HCPCS | Performed by: INTERNAL MEDICINE

## 2022-03-01 PROCEDURE — G0463 HOSPITAL OUTPT CLINIC VISIT: HCPCS | Performed by: INTERNAL MEDICINE

## 2022-03-01 NOTE — PROGRESS NOTES
HISTORY OF PRESENT ILLNESS  Ricardo Smyth is a 80 y.o. female. HPI      Hx ADHD, vascular dementia, osteoporosis s/p L3 kypholasty,HLD OAB SHRUTHI mild asthma/copd -------------here with   C/o dizziness last several days  Worse when standing  Has spinning sensation and some nausea  Evaluated by dispatch [facility] and given zofran and meclizine-overall improvement  No weakness , slurred speech amuarsosis etc , no change in gait    Last OV     saw Dr Hailey Pradhan for vasovagal syncope and vascular dementia on Namzaric-no recrrrent syncope  Memory loss--slightly worse short term memory losss and sometimes agitation in evenings  On vyvanse for ADHD   On prolia for osteoporosis-due for dexa in a few months--  Forgets to take calcium pills  No falls  On lexapro for  Anxiety but still gets anxious and agitated in PM    Patient Active Problem List    Diagnosis Date Noted    Vascular dementia without behavioral disturbance ([facility] 09/08/2020    Bilateral carotid artery stenosis 08/17/2020    Syncopal seizure ([facility] [facility] 08/16/2020    Compression fracture of L3 vertebra ([facility] [address] 04/10/2020    Alzheimer's disease ([facility] 09/10/2018    ADD (attention deficit disorder) 01/18/2017    Hyperlipidemia 07/20/2015    Mild cognitive impairment without memory loss 02/02/2015    Generalized anxiety disorder 02/02/2015    TMJ (temporomandibular joint disorder) 12/21/2011    Fever, unspecified 08/10/2011    Syncope and collapse 08/10/2011    History of skin cancer     Asthma     Hearing loss, left     Ceruminosis     Postmenopausal hormone replacement therapy     Overactive bladder      Current Outpatient Medications   Medication Sig Dispense Refill    escitalopram oxalate (LEXAPRO) 20 mg tablet Take 0.5 Tablets by mouth daily. 90 Tablet 3    lisdexamfetamine (Vyvanse) 20 mg capsule Take 1 Capsule by mouth daily.  Max Daily Amount: 20 mg. 90 Capsule 0    atorvastatin (LIPITOR) 10 mg tablet TAKE 1 TABLET DAILY 90 Tablet 3    Namzaric 28-10 mg CSpX TAKE 1 CAPSULE DAILY 90 Cap 3    montelukast (SINGULAIR) 10 mg tablet Take 10 mg by mouth daily.  cholecalciferol, vitamin d3, (VITAMIN D) 1,000 unit tablet Take  by mouth daily.  methylPREDNISolone (MEDROL DOSEPACK) 4 mg tablet As direceted 1 Dose Pack 0    escitalopram oxalate (LEXAPRO) 10 mg tablet TAKE 1 TABLET DAILY 90 Tablet 3    albuterol (PROAIR HFA) 90 mcg/actuation inhaler Take 1 Puff by inhalation every four (4) hours as needed for Wheezing or Shortness of Breath. Indications: Acute Asthma Attack 3 Inhaler 3     Allergies   Allergen Reactions    Latex Unknown (comments)    Dog Dander Shortness of Breath      Lab Results   Component Value Date/Time    WBC 6.3 09/13/2021 03:14 PM    HGB 12.6 09/13/2021 03:14 PM    HCT 41.3 09/13/2021 03:14 PM    Hematocrit (POC) 37 02/16/2021 11:46 AM    PLATELET 947 47/34/2184 03:14 PM    .9 (H) 09/13/2021 03:14 PM     Lab Results   Component Value Date/Time    GFR est non-AA 65 03/08/2021 11:57 AM    GFRNA, POC >60 02/14/2022 11:33 AM    GFR est AA 74 03/08/2021 11:57 AM    GFRAA, POC >60 02/14/2022 11:33 AM    Creatinine 0.81 03/08/2021 11:57 AM    Creatinine (POC) 0.60 02/14/2022 11:33 AM    BUN 13 03/08/2021 11:57 AM    BUN (POC) 10 02/16/2021 11:46 AM    Sodium 143 03/08/2021 11:57 AM    Sodium (POC) 142 02/14/2022 11:33 AM    Potassium 4.6 03/08/2021 11:57 AM    Potassium (POC) 4.1 02/14/2022 11:33 AM    Chloride 104 03/08/2021 11:57 AM    Chloride (POC) 103 02/14/2022 11:33 AM    CO2 25 03/08/2021 11:57 AM    Magnesium 2.0 02/14/2022 11:26 AM    Phosphorus 3.8 02/14/2022 11:26 AM        ROS    Physical Exam  Vitals and nursing note reviewed. Constitutional:       Appearance: She is well-developed.       Comments: Appears stated age, frail elderly   HENT:      Right Ear: Tympanic membrane normal.      Left Ear: Tympanic membrane normal.   Eyes:      Comments: No nystagmus   Cardiovascular:      Rate and Rhythm: Normal rate and regular rhythm. Heart sounds: Normal heart sounds. No murmur heard. No friction rub. No gallop. Pulmonary:      Effort: Pulmonary effort is normal. No respiratory distress. Breath sounds: Normal breath sounds. No wheezing. Abdominal:      General: Bowel sounds are normal.      Palpations: Abdomen is soft. Neurological:      General: No focal deficit present. Mental Status: She is alert and oriented to person, place, and time. Cranial Nerves: No cranial nerve deficit. Motor: No weakness. Gait: Gait normal.         ASSESSMENT and PLAN  Diagnoses and all orders for this visit:    1. Benign paroxysmal positional vertigo, unspecified laterality  -     REFERRAL TO PHYSICAL THERAPY   Continue meclizine tid and zofran prn  2. Late onset Alzheimer's disease with behavioral disturbance ([address]    3.  Syncopal seizure ([address]

## 2022-03-01 NOTE — PATIENT INSTRUCTIONS
Office Policies    Phone calls/patient messages:            Please allow up to 24 hours for someone in the office to contact you about your call or message. Be mindful your provider may be out of the office or your message may require further review. We encourage you to use imagine for your messages as this is a faster, more efficient way to communicate with our office                         Medication Refills:            Prescription medications require 48-72 business hours to process. We encourage you to use imagine for your refills. For controlled medications: Please allow 72 business hours to process. Certain medications may require you to  a written prescription at our office. NO narcotic/controlled medications will be prescribed after 4pm Monday through Friday or on weekends              Form/Paperwork Completion:            Please note a $25 fee may incur for all paperwork for completed by our providers. We ask that you allow 7-10 business days. Pre-payment is due prior to picking up/faxing the completed form. You may also download your forms to imagine to have your doctor print off.

## 2022-03-01 NOTE — TELEPHONE ENCOUNTER
Called patient. ID verified with Name and . Spoke with  in regards to patient.  states that patient has an appt scheduled with provider today and will obtain referral information at that time. Writer confirmed understanding.  had no further questions or concerns at this time.

## 2022-03-01 NOTE — PROGRESS NOTES
1. \"Have you been to the ER, urgent care clinic since your last visit? Hospitalized since your last visit? \"  [facility]  2. \"Have you seen or consulted any other health care providers outside of the [address] [place] [address] since your last visit? \"  No     3. For patients aged 39-70: Has the patient had a colonoscopy / FIT/ Cologuard? No , age 80      If the patient is female:    3. For patients aged 41-77: Has the patient had a mammogram within the past 2 years? No , Age 80      5. For patients aged 21-65: Has the patient had a pap smear?  No , age 80

## 2022-03-15 ENCOUNTER — HOSPITAL ENCOUNTER (OUTPATIENT)
Dept: PHYSICAL THERAPY | Age: 87
Discharge: HOME | End: 2022-03-15
Payer: MEDICARE

## 2022-03-15 DIAGNOSIS — H81.10 BENIGN PAROXYSMAL POSITIONAL VERTIGO, UNSPECIFIED LATERALITY: ICD-10-CM

## 2022-03-15 PROCEDURE — 97162 PT EVAL MOD COMPLEX 30 MIN: CPT | Performed by: PHYSICAL THERAPIST

## 2022-03-15 PROCEDURE — 97112 NEUROMUSCULAR REEDUCATION: CPT | Performed by: PHYSICAL THERAPIST

## 2022-03-15 PROCEDURE — 97140 MANUAL THERAPY 1/> REGIONS: CPT | Performed by: PHYSICAL THERAPIST

## 2022-03-15 NOTE — PROGRESS NOTES
[facility] Physical Therapy  [address] [place]  Phone: [phone] Fax: [phone]     Plan of Care/Statement of Necessity for Physical Therapy Services  2-15    Patient name: Ivana Gomez  : 1932  Provider#: [id number]  Referral source: Stacey Berrios MD      Medical/Treatment Diagnosis: Benign paroxysmal positional vertigo, unspecified laterality [H81.10]     Prior Hospitalization: see medical history     Comorbidities: anxiety, arhtritis, cancer, DM II, hearing impairment  Prior Level of Function: 20 min of exercise seldom or never  Medications: Verified on Patient Summary List  Start of Care: 3/15/22      Onset Date: 3/1/22   The Plan of Care and following information is based on the information from the initial evaluation. Assessment/ key information: The patient is a 80 y.o. female that presents with a chief complaint of signs and symptoms that are consistent with vestibular hypofunction. She did not have a positive [place]-Hallpike test for BPPV today, but she did present with a poor vestibular input (and poor proprioception) as seen from an MCTSIB test. Her limited cervical mobility did make it difficult to perform a proper Beckemeyer-Hallpike test, and she did have dizziness symptoms, but only when sitting up from lying down during the treatment maneuvers. It is uncertain if she is having underlying orthostatic hypotension in addition to her vestibular dysfunction. She will benefit from guided therapeutic intervention with a few follow up treatments to decrease their symptoms with self managed Epley's maneuvers.     Evaluation Complexity History MEDIUM  Complexity : 1-2 comorbidities / personal factors will impact the outcome/ POC ; Examination MEDIUM Complexity : 3 Standardized tests and measures addressing body structure, function, activity limitation and / or participation in recreation  ;Presentation MEDIUM Complexity : Evolving with changing characteristics  ; Clinical Decision Making MEDIUM Complexity : FOTO score of 26-74  Overall Complexity Rating: MEDIUM    Problem List: decrease ROM, decrease strength, impaired gait/ balance, decrease ADL/ functional abilitiies, decrease activity tolerance, decrease flexibility/ joint mobility and decrease transfer abilities   Treatment Plan may include any combination of the following: Therapeutic exercise, Therapeutic activities, Neuromuscular re-education, Physical agent/modality, Gait/balance training, Manual therapy, Patient education, Self Care training, Functional mobility training, Home safety training and Stair training  Patient / Family readiness to learn indicated by: asking questions, trying to perform skills and interest  Persons(s) to be included in education: patient (P) and family support person (FSP);list   Barriers to Learning/Limitations: None  Patient Goal (s): know how to react in the future  Patient Self Reported Health Status: good  Rehabilitation Potential: good    Short Term Goals: To be accomplished in 2 treatments:                         1.) The patient will be independent with their HEP consistently for at least one week. Long Term Goals: To be accomplished in 8 treatments:                         1.) The patient will report having at most one significant instance of symptoms with daily activities for one week. 2.) The patient will be able to perform at least one Epley's maneuver with minimal to no verbal cues in order to self manage and treat their symptoms. 3.) The patient will improve their MCTSIB score from 53/120 to at least 55/120 to show improvements in vestibular input. Frequency / Duration: Patient to be seen 1-2 times per week for 8 treatments.     Patient/ Caregiver education and instruction: self care, activity modification and exercises    [x]  Plan of care has been reviewed with PTA        Certification Period: 3/15/22-6/13/22  Char Guajardo, PT 3/15/2022     ________________________________________________________________________    I certify that the above Therapy Services are being furnished while the patient is under my care. I agree with the treatment plan and certify that this therapy is necessary.     [de-identified] Signature:____________________  Date:____________Time: _________         Rox Bess MD

## 2022-03-15 NOTE — PROGRESS NOTES
PT INITIAL EVALUATION NOTE - [facility] 2-15    Patient Name: Hernandez Herrera  Date:3/15/2022  : 1932  [x]  Patient  Verified  Payor: Siria Grew / Plan: [place] & [place] / Product Type: Medicare /    In time: 1:05pm  Out time: 2:00pm  Total Treatment Time (min): 55  Total Timed Codes (min): 25  1:1 Treatment Time ( only): 25   Visit #: 1     Treatment Area: Benign paroxysmal positional vertigo, unspecified laterality [H81.10]    SUBJECTIVE  Pain Level (0-10 scale): 0 (0-6/10)  Any medication changes, allergies to medications, adverse drug reactions, diagnosis change, or new procedure performed?: [] No    [x] Yes (see summary sheet for update)  Subjective: The patient's  reports that she woke up on 3/1/22 and had dizziness. They tried some Epley's maneuver but was unsure about it. She is now unsure of herself and is afraid to walk. She will feel lopsided. She will frequently hold onto the right wall when walking.       OBJECTIVE/EXAMINATION  Observations:  Posture: scapular protraction bilaterall/forward head  Gait: shuffling, decreased clearance bilaterally; fear of falling behavior/holding onto   Functional Mobility: CGA/David for supine to sit; significant uneasiness/dizziness when going from sidelying to sitting at EOB  Palpation: nt  Cervical AROM:  Flexion WFL  Extension Slightly limited  Side Bend R nt  L nt  Rotation R 45  L 45  Strength:  UE: Grossly nt  LE: Grossly WFL except right DF= 3+/5  Vision:   Spontaneous Nystagmus: nt   Smooth Pursuit (H pattern): neg   Convergence: nt   Saccades (shift eyes bw 2 targets): neg    Gaze stabilization (hold eyes on stable target while moving head): + for difficulty  Cerebellar Function:    Finger to nose: neg   Pointing/ past pointing: neg   Rapid forearm supination/pronation:neg   VOR Cancellation: nt  Vertebral Artery Test (modified): nt  BPPV:  [place] Hallpike: neg bilaterally    Balance Tests:  SLS: R: 1s: L=2s    Mod CTSIB: (53/120) Non-compliant surface      EO 30 seconds     EC 7s seconds         Compliant Surface     EO 14 seconds     EC 2 seconds    Mobility Assessment: hypomobile cervical and thoracic spine      10 min Neuromuscular Re-education:  [x]  See flow sheet :   Rationale: improve coordination, improve balance and increase proprioception  to improve the patients ability to perform daily activities. 15 min Manual Therapy: [place]-Hallpike; Epley's maneuver    Rationale: correct positional vertigo to improve the patients ability to perform daily activities.           With   [x] TE   [] TA   [] Neuro   [] SC   [] other: Patient Education: [x] Review HEP    [x] Progressed/Changed HEP based on:   [x] positioning   [x] body mechanics   [] transfers   [] heat/ice application    [] other:      Other Objective/Functional Measures: FOTO Functional Measure: 55/100                         Pain Level (0-10 scale) post treatment: 0    ASSESSMENT/Changes in Function:     [x]  See Plan of [facility], PT 3/15/2022

## 2022-03-16 ENCOUNTER — OFFICE VISIT (OUTPATIENT)
Dept: INTERNAL MEDICINE CLINIC | Age: 87
End: 2022-03-16
Payer: MEDICARE

## 2022-03-16 VITALS
OXYGEN SATURATION: 98 % | HEART RATE: 80 BPM | RESPIRATION RATE: 16 BRPM | BODY MASS INDEX: 22.98 KG/M2 | DIASTOLIC BLOOD PRESSURE: 60 MMHG | HEIGHT: 59 IN | TEMPERATURE: 97 F | WEIGHT: 114 LBS | SYSTOLIC BLOOD PRESSURE: 100 MMHG

## 2022-03-16 DIAGNOSIS — F90.9 ATTENTION DEFICIT HYPERACTIVITY DISORDER (ADHD), UNSPECIFIED ADHD TYPE: ICD-10-CM

## 2022-03-16 DIAGNOSIS — F41.9 ANXIETY: ICD-10-CM

## 2022-03-16 DIAGNOSIS — F03.90 DEMENTIA WITHOUT BEHAVIORAL DISTURBANCE, UNSPECIFIED DEMENTIA TYPE: ICD-10-CM

## 2022-03-16 DIAGNOSIS — E78.00 PURE HYPERCHOLESTEROLEMIA: ICD-10-CM

## 2022-03-16 DIAGNOSIS — M81.0 OSTEOPOROSIS, UNSPECIFIED OSTEOPOROSIS TYPE, UNSPECIFIED PATHOLOGICAL FRACTURE PRESENCE: ICD-10-CM

## 2022-03-16 DIAGNOSIS — Z78.0 MENOPAUSE: Primary | ICD-10-CM

## 2022-03-16 DIAGNOSIS — Z00.00 MEDICARE ANNUAL WELLNESS VISIT, SUBSEQUENT: ICD-10-CM

## 2022-03-16 LAB
ALBUMIN SERPL-MCNC: 3.7 G/DL (ref 3.5–5)
ALBUMIN/GLOB SERPL: 1.1 {RATIO} (ref 1.1–2.2)
ALP SERPL-CCNC: 57 U/L (ref 45–117)
ALT SERPL-CCNC: 24 U/L (ref 12–78)
ANION GAP SERPL CALC-SCNC: 2 MMOL/L (ref 5–15)
AST SERPL-CCNC: 16 U/L (ref 15–37)
BILIRUB SERPL-MCNC: 0.4 MG/DL (ref 0.2–1)
BUN SERPL-MCNC: 15 MG/DL (ref 6–20)
BUN/CREAT SERPL: 18 (ref 12–20)
CALCIUM SERPL-MCNC: 9.9 MG/DL (ref 8.5–10.1)
CHLORIDE SERPL-SCNC: 104 MMOL/L (ref 97–108)
CHOLEST SERPL-MCNC: 234 MG/DL
CO2 SERPL-SCNC: 32 MMOL/L (ref 21–32)
CREAT SERPL-MCNC: 0.84 MG/DL (ref 0.55–1.02)
GLOBULIN SER CALC-MCNC: 3.3 G/DL (ref 2–4)
GLUCOSE SERPL-MCNC: 85 MG/DL (ref 65–100)
HDLC SERPL-MCNC: 87 MG/DL
HDLC SERPL: 2.7 {RATIO} (ref 0–5)
LDLC SERPL CALC-MCNC: 126.8 MG/DL (ref 0–100)
POTASSIUM SERPL-SCNC: 4.6 MMOL/L (ref 3.5–5.1)
PROT SERPL-MCNC: 7 G/DL (ref 6.4–8.2)
SODIUM SERPL-SCNC: 138 MMOL/L (ref 136–145)
TRIGL SERPL-MCNC: 101 MG/DL (ref ?–150)
TSH SERPL DL<=0.05 MIU/L-ACNC: 3.22 UIU/ML (ref 0.36–3.74)
VLDLC SERPL CALC-MCNC: 20.2 MG/DL

## 2022-03-16 PROCEDURE — G8536 NO DOC ELDER MAL SCRN: HCPCS | Performed by: INTERNAL MEDICINE

## 2022-03-16 PROCEDURE — G0439 PPPS, SUBSEQ VISIT: HCPCS | Performed by: INTERNAL MEDICINE

## 2022-03-16 PROCEDURE — G8427 DOCREV CUR MEDS BY ELIG CLIN: HCPCS | Performed by: INTERNAL MEDICINE

## 2022-03-16 PROCEDURE — 1090F PRES/ABSN URINE INCON ASSESS: CPT | Performed by: INTERNAL MEDICINE

## 2022-03-16 PROCEDURE — G8510 SCR DEP NEG, NO PLAN REQD: HCPCS | Performed by: INTERNAL MEDICINE

## 2022-03-16 PROCEDURE — 1101F PT FALLS ASSESS-DOCD LE1/YR: CPT | Performed by: INTERNAL MEDICINE

## 2022-03-16 PROCEDURE — G8420 CALC BMI NORM PARAMETERS: HCPCS | Performed by: INTERNAL MEDICINE

## 2022-03-16 PROCEDURE — G0463 HOSPITAL OUTPT CLINIC VISIT: HCPCS | Performed by: INTERNAL MEDICINE

## 2022-03-16 PROCEDURE — 99213 OFFICE O/P EST LOW 20 MIN: CPT | Performed by: INTERNAL MEDICINE

## 2022-03-16 RX ORDER — VITAMIN E 268 MG
CAPSULE ORAL DAILY
COMMUNITY

## 2022-03-16 NOTE — PROGRESS NOTES
1. \"Have you been to the ER, urgent care clinic since your last visit? Hospitalized since your last visit? \" No    2. \"Have you seen or consulted any other health care providers outside of the [address] [place] [address] since your last visit? \" No    3. For patients aged 39-70: Has the patient had a colonoscopy / FIT/ Cologuard? No, Age 80      If the patient is female:    4. For patients aged 41-77: Has the patient had a mammogram within the past 2 years? No, Age 80      5. For patients aged 21-65: Has the patient had a pap smear?  No , age 80

## 2022-03-16 NOTE — PATIENT INSTRUCTIONS
Office Policies    Phone calls/patient messages:            Please allow up to 24 hours for someone in the office to contact you about your call or message. Be mindful your provider may be out of the office or your message may require further review. We encourage you to use [employer] for your messages as this is a faster, more efficient way to communicate with our office                         Medication Refills:            Prescription medications require 48-72 business hours to process. We encourage you to use [employer] for your refills. For controlled medications: Please allow 72 business hours to process. Certain medications may require you to  a written prescription at our office. NO narcotic/controlled medications will be prescribed after 4pm Monday through Friday or on weekends              Form/Paperwork Completion:            Please note a $25 fee may incur for all paperwork for completed by our providers. We ask that you allow 7-10 business days. Pre-payment is due prior to picking up/faxing the completed form. You may also download your forms to [employer] to have your doctor print off. Medicare Wellness Visit, Female     The best way to live healthy is to have a lifestyle where you eat a well-balanced diet, exercise regularly, limit alcohol use, and quit all forms of tobacco/nicotine, if applicable. Regular preventive services are another way to keep healthy. Preventive services (vaccines, screening tests, monitoring & exams) can help personalize your care plan, which helps you manage your own care. Screening tests can find health problems at the earliest stages, when they are easiest to treat. [facility] follows the current, evidence-based guidelines published by the [facility] [employer] (USPSTF) when recommending preventive services for our patients.  Because we follow these guidelines, sometimes recommendations change over time as research supports it. (For example, mammograms used to be recommended annually. Even though Medicare will still pay for an annual mammogram, the newer guidelines recommend a mammogram every two years for women of average risk). Of course, you and your doctor may decide to screen more often for some diseases, based on your risk and your co-morbidities (chronic disease you are already diagnosed with). Preventive services for you include:  - Medicare offers their members a free annual wellness visit, which is time for you and your primary care provider to discuss and plan for your preventive service needs. Take advantage of this benefit every year!  -All adults over the age of 72 should receive the recommended pneumonia vaccines. Current USPSTF guidelines recommend a series of two vaccines for the best pneumonia protection.   -All adults should have a flu vaccine yearly and a tetanus vaccine every 10 years.   -All adults age 48 and older should receive the shingles vaccines (series of two vaccines). -All adults age 38-68 who are overweight should have a diabetes screening test once every three years.   -All adults born between [birth year] and 1965 should be screened once for Hepatitis C.  -Other screening tests and preventive services for persons with diabetes include: an eye exam to screen for diabetic retinopathy, a kidney function test, a foot exam, and stricter control over your cholesterol.   -Cardiovascular screening for adults with routine risk involves an electrocardiogram (ECG) at intervals determined by your doctor.   -Colorectal cancer screenings should be done for adults age 54-65 with no increased risk factors for colorectal cancer. There are a number of acceptable methods of screening for this type of cancer. Each test has its own benefits and drawbacks. Discuss with your doctor what is most appropriate for you during your annual wellness visit.  The different tests include: colonoscopy (considered the best screening method), a fecal occult blood test, a fecal DNA test, and sigmoidoscopy.    -A bone mass density test is recommended when a woman turns 65 to screen for osteoporosis. This test is only recommended one time, as a screening. Some providers will use this same test as a disease monitoring tool if you already have osteoporosis. -Breast cancer screenings are recommended every other year for women of normal risk, age 54-69.  -Cervical cancer screenings for women over age 72 are only recommended with certain risk factors.      Here is a list of your current Health Maintenance items (your personalized list of preventive services) with a due date:  Health Maintenance Due   Topic Date Due    Depresssion Screening  03/08/2022    Cholesterol Test   03/08/2022    Annual Well Visit  03/09/2022

## 2022-03-16 NOTE — PROGRESS NOTES
HISTORY OF PRESENT ILLNESS  Delbert Hwang is a 80 y.o. female. HPI   Hx ADHD, vascular dementia, osteoporosis s/p L3 kypholasty,HLD OAB SHRUTHI mild asthma/copd -------------here with   Recent OV for BPPV--went to PT x1--some better. Has a few more visits and HEP  On medication for anxiety and ADHD  Last refill vyvanse 9/13/21-- -not taking thevmedication now---no hyperactivity per . Neuro MD questioned of pt has ADHD  Memory loss worse--foregets after 5 minutes per   Due for dexa on prolia-no falls  Last OV       C/o dizziness last several days  Worse when standing  Has spinning sensation and some nausea  Evaluated by dispatch health and given zofran and meclizine-overall improvement  No weakness , slurred speech amuarsosis etc , no change in gait         Patient Active Problem List    Diagnosis Date Noted    Vascular dementia without behavioral disturbance ([facility] 09/08/2020    Bilateral carotid artery stenosis 08/17/2020    Syncopal seizure ([facility] [address] 08/16/2020    Compression fracture of L3 vertebra ([facility] 04/10/2020    Alzheimer's disease ([facility] [facility] 09/10/2018    ADD (attention deficit disorder) 01/18/2017    Hyperlipidemia 07/20/2015    Mild cognitive impairment without memory loss 02/02/2015    Generalized anxiety disorder 02/02/2015    TMJ (temporomandibular joint disorder) 12/21/2011    Fever, unspecified 08/10/2011    Syncope and collapse 08/10/2011    History of skin cancer     Asthma     Hearing loss, left     Ceruminosis     Postmenopausal hormone replacement therapy     Overactive bladder      Current Outpatient Medications   Medication Sig Dispense Refill    vitamin E (AQUA GEMS) 400 unit capsule Take  by mouth daily.  escitalopram oxalate (LEXAPRO) 20 mg tablet Take 0.5 Tablets by mouth daily. 90 Tablet 3    lisdexamfetamine (Vyvanse) 20 mg capsule Take 1 Capsule by mouth daily. Max Daily Amount: 20 mg.  (Patient taking differently: Take 20 mg by mouth every other day.) 90 Capsule 0    atorvastatin (LIPITOR) 10 mg tablet TAKE 1 TABLET DAILY 90 Tablet 3    Namzaric 28-10 mg CSpX TAKE 1 CAPSULE DAILY 90 Cap 3    montelukast (SINGULAIR) 10 mg tablet Take 10 mg by mouth daily.  cholecalciferol, vitamin d3, (VITAMIN D) 1,000 unit tablet Take  by mouth daily.  albuterol (PROAIR HFA) 90 mcg/actuation inhaler Take 1 Puff by inhalation every four (4) hours as needed for Wheezing or Shortness of Breath.  Indications: Acute Asthma Attack 3 Inhaler 3     Allergies   Allergen Reactions    Latex Unknown (comments)    Dog Dander Shortness of Breath      Lab Results   Component Value Date/Time    WBC 6.3 09/13/2021 03:14 PM    HGB 12.6 09/13/2021 03:14 PM    HCT 41.3 09/13/2021 03:14 PM    Hematocrit (POC) 37 02/16/2021 11:46 AM    PLATELET 752 01/52/3745 03:14 PM    .9 (H) 09/13/2021 03:14 PM     Lab Results   Component Value Date/Time    Glucose 81 03/08/2021 11:57 AM    Glucose (POC) 77 02/14/2022 11:33 AM    LDL, calculated 89 03/08/2021 11:57 AM    LDL, calculated 78 11/04/2019 11:27 AM    Creatinine (POC) 0.60 02/14/2022 11:33 AM    Creatinine 0.81 03/08/2021 11:57 AM      Lab Results   Component Value Date/Time    Cholesterol, total 195 03/08/2021 11:57 AM    HDL Cholesterol 93 03/08/2021 11:57 AM    LDL, calculated 89 03/08/2021 11:57 AM    LDL, calculated 78 11/04/2019 11:27 AM    Triglyceride 71 03/08/2021 11:57 AM    CHOL/HDL Ratio 2.4 08/31/2010 10:48 AM     Lab Results   Component Value Date/Time    GFR est non-AA 65 03/08/2021 11:57 AM    GFRNA, POC >60 02/14/2022 11:33 AM    GFR est AA 74 03/08/2021 11:57 AM    GFRAA, POC >60 02/14/2022 11:33 AM    Creatinine 0.81 03/08/2021 11:57 AM    Creatinine (POC) 0.60 02/14/2022 11:33 AM    BUN 13 03/08/2021 11:57 AM    BUN (POC) 10 02/16/2021 11:46 AM    Sodium 143 03/08/2021 11:57 AM    Sodium (POC) 142 02/14/2022 11:33 AM    Potassium 4.6 03/08/2021 11:57 AM    Potassium (POC) 4.1 02/14/2022 11:33 AM    Chloride 104 03/08/2021 11:57 AM    Chloride (POC) 103 02/14/2022 11:33 AM    CO2 25 03/08/2021 11:57 AM    Magnesium 2.0 02/14/2022 11:26 AM    Phosphorus 3.8 02/14/2022 11:26 AM        ROS    Physical Exam  Vitals and nursing note reviewed. Constitutional:       Appearance: Normal appearance. She is well-developed. Comments: Appears stated age   Cardiovascular:      Rate and Rhythm: Normal rate and regular rhythm. Heart sounds: Normal heart sounds. No murmur heard. No friction rub. No gallop. Pulmonary:      Effort: Pulmonary effort is normal. No respiratory distress. Breath sounds: Normal breath sounds. No wheezing. Abdominal:      General: Bowel sounds are normal.      Palpations: Abdomen is soft. Neurological:      Mental Status: She is alert. ASSESSMENT and PLAN  Diagnoses and all orders for this visit:    1. Menopause  -     DEXA BONE DENSITY STUDY AXIAL; Future    2. Osteoporosis, unspecified osteoporosis type, unspecified pathological fracture presence  -     DEXA BONE DENSITY STUDY AXIAL; Future  -     METABOLIC PANEL, COMPREHENSIVE; Future    3. Pure hypercholesterolemia  -     METABOLIC PANEL, COMPREHENSIVE; Future  -     LIPID PANEL; Future  -     TSH 3RD GENERATION; Future    4. Dementia without behavioral disturbance, unspecified dementia type ([facility] [address]  -     REFERRAL TO NEUROLOGY    5. Attention deficit hyperactivity disorder (ADHD), unspecified ADHD type  -     REFERRAL TO NEUROLOGY    6. Anxiety  -     REFERRAL TO NEUROLOGY           This is the Subsequent Medicare Annual Wellness Exam, performed 12 months or more after the Initial AWV or the last Subsequent AWV    I have reviewed the patient's medical history in detail and updated the computerized patient record. Assessment/Plan   Education and counseling provided:  Are appropriate based on today's review and evaluation  End-of-Life planning (with patient's consent)    1.  Menopause  -     DEXA BONE DENSITY STUDY AXIAL; Future  2. Osteoporosis, unspecified osteoporosis type, unspecified pathological fracture presence  -     DEXA BONE DENSITY STUDY AXIAL; Future  -     METABOLIC PANEL, COMPREHENSIVE; Future  3. Pure hypercholesterolemia  -     METABOLIC PANEL, COMPREHENSIVE; Future  -     LIPID PANEL; Future  -     TSH 3RD GENERATION; Future   On statin  4. Dementia without behavioral disturbance, unspecified dementia type ([facility] [address]  -     REFERRAL TO NEUROLOGY   Declines referral for neuropsych testin  5. Attention deficit hyperactivity disorder (ADHD), unspecified ADHD type  -     REFERRAL TO NEUROLOGY   Off vyvanse  6. Anxiety  -     REFERRAL TO NEUROLOGY    Continue lexapro    Depression Risk Factor Screening     3 most recent PHQ Screens 3/16/2022   Little interest or pleasure in doing things Not at all   Feeling down, depressed, irritable, or hopeless Not at all   Total Score PHQ 2 0   Trouble falling or staying asleep, or sleeping too much -   Feeling tired or having little energy -   Poor appetite, weight loss, or overeating -   Feeling bad about yourself - or that you are a failure or have let yourself or your family down -   Moving or speaking so slowly that other people could have noticed; or the opposite being so fidgety that others notice -   Thoughts of being better off dead, or hurting yourself in some way -   How difficult have these problems made it for you to do your work, take care of your home and get along with others -       Alcohol & Drug Abuse Risk Screen    Do you average more than 1 drink per night or more than 7 drinks a week:  No    On any one occasion in the past three months have you have had more than 3 drinks containing alcohol:  No          Functional Ability and Level of Safety    Hearing: Hearing is good. Activities of Daily Living:   The home contains: handrails and grab bars  Patient needs help with:  transportation, shopping, laundry, housework, managing medications and managing money Ambulation: with mild difficulty     Fall Risk:  Fall Risk Assessment, last 12 mths 3/16/2022   Able to walk? Yes   Fall in past 12 months? 0   Do you feel unsteady? 0   Are you worried about falling 0   Number of falls in past 12 months -   Fall with injury? -      Abuse Screen:  Patient is not abused       Cognitive Screening    Has your family/caregiver stated any concerns about your memory: yes - has dementia     Cognitive Screening: hsa moderate dementia    Health Maintenance Due     Health Maintenance Due   Topic Date Due    Depression Screen  03/08/2022    Lipid Screen  03/08/2022    Medicare Yearly Exam  03/09/2022       Patient Care Team   Patient Care Team:  Elba Hair MD as PCP - General (Internal Medicine)  Elba Hair MD as PCP - [facility] Empaneled Provider  Pierce Luciano MD as Consulting Provider (Urology)  Dior Morejon MD (Ophthalmology)  Edu John MD (Neurology)    History     Patient Active Problem List   Diagnosis Code    Postmenopausal hormone replacement therapy Z79.890    Overactive bladder N32.81    Asthma J45.909    Hearing loss, left H91.92    Ceruminosis H61.20    History of skin cancer Z85.828    Fever, unspecified R50.9    Syncope and collapse R55    TMJ (temporomandibular joint disorder) M26.609    Mild cognitive impairment without memory loss G31.84    Generalized anxiety disorder F41.1    Hyperlipidemia E78.5    ADD (attention deficit disorder) F98.8    Alzheimer's disease ([address] G30.9, F02.80    Compression fracture of L3 vertebra ([address] S32.030A    Syncopal seizure ([address] R55, R56.9    Bilateral carotid artery stenosis I65.23    Vascular dementia without behavioral disturbance ([address] F01.50     Past Medical History:   Diagnosis Date    Asthma     Dr. Rajni Cassidy June 2013--doesn't see routinely--pt reports no change in allergies--no need for routine follow-up. Dr. Domenico Lemus sees for mild COPD--on Symbicort since ~Nov 2013. Continued Singulair.     Ceruminosis     periodic ear cleaning at [place] [facility]    Chronic obstructive pulmonary disease ([facility]     Encounter for screening colonoscopy ~    Normal per pt--[facility] records requested 2013 visit.  Fainting     Falls     Fatigue     [facility] loss, left     [facility]    History of mammogram May 2014    Normal/no change.  History of shingles 2010    Dec 2014:  Pt reports clinical history and shingles vaccine 4yrs ago. To try to obtain exact date of zostavax from /source.  History of skin cancer     melanoma ?, BCC & SCC- followed by Dr. Jossue Hardin- twice a yr appts. 2013-still on 6mo schedule for skin checks.  Memory disorder     Osteopenia     DEXA     Overactive bladder     detrol LA. Vesicare--sees [facility] Considering procedure for incontinence.  Postmenopausal hormone replacement therapy     since hysterectomy    Right shoulder pain 2013. Mild osteopenia; mild AC osteoarthritis. Sports Med Eval Oct 2013.  Well woman exam (no gynecological exam) Aug 3, 2012    \"[facility] Screening\":  Results scanned to chart. Updated 2014-no change except noted concern for GFR--see Dec 2014 note, letter as reviewed with pt. Past Surgical History:   Procedure Laterality Date    HX CATARACT REMOVAL      HX HYSTERECTOMY  1948    IR KYPHOPLASTY LUMBAR  2020    MULTIPLE DELIVERY       CT TRABECULOPLASTY BY LASER SURGERY      Dr. Cayla Diaz - [address] [place] [address]- last eye exam 2010     Current Outpatient Medications   Medication Sig Dispense Refill    vitamin E (AQUA GEMS) 400 unit capsule Take  by mouth daily.  escitalopram oxalate (LEXAPRO) 20 mg tablet Take 0.5 Tablets by mouth daily. 90 Tablet 3    lisdexamfetamine (Vyvanse) 20 mg capsule Take 1 Capsule by mouth daily. Max Daily Amount: 20 mg.  (Patient taking differently: Take 20 mg by mouth every other day.) 90 Capsule 0    atorvastatin (LIPITOR) 10 mg tablet TAKE 1 TABLET DAILY 90 Tablet 3    Namzaric 28-10 mg CSpX TAKE 1 CAPSULE DAILY 90 Cap 3    montelukast (SINGULAIR) 10 mg tablet Take 10 mg by mouth daily.  cholecalciferol, vitamin d3, (VITAMIN D) 1,000 unit tablet Take  by mouth daily.  albuterol (PROAIR HFA) 90 mcg/actuation inhaler Take 1 Puff by inhalation every four (4) hours as needed for Wheezing or Shortness of Breath. Indications: Acute Asthma Attack 3 Inhaler 3     Allergies   Allergen Reactions    Latex Unknown (comments)    Dog Dander Shortness of Breath       Family History   Problem Relation Age of Onset   [facility] Dementia Father     Hypertension Mother     Stroke Mother     Cancer Other         daughter- breast cancer     Social History     Tobacco Use    Smoking status: Former Smoker    Smokeless tobacco: Never Used   Substance Use Topics    Alcohol use:  Yes     Alcohol/week: 0.0 standard drinks     Comment: 1-2 drinks/every evening ( wine/vodka)         Theron Deleon MD

## 2022-03-18 PROBLEM — S32.030A COMPRESSION FRACTURE OF L3 VERTEBRA (HCC): Status: ACTIVE | Noted: 2020-04-10

## 2022-03-19 PROBLEM — R56.9 SYNCOPAL SEIZURE (HCC): Status: ACTIVE | Noted: 2020-08-16

## 2022-03-19 PROBLEM — I65.23 BILATERAL CAROTID ARTERY STENOSIS: Status: ACTIVE | Noted: 2020-08-17

## 2022-03-19 PROBLEM — F98.8 ADD (ATTENTION DEFICIT DISORDER): Status: ACTIVE | Noted: 2017-01-18

## 2022-03-19 PROBLEM — F01.50 VASCULAR DEMENTIA WITHOUT BEHAVIORAL DISTURBANCE (HCC): Status: ACTIVE | Noted: 2020-09-08

## 2022-03-19 PROBLEM — R55 SYNCOPAL SEIZURE (HCC): Status: ACTIVE | Noted: 2020-08-16

## 2022-03-20 PROBLEM — G30.9 ALZHEIMER'S DISEASE (HCC): Status: ACTIVE | Noted: 2018-09-10

## 2022-03-20 PROBLEM — F02.80 ALZHEIMER'S DISEASE (HCC): Status: ACTIVE | Noted: 2018-09-10

## 2022-03-23 ENCOUNTER — HOSPITAL ENCOUNTER (OUTPATIENT)
Dept: PHYSICAL THERAPY | Age: 87
Discharge: HOME | End: 2022-03-23
Payer: MEDICARE

## 2022-03-23 PROCEDURE — 97112 NEUROMUSCULAR REEDUCATION: CPT | Performed by: PHYSICAL THERAPIST

## 2022-03-23 PROCEDURE — 97140 MANUAL THERAPY 1/> REGIONS: CPT | Performed by: PHYSICAL THERAPIST

## 2022-03-23 NOTE — PROGRESS NOTES
PT DAILY TREATMENT NOTE - [facility] 2-15    Patient Name: Alex Shultz  Date:3/23/2022  : 1932  [x]  Patient  Verified  Payor: [place] MEDICARE / Plan: [place] MEDICARE PART A & B / Product Type: Medicare /    In time: 12:16pm  Out time: 12:54pm  Total Treatment Time (min): 38  Total Timed Codes (min): 38  1:1 Treatment Time ( only): 38   Visit #:  2    Treatment Area: Vertigo [R42]    SUBJECTIVE  Pain Level (0-10 scale): symptoms: 0  Any medication changes, allergies to medications, adverse drug reactions, diagnosis change, or new procedure performed?: [x] No    [] Yes (see summary sheet for update)  Subjective functional status/changes:   [] No changes reported  The patient and  both report that she's feeling better overall with less dizziness and isn't holding onto things as much, but it was still difficult to practice the rolling exercise at home. OBJECTIVE     23 min Neuromuscular Re-education:  [x]  See flow sheet :   Rationale: improve coordination, improve balance and increase proprioception  to improve the patients ability to perform daily activities. 15 min Manual Therapy: Epleys maneuver    Rationale: correct positional vertigo to improve the patients ability to perform daily activities. With   [x] TE   [] TA   [] Neuro   [] SC   [] other: Patient Education: [x] Review HEP    [x] Progressed/Changed HEP based on:   [x] positioning   [x] body mechanics   [] transfers   [] heat/ice application    [] other:      Other Objective/Functional Measures: none noted     Pain Level (0-10 scale) post treatment: symptoms: 0    ASSESSMENT/Changes in Function:   The patient had symptoms on the way up from a right epley's (lying on left side and coming up with head turned left), which decreased with a second maneuver. They will just practice Ring-Daroff at home and be reassessed next visit.   Patient will continue to benefit from skilled PT services to modify and progress therapeutic interventions, address functional mobility deficits, address ROM deficits, address strength deficits, analyze and address soft tissue restrictions, analyze and cue movement patterns, analyze and modify body mechanics/ergonomics, assess and modify postural abnormalities, address imbalance/dizziness and instruct in home and community integration to attain remaining goals. [x]  See Plan of Care  []  See progress note/recertification  []  See Discharge Summary         Progress towards goals / Updated goals: The patient is progressing towards goals.      PLAN  [x]  Upgrade activities as tolerated     [x]  Continue plan of care  [x]  Update interventions per flow sheet       []  Discharge due to:_  []  Other:_      Rick Tucker, PT 3/23/2022

## 2022-03-30 ENCOUNTER — HOSPITAL ENCOUNTER (OUTPATIENT)
Dept: PHYSICAL THERAPY | Age: 87
Discharge: HOME | End: 2022-03-30
Payer: MEDICARE

## 2022-03-30 PROCEDURE — 97112 NEUROMUSCULAR REEDUCATION: CPT | Performed by: PHYSICAL THERAPIST

## 2022-03-30 PROCEDURE — 97140 MANUAL THERAPY 1/> REGIONS: CPT | Performed by: PHYSICAL THERAPIST

## 2022-03-30 NOTE — PROGRESS NOTES
PT DAILY TREATMENT NOTE - [facility] 2-15    Patient Name: Stanley Tavares  Date:3/30/2022  : 1932  [x]  Patient  Verified  Payor: [place] MEDICARE / Plan: VA MEDICARE PART A & B / Product Type: Medicare /    In time: 11:40am  Out time: 12:15pm   Total Treatment Time (min): 35   Total Timed Codes (min): 35   1:1 Treatment Time ( only): 35    Visit #:  3    Treatment Area: Vertigo [R42]    SUBJECTIVE  Pain Level (0-10 scale): symptoms: 0   Any medication changes, allergies to medications, adverse drug reactions, diagnosis change, or new procedure performed?: [x] No    [] Yes (see summary sheet for update)  Subjective functional status/changes:   [] No changes reported  The patient and  both report that she is doing better with walking, less dizziness, and have been attempting the home exercises. OBJECTIVE     20  min Neuromuscular Re-education:  [x]  See flow sheet :   Rationale: improve coordination, improve balance and increase proprioception  to improve the patients ability to perform daily activities. 15  min Manual Therapy: Epleys maneuver    Rationale: correct positional vertigo to improve the patients ability to perform daily activities. With   [x] TE   [] TA   [] Neuro   [] SC   [] other: Patient Education: [x] Review HEP    [x] Progressed/Changed HEP based on:   [x] positioning   [x] body mechanics   [] transfers   [] heat/ice application    [] other:      Other Objective/Functional Measures: FOTO= 57 (55 at eval)    MCTSIB: 1= 30s (30s at eval); 2= 9 (7s at eval); 4= 30s (14s at eval); 5= 5 (2s at eval)= 74/120 (53/120 at eval)       Pain Level (0-10 scale) post treatment: symptoms: 0     ASSESSMENT/Changes in Function:   The patient has progressed well overall and will be discharged to her HEP. []  See Plan of Care  []  See progress note/recertification  [x]  See Discharge Summary          Progress towards goals / Updated goals:   The patient has MET or is progressing towards goals.     PLAN  []  Upgrade activities as tolerated     []  Continue plan of care  []  Update interventions per flow sheet       [x]  Discharge due to: Pt has MET goals.    []  Other:_      Franco Vora, PT 3/30/2022

## 2022-03-30 NOTE — PROGRESS NOTES
[facility]  [address] [place]  Phone: [phone] Fax: [phone]    Discharge Summary 2-15    Patient name: Daron Galeana  : 1932  Provider#: [id number]  Referral source: Abelino Nolan MD      Medical/Treatment Diagnosis: Vertigo [R42]     Prior Hospitalization: see medical history     Comorbidities: See Plan of Care  Prior Level of Function: See Plan of Care  Medications: Verified on Patient Summary List    Start of Care: 3/30/22      Onset Date:3/1/22   Visits from Start of Care: 3     Missed Visits: 0  Reporting Period : 3/15/22 to 3/30/22    Assessment/Summary of care: The patient has progressed well with vestibular rehab, initially presenting with probable signs of vestibular hypofunction. Her and her  have been able to safely perform Ring-Daroff vestibular habituation techniques, and were able to attempt the Epley's maneuver at home. She has improved her vestibular input as seen from and MCTSIB test, and her and her  report that she is ambulating better with less frequent dizziness spells. She will continue to maintain improvements made thus far independently with her HEP. Short Term Goals: To be accomplished in 2 treatments:                         9.) The patient will be independent with their HEP consistently for at least one week- MET  Long Term Goals: To be accomplished in 8 treatments:                         4.) The patient will report having at most one significant instance of symptoms with daily activities for one week. - MET                         3.) The patient will be able to perform at least one Epley's maneuver with minimal to no verbal cues in order to self manage and treat their symptoms.- MET                         9.) The patient will improve their MCTSIB score from 53/120 to at least 55/120 to show improvements in vestibular input.- MET (74/120 today)        RECOMMENDATIONS:  [x]Discontinue therapy: [x]Patient has reached or is progressing toward set goals     []Patient is non-compliant or has abdicated     []Due to lack of appreciable progress towards set goals     []Other  Sherman Cardona, PT 3/30/2022

## 2022-04-05 ENCOUNTER — APPOINTMENT (OUTPATIENT)
Dept: PHYSICAL THERAPY | Age: 87
End: 2022-04-05

## 2022-04-12 ENCOUNTER — APPOINTMENT (OUTPATIENT)
Dept: PHYSICAL THERAPY | Age: 87
End: 2022-04-12

## 2022-06-08 RX ORDER — MEMANTINE HYDROCHLORIDE AND DONEPEZIL HYDROCHLORIDE 28; 10 MG/1; MG/1
CAPSULE ORAL
Qty: 90 CAPSULE | Refills: 3 | Status: SHIPPED | OUTPATIENT
Start: 2022-06-08

## 2022-06-22 ENCOUNTER — OFFICE VISIT (OUTPATIENT)
Dept: NEUROLOGY | Age: 87
End: 2022-06-22
Payer: MEDICARE

## 2022-06-22 VITALS
TEMPERATURE: 97.7 F | SYSTOLIC BLOOD PRESSURE: 120 MMHG | HEIGHT: 59 IN | BODY MASS INDEX: 23.59 KG/M2 | WEIGHT: 117 LBS | HEART RATE: 87 BPM | OXYGEN SATURATION: 97 % | DIASTOLIC BLOOD PRESSURE: 80 MMHG | RESPIRATION RATE: 16 BRPM

## 2022-06-22 DIAGNOSIS — G30.9 MIXED ALZHEIMER'S AND VASCULAR DEMENTIA (HCC): Primary | ICD-10-CM

## 2022-06-22 DIAGNOSIS — F90.0 ATTENTION DEFICIT HYPERACTIVITY DISORDER (ADHD), PREDOMINANTLY INATTENTIVE TYPE: ICD-10-CM

## 2022-06-22 DIAGNOSIS — F01.50 MIXED ALZHEIMER'S AND VASCULAR DEMENTIA (HCC): Primary | ICD-10-CM

## 2022-06-22 DIAGNOSIS — F02.80 MIXED ALZHEIMER'S AND VASCULAR DEMENTIA (HCC): Primary | ICD-10-CM

## 2022-06-22 PROCEDURE — 1101F PT FALLS ASSESS-DOCD LE1/YR: CPT | Performed by: NURSE PRACTITIONER

## 2022-06-22 PROCEDURE — 1090F PRES/ABSN URINE INCON ASSESS: CPT | Performed by: NURSE PRACTITIONER

## 2022-06-22 PROCEDURE — G8420 CALC BMI NORM PARAMETERS: HCPCS | Performed by: NURSE PRACTITIONER

## 2022-06-22 PROCEDURE — 99215 OFFICE O/P EST HI 40 MIN: CPT | Performed by: NURSE PRACTITIONER

## 2022-06-22 PROCEDURE — G8432 DEP SCR NOT DOC, RNG: HCPCS | Performed by: NURSE PRACTITIONER

## 2022-06-22 PROCEDURE — G8428 CUR MEDS NOT DOCUMENT: HCPCS | Performed by: NURSE PRACTITIONER

## 2022-06-22 PROCEDURE — 1123F ACP DISCUSS/DSCN MKR DOCD: CPT | Performed by: NURSE PRACTITIONER

## 2022-06-22 PROCEDURE — G8536 NO DOC ELDER MAL SCRN: HCPCS | Performed by: NURSE PRACTITIONER

## 2022-06-23 NOTE — PROGRESS NOTES
Marla oPnce is a 80 y.o. female who presents with the following  Chief Complaint   Patient presents with    Follow-up     memory issues are going away fairly quickly right now. HPI    FU memory loss  In 2016 diagnosed with mild mixed dementia   Since then has been seeing a decline    believes the past 6 months things have really increased  They do live at [facility]   She tries to stay active by reading, watching tv, socializing   She does not like to go to the games much   They do go to dinner each night with other residents.  helps direct medications, meals. She is fairly self-sufficient   She does ask the same questions over and over  Will misplace things really easily   She will also forget earlier conversations   Says she is retaining reading but not sure  Eating well   Sleeping well   No agitation or restlessness  1 glass of wine occasionally   They feel like her memory is declining   On Namzaric 28-10   Also on Vyvanse but not anymore  Want to know what this is for. Allergies   Allergen Reactions    Latex Unknown (comments)    Dog Dander Shortness of Breath       Current Outpatient Medications   Medication Sig    Lisdexamfetamine (Vyvanse) 40 mg capsule Take 1 Capsule by mouth every morning. Max Daily Amount: 40 mg.    Namzaric 28-10 mg CSpX TAKE 1 CAPSULE DAILY    vitamin E (AQUA GEMS) 400 unit capsule Take  by mouth daily.  escitalopram oxalate (LEXAPRO) 20 mg tablet Take 0.5 Tablets by mouth daily.  atorvastatin (LIPITOR) 10 mg tablet TAKE 1 TABLET DAILY    albuterol (PROAIR HFA) 90 mcg/actuation inhaler Take 1 Puff by inhalation every four (4) hours as needed for Wheezing or Shortness of Breath. Indications: Acute Asthma Attack    montelukast (SINGULAIR) 10 mg tablet Take 10 mg by mouth daily.  cholecalciferol, vitamin d3, (VITAMIN D) 1,000 unit tablet Take  by mouth daily. No current facility-administered medications for this visit. Social History     Tobacco Use   Smoking Status Former Smoker   Smokeless Tobacco Never Used       Past Medical History:   Diagnosis Date    Asthma     Dr. Brandi Gray 2013--doesn't see routinely--pt reports no change in allergies--no need for routine follow-up. Dr. Colleen Keith sees for mild COPD--on Symbicort since ~2013. Continued Singulair.  Ceruminosis     [facility]    Chronic obstructive pulmonary disease ([facility] [address]     Encounter for screening colonoscopy ~    Normal per pt--[facility] records requested 2013 visit.  Fainting     Falls     Fatigue     Hearing loss, left     va hearing    History of mammogram May 2014    Normal/no change.  History of shingles 2010    Dec 2014:  Pt reports clinical history and shingles vaccine 4yrs ago. To try to obtain exact date of zostavax from /source.  History of skin cancer     melanoma ?, BCC & SCC- followed by Dr. Lester Danielle- twice a yr appts. 2013-still on 6mo schedule for skin checks.  Memory disorder     Osteopenia     DEXA     Overactive bladder     detrol [place]. Vesicare--sees [facility] Considering procedure for incontinence.  Postmenopausal hormone replacement therapy     since hysterectomy    Right shoulder pain 2013. Mild osteopenia; mild AC osteoarthritis. Sports Med Eval Oct 2013.  Well woman exam (no gynecological exam) Aug 3, 2012    \"[facility] Screening\":  Results scanned to chart. Updated 2014-no change except noted concern for GFR--see Dec 2014 note, letter as reviewed with pt.        Past Surgical History:   Procedure Laterality Date    HX CATARACT REMOVAL      HX HYSTERECTOMY  1948    IR KYPHOPLASTY LUMBAR  2020    MULTIPLE DELIVERY       NM TRABECULOPLASTY Nadja Castelan Running - [facility]- last eye exam 2010       Family History   Problem Relation Age of Onset    Dementia Father     Hypertension Mother     Stroke Mother     Cancer Other         daughter- breast cancer       Social History     Socioeconomic History    Marital status:    Tobacco Use    Smoking status: Former Smoker    Smokeless tobacco: Never Used   Substance and Sexual Activity    Alcohol use: Yes     Alcohol/week: 0.0 standard drinks     Comment: 1-2 drinks/every evening ( wine/vodka)    Drug use: Yes     Types: Prescription, OTC    Sexual activity: Never     Birth control/protection: None       Review of Systems   Eyes: Negative for blurred vision, double vision and photophobia. Respiratory: Negative for shortness of breath and wheezing. Cardiovascular: Negative for chest pain. Neurological: Negative for seizures, loss of consciousness and weakness. Psychiatric/Behavioral: Positive for memory loss. Negative for depression, hallucinations, substance abuse and suicidal ideas. The patient is not nervous/anxious and does not have insomnia. Remainder of comprehensive review is negative. Physical Exam :    Visit Vitals  /80 (BP 1 Location: Left upper arm, BP Patient Position: Sitting, BP Cuff Size: Adult)   Pulse 87   Temp 97.7 °F (36.5 °C) (Temporal)   Resp 16   Ht 4' 11\" (1.499 m)   Wt 53.1 kg (117 lb)   SpO2 97%   BMI 23.63 kg/m²       General: Well defined, nourished, and groomed individual in no acute distress.    Neck: Supple, nontender, no bruits, no pain with resistance to active range of motion.    Musculoskeletal: Extremities revealed no edema and had full range of motion of joints.    Psych: Good mood and bright affect    NEUROLOGICAL EXAMINATION:    Mental Status: Alert and oriented to person not place or time. mmse 24    Cranial Nerves:    II, III, IV, VI: Visual acuity grossly intact. Visual fields are normal.    Pupils are equal, round, and reactive to light and accommodation.    Extra-ocular movements are full and fluid.  Fundoscopic exam was benign, no ptosis or nystagmus.    V-XII: Hearing is grossly intact. Facial features are symmetric, with normal sensation and strength. The palate rises symmetrically and the tongue protrudes midline. Sternocleidomastoids 5/5. Motor Examination: Normal tone, bulk, and strength, 5/5 muscle strength throughout. Coordination: Finger to nose was normal. No resting or intention tremor    Gait and Station: Steady while walking. Normal arm swing. No pronator drift. No muscle wasting or fasiculations noted. Reflexes: DTRs 2+ throughout. Results for orders placed or performed in visit on 03/16/22   TSH 3RD GENERATION   Result Value Ref Range    TSH 3.22 0.36 - 3.74 uIU/mL   LIPID PANEL   Result Value Ref Range    Cholesterol, total 234 (H) <200 MG/DL    Triglyceride 101 <150 MG/DL    HDL Cholesterol 87 MG/DL    LDL, calculated 126.8 (H) 0 - 100 MG/DL    VLDL, calculated 20.2 MG/DL    CHOL/HDL Ratio 2.7 0.0 - 5.0     METABOLIC PANEL, COMPREHENSIVE   Result Value Ref Range    Sodium 138 136 - 145 mmol/L    Potassium 4.6 3.5 - 5.1 mmol/L    Chloride 104 97 - 108 mmol/L    CO2 32 21 - 32 mmol/L    Anion gap 2 (L) 5 - 15 mmol/L    Glucose 85 65 - 100 mg/dL    BUN 15 6 - 20 MG/DL    Creatinine 0.84 0.55 - 1.02 MG/DL    BUN/Creatinine ratio 18 12 - 20      GFR est AA >60 >60 ml/min/1.73m2    GFR est non-AA >60 >60 ml/min/1.73m2    Calcium 9.9 8.5 - 10.1 MG/DL    Bilirubin, total 0.4 0.2 - 1.0 MG/DL    ALT (SGPT) 24 12 - 78 U/L    AST (SGOT) 16 15 - 37 U/L    Alk.  phosphatase 57 45 - 117 U/L    Protein, total 7.0 6.4 - 8.2 g/dL    Albumin 3.7 3.5 - 5.0 g/dL    Globulin 3.3 2.0 - 4.0 g/dL    A-G Ratio 1.1 1.1 - 2.2         Orders Placed This Encounter    MRI BRAIN WO CONT     Standing Status:   Future     Standing Expiration Date:   7/22/2023   [facility] Records Clinical Neropsychology [facility] EMPL     Referral Priority:   Routine     Referral Type:   Consultation     Referral Reason:   Specialty Services Required     Referred to Provider:   Mai García PsyD Number of Visits Requested:   1    EEG     Standing Status:   Future     Standing Expiration Date:   12/22/2022     Order Specific Question:   Reason for Exam:     Answer:   memory loss    Lisdexamfetamine (Vyvanse) 40 mg capsule     Sig: Take 1 Capsule by mouth every morning. Max Daily Amount: 40 mg. Dispense:  30 Capsule     Refill:  0       1. Mixed Alzheimer's and vascular dementia ([facility] [address]    2. Attention deficit hyperactivity disorder (ADHD), predominantly inattentive type      MRI brain to rule out stroke, tumor, lesion, mass  EEG to look at epilepsy vs other   Refer to neuropsychiatry to evaluate dementia vs. Other   Increase Vyvanse to 40 mg for attention day to day   Try this for a few weeks to see if changes in day   Namzaric 28-10   Stay active mentally and physically.        This note will not be viewable in [employer]

## 2022-06-28 ENCOUNTER — HOSPITAL ENCOUNTER (OUTPATIENT)
Dept: NEUROLOGY | Age: 87
Discharge: HOME | End: 2022-06-28
Attending: NURSE PRACTITIONER
Payer: MEDICARE

## 2022-06-28 DIAGNOSIS — F01.50 MIXED ALZHEIMER'S AND VASCULAR DEMENTIA (HCC): ICD-10-CM

## 2022-06-28 DIAGNOSIS — G30.9 MIXED ALZHEIMER'S AND VASCULAR DEMENTIA (HCC): ICD-10-CM

## 2022-06-28 DIAGNOSIS — F02.80 MIXED ALZHEIMER'S AND VASCULAR DEMENTIA (HCC): ICD-10-CM

## 2022-06-28 PROCEDURE — 95816 EEG AWAKE AND DROWSY: CPT

## 2022-06-30 PROCEDURE — 95816 EEG AWAKE AND DROWSY: CPT | Performed by: PSYCHIATRY & NEUROLOGY

## 2022-06-30 NOTE — PROCEDURES
PROCEDURE: ROUTINE OUTPATIENT EEG  NAME:   Fuentes Mills  ACCOUNT NUMBER : [de-identified]  MRN:   [id number]  DATE OF SERVICE: 6/28/22    HISTORY/INDICATION: Pt is a 79yo female with dementia with recent decline. EEG to evaluate for epilepsy as a contributing factor. MEDICATIONS:   Current Outpatient Medications   Medication Sig Dispense Refill    Lisdexamfetamine (Vyvanse) 40 mg capsule Take 1 Capsule by mouth every morning. Max Daily Amount: 40 mg. 30 Capsule 0    Namzaric 28-10 mg CSpX TAKE 1 CAPSULE DAILY 90 Capsule 3    vitamin E (AQUA GEMS) 400 unit capsule Take  by mouth daily.  escitalopram oxalate (LEXAPRO) 20 mg tablet Take 0.5 Tablets by mouth daily. 90 Tablet 3    atorvastatin (LIPITOR) 10 mg tablet TAKE 1 TABLET DAILY 90 Tablet 3    albuterol (PROAIR HFA) 90 mcg/actuation inhaler Take 1 Puff by inhalation every four (4) hours as needed for Wheezing or Shortness of Breath. Indications: Acute Asthma Attack 3 Inhaler 3    montelukast (SINGULAIR) 10 mg tablet Take 10 mg by mouth daily.  cholecalciferol, vitamin d3, (VITAMIN D) 1,000 unit tablet Take  by mouth daily. CONDITIONS OF RECORDING: This is a routine 21-channel EEG recording performed in accordance with the international 10-20 system with one channel devoted to limited EKG. This study was done during states of wakefulness and drowsiness. Photic stimulation was performed as an activating procedure. DESCRIPTION:   Upon maximal arousal the posterior dominant rhythm has a frequency of 10Hz with an amplitude of 20uV. This activity is symmetric over the bilateral posterior derivations and attenuates with eye opening. Photic stimulation did not significantly alter the tracing. The patient becomes drowsy, but deeper stages of sleep are not attained. There are no focal abnormalities, epileptiform discharges, or electrographic seizures seen.      INTERPRETATION: Normal awake and drowsy EEG    CLINICAL CORRELATION: A normal EEG does not definitively exclude a diagnosis of epilepsy if clinical suspicion is high consider sleep deprived EEG.      Jeronimo Olmstead MD

## 2022-07-02 ENCOUNTER — HOSPITAL ENCOUNTER (OUTPATIENT)
Dept: MRI IMAGING | Age: 87
Discharge: HOME | End: 2022-07-02
Attending: NURSE PRACTITIONER
Payer: MEDICARE

## 2022-07-02 DIAGNOSIS — G30.9 MIXED ALZHEIMER'S AND VASCULAR DEMENTIA (HCC): ICD-10-CM

## 2022-07-02 DIAGNOSIS — F02.80 MIXED ALZHEIMER'S AND VASCULAR DEMENTIA (HCC): ICD-10-CM

## 2022-07-02 DIAGNOSIS — F01.50 MIXED ALZHEIMER'S AND VASCULAR DEMENTIA (HCC): ICD-10-CM

## 2022-07-02 PROCEDURE — 70551 MRI BRAIN STEM W/O DYE: CPT

## 2022-07-08 ENCOUNTER — OFFICE VISIT (OUTPATIENT)
Dept: NEUROLOGY | Age: 87
End: 2022-07-08
Payer: MEDICARE

## 2022-07-08 DIAGNOSIS — F41.1 GENERALIZED ANXIETY DISORDER: Primary | ICD-10-CM

## 2022-07-08 PROCEDURE — 90791 PSYCH DIAGNOSTIC EVALUATION: CPT | Performed by: CLINICAL NEUROPSYCHOLOGIST

## 2022-07-08 PROCEDURE — 1123F ACP DISCUSS/DSCN MKR DOCD: CPT | Performed by: CLINICAL NEUROPSYCHOLOGIST

## 2022-07-08 NOTE — PROGRESS NOTES
Intake Note      Patient Name: David Lopes  YOB: 1932    Age: 80 y.o. Date of Intake: 7/8/2022   Education: 14 Ethnicity White   Gender: Female Referring Provider: Kye Queen     REASON FOR REFERRAL AND EVALUATION PROCEDURES:  David Lopes  was referred for evaluation by her Neurology Nurse Practitioner to assist in differential diagnosis and individualized treatment planning. she understood the rationale and procedures for evaluation, as well as the limits to confidentiality, and agreed to participate. she consented to have this report made available to her  treating providers through her  electronic medical records. History Sources: Patient, Spouse and Medical Records    Chief Complaints:  The patient is a very pleasant 63-year-old woman whose history is significant for hyperlipidemia and generalized anxiety disorder. She participated in a neuropsychological evaluations with Dr. Geno Coronel in 2015 and 2016 and review of those reports revealed the patient started experiencing gradually progressive cognitive decline around the spring 2014. Results of those evaluations indicated the presence of a mixed dementia process, likely due to Alzheimer's disease and vascular disease. According to the patient and her , the patient's gradually progressive decline has appeared to advance at a more rapid rate over the past 2 years. The patient's primary complaint include slowed mental processing speed while her 's largest concern is her memory loss. The patient's  provided specific examples including the patient asking about the condition of her mother and father, rapidly forgetting recent conversations, and repeating herself. The patient's  also reported the patient has started misplacing household items and her personal belongings and later believes they are being stolen.   Due to the patient's level of decline, she is unable to complete virtually all complex instrumental activities of daily living, though she remains independent in basic ADLs. Regarding the patient's psychiatric history, while medical records list diagnoses including generalized anxiety disorder and ADD, the patient and her  denied knowledge of these conditions and stated she has never been treated for mental health diagnoses. The patient also denied experiencing symptoms suggestive of depression or anxiety. she described her recent mood to be \"always nita and amenable. \"  She denied history of auditory or visual hallucinations as well as history of passive or active suicidal ideation, intent, or plan. PERTINENT MEDICAL HISTORY:  Patient Active Problem List   Diagnosis Code    Postmenopausal hormone replacement therapy Z79.890    Overactive bladder N32.81    Asthma J45.909    Hearing loss, left H91.92    Ceruminosis H61.20    History of skin cancer Z85.828    Fever, unspecified R50.9    Syncope and collapse R55    TMJ (temporomandibular joint disorder) M26.609    Mild cognitive impairment without memory loss G31.84    Generalized anxiety disorder F41.1    Hyperlipidemia E78.5    ADD (attention deficit disorder) F98.8    Alzheimer's disease ([facility] [address] G30.9, F02.80    Compression fracture of L3 vertebra ([facility]) S32.030A    Syncopal seizure ([facility] [address] R55, R56.9    Bilateral carotid artery stenosis I65.23    Vascular dementia without behavioral disturbance ([facility]) F01.50      Pertaining to the patient's other medical and physical functioning, the patient described her sleep as \"great. \"  Her  reported she goes to bed between 8:00 PM and 9:00 PM and wakes up between 9:00 AM and 10:00 AM.  The patient and her  denied difficulties initiating or sustaining sleep and they denied evidence of obstructive sleep apnea or REM sleep behavior disorder.   The patient and her  also denied observing symptoms potentially suggestive of parkinsonism, autonomic dysfunction, or sensory changes. Family neurological history: Dementia in the patient's mother and father      Current Outpatient Medications:     Lisdexamfetamine (Vyvanse) 40 mg capsule, Take 1 Capsule by mouth every morning. Max Daily Amount: 40 mg., Disp: 30 Capsule, Rfl: 0    Namzaric 28-10 mg CSpX, TAKE 1 CAPSULE DAILY, Disp: 90 Capsule, Rfl: 3    vitamin E (AQUA GEMS) 400 unit capsule, Take  by mouth daily. , Disp: , Rfl:     escitalopram oxalate (LEXAPRO) 20 mg tablet, Take 0.5 Tablets by mouth daily. , Disp: 90 Tablet, Rfl: 3    atorvastatin (LIPITOR) 10 mg tablet, TAKE 1 TABLET DAILY, Disp: 90 Tablet, Rfl: 3    albuterol (PROAIR HFA) 90 mcg/actuation inhaler, Take 1 Puff by inhalation every four (4) hours as needed for Wheezing or Shortness of Breath. Indications: Acute Asthma Attack, Disp: 3 Inhaler, Rfl: 3    montelukast (SINGULAIR) 10 mg tablet, Take 10 mg by mouth daily. , Disp: , Rfl:     cholecalciferol, vitamin d3, (VITAMIN D) 1,000 unit tablet, Take  by mouth daily. , Disp: , Rfl:     Pertinent Neurological History:   Seizure: Never   Stroke: Never   TBI: Never    Neurodiagnostic Findings:   Imaging: MRI brain (7/2/2022) revealed \"atrophy and extensive chronic white matter disease with no acute infarction. \"   EEG: Completed 6/28/2022, however, at the time of this report, the results were unavailable    Pertinent Labs:  Lab Date Result   TSH 3/16/2022 Within reference range     PSYCHOSOCIAL HISTORY:   Birth/Development: The patient was born and raised in [address] [place] [address] 759 Language: [address] [place] [address] Education: Graduated from high school and completed 2 years of college   Academic Problems: Denied   Occupation: Primarily worked in clerical/administrative positions  [address] Service: Denied   Relationship Status:  70 years   Children: 3 children.   Unable to recall number of grandchildren and great-grandchildren   Housing: Living in independent living at hospitals Legal: Denied    Substance Use:   Alcohol: Reported consuming 1 glass of wine each evening   Nicotine: Patient smoked for approximately 20 years before quitting in 1970. Her highest level of use was approximately 1 pack/week   Recreational/Illicit Substances: Denied   Treatment: Denied    BEHAVIORAL OBSERVATIONS:   Appearance: Casually dressed, Well-groomed and Appeared stated age  [facility] Orientation: Person and Place. Reported the year to be 2001, the month to be March, and the day of the week to be Wednesday (correct: Friday, 7/8/2022)   Motor: Ambulated independently, Adequate gait, Adequate posture, No involuntary movements and Adequate strength   Thought Processes: Clear, Coherent, Intact, Logical and Organized   Hearing and Vision: Adequate   Speech: shows no evidence of impairment   Comprehension: Adequate   Interpersonal Skills: Adequate   Affect: Appropriate   Ability as Historian: Inadequate   Insight: Inadequate   Judgment: Inadequate    STRENGTHS:  Access to housing/residential stability and Interpersonal/supportive relationships (family, friends, peers)    WEAKNESSES:  Health problems and Cognitive limitations    IMPRESSION:  Patient is a very pleasant 68-year-old woman with a history of anxiety and cognitive difficulties. At this time, the severity and nature of the patient's cognitive and emotional problems is unknown and the degree to which they may be related requires clarification for appropriate treatment. Comprehensive evaluation is necessary in order to obtain a quantitative assessment of her current functioning for clear differential diagnosis and treatment planning. ASSESSMENT:  1. Generalized anxiety disorder (F41.1)    PLAN:  1. Patient will participate in comprehensive evaluation in order to obtain a quantitative assessment of their cognitive and emotional functioning  2. Differential diagnosis and treatment planning will be based upon results from clinical interview and objective testing  3.  Patient will be provided with review of results, impressions, and recommendations during feedback session  4.  Patient will be encouraged to follow-up with referring provider for ongoing management        TIME DOCUMENTATION:  Clinical Interview: 9166 - 0905 = 35 minutes    [email or web address]

## 2022-07-15 ENCOUNTER — OFFICE VISIT (OUTPATIENT)
Dept: NEUROLOGY | Age: 87
End: 2022-07-15
Payer: MEDICARE

## 2022-07-15 DIAGNOSIS — F02.80 ALZHEIMER'S DISEASE (HCC): Primary | ICD-10-CM

## 2022-07-15 DIAGNOSIS — G30.9 ALZHEIMER'S DISEASE (HCC): Primary | ICD-10-CM

## 2022-07-15 DIAGNOSIS — F01.518 VASCULAR DEMENTIA WITH BEHAVIOR DISTURBANCE: ICD-10-CM

## 2022-07-15 PROCEDURE — 96139 PSYCL/NRPSYC TST TECH EA: CPT | Performed by: CLINICAL NEUROPSYCHOLOGIST

## 2022-07-15 PROCEDURE — 96138 PSYCL/NRPSYC TECH 1ST: CPT | Performed by: CLINICAL NEUROPSYCHOLOGIST

## 2022-07-20 NOTE — PROGRESS NOTES
Neuropsychological Evaluation Report      Patient Name: Kelli Steele  YOB: 1932    Age: 80 y.o. Date of Intake: 2022   Education: 14 Date of Testin/15/2022   Gender: Female Ethnicity: White     Referring Provider: Pasha Parra NP     REASON FOR REFERRAL AND EVALUATION PROCEDURES:  Kelli Steele  was referred for neuropsychological evaluation by her Neurology Nurse Practitioner to obtain a quantitative assessment of her current level of neurocognitive functioning, assist in differential diagnosis, and aid in individualized treatment planning. The patient understood the rationale and procedures for evaluation, as well as the limits to confidentiality, and they agreed to participate. The patient consented to have this report made available to her  treating providers through her  electronic medical records. This evaluation was completed with the patient by Esteban Jones PsyD with the exception of testing by technician, which was completed by JOLENE Wisdom under the supervision of Dr. Nate Mckeon. History Sources: Patient, Spouse, Medical Records, Rating Scales and Assessment Instruments    SUMMARY AND IMPRESSION:  The following section is a summary of the patient's pertinent history, test results, and impressions. A more thorough review of the patient's background and test scores can be found below. The patient is a very pleasant 80-year-old woman whose history is significant for hyperlipidemia and generalized anxiety disorder. She has reportedly experienced gradually progressive cognitive decline since . She previously participated in 2 neuropsychological evaluations, which reportedly revealed evidence of a mixed dementia process involving Alzheimer's disease and vascular disease.   According to the patient's , the patient's cognitive difficulties have been more rapidly advancing over the course of the past 2 years and she requires increasing levels of supervision and assistance in complex instrumental activities of daily living. Results from the patient's neuropsychological evaluation revealed an amnestic memory profile characterized by moderate impairment (~2 standard deviations below the mean) on measures of learning and memory with limited benefit from recognition cueing and evidence of forgetting previously learned material.  Moderate impairment was also demonstrated on measures of mental processing speed, semantic verbal fluency, executive functioning, and visuospatial functioning. Practical judgment was also compromised. Diagnostically, consistent with her previous evaluations, the degree of impairment demonstrated on testing and the patient's decline in independent functioning indicates she meets criteria for diagnosis of dementia. Considering the duration of the patient's decline and her level of impairment, she is likely in the moderate stage of the condition. Regarding the etiology of the patient's decline, her medical history, current presentation, and test findings continue to be consistent with a multifactorial process involving Alzheimer's disease and cerebrovascular disease. Unfortunately, the patient's prognosis remains poor as additional gradually progressive decline is anticipated. 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 During this appointment, the results of the evaluation will be reviewed, recommendations will be offered (see below), and the patient will be provided with the opportunity to ask questions. The patient will be encouraged to review the results of this evaluation with her providers and discuss potential implications for treatment.    At this time, the patient's neuropsychological evaluation suggests she does not maintain capacity to make decisions. If not already addressed, the patient and her family are encouraged to discuss legal issues such as power of  to assist the patient in future financial and healthcare decisions. Information regarding these issues can be found at www.caringinfo. org or www.agingwithdignity.org/5wishes.html  As the patient is currently in the Moderate stage of dementia, daily supervision and assistance is recommended. In general, the patient will benefit from a structured, routine environment that will provide assistance with complex activities requiring memory, organization, and planning (e.g., meal preparation, medication management) while allowing them to maintain independence in other basic activities of daily living (e.g., cleaning, dressing, feeding) for as long as possible. Given the patient's current level of functioning, it is anticipated they presently need less than 24-hour care; however, as the disease progresses, their needs for supervision should be reassessed. Continued monitoring and treatment of the patient's neuropsychiatric dementia is recommended. Due to their level of cognitive impairment, they will benefit from a combination of psychopharmacology, environmental management, and concrete behaviorally-focused interventions. Examples of environmental strategies include:  Keep a steady routine environment and using visual cues, such as whiteboards, to help provide reminders of tasks. Take additional time to complete tasks and limit the amount of external distracters in the environment when having to focus on a task;  Break larger tasks into smaller ones and take frequent, regularly scheduled breaks;   Write down information as soon as possible and to utilize a day planner/calendar, especially when recording doctor's appointments and medicine regimens;  Development of a memory book may be beneficial. Memory books typically contain a day planner/calendar component; however, they also contain organized sections for recording frequently asked questions and information about routines that the patient will be able to access and use independently. Pair behaviors or items to be remembered with well learned patterns or routines. For example, place medications by your toothbrush so that brushing your teeth will cue you to take your medication; and,  Designate a memory place in which you keep all of your important personal belongings (e.g. wallet, keys) when not in use. The patient will be provided with psychoeducation regarding empirically determined modifiable risk and protective factors for cognitive decline. Specifically:  The patient will be encouraged to engage in approximately 2.5 hours of physician approved physical activity on a weekly basis. The patient will be encouraged to maintain a healthy diet. they will also be informed of the Mediterranean diet, which has been associated with reduced risk for neurodegenerative conditions as well as heart disease. The patient will be encouraged to maintain a cognitively stimulated lifestyle, also incorporating social interaction. The patient's family and caretakers may benefit from reaching out to local support groups in the community. These resources may be able to provide medical assistance or support and reduce the risk of caregiver burnout. The Alzheimer's Association (Phone: 591.794.1981; Website: [place]) provides general information about cognitive decline in late life, along with local resources. The book The 36 Hour Day: A Family Guide to Caring for Persons with Alzheimer's Disease, Related Dementing Illnesses, and Memory Loss in Later Life, by Albin Gee and Gordon Mills. HISTORY OF PRESENT ILLNESS:  The patient is a very pleasant 27-year-old woman whose history is significant for hyperlipidemia and generalized anxiety disorder.   She participated in a neuropsychological evaluations with Dr. Yfn Javed in 2015 and 2016 and review of those reports revealed the patient started experiencing gradually progressive cognitive decline around the spring 2014. Results of those evaluations indicated the presence of a mixed dementia process, likely due to Alzheimer's disease and vascular disease. According to the patient and her , the patient's gradually progressive decline has appeared to advance at a more rapid rate over the past 2 years. The patient's primary complaint include slowed mental processing speed while her 's largest concern is her memory loss. The patient's  provided specific examples including the patient asking about the condition of her mother and father, rapidly forgetting recent conversations, and repeating herself. The patient's  also reported the patient has started misplacing household items and her personal belongings and later believes they are being stolen. Due to the patient's level of decline, she is unable to complete virtually all complex instrumental activities of daily living, though she remains independent in basic ADLs. Regarding the patient's psychiatric history, while medical records list diagnoses including generalized anxiety disorder and ADD, the patient and her  denied knowledge of these conditions and stated she has never been treated for mental health diagnoses. The patient also denied experiencing symptoms suggestive of depression or anxiety. she described her recent mood to be \"always nita and amenable. \"  She denied history of auditory or visual hallucinations as well as history of passive or active suicidal ideation, intent, or plan.     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    Ceruminosis H61.20    History of skin cancer Z85.828    Fever, unspecified R50.9    Syncope and collapse R55    TMJ (temporomandibular joint disorder) M26.609    Mild cognitive impairment without memory loss G31.84    Generalized anxiety disorder F41.1    Hyperlipidemia E78.5    ADD (attention deficit disorder) F98.8    Alzheimer's disease (HCC) G30.9, F02.80    Compression fracture of L3 vertebra (HCC) S32.030A    Syncopal seizure ([facility]) R55, R56.9    Bilateral carotid artery stenosis I65.23    Vascular dementia without behavioral disturbance ([facility] [address] F01.50      Pertaining to the patient's other medical and physical functioning, the patient described her sleep as \"great. \"  Her  reported she goes to bed between 8:00 PM and 9:00 PM and wakes up between 9:00 AM and 10:00 AM.  The patient and her  denied difficulties initiating or sustaining sleep and they denied evidence of obstructive sleep apnea or REM sleep behavior disorder. The patient and her  also denied observing symptoms potentially suggestive of parkinsonism, autonomic dysfunction, or sensory changes. Family neurological history: Dementia in the patient's mother and father    Current Outpatient Medications:     Lisdexamfetamine (Vyvanse) 40 mg capsule, Take 1 Capsule by mouth every morning. Max Daily Amount: 40 mg., Disp: 30 Capsule, Rfl: 0    Namzaric 28-10 mg CSpX, TAKE 1 CAPSULE DAILY, Disp: 90 Capsule, Rfl: 3    vitamin E (AQUA GEMS) 400 unit capsule, Take  by mouth daily. , Disp: , Rfl:     escitalopram oxalate (LEXAPRO) 20 mg tablet, Take 0.5 Tablets by mouth daily. , Disp: 90 Tablet, Rfl: 3    atorvastatin (LIPITOR) 10 mg tablet, TAKE 1 TABLET DAILY, Disp: 90 Tablet, Rfl: 3    albuterol (PROAIR HFA) 90 mcg/actuation inhaler, Take 1 Puff by inhalation every four (4) hours as needed for Wheezing or Shortness of Breath. Indications: Acute Asthma Attack, Disp: 3 Inhaler, Rfl: 3    montelukast (SINGULAIR) 10 mg tablet, Take 10 mg by mouth daily. , Disp: , Rfl:     cholecalciferol, vitamin d3, (VITAMIN D) 1,000 unit tablet, Take  by mouth daily. , Disp: , Rfl: Pertinent Neurological History:  Seizure: Never  Stroke: Never  TBI: Never    Neurodiagnostic Findings:  Imaging: MRI brain (7/2/2022) revealed \"atrophy and extensive chronic white matter disease with no acute infarction. \"  EEG: Completed 6/28/2022; interpreted as \"normal.\"    Pertinent Labs:  Lab Date Result   TSH 3/16/2022 Within reference range      PSYCHOSOCIAL HISTORY:  Birth/Development: The patient was born and raised in 2026 [employer]: English  Education: Graduated from high school and completed 2 years of college  Academic Problems: Denied  Occupation: Primarily worked in clerical/administrative positions   Service: Denied  Relationship Status:  70 years  Children: 3 children. Unable to recall number of grandchildren and great-grandchildren  Housing: Living in independent living at [address] [place] [address].: Denied     Substance Use:  Alcohol: Reported consuming 1 glass of wine each evening  Nicotine: Patient smoked for approximately 20 years before quitting in 1970. Her highest level of use was approximately 1 pack/week  Recreational/Illicit Substances: Denied  Treatment: Denied     BEHAVIORAL OBSERVATIONS:  Appearance: Casually dressed, Well-groomed and Appeared stated age  Orientation: Place. Reported her age to be \"almost [de-identified]. \"  Not oriented to circumstance. Reported the year to be 2001, the month to be March, and the day of the week to be Wednesday (correct: Friday, 7/8/2022)  Motor: Ambulated independently, Adequate gait, Adequate posture, No involuntary movements and Adequate strength  Thought Processes: Clear, Coherent, Intact, Logical and Organized  Hearing and Vision: Adequate  Speech: shows no evidence of impairment  Comprehension: Adequate  Interpersonal Skills: Adequate  Affect: Appropriate  Ability as Historian: Inadequate  Insight: Inadequate  Judgment: Inadequate  Testing Behaviors: Patient remained alert and focused throughout the evaluation.   Rapport was adequately established between the patient and the examiner. The patient was generally happy and cheerful throughout testing until confronted with a particularly challenging test, at which point she became frustrated and verbally assaultive with staff. TESTING  Measures administered:   Test of Premorbid Functioning (TOPF); Repeatable Battery for the Assessment of Neuropsychological Status (RBANS); Verbal Fluency (FAS & Animals); [place] Making Test A&B; Modified [employer] (M-WCST); Test of Practical Judgment (TOP-J: Form B); [place] Sleepiness Scale (ESS); [place] Sleep Quality Inventory (PSQI); Geriatric Depression Scale - Short Form (GDS-SF); and Generalized Anxiety Disorder - 7 (SHRUTHI-7). Results: For standardized tests, performance was compared to a demographically matched sample of neurocognitively healthy adults using the following classification system to indicate deviation from mean (or average) test performance:    Normally Distributed Not-Normally Distributed   Descriptor Percentile Descriptor Percentile   \"Exceptionally High\" >97th \"Within Normal Limits\" >24th   \"Above Average\" 91st - 97th \"Low Average\" 9th - 24th   \"High Average\" 75th - 90th \"Below Average\" 2nd - 8th   \"Average\" 25th - 74th \"Exceptionally Low\" <2nd   \"Low Average\" 9th - 24th    \"Below Average\" 2nd - 8th    \"Exceptionally Low\" <2nd      Performance and symptom validity are analyzed in a number of ways, including administration of neuropsychological measures that have been empirically shown to identify suboptimal performance or purposeful exaggeration.  These tests and their scores have been redacted from this report in order to ensure test security, but are available to formally trained neuropsychologists upon request. In addition, when possible, the overall pattern of performance is analyzed for consistency between measures, consistency with the expected severity of impairment, and the presenting symptoms are compared against base rates of symptoms in other patients with similar problems. The patient's performances were within acceptable limits, indicating the results of the present evaluation are believed to be valid and reliable. Premorbid Functioning:   Average  Attention:   Brief auditory attention: High average  Processing Speed:  Exceptionally low to low average but generally in the below average range  Executive Functioning:   Mental set switching: Exceptionally low. Discontinued after the allotted time had elapsed.   During that time, the patient correctly completed 7/24 transitions and made 3 sequencing errors  Problem Solving: Exceptionally low  Visuospatial:  Basic visuoperception: Below average  Figure copy: Exceptionally low  Language:  Confrontation naming: Within normal limits  Letter fluency: Average  Semantic Fluency: Exceptionally low  Learning   List learning: Exceptionally low  Story learning: Below average  Memory:  List recall: Below average  Story recall: Exceptionally low  Recall of previously copied figure: Exceptionally low  Recognition:  List recognition: Exceptionally low  Story recognition: Exceptionally low  Recognition of previously copied figure: Exceptionally low   Functional:   Practical judgment: Below average  Psychiatric/Sleep:   Depression: Within normal limits  Anxiety: Within normal limits  Daytime sleepiness: Within normal limits  Sleep quality: Within normal limits    TIME:  Clinical Interview: 9849 - 6462 = 35 minutes  Testing by technician: 1300 - 1405 = 65 minutes  Scoring by technician: 20 minutes    BILLING:  02627 x 1 Unit  96138 x 1 Unit  96139 x 2 Units

## 2022-08-02 ENCOUNTER — OFFICE VISIT (OUTPATIENT)
Dept: NEUROLOGY | Age: 87
End: 2022-08-02
Payer: MEDICARE

## 2022-08-02 DIAGNOSIS — F01.50 VASCULAR DEMENTIA WITHOUT BEHAVIORAL DISTURBANCE (HCC): ICD-10-CM

## 2022-08-02 DIAGNOSIS — F02.80 ALZHEIMER'S DISEASE (HCC): Primary | ICD-10-CM

## 2022-08-02 DIAGNOSIS — G30.9 ALZHEIMER'S DISEASE (HCC): Primary | ICD-10-CM

## 2022-08-02 PROCEDURE — 96132 NRPSYC TST EVAL PHYS/QHP 1ST: CPT | Performed by: CLINICAL NEUROPSYCHOLOGIST

## 2022-08-02 PROCEDURE — 96133 NRPSYC TST EVAL PHYS/QHP EA: CPT | Performed by: CLINICAL NEUROPSYCHOLOGIST

## 2022-08-02 NOTE — PROGRESS NOTES
Prior to seeing the patient I reviewed pertinent records, including the previously completed report, the records in [place], and any updated visits from other providers since the patient's last visit. Today, I engaged in a psychoeducational and supportive feedback session with the patient. I provided psychotherapy in the form of psychoeducation and support with respect to the results of the recent Neuropsychological Evaluation, including discussing individual tests as well as patient's areas of neurocognitive strength versus weakness. We discussed, in detail, the following:  Testing revealed moderate impairment across several domains of cognitive functioning but were particularly notable for an amnestic memory profile  As the patient's cognitive impairment has interfered with her daily functioning, she meets criteria for the diagnosis of dementia, currently in the moderate stage  The patient's cognitive dysfunction is likely attributable to a combination of Alzheimer's disease and cerebrovascular disease. Reviewed recommendations outlined in report dated 7/15/2022, including appropriate levels of supervision, practical compensatory strategies, modifiable risk and protective factors, and community resources for family members    Education was provided regarding my diagnostic impressions, and we discussed treatment plan/options. I also answered numerous questions related to the clinical findings, including discussing various methods to improve cognition and mood. Supportive/Cognitive Behavioral/Solution Focused psychotherapy provided. The patient needs to:    Follow-up with referring provider to discuss potential implications for treatment  Incorporate practical compensatory strategies into her daily routine  Emphasized modifiable protective factors (i.e., exercise, healthy diet, social activity, and cognitive stimulation)    The patient's  was particularly interested in discussing medications. He was encouraged to review these during his upcoming appointment with Sadie Acosta and was instructed not to make any medication changes prior to that appointment. TIME:  Clinical Interview: 1000 - 7635 = 35 minutes  Testing by technician: 8863 - 3608 = 65 minutes  Scoring by technician: 20 minutes  Neuropsychological testing evaluation services*: 124 minutes + 30 minutes feedback = 154 minutes total    BILLING:  72796 x 1 Unit  96138 x 1 Unit  96139 x 2 Units  96132 x 1 Units  96133 x 2 Units    *Neuropsychological testing evaluation services include: Integration of patient data, interpretation of standardized test results and clinical data, clinical decision-making, treatment planning and report, and interactive feedback to the patient, family member(s) or caregiver(s), when performed.

## 2022-08-15 ENCOUNTER — HOSPITAL ENCOUNTER (OUTPATIENT)
Dept: INFUSION THERAPY | Age: 87
Discharge: HOME | End: 2022-08-15
Payer: MEDICARE

## 2022-08-15 VITALS
WEIGHT: 115.6 LBS | DIASTOLIC BLOOD PRESSURE: 78 MMHG | RESPIRATION RATE: 18 BRPM | HEIGHT: 60 IN | SYSTOLIC BLOOD PRESSURE: 130 MMHG | BODY MASS INDEX: 22.7 KG/M2 | TEMPERATURE: 96.9 F | HEART RATE: 84 BPM

## 2022-08-15 LAB
ANION GAP BLD CALC-SCNC: 9 MMOL/L (ref 10–20)
CA-I BLD-MCNC: 1.15 MMOL/L (ref 1.12–1.32)
CHLORIDE BLD-SCNC: 105 MMOL/L (ref 98–107)
CO2 BLD-SCNC: 28.2 MMOL/L (ref 21–32)
CREAT BLD-MCNC: 0.84 MG/DL (ref 0.6–1.3)
GLUCOSE BLD-MCNC: 115 MG/DL (ref 65–100)
MAGNESIUM SERPL-MCNC: 2 MG/DL (ref 1.6–2.4)
PHOSPHATE SERPL-MCNC: 3.2 MG/DL (ref 2.6–4.7)
POTASSIUM BLD-SCNC: 4 MMOL/L (ref 3.5–5.1)
SERVICE CMNT-IMP: ABNORMAL
SODIUM BLD-SCNC: 141 MMOL/L (ref 136–145)

## 2022-08-15 PROCEDURE — 36415 COLL VENOUS BLD VENIPUNCTURE: CPT

## 2022-08-15 PROCEDURE — 80047 BASIC METABLC PNL IONIZED CA: CPT

## 2022-08-15 PROCEDURE — 74011250636 HC RX REV CODE- 250/636: Performed by: INTERNAL MEDICINE

## 2022-08-15 PROCEDURE — 84100 ASSAY OF PHOSPHORUS: CPT

## 2022-08-15 PROCEDURE — 83735 ASSAY OF MAGNESIUM: CPT

## 2022-08-15 PROCEDURE — 96372 THER/PROPH/DIAG INJ SC/IM: CPT

## 2022-08-15 RX ADMIN — DENOSUMAB 60 MG: 60 INJECTION SUBCUTANEOUS at 11:46

## 2022-08-15 NOTE — PROGRESS NOTES
[facility] Progress Note    1120  Pt arrived in stable condition and in no distress for Prolia    Assessment completed. Patient has no complaints. Noticed some short-term memory loss during assessment questions. Patient denies any dental work. Labs obtained per order and sent for processing. Patient Vitals for the past 12 hrs:   Temp Pulse Resp BP   08/15/22 1125 96.9 °F (36.1 °C) 84 18 130/78        Recent Results (from the past 12 hour(s))   POC CHEM8    Collection Time: 08/15/22 11:34 AM   Result Value Ref Range    Calcium, ionized (POC) 1.15 1.12 - 1.32 mmol/L    Sodium (POC) 141 136 - 145 mmol/L    Potassium (POC) 4.0 3.5 - 5.1 mmol/L    Chloride (POC) 105 98 - 107 mmol/L    CO2 (POC) 28.2 21 - 32 mmol/L    Anion gap (POC) 9 (L) 10 - 20 mmol/L    Glucose (POC) 115 (H) 65 - 100 mg/dL    Creatinine (POC) 0.84 0.6 - 1.3 mg/dL    GFRAA, POC >60 >60 ml/min/1.73m2    GFRNA, POC >60 >60 ml/min/1.73m2    Comment Comment Not Indicated. MAGNESIUM    Collection Time: 08/15/22 11:36 AM   Result Value Ref Range    Magnesium 2.0 1.6 - 2.4 mg/dL   PHOSPHORUS    Collection Time: 08/15/22 11:36 AM   Result Value Ref Range    Phosphorus 3.2 2.6 - 4.7 MG/DL        The following medications administered:  Medications Administered       denosumab (PROLIA) injection 60 mg       Admin Date  08/15/2022 Action  Given Dose  60 mg Route  SubCUTAneous Administered By  Vikas Bentley RN                    Pt tolerated treatment well. No s/s of adverse reaction noted. Pt provided with education on possible side effects of medication along with discharge instructions. Pt verbalized understanding. 1145  Pt discharged in no acute distress.  Next appointment:    Future Appointments   Date Time Provider Fred Ibarra   8/22/2022  2:30 PM Merlin Hippo, NP NEUM BS AMB   2/13/2023 12:00 PM [facility] [address] REG

## 2022-08-22 ENCOUNTER — OFFICE VISIT (OUTPATIENT)
Dept: NEUROLOGY | Age: 87
End: 2022-08-22
Payer: MEDICARE

## 2022-08-22 VITALS
SYSTOLIC BLOOD PRESSURE: 127 MMHG | BODY MASS INDEX: 22.58 KG/M2 | RESPIRATION RATE: 17 BRPM | HEART RATE: 83 BPM | DIASTOLIC BLOOD PRESSURE: 74 MMHG | HEIGHT: 60 IN | OXYGEN SATURATION: 96 %

## 2022-08-22 DIAGNOSIS — I67.9 CEREBROVASCULAR DISEASE: ICD-10-CM

## 2022-08-22 DIAGNOSIS — F02.80 ALZHEIMER'S DISEASE (HCC): Primary | ICD-10-CM

## 2022-08-22 DIAGNOSIS — F98.8 ATTENTION DEFICIT DISORDER, UNSPECIFIED HYPERACTIVITY PRESENCE: ICD-10-CM

## 2022-08-22 DIAGNOSIS — G30.9 ALZHEIMER'S DISEASE (HCC): Primary | ICD-10-CM

## 2022-08-22 PROBLEM — F01.518 VASCULAR DEMENTIA WITH BEHAVIOR DISTURBANCE (HCC): Status: ACTIVE | Noted: 2022-08-22

## 2022-08-22 PROCEDURE — 99213 OFFICE O/P EST LOW 20 MIN: CPT | Performed by: NURSE PRACTITIONER

## 2022-08-22 PROCEDURE — 1123F ACP DISCUSS/DSCN MKR DOCD: CPT | Performed by: NURSE PRACTITIONER

## 2022-08-22 NOTE — PROGRESS NOTES
[facility]  [address] [address] [facility]  Liz Dunaway  Tel: [phone]  Fax: [phone]      Date:  22     Name:  Poli Benz  :  1932  MRN:  [id number]     PCP:  Onesimo Ramirez MD    Chief Complaint   Patient presents with    Alzheimers    Dementia     Pt has had EEG and Brain MRI. Yearly visit for late on set dementia       HISTORY OF PRESENT ILLNESS:  Patient presents today for follow up tests, brain MRI and EEG. The EEG was Normal.  Pt saw Zoe Winters in 2022, who tried to increase the Vyvanse to 40mg: Patient's  is unclear whether he actually increased it, otherwise he has multiple questions about what that is supposed to be helping for whether she should keep taking it. Still able to read, enjoys walking, housekeeping, she is still able to dress herself, perform self-care independently, sometimes her  has to remind her to place a pad or depends on so she does not have any accidents. 1 safety incident: she went out of the apartment and didn't know how to get to [employer], she lives at [employer] and someone recognized her and helped her safely. .   A little upset with  at times but normally a year agitation is not an issue  Stays in bed till about 9 AM, no issues with sleeping. Pill box,  reminds her to take her meds. Family notes today she has decreased abilities in her memory, short term memory is deteriorating. Asks the same questions over and over again. Patient is consistent with the Namzeric. REVIEW OF SYSTEMS:     Review of Systems   Eyes: Negative. Gastrointestinal:         Good appetite, meals come from [employer], no issues with swallowing. Genitourinary:         Wears a pad/depends at times   Musculoskeletal: Negative. Negative for falls. Neurological:  Negative for dizziness, tremors, seizures and headaches. Psychiatric/Behavioral:  Positive for memory loss.  Negative for depression and hallucinations. The patient is nervous/anxious (at times, but not normally). The patient does not have insomnia. All other systems reviewed and are negative. Current Outpatient Medications   Medication Sig    lisdexamfetamine (VYVANSE) 20 mg capsule Take 1 Capsule by mouth daily. Max Daily Amount: 20 mg.    Namzaric 28-10 mg CSpX TAKE 1 CAPSULE DAILY    vitamin E (AQUA GEMS) 400 unit capsule Take  by mouth daily. escitalopram oxalate (LEXAPRO) 20 mg tablet Take 0.5 Tablets by mouth daily. atorvastatin (LIPITOR) 10 mg tablet TAKE 1 TABLET DAILY    albuterol (PROAIR HFA) 90 mcg/actuation inhaler Take 1 Puff by inhalation every four (4) hours as needed for Wheezing or Shortness of Breath. Indications: Acute Asthma Attack    montelukast (SINGULAIR) 10 mg tablet Take 10 mg by mouth daily. cholecalciferol (VITAMIN D3) (1000 Units /25 mcg) tablet Take  by mouth daily. No current facility-administered medications for this visit. Allergies   Allergen Reactions    Latex Unknown (comments)    Dog Dander Shortness of Breath     Past Medical History:   Diagnosis Date    Asthma     Dr. Roselia Lombardi June 2013--doesn't see routinely--pt reports no change in allergies--no need for routine follow-up. Dr. Breezy Ramos sees for mild COPD--on Symbicort since ~Nov 2013. Continued Singulair. Ceruminosis     periodic ear cleaning at [facility]    Chronic obstructive pulmonary disease ([facility] [address]     Encounter for screening colonoscopy ~2006    Normal per pt--[facility] records requested July 2013 visit. Fainting     Falls     Fatigue     Hearing loss, left     va hearing    History of mammogram May 2014    Normal/no change. History of shingles 2010    Dec 2014:  Pt reports clinical history and shingles vaccine 4yrs ago. To try to obtain exact date of zostavax from /source. History of skin cancer     melanoma 2003?, BCC & SCC- followed by Dr. Ethan Vigil- twice a yr appts.   July 2013-still on 6mo schedule for skin checks. Memory disorder     Osteopenia     DEXA     Overactive bladder     detrol LA. Vesicare--[facility] Considering procedure for incontinence. Postmenopausal hormone replacement therapy     since hysterectomy    Right shoulder pain 2013. Mild osteopenia; mild AC osteoarthritis. Sports Med Eval Oct 2013. Well woman exam (no gynecological exam) Aug 3, 2012    \"[facility] Screening\":  Results scanned to chart. Updated 2014-no change except noted concern for GFR--see Dec 2014 note, letter as reviewed with pt. Past Surgical History:   Procedure Laterality Date    HX CATARACT REMOVAL      HX HYSTERECTOMY  89    IR KYPHOPLASTY LUMBAR  2020    MULTIPLE DELIVERY       OK TRABECULOPLASTY BY LASER SURGERY      Dr. Giuliana Francis- last eye exam 2010     Social History     Socioeconomic History    Marital status:      Spouse name: Not on file    Number of children: Not on file    Years of education: Not on file    Highest education level: Not on file   Occupational History    Not on file   Tobacco Use    Smoking status: Former    Smokeless tobacco: Never   Vaping Use    Vaping Use: Not on file   Substance and Sexual Activity    Alcohol use:  Yes     Alcohol/week: 0.0 standard drinks     Comment: 1-2 drinks/every evening ( wine/vodka)    Drug use: Yes     Types: Prescription, OTC    Sexual activity: Never     Birth control/protection: None   Other Topics Concern    Not on file   Social History Narrative    Not on file     Social Determinants of Health     Financial Resource Strain: Not on file   Food Insecurity: Not on file   Transportation Needs: Not on file   Physical Activity: Not on file   Stress: Not on file   Social Connections: Not on file   Intimate Partner Violence: Not on file   Housing Stability: Not on file     Family History   Problem Relation Age of Onset    Dementia Father     Hypertension Mother Stroke Mother     Cancer Other         daughter- breast cancer     INDICATION: Alzheimer's disease, unspecified     EXAMINATION:  MRI BRAIN WO CONTRAST     COMPARISON:  CT head August 16, 2020     TECHNIQUE:  Multiplanar multisequence acquisition without contrast of the brain. FINDINGS:       Ventricles:  Midline, no hydrocephalus. Brain Parenchyma/Brainstem:  Generalized volume loss. Extensive chronic T2  signal hyperintensity in the supratentorial white matter and napoleon. No acute  infarction. Intracranial Hemorrhage:  None. Basal Cisterns:  Normal.   Flow Voids:  Normal.  Additional Comments:  N/A. IMPRESSION  Atrophy and extensive chronic white matter disease with no acute infarction. PHYSICAL EXAMINATION:    Visit Vitals  /74 (BP 1 Location: Left arm, BP Patient Position: Sitting, BP Cuff Size: Adult)   Pulse 83   Resp 17   Ht 5' (1.524 m)   SpO2 96%   BMI 22.58 kg/m²     General:  Well defined, nourished, and well groomed individual in no acute distress. Neck: Supple, nontender, normal range of motion. Musculoskeletal:  Extremities revealed no edema and had full range of motion of joints. Psych:  Good mood and bright affect, presents today with her daughter as well as her . NEUROLOGICAL EXAMINATION:     Mental Status:   Alert and oriented to person, she did not know where she was, she did not know the year, but she did know the month as well as the president. She answered most questions appropriately, she was able to spell world correctly forwards as well as backwards. Attention span and concentration are somewhat limited. Clear speech. Fund of knowledge limited. Cranial Nerves:   PERRLA. Visual fields were full  EOM: no evidence of nystagmus  Facial sensation:  normal and symmetric  Facial motor: normal and symmetric, no facial droop noted. Hearing intact  SCM strength intact  Tongue: midline without fasciculations    Motor Examination: Normal tone.  5/5 muscle strength in bilateral upper and lower extremities. No cogwheel rigidity. No muscle wasting, no twitching or fasciculation noted. Sensory exam:  Normal throughout to light touch in bilateral upper extremities. Coordination:   Finger to nose and rapid arm movement testing was normal.  No resting or intention tremor. Negative Romberg, negative pronator drift. Gait and Station: Slow, relatively steady, some shuffling steps noted. Reflexes:  DTRs 2+ in bilateral biceps, brachioradialis, patella. ASSESSMENT AND PLAN      ICD-10-CM ICD-9-CM    1. Alzheimer's disease ([facility] [address]  G30.9 331.0     F02.80        2. Cerebrovascular disease  I67.9 437.9       3. Attention deficit disorder, unspecified hyperactivity presence  F98.8 314.00 lisdexamfetamine (VYVANSE) 20 mg capsule        1. Alzheimer's disease [facility]): Patient is to continue the Namzaric daily, her  assist with her administering her medications, they do note progression of her memory deficits, will defer supplemental medication at this time. She is to continue with reading and other enjoyable activities as well as supervised walking, advised to notify us of any safety and behavior concerns at which time we can modify her plan of care if need be. 2. Cerebrovascular disease: Brain MRI reviewed with patient as well as her family, she is to continue to follow-up with Javier Lion for ongoing blood pressure and cholesterol management. Stroke risk factors reviewed with patient and family. 3. Attention deficit disorder, unspecified hyperactivity presence: They are uncertain as to whether the Vyvanse is helping or not, at last visit she was increased to 40 mg, her  is unsure whether she was increased that, therefore due to lack of efficacy we will decrease her back to 20 mg daily to gradually try to taper her off this medication, if they note worsening issues with task completion focus etc. we can resume it hopefully at that lower dose.   - lisdexamfetamine (VYVANSE) 20 mg capsule; Take 1 Capsule by mouth daily. Max Daily Amount: 20 mg., Normal, Disp-30 Capsule, R-0      Patient and/or family verbalized understand of all instructions and all questions/concerns were addressed. Safety/side effects of medications discussed. Patient remains a complex patient secondary to polypharmacy, significant comorbid conditions, and use of high-risk medications which complicate the decision making process related to patient's neurologic diagnosis. I will see the patient back in 6 months, sooner if needed.       Jaimie Nurse, FNP-BC

## 2022-08-22 NOTE — PROGRESS NOTES
Chief Complaint   Patient presents with    Alzheimers    Dementia     Pt has had EEG and Brain MRI. Yearly visit for late on set dementia    1. Have you been to the ER, urgent care clinic since your last visit? Hospitalized since your last visit? 2. Have you seen or consulted any other health care providers outside of the [address] [place] [address] since your last visit? Include any pap smears or colon screening.

## 2022-08-24 ENCOUNTER — TELEPHONE (OUTPATIENT)
Dept: INTERNAL MEDICINE CLINIC | Age: 87
End: 2022-08-24

## 2022-08-24 RX ORDER — SULFAMETHOXAZOLE AND TRIMETHOPRIM 800; 160 MG/1; MG/1
1 TABLET ORAL 2 TIMES DAILY
Qty: 6 TABLET | Refills: 0 | Status: SHIPPED | OUTPATIENT
Start: 2022-08-24 | End: 2022-08-27

## 2022-08-24 RX ORDER — CIPROFLOXACIN 250 MG/1
250 TABLET, FILM COATED ORAL 2 TIMES DAILY
Qty: 10 TABLET | Refills: 0 | Status: CANCELLED | OUTPATIENT
Start: 2022-08-24 | End: 2022-08-29

## 2022-08-24 NOTE — TELEPHONE ENCOUNTER
Called, spoke to [place] (HIPAA)  Two pt identifiers confirmed. Toby informed results per.   See message below. Tell them I ecribed bactrim x 3d---go to UC or appt if not improved        Patient verbalized understanding of information discussed with no further questions at this time.

## 2022-08-24 NOTE — TELEPHONE ENCOUNTER
Pt  called. States pt has UTI    States burning during urination  Onset 1 day or 2.       States please send something in    [address], [address] [address]  [phone]

## 2022-10-29 DIAGNOSIS — E78.5 DYSLIPIDEMIA: ICD-10-CM

## 2022-10-29 RX ORDER — ATORVASTATIN CALCIUM 10 MG/1
TABLET, FILM COATED ORAL
Qty: 90 TABLET | Refills: 3 | Status: SHIPPED | OUTPATIENT
Start: 2022-10-29

## 2022-11-11 RX ORDER — ESCITALOPRAM OXALATE 10 MG/1
TABLET ORAL
Qty: 90 TABLET | Refills: 3 | OUTPATIENT
Start: 2022-11-11

## 2022-11-11 RX ORDER — ESCITALOPRAM OXALATE 20 MG/1
10 TABLET ORAL DAILY
Qty: 90 TABLET | Refills: 3 | Status: SHIPPED | OUTPATIENT
Start: 2022-11-11 | End: 2022-11-14 | Stop reason: SDUPTHER

## 2022-11-14 RX ORDER — ESCITALOPRAM OXALATE 20 MG/1
10 TABLET ORAL DAILY
Qty: 90 TABLET | Refills: 3 | Status: SHIPPED | OUTPATIENT
Start: 2022-11-14

## 2022-11-18 ENCOUNTER — TELEPHONE (OUTPATIENT)
Dept: NEUROLOGY | Age: 87
End: 2022-11-18

## 2023-02-01 ENCOUNTER — OFFICE VISIT (OUTPATIENT)
Dept: NEUROLOGY | Age: 88
End: 2023-02-01
Payer: MEDICARE

## 2023-02-01 ENCOUNTER — TELEPHONE (OUTPATIENT)
Dept: NEUROLOGY | Age: 88
End: 2023-02-01

## 2023-02-01 VITALS
HEART RATE: 76 BPM | BODY MASS INDEX: 22.85 KG/M2 | WEIGHT: 116.4 LBS | RESPIRATION RATE: 18 BRPM | DIASTOLIC BLOOD PRESSURE: 78 MMHG | SYSTOLIC BLOOD PRESSURE: 110 MMHG | HEIGHT: 60 IN | TEMPERATURE: 97.6 F

## 2023-02-01 DIAGNOSIS — G30.1 ALZHEIMER'S TYPE DEMENTIA WITH LATE ONSET WITHOUT BEHAVIORAL DISTURBANCE (HCC): ICD-10-CM

## 2023-02-01 DIAGNOSIS — F01.50 VASCULAR DEMENTIA WITHOUT BEHAVIORAL DISTURBANCE (HCC): Primary | ICD-10-CM

## 2023-02-01 DIAGNOSIS — F02.80 ALZHEIMER'S TYPE DEMENTIA WITH LATE ONSET WITHOUT BEHAVIORAL DISTURBANCE (HCC): ICD-10-CM

## 2023-02-01 PROCEDURE — 1101F PT FALLS ASSESS-DOCD LE1/YR: CPT | Performed by: PSYCHIATRY & NEUROLOGY

## 2023-02-01 PROCEDURE — 1090F PRES/ABSN URINE INCON ASSESS: CPT | Performed by: PSYCHIATRY & NEUROLOGY

## 2023-02-01 PROCEDURE — G8510 SCR DEP NEG, NO PLAN REQD: HCPCS | Performed by: PSYCHIATRY & NEUROLOGY

## 2023-02-01 PROCEDURE — 1123F ACP DISCUSS/DSCN MKR DOCD: CPT | Performed by: PSYCHIATRY & NEUROLOGY

## 2023-02-01 PROCEDURE — G8536 NO DOC ELDER MAL SCRN: HCPCS | Performed by: PSYCHIATRY & NEUROLOGY

## 2023-02-01 PROCEDURE — G8427 DOCREV CUR MEDS BY ELIG CLIN: HCPCS | Performed by: PSYCHIATRY & NEUROLOGY

## 2023-02-01 PROCEDURE — G8420 CALC BMI NORM PARAMETERS: HCPCS | Performed by: PSYCHIATRY & NEUROLOGY

## 2023-02-01 PROCEDURE — 99214 OFFICE O/P EST MOD 30 MIN: CPT | Performed by: PSYCHIATRY & NEUROLOGY

## 2023-02-01 RX ORDER — ESCITALOPRAM OXALATE 20 MG/1
20 TABLET ORAL
Qty: 90 TABLET | Refills: 4 | Status: SHIPPED | OUTPATIENT
Start: 2023-02-01

## 2023-02-01 RX ORDER — LANOLIN ALCOHOL/MO/W.PET/CERES
1000 CREAM (GRAM) TOPICAL DAILY
COMMUNITY

## 2023-02-01 RX ORDER — CALCIUM CARBONATE/VITAMIN D3 600 MG-125
TABLET ORAL DAILY
COMMUNITY

## 2023-02-01 RX ORDER — MULTIVITAMIN
1 TABLET ORAL DAILY
COMMUNITY

## 2023-02-01 NOTE — TELEPHONE ENCOUNTER
LMVM RE:    Lexapro sent to pharmacy, she should have stopped Vyvance. Call back with any questions.

## 2023-02-02 NOTE — PROGRESS NOTES
Consult  REFERRED BY:  Daniel Wyman MD    CHIEF COMPLAINT: Progressive memory loss and increased behavioral problems      Subjective: Terry Sheehan is a 80 y.o. right-handed  female seen as a new patient to me, at the request of Dr. Anjali Christiansen, for evaluation of progressive memory loss as a new problem despite taking medications of Namzaric 28/10 mg dose every day and is on maximum cognitive enhancing agents. She is having some progression of her memory loss and some behavioral problems that did get a little bit better on 10 mg of Lexapro each night, and she is taking one half of a 20 mg pill. She still gets periods of agitation and will get mad at her  at times and be somewhat aggressive. Refusing because she cannot remember things. He wants to know about the new monoclonal antibody for amyloid, and we discussed that medication with him today, explained that has a 10 to 15% risk of inflammatory lesions in the brain including hemorrhages or edema, and that the benefit his only 25% slower progression of memory loss with time, probably not much more than the medication she is taking right now. It takes an infusion every 2 weeks, and needs a lot of expensive monitoring including spinal taps, PET scans, MRI scans, and that complete protocols have not been released by the FDA nor has the medicine yet, and what monitoring programs are needed, and the testing is responsive and the medicine itself is $30-$36,000 a year also. After discussing risk and benefits the patient and her  would prefer to wait and see where and who qualifies for the medication and how much side effect there is before making any decisions. We will increase her Lexapro to 20 mg a day to help with behavior, and having vitals a side effect as far as dizziness, nausea, or sedation,, watch closely for that.   In addition he is to try to keep her mentally and physically active and try to get her into an exercise program at her assisted living place. We encouraged him also take a multivitamin vitamin D every day as studies have shown that that helps slow the rate of memory loss down to. They are also try to keep her mentally active, she does read quite a bit, and eat a Mediterranean type diet. Her chart reviewed, her studies are reviewed, and she has had normal B12 and thyroid test done, and had a MRI scan just showed white matter disease and mild atrophic changes July 2022. She has had several neuropsych testing done that shows a cognitive problem typical of both Alzheimer's disease and vascular dementia. She has had carotid Doppler studies that show less than 50% stenosis 2 to 3 years ago and the patient's never had a history of stroke or TIA. She is now off the Vyvanase completely, and I advised him to keep her off of it. Again her neurologic notes, her neuropsych testing, her MRI scans, and all her lab studies and medical notes were all reviewed in detail with the patient and her . Past Medical History:   Diagnosis Date    Asthma     Dr. Naun Bean June 2013--doesn't see routinely--pt reports no change in allergies--no need for routine follow-up. Dr. Nikos Pugh sees for mild COPD--on Symbicort since ~Nov 2013. Continued Singulair. Ceruminosis     periodic ear cleaning at [facility] hearing    Chronic obstructive pulmonary disease ([facility] [address]     Encounter for screening colonoscopy ~2006    Normal per pt--[facility] records requested July 2013 visit. Fainting     Falls     Fatigue     Hearing loss, left     va hearing    History of mammogram May 2014    Normal/no change. History of shingles 2010    Dec 2014:  Pt reports clinical history and shingles vaccine 4yrs ago. To try to obtain exact date of zostavax from /source. History of skin cancer     melanoma 2003?, BCC & SCC- followed by Dr. Deyanira Shook- twice a yr appts. July 2013-still on 6mo schedule for skin checks.     Memory disorder     Osteopenia     DEXA 8/11 Overactive bladder     detrol [place]. Vesicare--[facility]es [facility] Considering procedure for incontinence. Postmenopausal hormone replacement therapy     since hysterectomy    Right shoulder pain 2013. Mild osteopenia; mild AC osteoarthritis. Sports Med Eval Oct 2013. Well woman exam (no gynecological exam) Aug 3, 2012    \"[facility] Screening\":  Results scanned to chart. Updated 2014-no change except noted concern for GFR--see Dec 2014 note, letter as reviewed with pt. Past Surgical History:   Procedure Laterality Date    HX CATARACT REMOVAL      HX HYSTERECTOMY  1948    IR KYPHOPLASTY LUMBAR  2020    MULTIPLE DELIVERY       [place] TRABECULOPLASTY BY LASER SURGERY      Dr. Sveta Geronimo- last eye exam 2010     Family History   Problem Relation Age of Onset    Dementia Father     Hypertension Mother     Stroke Mother     Cancer Other         daughter- breast cancer      Social History     Tobacco Use    Smoking status: Former    Smokeless tobacco: Never   Substance Use Topics    Alcohol use: Yes     Alcohol/week: 0.0 standard drinks     Comment: 1-2 drinks/every evening ( wine/vodka)         Current Outpatient Medications:     mirabegron ER (Myrbetriq) 25 mg ER tablet, Take 25 mg by mouth daily. , Disp: , Rfl:     cyanocobalamin (Vitamin B-12) 1,000 mcg tablet, Take 1,000 mcg by mouth daily. , Disp: , Rfl:     calcium-cholecalciferol, d3, (CALCIUM 600 + D) 600-125 mg-unit tab, Take  by mouth daily. , Disp: , Rfl:     multivitamin (ONE A DAY) tablet, Take 1 Tablet by mouth daily. , Disp: , Rfl:     escitalopram oxalate (LEXAPRO) 20 mg tablet, Take 1 Tablet by mouth daily (with dinner). , Disp: 90 Tablet, Rfl: 4    Namzaric 28-10 mg CSpX, TAKE 1 CAPSULE DAILY, Disp: 90 Capsule, Rfl: 3    vitamin E (AQUA GEMS) 400 unit capsule, Take  by mouth daily. , Disp: , Rfl:     albuterol (PROAIR HFA) 90 mcg/actuation inhaler, Take 1 Puff by inhalation every four (4) hours as needed for Wheezing or Shortness of Breath. Indications: Acute Asthma Attack, Disp: 3 Inhaler, Rfl: 3    montelukast (SINGULAIR) 10 mg tablet, Take 10 mg by mouth daily. , Disp: , Rfl:     cholecalciferol (VITAMIN D3) (1000 Units /25 mcg) tablet, Take  by mouth daily. , Disp: , Rfl:         Allergies   Allergen Reactions    Latex Unknown (comments)    Dog Dander Shortness of Breath      MRI Results (most recent):  Results from Hospital Encounter encounter on 07/02/22    MRI BRAIN WO CONT    Narrative  INDICATION: Alzheimer's disease, unspecified    EXAMINATION:  MRI BRAIN WO CONTRAST    COMPARISON:  CT head August 16, 2020    TECHNIQUE:  Multiplanar multisequence acquisition without contrast of the brain. FINDINGS:    Ventricles:  Midline, no hydrocephalus. Brain Parenchyma/Brainstem:  Generalized volume loss. Extensive chronic T2  signal hyperintensity in the supratentorial white matter and napoleon. No acute  infarction. Intracranial Hemorrhage:  None. Basal Cisterns:  Normal.  Flow Voids:  Normal.  Additional Comments:  N/A. Impression  Atrophy and extensive chronic white matter disease with no acute infarction. Results from [place] encounter on 07/02/22    MRI BRAIN WO CONT    Narrative  INDICATION: Alzheimer's disease, unspecified    EXAMINATION:  MRI BRAIN WO CONTRAST    COMPARISON:  CT head August 16, 2020    TECHNIQUE:  Multiplanar multisequence acquisition without contrast of the brain. FINDINGS:    Ventricles:  Midline, no hydrocephalus. Brain Parenchyma/Brainstem:  Generalized volume loss. Extensive chronic T2  signal hyperintensity in the supratentorial white matter and napoleon. No acute  infarction. Intracranial Hemorrhage:  None. Basal Cisterns:  Normal.  Flow Voids:  Normal.  Additional Comments:  N/A. Impression  Atrophy and extensive chronic white matter disease with no acute infarction. Review of Systems:  Review of systems not obtained due to patient factors. Vitals:    02/01/23 0948   BP: 110/78   Pulse: 76   Resp: 18   Temp: 97.6 °F (36.4 °C)   Weight: 116 lb 6.4 oz (52.8 kg)   Height: 5' (1.524 m)     Objective:     I      NEUROLOGICAL EXAM:    Appearance: The patient is well developed, well nourished, provides a coherent history and is in no acute distress. Mental Status: Oriented to place and person, and not the date or the president, did not know her age and date of birth, and knew her children and her 2 grandchildren, but could not remember 1 of 3 things at 30 seconds or do serial sevens or draw a clock that shows a time 10:50 or spell world backward. Cognitive function is abnormal and speech is fluent and no aphasia or dysarthria. Mood and affect appropriate. Cranial Nerves:   Intact visual fields. Fundi are benign, disc are flat, no lesions seen on funduscopy. CAPRICE, EOM's full, no nystagmus, no ptosis. Facial sensation is normal. Corneal reflexes are not tested. Facial movement is symmetric. Hearing is normal bilaterally. Palate is midline with normal sternocleidomastoid and trapezius muscles are normal. Tongue is midline. Neck without meningismus or bruits  Temporal arteries are not tender or enlarged  TMJ areas are not tender on palpation   Motor:  4/5 strength in upper and lower proximal and distal muscles. Normal bulk and tone. No fasciculations. Rapid alternating movement is symmetric and slow bilaterally   Reflexes:   Deep tendon reflexes 1+/4 and symmetrical.  No babinski or clonus present   Sensory:   Normal to touch, pinprick and vibration and temperature. DSS is intact   Gait:  Normal gait for patient's age. Tremor:   No tremor noted. Cerebellar:  No abnormal cerebellar signs present on Romberg and tandem testing and finger-nose-finger exam.   Neurovascular:  Normal heart sounds and regular rhythm, peripheral pulses decreased, and no carotid bruits.            Assessment:       ICD-10-CM ICD-9-CM    1. Vascular dementia without behavioral disturbance ([facility]  F01.50 290.40       2. Alzheimer's type dementia with late onset without behavioral disturbance ([facility]  G30.1 331.0     F02.80 294.10         Active Problems:    * No active hospital problems. *      Plan:     Patient with several year history of progressive dementia, probably typical of Alzheimer's type dementia, with some possible vascular component, and for this she will continue her Namzaric because she is on maximal therapy there, and we discussed in detail the new monoclonal antibody, that may be available soon, and has been FDA approved, but after the risk and benefits explained to the patient's  and the patient they are not anxious for the medication at this time. They would rather wait. Because of her slight agitation at times and difficult to control behavior and emotional outburst, we will increase the Lexapro to 20 mg a day and see how she tolerates it, because she has done better on the 10 mg. We encouraged her to stay mentally and physically active and eat a Mediterranean type diet and try to exercise with gentle exercise about half an hour each day  She is encouraged to take a multivitamin vitamin D every day in addition. She will try to join an exercise class at her assisted living place. We will see her again in 6 to 8 months time or earlier if need be, she will call if any problem. 38 minutes, the patient and her  reviewing her chart reviewing her x-rays reviewing her neuropsych testing, reviewing her MRI scan, reviewing her lab test, reviewing her medical and neurologic notes, and discussing her diagnosis prognosis and treatment options with her and her  in detail.     Signed By: Jared France MD     February 1, 2023       CC: Kathia Calzada MD  FAX: [phone]

## 2023-02-13 ENCOUNTER — HOSPITAL ENCOUNTER (OUTPATIENT)
Dept: INFUSION THERAPY | Age: 88
Discharge: HOME | End: 2023-02-13
Payer: MEDICARE

## 2023-02-13 VITALS
SYSTOLIC BLOOD PRESSURE: 117 MMHG | WEIGHT: 116.8 LBS | HEART RATE: 71 BPM | DIASTOLIC BLOOD PRESSURE: 69 MMHG | OXYGEN SATURATION: 96 % | BODY MASS INDEX: 22.81 KG/M2 | TEMPERATURE: 97.7 F | RESPIRATION RATE: 16 BRPM

## 2023-02-13 LAB
ALBUMIN SERPL-MCNC: 3.5 G/DL (ref 3.5–5)
ANION GAP BLD CALC-SCNC: 12 MMOL/L (ref 10–20)
ANION GAP BLD CALC-SCNC: 13 MMOL/L (ref 10–20)
ANION GAP SERPL CALC-SCNC: 8 MMOL/L (ref 5–15)
BUN SERPL-MCNC: 22 MG/DL (ref 6–20)
BUN/CREAT SERPL: 21 (ref 12–20)
CA-I BLD-MCNC: 1.09 MMOL/L (ref 1.12–1.32)
CA-I BLD-MCNC: 1.1 MMOL/L (ref 1.12–1.32)
CALCIUM SERPL-MCNC: 8.8 MG/DL (ref 8.5–10.1)
CHLORIDE BLD-SCNC: 101 MMOL/L (ref 98–107)
CHLORIDE BLD-SCNC: 103 MMOL/L (ref 98–107)
CHLORIDE SERPL-SCNC: 102 MMOL/L (ref 97–108)
CO2 BLD-SCNC: 23.1 MMOL/L (ref 21–32)
CO2 BLD-SCNC: 23.8 MMOL/L (ref 21–32)
CO2 SERPL-SCNC: 26 MMOL/L (ref 21–32)
CREAT BLD-MCNC: 0.93 MG/DL (ref 0.6–1.3)
CREAT BLD-MCNC: 0.99 MG/DL (ref 0.6–1.3)
CREAT SERPL-MCNC: 1.06 MG/DL (ref 0.55–1.02)
GLUCOSE BLD-MCNC: 79 MG/DL (ref 65–100)
GLUCOSE BLD-MCNC: 80 MG/DL (ref 65–100)
GLUCOSE SERPL-MCNC: 83 MG/DL (ref 65–100)
PHOSPHATE SERPL-MCNC: 3.4 MG/DL (ref 2.6–4.7)
POTASSIUM BLD-SCNC: 3.8 MMOL/L (ref 3.5–5.1)
POTASSIUM BLD-SCNC: 3.8 MMOL/L (ref 3.5–5.1)
POTASSIUM SERPL-SCNC: 3.8 MMOL/L (ref 3.5–5.1)
SERVICE CMNT-IMP: ABNORMAL
SERVICE CMNT-IMP: ABNORMAL
SODIUM BLD-SCNC: 137 MMOL/L (ref 136–145)
SODIUM BLD-SCNC: 137 MMOL/L (ref 136–145)
SODIUM SERPL-SCNC: 136 MMOL/L (ref 136–145)

## 2023-02-13 PROCEDURE — 80047 BASIC METABLC PNL IONIZED CA: CPT

## 2023-02-13 PROCEDURE — 36415 COLL VENOUS BLD VENIPUNCTURE: CPT

## 2023-02-13 PROCEDURE — 96372 THER/PROPH/DIAG INJ SC/IM: CPT

## 2023-02-13 PROCEDURE — 74011250636 HC RX REV CODE- 250/636: Performed by: INTERNAL MEDICINE

## 2023-02-13 PROCEDURE — 80069 RENAL FUNCTION PANEL: CPT

## 2023-02-13 RX ADMIN — DENOSUMAB 60 MG: 60 INJECTION SUBCUTANEOUS at 15:58

## 2023-02-13 NOTE — PROGRESS NOTES
[facility] Visit Note:  Arrived - 12    Visit Vitals  /69   Pulse 71   Temp 97.7 °F (36.5 °C)   Resp 16   Wt 53 kg (116 lb 12.8 oz)   SpO2 96%   Breastfeeding No   BMI 22.81 kg/m²       Assessment - unchanged. Reviewed Prolia info. Pt denies any recent or upcoming dental work. Labs obtained: Renal Panel, Phos (Ionized Calcium low on Isat)  Recent Results (from the past 12 hour(s))   POC CHEM8    Collection Time: 02/13/23  3:11 PM   Result Value Ref Range    Calcium, ionized (POC) 1.10 (L) 1.12 - 1.32 mmol/L    Sodium (POC) 137 136 - 145 mmol/L    Potassium (POC) 3.8 3.5 - 5.1 mmol/L    Chloride (POC) 101 98 - 107 mmol/L    CO2 (POC) 23.8 21 - 32 mmol/L    Anion gap (POC) 13 10 - 20 mmol/L    Glucose (POC) 79 65 - 100 mg/dL    Creatinine (POC) 0.99 0.6 - 1.3 mg/dL    eGFR (POC) 54 (L) >60 ml/min/1.73m2    Comment Comment Not Indicated. POC CHEM8    Collection Time: 02/13/23  3:17 PM   Result Value Ref Range    Calcium, ionized (POC) 1.09 (L) 1.12 - 1.32 mmol/L    Sodium (POC) 137 136 - 145 mmol/L    Potassium (POC) 3.8 3.5 - 5.1 mmol/L    Chloride (POC) 103 98 - 107 mmol/L    CO2 (POC) 23.1 21 - 32 mmol/L    Anion gap (POC) 12 10 - 20 mmol/L    Glucose (POC) 80 65 - 100 mg/dL    Creatinine (POC) 0.93 0.6 - 1.3 mg/dL    eGFR (POC) 58 (L) >60 ml/min/1.73m2    Comment Comment Not Indicated. RENAL FUNCTION PANEL    Collection Time: 02/13/23  3:22 PM   Result Value Ref Range    Sodium 136 136 - 145 mmol/L    Potassium 3.8 3.5 - 5.1 mmol/L    Chloride 102 97 - 108 mmol/L    CO2 26 21 - 32 mmol/L    Anion gap 8 5 - 15 mmol/L    Glucose 83 65 - 100 mg/dL    BUN 22 (H) 6 - 20 MG/DL    Creatinine 1.06 (H) 0.55 - 1.02 MG/DL    BUN/Creatinine ratio 21 (H) 12 - 20      eGFR 50 (L) >60 ml/min/1.73m2    Calcium 8.8 8.5 - 10.1 MG/DL    Phosphorus 3.4 2.6 - 4.7 MG/DL    Albumin 3.5 3.5 - 5.0 g/dL     Medication given:  Prolia 60mg SQ in left arm    1600 - Tolerated well. No reaction noted. Reviewed Prolia D/C instructions. Verbalized understanding. Pt denies any acute problems/changes. Discharged from Health system ambulatory. No distress.  Next appt: 8/14/23

## 2023-04-03 PROBLEM — E11.9 TYPE 2 DIABETES MELLITUS (HCC): Status: RESOLVED | Noted: 2021-09-13 | Resolved: 2021-12-27

## 2023-07-24 ENCOUNTER — TELEPHONE (OUTPATIENT)
Age: 88
End: 2023-07-24

## 2023-07-24 NOTE — TELEPHONE ENCOUNTER
----- Message from Manjit An sent at 7/24/2023  2:11 PM EDT -----  Subject: Appointment Request    Reason for Call: Established Patient Appointment needed: Routine (Patient   Request) No Script    QUESTIONS    Reason for appointment request? No appointments available during search     Additional Information for Provider? Patient's daughter Alexia Garsia is calling   because patient needs to schedule an appointment to have provider fill out   forms. No available appointment with provider Dr. Author Avalos. Please [facility]   [facility] [phone]. Please advise within the next 5 days.   ---------------------------------------------------------------------------  --------------  Stacy JAIMES  [phone]; OK to leave message on voicemail  ---------------------------------------------------------------------------  --------------  SCRIPT ANSWERS

## 2023-07-29 NOTE — PROGRESS NOTES
HISTORY OF PRESENT ILLNESS   Barbara Dodd   is a 80 y.o.  female. FU ADHD, vascular dementia, osteoporosis s/p L3 kypholasty,HLD OAB ANASTASIIA mild asthma/copd  and medicare wellness-------------here with 2 daughters    Needs forms completed to move to memory care unit at [facility] as her  is now very ill on Hospice care on the health care unit    Pt saw Neurologist and neuropsych last year--has Alz disease on namzaric and has ADHD on vyvanse tapered off-  Progressiuve memory loss and some agitation-started on lexapro 20 mg qd  Brain MRI-white matter dz -extnesive   ill in hospice with acute renal failure and bacteremia from endocarditis    Last ----not on statin  On prolia inj q6 months-calcium not on med list    Her memory loss is severe forgetting conversations after 5 minutes per both daughters  She is not able to tell me where she lives and lacks insight in her inability to take her own medicines or care for herself in her apartment without her  helping her    Last OV   Recent OV for BPPV--went to PT x1--some better. Has a few more visits and HEP   On medication for anxiety and ADHD   Last refill vyvanse 9/13/21-- -not taking thevmedication now---no hyperactivity per .  Neuro MD questioned of pt has ADHD   Memory loss worse--foregets after 5 minutes per    Due for dexa on prolia-no falls  Patient Active Problem List    Diagnosis Date Noted    Alzheimer's type dementia with late onset without behavioral disturbance ([address]) 02/01/2023    Vascular dementia with behavior disturbance ([address]) 08/22/2022    Vascular dementia without behavioral disturbance ([address]) 09/08/2020    Bilateral carotid artery stenosis 08/17/2020    Syncopal seizure ([address]) 08/16/2020    Asthma     Compression fracture of L3 vertebra ([address]) 04/10/2020    Alzheimer's disease ([address]) 09/10/2018    ADD (attention deficit disorder) 01/18/2017    Hyperlipidemia 07/20/2015    Generalized anxiety disorder

## 2023-07-31 ENCOUNTER — OFFICE VISIT (OUTPATIENT)
Age: 88
End: 2023-07-31
Payer: MEDICARE

## 2023-07-31 VITALS
HEIGHT: 60 IN | DIASTOLIC BLOOD PRESSURE: 80 MMHG | RESPIRATION RATE: 16 BRPM | WEIGHT: 108.6 LBS | SYSTOLIC BLOOD PRESSURE: 112 MMHG | TEMPERATURE: 97.2 F | HEART RATE: 65 BPM | OXYGEN SATURATION: 97 % | BODY MASS INDEX: 21.32 KG/M2

## 2023-07-31 DIAGNOSIS — F02.B11 MODERATE LATE ONSET ALZHEIMER'S DEMENTIA WITH AGITATION (HCC): ICD-10-CM

## 2023-07-31 DIAGNOSIS — G30.1 MODERATE LATE ONSET ALZHEIMER'S DEMENTIA WITH AGITATION (HCC): ICD-10-CM

## 2023-07-31 DIAGNOSIS — E78.00 PURE HYPERCHOLESTEROLEMIA: Primary | ICD-10-CM

## 2023-07-31 DIAGNOSIS — F01.518 VASCULAR DEMENTIA WITH BEHAVIORAL DISTURBANCE (HCC): ICD-10-CM

## 2023-07-31 DIAGNOSIS — Z00.00 MEDICARE ANNUAL WELLNESS VISIT, SUBSEQUENT: ICD-10-CM

## 2023-07-31 DIAGNOSIS — M81.0 AGE RELATED OSTEOPOROSIS, UNSPECIFIED PATHOLOGICAL FRACTURE PRESENCE: ICD-10-CM

## 2023-07-31 PROCEDURE — 4004F PT TOBACCO SCREEN RCVD TLK: CPT | Performed by: INTERNAL MEDICINE

## 2023-07-31 PROCEDURE — 1123F ACP DISCUSS/DSCN MKR DOCD: CPT | Performed by: INTERNAL MEDICINE

## 2023-07-31 PROCEDURE — 99213 OFFICE O/P EST LOW 20 MIN: CPT | Performed by: INTERNAL MEDICINE

## 2023-07-31 PROCEDURE — G0439 PPPS, SUBSEQ VISIT: HCPCS | Performed by: INTERNAL MEDICINE

## 2023-07-31 PROCEDURE — G8420 CALC BMI NORM PARAMETERS: HCPCS | Performed by: INTERNAL MEDICINE

## 2023-07-31 PROCEDURE — 1090F PRES/ABSN URINE INCON ASSESS: CPT | Performed by: INTERNAL MEDICINE

## 2023-07-31 PROCEDURE — G8427 DOCREV CUR MEDS BY ELIG CLIN: HCPCS | Performed by: INTERNAL MEDICINE

## 2023-07-31 RX ORDER — ATORVASTATIN CALCIUM 10 MG/1
TABLET, FILM COATED ORAL
COMMUNITY
Start: 2023-07-26

## 2023-07-31 SDOH — ECONOMIC STABILITY: FOOD INSECURITY: WITHIN THE PAST 12 MONTHS, THE FOOD YOU BOUGHT JUST DIDN'T LAST AND YOU DIDN'T HAVE MONEY TO GET MORE.: NEVER TRUE

## 2023-07-31 SDOH — ECONOMIC STABILITY: INCOME INSECURITY: HOW HARD IS IT FOR YOU TO PAY FOR THE VERY BASICS LIKE FOOD, HOUSING, MEDICAL CARE, AND HEATING?: NOT HARD AT ALL

## 2023-07-31 SDOH — ECONOMIC STABILITY: FOOD INSECURITY: WITHIN THE PAST 12 MONTHS, YOU WORRIED THAT YOUR FOOD WOULD RUN OUT BEFORE YOU GOT MONEY TO BUY MORE.: NEVER TRUE

## 2023-07-31 SDOH — ECONOMIC STABILITY: HOUSING INSECURITY
IN THE LAST 12 MONTHS, WAS THERE A TIME WHEN YOU DID NOT HAVE A STEADY PLACE TO SLEEP OR SLEPT IN A SHELTER (INCLUDING NOW)?: NO

## 2023-07-31 ASSESSMENT — PATIENT HEALTH QUESTIONNAIRE - PHQ9
1. LITTLE INTEREST OR PLEASURE IN DOING THINGS: 0
SUM OF ALL RESPONSES TO PHQ QUESTIONS 1-9: 0
SUM OF ALL RESPONSES TO PHQ QUESTIONS 1-9: 0
2. FEELING DOWN, DEPRESSED OR HOPELESS: 0
SUM OF ALL RESPONSES TO PHQ QUESTIONS 1-9: 0
SUM OF ALL RESPONSES TO PHQ9 QUESTIONS 1 & 2: 0
SUM OF ALL RESPONSES TO PHQ QUESTIONS 1-9: 0

## 2023-07-31 ASSESSMENT — LIFESTYLE VARIABLES
HOW MANY STANDARD DRINKS CONTAINING ALCOHOL DO YOU HAVE ON A TYPICAL DAY: 1 OR 2
HOW OFTEN DO YOU HAVE A DRINK CONTAINING ALCOHOL: MONTHLY OR LESS

## 2023-07-31 NOTE — PROGRESS NOTES
1. \"Have you been to the ER, urgent care clinic since your last visit? Hospitalized since your last visit? \" No    2. \"Have you seen or consulted any other health care providers outside of the [address] [place] [address] since your last visit? \" No     3. For patients aged 43-73: Has the patient had a colonoscopy / FIT/ Cologuard? No      If the patient is female:    4. For patients aged 43-66: Has the patient had a mammogram within the past 2 years? No      5. For patients aged 21-65: Has the patient had a pap smear?   No

## 2023-08-01 ENCOUNTER — TELEPHONE (OUTPATIENT)
Age: 88
End: 2023-08-01

## 2023-08-01 NOTE — TELEPHONE ENCOUNTER
Caller: Eduardo Lopez, Daughter  HIPAA: caller NOT on most recent HIPAA (jan 2023)  No information given out      Caller states:   completing H+P for [employer]    Please fax to [employer]: [phone]    They request it be sent today or tomorrow

## 2023-08-07 PROBLEM — M81.0 OSTEOPOROSIS: Status: ACTIVE | Noted: 2023-08-07

## 2023-08-14 ENCOUNTER — APPOINTMENT (OUTPATIENT)
Dept: INFUSION THERAPY | Age: 88
End: 2023-08-14

## 2024-02-06 ENCOUNTER — TELEPHONE (OUTPATIENT)
Age: 89
End: 2024-02-06

## 2024-02-06 DIAGNOSIS — M81.0 OSTEOPOROSIS, UNSPECIFIED OSTEOPOROSIS TYPE, UNSPECIFIED PATHOLOGICAL FRACTURE PRESENCE: Primary | ICD-10-CM

## 2024-02-06 NOTE — TELEPHONE ENCOUNTER
Attempted to reach patient on both numbers listed in chart. Home number rings busy unable to leave message. Mobile number is no longer in service. Unable to leave message.   Not active on [employer].     Patient child, tristin not on PHI form to release information too.

## 2024-02-06 NOTE — TELEPHONE ENCOUNTER
----- Message from Bucky Levin MD sent at 2024  1:18 PM EST -----  Signed-please inform pt  ----- Message -----  From: Franci Padilla RN  Sent: 2024  10:21 AM EST  To: Bucky Levin MD    Pt scheduled for prolia , however her order has . Please sign plan in Epic.    Thanks!

## 2024-03-22 ENCOUNTER — APPOINTMENT (OUTPATIENT)
Facility: HOSPITAL | Age: 89
End: 2024-03-22
Payer: MEDICARE

## 2024-03-22 ENCOUNTER — HOSPITAL ENCOUNTER (EMERGENCY)
Facility: HOSPITAL | Age: 89
Discharge: HOME | End: 2024-03-22
Payer: MEDICARE

## 2024-03-22 ENCOUNTER — CLINICAL DOCUMENTATION (OUTPATIENT)
Dept: CASE MANAGEMENT | Age: 89
End: 2024-03-22

## 2024-03-22 VITALS
BODY MASS INDEX: 21.2 KG/M2 | HEART RATE: 71 BPM | TEMPERATURE: 97.4 F | DIASTOLIC BLOOD PRESSURE: 70 MMHG | HEIGHT: 60 IN | WEIGHT: 108 LBS | SYSTOLIC BLOOD PRESSURE: 122 MMHG | OXYGEN SATURATION: 96 % | RESPIRATION RATE: 16 BRPM

## 2024-03-22 DIAGNOSIS — S01.81XA LACERATION OF FOREHEAD, INITIAL ENCOUNTER: ICD-10-CM

## 2024-03-22 DIAGNOSIS — R11.2 NAUSEA AND VOMITING, UNSPECIFIED VOMITING TYPE: ICD-10-CM

## 2024-03-22 DIAGNOSIS — K52.9 ENTERITIS: ICD-10-CM

## 2024-03-22 DIAGNOSIS — S09.90XA INJURY OF HEAD, INITIAL ENCOUNTER: Primary | ICD-10-CM

## 2024-03-22 DIAGNOSIS — R93.89 ABNORMAL CT SCAN: ICD-10-CM

## 2024-03-22 LAB
ALBUMIN SERPL-MCNC: 4.1 G/DL (ref 3.5–5)
ALBUMIN/GLOB SERPL: 1.1 (ref 1.1–2.2)
ALP SERPL-CCNC: 83 U/L (ref 45–117)
ALT SERPL-CCNC: 21 U/L (ref 12–78)
ANION GAP SERPL CALC-SCNC: 6 MMOL/L (ref 5–15)
AST SERPL-CCNC: 18 U/L (ref 15–37)
BASOPHILS # BLD: 0.1 K/UL (ref 0–0.1)
BASOPHILS NFR BLD: 0 % (ref 0–1)
BILIRUB SERPL-MCNC: 0.4 MG/DL (ref 0.2–1)
BUN SERPL-MCNC: 21 MG/DL (ref 6–20)
BUN/CREAT SERPL: 16 (ref 12–20)
CALCIUM SERPL-MCNC: 10.3 MG/DL (ref 8.5–10.1)
CHLORIDE SERPL-SCNC: 108 MMOL/L (ref 97–108)
CO2 SERPL-SCNC: 25 MMOL/L (ref 21–32)
CREAT SERPL-MCNC: 1.31 MG/DL (ref 0.55–1.02)
DIFFERENTIAL METHOD BLD: ABNORMAL
EOSINOPHIL # BLD: 0.1 K/UL (ref 0–0.4)
EOSINOPHIL NFR BLD: 0 % (ref 0–7)
ERYTHROCYTE [DISTWIDTH] IN BLOOD BY AUTOMATED COUNT: 12.5 % (ref 11.5–14.5)
GLOBULIN SER CALC-MCNC: 3.6 G/DL (ref 2–4)
GLUCOSE SERPL-MCNC: 114 MG/DL (ref 65–100)
HCT VFR BLD AUTO: 45.8 % (ref 35–47)
HGB BLD-MCNC: 14.7 G/DL (ref 11.5–16)
IMM GRANULOCYTES # BLD AUTO: 0.1 K/UL (ref 0–0.04)
IMM GRANULOCYTES NFR BLD AUTO: 1 % (ref 0–0.5)
LYMPHOCYTES # BLD: 1.3 K/UL (ref 0.8–3.5)
LYMPHOCYTES NFR BLD: 7 % (ref 12–49)
MCH RBC QN AUTO: 32.3 PG (ref 26–34)
MCHC RBC AUTO-ENTMCNC: 32.1 G/DL (ref 30–36.5)
MCV RBC AUTO: 100.7 FL (ref 80–99)
MONOCYTES # BLD: 1.4 K/UL (ref 0–1)
MONOCYTES NFR BLD: 7 % (ref 5–13)
NEUTS SEG # BLD: 17.1 K/UL (ref 1.8–8)
NEUTS SEG NFR BLD: 85 % (ref 32–75)
NRBC # BLD: 0 K/UL (ref 0–0.01)
NRBC BLD-RTO: 0 PER 100 WBC
PLATELET # BLD AUTO: 297 K/UL (ref 150–400)
PMV BLD AUTO: 10.1 FL (ref 8.9–12.9)
POTASSIUM SERPL-SCNC: 4.3 MMOL/L (ref 3.5–5.1)
PROT SERPL-MCNC: 7.7 G/DL (ref 6.4–8.2)
RBC # BLD AUTO: 4.55 M/UL (ref 3.8–5.2)
SODIUM SERPL-SCNC: 139 MMOL/L (ref 136–145)
TROPONIN I SERPL HS-MCNC: 5 NG/L (ref 0–51)
WBC # BLD AUTO: 20 K/UL (ref 3.6–11)

## 2024-03-22 PROCEDURE — 96376 TX/PRO/DX INJ SAME DRUG ADON: CPT

## 2024-03-22 PROCEDURE — 71045 X-RAY EXAM CHEST 1 VIEW: CPT

## 2024-03-22 PROCEDURE — 72125 CT NECK SPINE W/O DYE: CPT

## 2024-03-22 PROCEDURE — 2580000003 HC RX 258: Performed by: PHYSICIAN ASSISTANT

## 2024-03-22 PROCEDURE — 85025 COMPLETE CBC W/AUTO DIFF WBC: CPT

## 2024-03-22 PROCEDURE — 99285 EMERGENCY DEPT VISIT HI MDM: CPT

## 2024-03-22 PROCEDURE — 6370000000 HC RX 637 (ALT 250 FOR IP): Performed by: PHYSICIAN ASSISTANT

## 2024-03-22 PROCEDURE — 36415 COLL VENOUS BLD VENIPUNCTURE: CPT

## 2024-03-22 PROCEDURE — 84484 ASSAY OF TROPONIN QUANT: CPT

## 2024-03-22 PROCEDURE — 74177 CT ABD & PELVIS W/CONTRAST: CPT

## 2024-03-22 PROCEDURE — 80053 COMPREHEN METABOLIC PANEL: CPT

## 2024-03-22 PROCEDURE — 6360000004 HC RX CONTRAST MEDICATION: Performed by: PHYSICIAN ASSISTANT

## 2024-03-22 PROCEDURE — 6360000002 HC RX W HCPCS: Performed by: PHYSICIAN ASSISTANT

## 2024-03-22 PROCEDURE — 90471 IMMUNIZATION ADMIN: CPT | Performed by: PHYSICIAN ASSISTANT

## 2024-03-22 PROCEDURE — 90715 TDAP VACCINE 7 YRS/> IM: CPT | Performed by: PHYSICIAN ASSISTANT

## 2024-03-22 PROCEDURE — 2500000003 HC RX 250 WO HCPCS: Performed by: PHYSICIAN ASSISTANT

## 2024-03-22 PROCEDURE — 12052 INTMD RPR FACE/MM 2.6-5.0 CM: CPT

## 2024-03-22 PROCEDURE — 87040 BLOOD CULTURE FOR BACTERIA: CPT

## 2024-03-22 PROCEDURE — 96374 THER/PROPH/DIAG INJ IV PUSH: CPT

## 2024-03-22 PROCEDURE — 70450 CT HEAD/BRAIN W/O DYE: CPT

## 2024-03-22 PROCEDURE — 93005 ELECTROCARDIOGRAM TRACING: CPT | Performed by: PHYSICIAN ASSISTANT

## 2024-03-22 RX ORDER — ACETAMINOPHEN 325 MG/1
650 TABLET ORAL
Status: COMPLETED | OUTPATIENT
Start: 2024-03-22 | End: 2024-03-22

## 2024-03-22 RX ORDER — ONDANSETRON 2 MG/ML
4 INJECTION INTRAMUSCULAR; INTRAVENOUS ONCE
Status: COMPLETED | OUTPATIENT
Start: 2024-03-22 | End: 2024-03-22

## 2024-03-22 RX ORDER — AMOXICILLIN AND CLAVULANATE POTASSIUM 875; 125 MG/1; MG/1
1 TABLET, FILM COATED ORAL 2 TIMES DAILY
Qty: 14 TABLET | Refills: 0 | Status: SHIPPED | OUTPATIENT
Start: 2024-03-22 | End: 2024-03-29

## 2024-03-22 RX ORDER — ONDANSETRON 4 MG/1
4 TABLET, ORALLY DISINTEGRATING ORAL 3 TIMES DAILY PRN
Qty: 21 TABLET | Refills: 0 | Status: SHIPPED | OUTPATIENT
Start: 2024-03-22 | End: 2024-03-22

## 2024-03-22 RX ORDER — ONDANSETRON 4 MG/1
4 TABLET, FILM COATED ORAL EVERY 8 HOURS PRN
Qty: 15 TABLET | Refills: 0 | Status: SHIPPED | OUTPATIENT
Start: 2024-03-22 | End: 2024-03-22

## 2024-03-22 RX ORDER — ONDANSETRON 4 MG/1
4 TABLET, ORALLY DISINTEGRATING ORAL 3 TIMES DAILY PRN
Qty: 21 TABLET | Refills: 0 | Status: SHIPPED | OUTPATIENT
Start: 2024-03-22

## 2024-03-22 RX ORDER — AMOXICILLIN AND CLAVULANATE POTASSIUM 875; 125 MG/1; MG/1
1 TABLET, FILM COATED ORAL 2 TIMES DAILY
Qty: 14 TABLET | Refills: 0 | Status: SHIPPED | OUTPATIENT
Start: 2024-03-22 | End: 2024-03-22

## 2024-03-22 RX ORDER — 0.9 % SODIUM CHLORIDE 0.9 %
500 INTRAVENOUS SOLUTION INTRAVENOUS ONCE
Status: COMPLETED | OUTPATIENT
Start: 2024-03-22 | End: 2024-03-22

## 2024-03-22 RX ORDER — GINSENG 100 MG
CAPSULE ORAL
Status: COMPLETED | OUTPATIENT
Start: 2024-03-22 | End: 2024-03-22

## 2024-03-22 RX ORDER — ONDANSETRON 4 MG/1
4 TABLET, ORALLY DISINTEGRATING ORAL ONCE
Status: DISCONTINUED | OUTPATIENT
Start: 2024-03-22 | End: 2024-03-22

## 2024-03-22 RX ORDER — AMOXICILLIN AND CLAVULANATE POTASSIUM 500; 125 MG/1; MG/1
1 TABLET, FILM COATED ORAL
Status: COMPLETED | OUTPATIENT
Start: 2024-03-22 | End: 2024-03-22

## 2024-03-22 RX ADMIN — ONDANSETRON 4 MG: 2 INJECTION INTRAMUSCULAR; INTRAVENOUS at 13:37

## 2024-03-22 RX ADMIN — BACITRACIN 1 EACH: 500 OINTMENT TOPICAL at 17:53

## 2024-03-22 RX ADMIN — ACETAMINOPHEN 650 MG: 325 TABLET ORAL at 13:59

## 2024-03-22 RX ADMIN — LIDOCAINE HYDROCHLORIDE 10 ML: 10; .005 INJECTION, SOLUTION EPIDURAL; INFILTRATION; INTRACAUDAL; PERINEURAL at 17:50

## 2024-03-22 RX ADMIN — TETANUS TOXOID, REDUCED DIPHTHERIA TOXOID AND ACELLULAR PERTUSSIS VACCINE, ADSORBED 0.5 ML: 5; 2.5; 8; 8; 2.5 SUSPENSION INTRAMUSCULAR at 14:01

## 2024-03-22 RX ADMIN — AMOXICILLIN AND CLAVULANATE POTASSIUM 1 TABLET: 500; 125 TABLET, FILM COATED ORAL at 17:50

## 2024-03-22 RX ADMIN — IOPAMIDOL 100 ML: 755 INJECTION, SOLUTION INTRAVENOUS at 15:52

## 2024-03-22 RX ADMIN — SODIUM CHLORIDE 500 ML: 9 INJECTION, SOLUTION INTRAVENOUS at 14:07

## 2024-03-22 RX ADMIN — ONDANSETRON 4 MG: 2 INJECTION INTRAMUSCULAR; INTRAVENOUS at 17:53

## 2024-03-22 ASSESSMENT — PAIN DESCRIPTION - DESCRIPTORS: DESCRIPTORS: DISCOMFORT

## 2024-03-22 ASSESSMENT — PAIN SCALES - GENERAL
PAINLEVEL_OUTOF10: 9
PAINLEVEL_OUTOF10: 8

## 2024-03-22 ASSESSMENT — PAIN DESCRIPTION - PAIN TYPE: TYPE: ACUTE PAIN

## 2024-03-22 ASSESSMENT — PAIN DESCRIPTION - ONSET: ONSET: SUDDEN

## 2024-03-22 ASSESSMENT — PAIN DESCRIPTION - LOCATION
LOCATION: HEAD
LOCATION: HEAD

## 2024-03-22 ASSESSMENT — PAIN - FUNCTIONAL ASSESSMENT
PAIN_FUNCTIONAL_ASSESSMENT: ACTIVITIES ARE NOT PREVENTED
PAIN_FUNCTIONAL_ASSESSMENT: 0-10

## 2024-03-22 ASSESSMENT — PAIN DESCRIPTION - ORIENTATION: ORIENTATION: MID

## 2024-03-22 NOTE — ED NOTES
Pt discharged by ABDIEL Duval. Discharge instructions discussed and pt given opportunity to ask questions. Pt wheeled out of ED to car at this time.

## 2024-03-22 NOTE — ED PROVIDER NOTES
diagnosis, treatment plan with patient and her daughter who are happy with this plan.  Verbal return precautions discussed.  They are happy this plan and verbalized understanding and agreement [TL]      ED Course User Index  [DB] Kee Otero MD  [TL] Any Driver PA         FINAL IMPRESSION     1. Injury of head, initial encounter    2. Laceration of forehead, initial encounter    3. Nausea and vomiting, unspecified vomiting type          DISPOSITION/PLAN   DISPOSITION        Transition care to oncoming ITALO     PATIENT REFERRED TO:  [facility] EMERGENCY DEPT  [address]  [facility] [address]  [phone]    If symptoms worsen    Bucky Levin MD  [address] [facility]  [address]  [place] [address]  [phone]    In 1 week         DISCHARGE MEDICATIONS:     Medication List        START taking these medications      ondansetron 4 MG tablet  Commonly known as: Zofran  Take 1 tablet by mouth every 8 hours as needed for Nausea or Vomiting            ASK your doctor about these medications      albuterol sulfate  (90 Base) MCG/ACT inhaler  Commonly known as: PROVENTIL;VENTOLIN;PROAIR     atorvastatin 10 MG tablet  Commonly known as: LIPITOR     cyanocobalamin 1000 MCG tablet     escitalopram 20 MG tablet  Commonly known as: LEXAPRO     mirabegron 25 MG Tb24  Commonly known as: MYRBETRIQ     montelukast 10 MG tablet  Commonly known as: SINGULAIR     Namzaric 28-10 MG Cp24  Generic drug: Memantine HCl-Donepezil HCl     vitamin D 25 MCG (1000 UT) Tabs tablet  Commonly known as: CHOLECALCIFEROL               Where to Get Your Medications        These medications were sent to [facility] - [address] - P [phone] - F [phone]  [address] [place] [id number]      Phone: [phone]   ondansetron 4 MG tablet           DISCONTINUED MEDICATIONS:  Current Discharge Medication List          I have seen and evaluated the

## 2024-03-22 NOTE — DISCHARGE INSTRUCTIONS
Sutures will need to be removed in approximately 1 week.  If there are any signs of an infection you need to return to the emergency department.  Please follow close with your primary care    Thank You!    It was a pleasure taking care of you in our Emergency Department today. We know that when you come to [facility], you are entrusting us with your health, comfort, and safety. Our clinicians honor that trust, and truly appreciate the opportunity to care for you and your loved ones.    If you receive a survey about your Emergency Department experience today, please fill it out.  We value your feedback. Thank you.          ___________________________________  I have included a copy of your lab results and/or radiologic studies from today's visit so you can have them easily available at your follow-up visit.   Recent Results (from the past 12 hour(s))   EKG 12 Lead    Collection Time: 03/22/24  1:09 PM   Result Value Ref Range    Ventricular Rate 80 BPM    Atrial Rate 69 BPM    P-R Interval 160 ms    QRS Duration 66 ms    Q-T Interval 454 ms    QTc Calculation (Bazett) 523 ms    R Axis 2 degrees    T Axis 45 degrees    Diagnosis       Sinus rhythm with premature atrial complexes  Low voltage QRS  Prolonged QT  When compared with ECG of 16-AUG-2020 18:18,  premature atrial complexes are now present  Questionable change in QRS axis  QT has lengthened     CBC with Auto Differential    Collection Time: 03/22/24  1:24 PM   Result Value Ref Range    WBC 20.0 (H) 3.6 - 11.0 K/uL    RBC 4.55 3.80 - 5.20 M/uL    Hemoglobin 14.7 11.5 - 16.0 g/dL    Hematocrit 45.8 35.0 - 47.0 %    .7 (H) 80.0 - 99.0 FL    MCH 32.3 26.0 - 34.0 PG    MCHC 32.1 30.0 - 36.5 g/dL    RDW 12.5 11.5 - 14.5 %    Platelets 297 150 - 400 K/uL    MPV 10.1 8.9 - 12.9 FL    Nucleated RBCs 0.0 0  WBC    nRBC 0.00 0.00 - 0.01 K/uL    Neutrophils % 85 (H) 32 - 75 %    Lymphocytes % 7 (L) 12 - 49 %    Monocytes % 7

## 2024-03-22 NOTE — ACP (ADVANCE CARE PLANNING)
ACP Specialist received information from ANAID Vivas (Outpatient ) expressing patient's request to have her spouse, Ken Mack, removed from her Health Care Decision Maker list due to spouse being .   This request has been completed.      Inocencio Freire, ROSA MARIA, HEAVEN-CP  Advance Care   [facility]  [phone]

## 2024-03-22 NOTE — ED NOTES
Assumed care of pt at this time. Pt arrives with reports of glf while trying to use commode. Pt states she had a syncopal episode causing the fall. Pt on monitor x3 with call bell in reach.     1245: Pt family at bedside at this time.     1330: PT resting in ER stretcher with no complaints. Pt remains on monitor x3 with call bell in reach and family at bedside.       1430: Pt resting comfortably and remains on monitor x3 with call bell in reach.     1545: Pt has no complaints. Pt remains on monitor x3 with call bell in reach. Pt family remains at bedside.     1645: Pt resting in ED stretcher and remains on monitor x3 with call bell in reach. Pt family at bedside.     1745: Assisted pt to bedside commode at this time. Pt had BM and urinated.

## 2024-03-23 LAB
EKG ATRIAL RATE: 69 BPM
EKG DIAGNOSIS: NORMAL
EKG P-R INTERVAL: 160 MS
EKG Q-T INTERVAL: 454 MS
EKG QRS DURATION: 66 MS
EKG QTC CALCULATION (BAZETT): 523 MS
EKG R AXIS: 2 DEGREES
EKG T AXIS: 45 DEGREES
EKG VENTRICULAR RATE: 80 BPM

## 2024-03-24 LAB
BACTERIA SPEC CULT: NORMAL
BACTERIA SPEC CULT: NORMAL
SERVICE CMNT-IMP: NORMAL
SERVICE CMNT-IMP: NORMAL

## 2024-06-01 ENCOUNTER — APPOINTMENT (OUTPATIENT)
Facility: HOSPITAL | Age: 89
End: 2024-06-01
Payer: MEDICARE

## 2024-06-01 ENCOUNTER — HOSPITAL ENCOUNTER (EMERGENCY)
Facility: HOSPITAL | Age: 89
Discharge: HOME | End: 2024-06-01
Attending: EMERGENCY MEDICINE
Payer: MEDICARE

## 2024-06-01 VITALS
DIASTOLIC BLOOD PRESSURE: 76 MMHG | OXYGEN SATURATION: 94 % | WEIGHT: 108 LBS | TEMPERATURE: 97.6 F | HEART RATE: 67 BPM | BODY MASS INDEX: 21.2 KG/M2 | HEIGHT: 60 IN | RESPIRATION RATE: 18 BRPM | SYSTOLIC BLOOD PRESSURE: 136 MMHG

## 2024-06-01 DIAGNOSIS — S01.01XA LACERATION OF SCALP, INITIAL ENCOUNTER: ICD-10-CM

## 2024-06-01 DIAGNOSIS — S09.90XA CLOSED HEAD INJURY, INITIAL ENCOUNTER: Primary | ICD-10-CM

## 2024-06-01 PROCEDURE — 2500000003 HC RX 250 WO HCPCS

## 2024-06-01 PROCEDURE — 99284 EMERGENCY DEPT VISIT MOD MDM: CPT

## 2024-06-01 PROCEDURE — 6370000000 HC RX 637 (ALT 250 FOR IP): Performed by: EMERGENCY MEDICINE

## 2024-06-01 PROCEDURE — 12001 RPR S/N/AX/GEN/TRNK 2.5CM/<: CPT

## 2024-06-01 PROCEDURE — 70450 CT HEAD/BRAIN W/O DYE: CPT

## 2024-06-01 PROCEDURE — 73110 X-RAY EXAM OF WRIST: CPT

## 2024-06-01 RX ORDER — LIDOCAINE HYDROCHLORIDE 10 MG/ML
5 INJECTION, SOLUTION EPIDURAL; INFILTRATION; INTRACAUDAL; PERINEURAL
Status: COMPLETED | OUTPATIENT
Start: 2024-06-01 | End: 2024-06-01

## 2024-06-01 RX ORDER — ACETAMINOPHEN 325 MG/1
650 TABLET ORAL
Status: COMPLETED | OUTPATIENT
Start: 2024-06-01 | End: 2024-06-01

## 2024-06-01 RX ADMIN — LIDOCAINE HYDROCHLORIDE 5 ML: 10 INJECTION, SOLUTION EPIDURAL; INFILTRATION; INTRACAUDAL; PERINEURAL at 16:10

## 2024-06-01 RX ADMIN — ACETAMINOPHEN 650 MG: 325 TABLET ORAL at 16:10

## 2024-06-01 ASSESSMENT — PAIN DESCRIPTION - LOCATION: LOCATION: HEAD

## 2024-06-01 ASSESSMENT — PAIN SCALES - GENERAL
PAINLEVEL_OUTOF10: 5
PAINLEVEL_OUTOF10: 0

## 2024-06-01 ASSESSMENT — PAIN - FUNCTIONAL ASSESSMENT: PAIN_FUNCTIONAL_ASSESSMENT: 0-10

## 2024-06-01 NOTE — ED PROVIDER NOTES
PROCEDURE NOTE    I was asked by the attending physician, Lu Oswald MD, to evaluate this head laceration.    Lac Repair    Date/Time: 6/1/2024 4:06 PM    Performed by: Tammy Ramos PA-C  Authorized by: Lu Oswald MD    Consent:     Consent obtained:  Verbal    Consent given by:  Patient    Risks, benefits, and alternatives were discussed: yes      Risks discussed:  Infection and poor cosmetic result    Alternatives discussed:  No treatment and delayed treatment  Universal protocol:     Procedure explained and questions answered to patient or proxy's satisfaction: yes      Relevant documents present and verified: yes      Test results available: yes      Patient identity confirmed:  Verbally with patient and arm band  Anesthesia:     Anesthesia method:  Local infiltration    Local anesthetic:  Lidocaine 1% w/o epi  Laceration details:     Location:  Scalp    Scalp location:  Occipital    Length (cm):  1    Depth (mm):  5  Pre-procedure details:     Preparation:  Patient was prepped and draped in usual sterile fashion  Exploration:     Limited defect created (wound extended): yes      Hemostasis achieved with:  Direct pressure    Imaging outcome: foreign body not noted      Wound exploration: wound explored through full range of motion      Contaminated: no    Treatment:     Area cleansed with:  Chlorhexidine    Amount of cleaning:  Extensive    Irrigation solution:  Sterile water    Irrigation method:  Syringe and pressure wash    Debridement:  None    Undermining:  None    Scar revision: no      Layers/structures repaired:  Deep dermal/superficial fascia  Skin repair:     Repair method:  Staples    Number of staples:  1  Approximation:     Approximation:  Close  Repair type:     Repair type:  Simple  Post-procedure details:     Dressing:  Non-adherent dressing    Procedure completion:  Tolerated well, no immediate complications       Tammy Ramos PA-C  06/01/24 [id number]

## 2024-06-01 NOTE — ED PROVIDER NOTES
[facility] EMERGENCY DEPT  EMERGENCY DEPARTMENT ENCOUNTER       Pt Name: Maria De Jesus Mack  MRN: [id number]  Birthdate 1/20/1932  Date of evaluation: 6/1/2024  Provider: Lu Oswald MD   PCP: Bucky Levin MD  Note Started: 4:06 PM EDT 6/1/24     CHIEF COMPLAINT       Chief Complaint   Patient presents with    Fall     Pt B[facility]EM[facility] from [facility] with reports of an unwitnessed fall, pt hit head unsure LOC. Pt Aox4 at baseline. Pt not on any blood thinners.         HISTORY OF PRESENT ILLNESS: 1 or more elements      History From: ***, History limited by: ***none     Maria De Jesus Mack is a 89 y.o. female ***       Please See MDM for Additional Details of the HPI/PMH  Nursing Notes were all reviewed and agreed with or any disagreements were addressed in the HPI.     REVIEW OF SYSTEMS        Positives and Pertinent negatives as per HPI.    PAST HISTORY     Past Medical History:  Past Medical History:   Diagnosis Date    Asthma     Dr. Herman--appt June 2013--doesn't see routinely--pt reports no change in allergies--no need for routine follow-up.  Dr. Ashby sees for mild COPD--on Symbicort since     Ceruminosis     [facility]    Chronic obstructive pulmonary disease (HCC)     Encounter for screening colonoscopy     2006    Fainting     Falls     Fatigue     [facility]    History of mammogram May 2014    Normal/no change.    History of shingles 2010    Dec 2014:  Pt reports clinical history and shingles vaccine 4yrs ago.  To try to obtain exact date of zostavax from /source.    History of skin cancer     melanoma 2003?, BCC & SCC- followed by Dr. Steward- twice a yr appts.  July 2013-still on 6mo schedule for skin checks.    Memory disorder     Osteopenia     DEXA 8/11    Overactive bladder     detrol LA.  [facility]--sees Urology Spring/Fall.  Considering procedure for incontinence.    Postmenopausal hormone replacement therapy     since hysterectomy    Right shoulder pain

## 2024-07-31 ENCOUNTER — HOSPITAL ENCOUNTER (EMERGENCY)
Facility: HOSPITAL | Age: 89
Discharge: HOME | End: 2024-07-31
Payer: MEDICARE

## 2024-07-31 ENCOUNTER — APPOINTMENT (OUTPATIENT)
Facility: HOSPITAL | Age: 89
End: 2024-07-31
Payer: MEDICARE

## 2024-07-31 VITALS
TEMPERATURE: 98.3 F | SYSTOLIC BLOOD PRESSURE: 131 MMHG | BODY MASS INDEX: 26.22 KG/M2 | HEART RATE: 91 BPM | WEIGHT: 138.89 LBS | RESPIRATION RATE: 18 BRPM | HEIGHT: 61 IN | OXYGEN SATURATION: 95 % | DIASTOLIC BLOOD PRESSURE: 65 MMHG
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
## 2024-07-31 DIAGNOSIS — S09.90XA CLOSED HEAD INJURY, INITIAL ENCOUNTER: Primary | ICD-10-CM

## 2024-07-31 DIAGNOSIS — D64.9 ANEMIA, UNSPECIFIED TYPE: ICD-10-CM

## 2024-07-31 LAB
ALBUMIN SERPL-MCNC: 3.4 G/DL (ref 3.5–5)
ALBUMIN/GLOB SERPL: 1.1 (ref 1.1–2.2)
ALP SERPL-CCNC: 72 U/L (ref 45–117)
ALT SERPL-CCNC: 18 U/L (ref 12–78)
ANION GAP SERPL CALC-SCNC: 4 MMOL/L (ref 5–15)
AST SERPL-CCNC: 16 U/L (ref 15–37)
BASOPHILS # BLD: 0.1 K/UL (ref 0–0.1)
BASOPHILS NFR BLD: 1 % (ref 0–1)
BILIRUB SERPL-MCNC: 0.3 MG/DL (ref 0.2–1)
BUN SERPL-MCNC: 23 MG/DL (ref 6–20)
BUN/CREAT SERPL: 22 (ref 12–20)
CALCIUM SERPL-MCNC: 9.2 MG/DL (ref 8.5–10.1)
CHLORIDE SERPL-SCNC: 107 MMOL/L (ref 97–108)
CO2 SERPL-SCNC: 29 MMOL/L (ref 21–32)
CREAT SERPL-MCNC: 1.04 MG/DL (ref 0.55–1.02)
DIFFERENTIAL METHOD BLD: ABNORMAL
EOSINOPHIL # BLD: 0 K/UL (ref 0–0.4)
EOSINOPHIL NFR BLD: 0 % (ref 0–7)
ERYTHROCYTE [DISTWIDTH] IN BLOOD BY AUTOMATED COUNT: 12.3 % (ref 11.5–14.5)
GLOBULIN SER CALC-MCNC: 3.1 G/DL (ref 2–4)
GLUCOSE SERPL-MCNC: 120 MG/DL (ref 65–100)
HCT VFR BLD AUTO: 34.8 % (ref 35–47)
HGB BLD-MCNC: 10.9 G/DL (ref 11.5–16)
IMM GRANULOCYTES # BLD AUTO: 0 K/UL (ref 0–0.04)
IMM GRANULOCYTES NFR BLD AUTO: 0 % (ref 0–0.5)
INR PPP: 1.1 (ref 0.9–1.1)
LYMPHOCYTES # BLD: 0.6 K/UL (ref 0.8–3.5)
LYMPHOCYTES NFR BLD: 8 % (ref 12–49)
MCH RBC QN AUTO: 30.2 PG (ref 26–34)
MCHC RBC AUTO-ENTMCNC: 31.3 G/DL (ref 30–36.5)
MCV RBC AUTO: 96.4 FL (ref 80–99)
MONOCYTES # BLD: 0.3 K/UL (ref 0–1)
MONOCYTES NFR BLD: 4 % (ref 5–13)
NEUTS SEG # BLD: 6.8 K/UL (ref 1.8–8)
NEUTS SEG NFR BLD: 87 % (ref 32–75)
NRBC # BLD: 0 K/UL (ref 0–0.01)
NRBC BLD-RTO: 0 PER 100 WBC
PLATELET # BLD AUTO: 280 K/UL (ref 150–400)
PMV BLD AUTO: 10.3 FL (ref 8.9–12.9)
POTASSIUM SERPL-SCNC: 4.3 MMOL/L (ref 3.5–5.1)
PROT SERPL-MCNC: 6.5 G/DL (ref 6.4–8.2)
PROTHROMBIN TIME: 11.7 SEC (ref 9–11.1)
RBC # BLD AUTO: 3.61 M/UL (ref 3.8–5.2)
SODIUM SERPL-SCNC: 140 MMOL/L (ref 136–145)
TROPONIN I SERPL HS-MCNC: 5 NG/L (ref 0–51)
TROPONIN I SERPL HS-MCNC: 5 NG/L (ref 0–51)
WBC # BLD AUTO: 7.8 K/UL (ref 3.6–11)

## 2024-07-31 PROCEDURE — 99284 EMERGENCY DEPT VISIT MOD MDM: CPT

## 2024-07-31 PROCEDURE — 84484 ASSAY OF TROPONIN QUANT: CPT

## 2024-07-31 PROCEDURE — 93005 ELECTROCARDIOGRAM TRACING: CPT | Performed by: PHYSICIAN ASSISTANT

## 2024-07-31 PROCEDURE — 72125 CT NECK SPINE W/O DYE: CPT

## 2024-07-31 PROCEDURE — 85610 PROTHROMBIN TIME: CPT

## 2024-07-31 PROCEDURE — 80053 COMPREHEN METABOLIC PANEL: CPT

## 2024-07-31 PROCEDURE — 36415 COLL VENOUS BLD VENIPUNCTURE: CPT

## 2024-07-31 PROCEDURE — 85025 COMPLETE CBC W/AUTO DIFF WBC: CPT

## 2024-07-31 PROCEDURE — 70450 CT HEAD/BRAIN W/O DYE: CPT

## 2024-07-31 RX ORDER — 0.9 % SODIUM CHLORIDE 0.9 %
1000 INTRAVENOUS SOLUTION INTRAVENOUS ONCE
Status: DISCONTINUED | OUTPATIENT
Start: 2024-07-31 | End: 2024-08-01 | Stop reason: HOSPADM

## 2024-07-31 ASSESSMENT — PAIN - FUNCTIONAL ASSESSMENT: PAIN_FUNCTIONAL_ASSESSMENT: 0-10

## 2024-07-31 ASSESSMENT — PAIN DESCRIPTION - LOCATION: LOCATION: HEAD

## 2024-07-31 ASSESSMENT — PAIN SCALES - GENERAL: PAINLEVEL_OUTOF10: 4

## 2024-07-31 NOTE — ED PROVIDER NOTES
Absolute 6.8 1.8 - 8.0 K/UL    Lymphocytes Absolute 0.6 (L) 0.8 - 3.5 K/UL    Monocytes Absolute 0.3 0.0 - 1.0 K/UL    Eosinophils Absolute 0.0 0.0 - 0.4 K/UL    Basophils Absolute 0.1 0.0 - 0.1 K/UL    Immature Granulocytes Absolute 0.0 0.00 - 0.04 K/UL    Differential Type AUTOMATED     Comprehensive Metabolic Panel    Collection Time: 07/31/24 12:19 PM   Result Value Ref Range    Sodium 140 136 - 145 mmol/L    Potassium 4.3 3.5 - 5.1 mmol/L    Chloride 107 97 - 108 mmol/L    CO2 29 21 - 32 mmol/L    Anion Gap 4 (L) 5 - 15 mmol/L    Glucose 120 (H) 65 - 100 mg/dL    BUN 23 (H) 6 - 20 MG/DL    Creatinine 1.04 (H) 0.55 - 1.02 MG/DL    BUN/Creatinine Ratio 22 (H) 12 - 20      Est, Glom Filt Rate 50 (L) >60 ml/min/1.73m2    Calcium 9.2 8.5 - 10.1 MG/DL    Total Bilirubin 0.3 0.2 - 1.0 MG/DL    ALT 18 12 - 78 U/L    AST 16 15 - 37 U/L    Alk Phosphatase 72 45 - 117 U/L    Total Protein 6.5 6.4 - 8.2 g/dL    Albumin 3.4 (L) 3.5 - 5.0 g/dL    Globulin 3.1 2.0 - 4.0 g/dL    Albumin/Globulin Ratio 1.1 1.1 - 2.2     Troponin    Collection Time: 07/31/24 12:19 PM   Result Value Ref Range    Troponin, High Sensitivity 5 0 - 51 ng/L   Protime-INR    Collection Time: 07/31/24 12:19 PM   Result Value Ref Range    INR 1.1 0.9 - 1.1      Protime 11.7 (H) 9.0 - 11.1 sec   EKG 12 Lead    Collection Time: 07/31/24 12:20 PM   Result Value Ref Range    Ventricular Rate 73 BPM    Atrial Rate 67 BPM    P-R Interval 168 ms    QRS Duration 72 ms    Q-T Interval 434 ms    QTc Calculation (Bazett) 478 ms    P Axis 71 degrees    R Axis 29 degrees    T Axis 60 degrees    Diagnosis       Sinus rhythm with premature atrial complexes  When compared with ECG of 22-MAR-2024 13:09,  No significant change was found     Troponin    Collection Time: 07/31/24  2:02 PM   Result Value Ref Range    Troponin, High Sensitivity 5 0 - 51 ng/L       EKG: When ordered, EKG's are interpreted by the Emergency Department Physician in the absence of a cardiologist.

## 2024-07-31 NOTE — DISCHARGE INSTRUCTIONS
Follow-up with PCP for repeat labs (hgb 10.9)       Thank You!    It was a pleasure taking care of you in our Emergency Department today. We know that when you come to [facility], you are entrusting us with your health, comfort, and safety. Our clinicians honor that trust, and truly appreciate the opportunity to care for you and your loved ones.    If you receive a survey about your Emergency Department experience today, please fill it out.  We value your feedback. Thank you.      Any Driver PA-C    ___________________________________  I have included a copy of your lab results and/or radiologic studies from today's visit so you can have them easily available at your follow-up visit.   Recent Results (from the past 12 hour(s))   CBC with Auto Differential    Collection Time: 07/31/24 12:19 PM   Result Value Ref Range    WBC 7.8 3.6 - 11.0 K/uL    RBC 3.61 (L) 3.80 - 5.20 M/uL    Hemoglobin 10.9 (L) 11.5 - 16.0 g/dL    Hematocrit 34.8 (L) 35.0 - 47.0 %    MCV 96.4 80.0 - 99.0 FL    MCH 30.2 26.0 - 34.0 PG    MCHC 31.3 30.0 - 36.5 g/dL    RDW 12.3 11.5 - 14.5 %    Platelets 280 150 - 400 K/uL    MPV 10.3 8.9 - 12.9 FL    Nucleated RBCs 0.0 0  WBC    nRBC 0.00 0.00 - 0.01 K/uL    Neutrophils % 87 (H) 32 - 75 %    Lymphocytes % 8 (L) 12 - 49 %    Monocytes % 4 (L) 5 - 13 %    Eosinophils % 0 0 - 7 %    Basophils % 1 0 - 1 %    Immature Granulocytes % 0 0.0 - 0.5 %    Neutrophils Absolute 6.8 1.8 - 8.0 K/UL    Lymphocytes Absolute 0.6 (L) 0.8 - 3.5 K/UL    Monocytes Absolute 0.3 0.0 - 1.0 K/UL    Eosinophils Absolute 0.0 0.0 - 0.4 K/UL    Basophils Absolute 0.1 0.0 - 0.1 K/UL    Immature Granulocytes Absolute 0.0 0.00 - 0.04 K/UL    Differential Type AUTOMATED     Comprehensive Metabolic Panel    Collection Time: 07/31/24 12:19 PM   Result Value Ref Range    Sodium 140 136 - 145 mmol/L    Potassium 4.3 3.5 - 5.1 mmol/L    Chloride 107 97 - 108 mmol/L    CO2 29 21 - 32 mmol/L

## 2024-08-01 LAB
EKG ATRIAL RATE: 67 BPM
EKG DIAGNOSIS: NORMAL
EKG P AXIS: 71 DEGREES
EKG P-R INTERVAL: 168 MS
EKG Q-T INTERVAL: 434 MS
EKG QRS DURATION: 72 MS
EKG QTC CALCULATION (BAZETT): 478 MS
EKG R AXIS: 29 DEGREES
EKG T AXIS: 60 DEGREES
EKG VENTRICULAR RATE: 73 BPM

## 2024-09-15 ENCOUNTER — HOSPITAL ENCOUNTER (EMERGENCY)
Facility: HOSPITAL | Age: 89
Discharge: HOME | End: 2024-09-15
Attending: EMERGENCY MEDICINE
Payer: MEDICARE

## 2024-09-15 VITALS
DIASTOLIC BLOOD PRESSURE: 67 MMHG | TEMPERATURE: 98.1 F | OXYGEN SATURATION: 99 % | SYSTOLIC BLOOD PRESSURE: 141 MMHG | HEART RATE: 67 BPM | RESPIRATION RATE: 14 BRPM

## 2024-09-15 DIAGNOSIS — R42 POSTURAL DIZZINESS WITH PRESYNCOPE: ICD-10-CM

## 2024-09-15 DIAGNOSIS — I95.9 TRANSIENT HYPOTENSION: Primary | ICD-10-CM

## 2024-09-15 DIAGNOSIS — R55 POSTURAL DIZZINESS WITH PRESYNCOPE: ICD-10-CM

## 2024-09-15 LAB
ALBUMIN SERPL-MCNC: 3.4 G/DL (ref 3.5–5)
ALBUMIN/GLOB SERPL: 1 (ref 1.1–2.2)
ALP SERPL-CCNC: 66 U/L (ref 45–117)
ALT SERPL-CCNC: 15 U/L (ref 12–78)
ANION GAP SERPL CALC-SCNC: 5 MMOL/L (ref 2–12)
AST SERPL-CCNC: 12 U/L (ref 15–37)
BASOPHILS # BLD: 0.1 K/UL (ref 0–0.1)
BASOPHILS NFR BLD: 1 % (ref 0–1)
BILIRUB SERPL-MCNC: 0.3 MG/DL (ref 0.2–1)
BUN SERPL-MCNC: 23 MG/DL (ref 6–20)
BUN/CREAT SERPL: 21 (ref 12–20)
CALCIUM SERPL-MCNC: 9.4 MG/DL (ref 8.5–10.1)
CHLORIDE SERPL-SCNC: 107 MMOL/L (ref 97–108)
CO2 SERPL-SCNC: 27 MMOL/L (ref 21–32)
CREAT SERPL-MCNC: 1.07 MG/DL (ref 0.55–1.02)
DIFFERENTIAL METHOD BLD: ABNORMAL
EKG ATRIAL RATE: 64 BPM
EKG DIAGNOSIS: NORMAL
EKG P AXIS: 77 DEGREES
EKG P-R INTERVAL: 168 MS
EKG Q-T INTERVAL: 442 MS
EKG QRS DURATION: 62 MS
EKG QTC CALCULATION (BAZETT): 455 MS
EKG R AXIS: 46 DEGREES
EKG T AXIS: 54 DEGREES
EKG VENTRICULAR RATE: 64 BPM
EOSINOPHIL # BLD: 0.1 K/UL (ref 0–0.4)
EOSINOPHIL NFR BLD: 1 % (ref 0–7)
ERYTHROCYTE [DISTWIDTH] IN BLOOD BY AUTOMATED COUNT: 13 % (ref 11.5–14.5)
GLOBULIN SER CALC-MCNC: 3.5 G/DL (ref 2–4)
GLUCOSE SERPL-MCNC: 103 MG/DL (ref 65–100)
HCT VFR BLD AUTO: 32.5 % (ref 35–47)
HGB BLD-MCNC: 10.1 G/DL (ref 11.5–16)
IMM GRANULOCYTES # BLD AUTO: 0 K/UL (ref 0–0.04)
IMM GRANULOCYTES NFR BLD AUTO: 0 % (ref 0–0.5)
LYMPHOCYTES # BLD: 0.9 K/UL (ref 0.8–3.5)
LYMPHOCYTES NFR BLD: 11 % (ref 12–49)
MCH RBC QN AUTO: 29 PG (ref 26–34)
MCHC RBC AUTO-ENTMCNC: 31.1 G/DL (ref 30–36.5)
MCV RBC AUTO: 93.4 FL (ref 80–99)
MONOCYTES # BLD: 0.6 K/UL (ref 0–1)
MONOCYTES NFR BLD: 7 % (ref 5–13)
NEUTS SEG # BLD: 6.6 K/UL (ref 1.8–8)
NEUTS SEG NFR BLD: 80 % (ref 32–75)
NRBC # BLD: 0 K/UL (ref 0–0.01)
NRBC BLD-RTO: 0 PER 100 WBC
PLATELET # BLD AUTO: 306 K/UL (ref 150–400)
PMV BLD AUTO: 9.6 FL (ref 8.9–12.9)
POTASSIUM SERPL-SCNC: 4.3 MMOL/L (ref 3.5–5.1)
PROT SERPL-MCNC: 6.9 G/DL (ref 6.4–8.2)
RBC # BLD AUTO: 3.48 M/UL (ref 3.8–5.2)
SODIUM SERPL-SCNC: 139 MMOL/L (ref 136–145)
TROPONIN I SERPL HS-MCNC: 6 NG/L (ref 0–51)
WBC # BLD AUTO: 8.2 K/UL (ref 3.6–11)

## 2024-09-15 PROCEDURE — 85025 COMPLETE CBC W/AUTO DIFF WBC: CPT

## 2024-09-15 PROCEDURE — 99284 EMERGENCY DEPT VISIT MOD MDM: CPT

## 2024-09-15 PROCEDURE — 96360 HYDRATION IV INFUSION INIT: CPT

## 2024-09-15 PROCEDURE — 2580000003 HC RX 258: Performed by: EMERGENCY MEDICINE

## 2024-09-15 PROCEDURE — 80053 COMPREHEN METABOLIC PANEL: CPT

## 2024-09-15 PROCEDURE — 36415 COLL VENOUS BLD VENIPUNCTURE: CPT

## 2024-09-15 PROCEDURE — 93005 ELECTROCARDIOGRAM TRACING: CPT | Performed by: EMERGENCY MEDICINE

## 2024-09-15 PROCEDURE — 84484 ASSAY OF TROPONIN QUANT: CPT

## 2024-09-15 RX ORDER — 0.9 % SODIUM CHLORIDE 0.9 %
500 INTRAVENOUS SOLUTION INTRAVENOUS ONCE
Status: COMPLETED | OUTPATIENT
Start: 2024-09-15 | End: 2024-09-15

## 2024-09-15 RX ADMIN — SODIUM CHLORIDE 500 ML: 900 INJECTION, SOLUTION INTRAVENOUS at 10:40

## 2024-09-15 ASSESSMENT — LIFESTYLE VARIABLES
HOW OFTEN DO YOU HAVE A DRINK CONTAINING ALCOHOL: NEVER
HOW MANY STANDARD DRINKS CONTAINING ALCOHOL DO YOU HAVE ON A TYPICAL DAY: PATIENT DOES NOT DRINK

## 2024-09-15 ASSESSMENT — PAIN - FUNCTIONAL ASSESSMENT: PAIN_FUNCTIONAL_ASSESSMENT: NONE - DENIES PAIN

## 2025-02-23 NOTE — ROUTINE PROCESS
0426-0784: VSS, afebrile. No s/s pain or discomfort. Slept well with parents at bedside overnight. Tolerated gavage feeds, parents attempting PO feed this morning with bottles from home they state are the only kind he likes. They are hoping he will take more PO using these bottles. GT cares completed, site unchanged. Continue POC.       Bedside and Verbal shift change report given to Taylor  (oncoming nurse) by FRANCISCO Myles(offgoing nurse). Report included the following information SBAR, Kardex, Intake/Output and MAR. Zone Phone:   [phone] Significant changes during shift:  none Patient Information Lolita Magana 80 y.o. 
8/16/2020  4:52 PM by Rosalind Olivo MD. Lolita Magana was admitted from [facility] Problem List 
 
Patient Active Problem List  
 Diagnosis Date Noted  Bilateral carotid artery stenosis 08/17/2020  Syncopal seizure ([facility] 08/16/2020  Compression fracture of L3 vertebra (HCC) 04/10/2020  Alzheimer's disease ([facility] 09/10/2018  ADD (attention deficit disorder) 01/18/2017  Hyperlipidemia 07/20/2015  Mild cognitive impairment without memory loss 02/02/2015  Generalized anxiety disorder 02/02/2015  TMJ (temporomandibular joint disorder) 12/21/2011  Syncope and collapse 08/10/2011  Fever, unspecified 08/10/2011  Syncope and collapse 08/10/2011  History of skin cancer  Asthma   
 Hearing loss, left  Ceruminosis  Postmenopausal hormone replacement therapy  Overactive bladder Past Medical History:  
Diagnosis Date  Asthma Dr. Nadja Stacy June 2013--doesn't see routinely--pt reports no change in allergies--no need for routine follow-up. Dr. Inocencio Rivera sees for mild COPD--on Symbicort since ~Nov 2013. Continued Singulair.  Ceruminosis   
 periodic ear cleaning at [place] hearing  Chronic obstructive pulmonary disease ([facility]  Encounter for screening colonoscopy ~2006 Normal per pt--[facility] records requested July 2013 visit.  Fainting [address]  Fatigue  [facility]  History of mammogram May 2014 Normal/no change.  History of shingles 2010 Dec 2014:  Pt reports clinical history and shingles vaccine 4yrs ago. To try to obtain exact date of zostavax from /source.  History of skin cancer melanoma 2003?, BCC & SCC- followed by Dr. Marek Luciano- twice a yr appts. July 2013-still on 6mo schedule for skin checks.  Memory disorder  Osteopenia DEXA 8/11  Overactive bladder   
 detrol LA. [facility]--sees Urology Spring/Fall. Considering procedure for incontinence.  Postmenopausal hormone replacement therapy   
 since hysterectomy  Right shoulder pain Sept 2013. Mild osteopenia; mild AC osteoarthritis. Sports Med Eval Oct 2013.  Well woman exam (no gynecological exam) Aug 3, 2012 \"[employer] Screening\":  Results scanned to chart. Updated Nov 2014-no change except noted concern for GFR--see Dec 2014 note, letter as reviewed with pt. Core Measures: CVA: Yes Yes Activity Status: OOB to Chair Yes Ambulated this shift Yes DVT prophylaxis: DVT prophylaxis Med- Yes Wounds: (If Applicable) Wounds- No 
 
Patient Safety: 
 
Falls Score Total Score: 4 Safety Level_______ Bed Alarm On? Yes Sitter? Yes Tele Plan for upcoming shift:  Possible DC tmrw? Discharge Plan: Yes back home in the morning Active Consults: 
IP CONSULT TO HOSPITALIST 
IP CONSULT TO NEUROLOGY

## 2025-06-18 ENCOUNTER — HOSPITAL ENCOUNTER (EMERGENCY)
Facility: HOSPITAL | Age: 89
Discharge: HOME | End: 2025-06-18
Attending: EMERGENCY MEDICINE
Payer: MEDICARE

## 2025-06-18 ENCOUNTER — APPOINTMENT (OUTPATIENT)
Facility: HOSPITAL | Age: 89
End: 2025-06-18
Payer: MEDICARE

## 2025-06-18 VITALS
RESPIRATION RATE: 15 BRPM | HEIGHT: 61 IN | HEART RATE: 92 BPM | TEMPERATURE: 97.9 F | WEIGHT: 135 LBS | DIASTOLIC BLOOD PRESSURE: 83 MMHG | OXYGEN SATURATION: 95 % | BODY MASS INDEX: 25.49 KG/M2 | SYSTOLIC BLOOD PRESSURE: 149 MMHG

## 2025-06-18 DIAGNOSIS — R55 SYNCOPE AND COLLAPSE: Primary | ICD-10-CM

## 2025-06-18 DIAGNOSIS — E86.0 DEHYDRATION: ICD-10-CM

## 2025-06-18 DIAGNOSIS — E86.1 HYPOTENSION DUE TO HYPOVOLEMIA: ICD-10-CM

## 2025-06-18 LAB
ALBUMIN SERPL-MCNC: 3.3 G/DL (ref 3.5–5)
ALBUMIN/GLOB SERPL: 0.9 (ref 1.1–2.2)
ALP SERPL-CCNC: 68 U/L (ref 45–117)
ALT SERPL-CCNC: 18 U/L (ref 12–78)
ANION GAP SERPL CALC-SCNC: 8 MMOL/L (ref 2–12)
AST SERPL-CCNC: 14 U/L (ref 15–37)
BASOPHILS # BLD: 0.05 K/UL (ref 0–0.1)
BASOPHILS NFR BLD: 0.6 % (ref 0–1)
BILIRUB SERPL-MCNC: 0.3 MG/DL (ref 0.2–1)
BUN SERPL-MCNC: 18 MG/DL (ref 6–20)
BUN/CREAT SERPL: 14 (ref 12–20)
CALCIUM SERPL-MCNC: 9.2 MG/DL (ref 8.5–10.1)
CHLORIDE SERPL-SCNC: 106 MMOL/L (ref 97–108)
CO2 SERPL-SCNC: 27 MMOL/L (ref 21–32)
COMMENT:: NORMAL
CREAT SERPL-MCNC: 1.28 MG/DL (ref 0.55–1.02)
DIFFERENTIAL METHOD BLD: ABNORMAL
EKG ATRIAL RATE: 73 BPM
EKG DIAGNOSIS: NORMAL
EKG P AXIS: 50 DEGREES
EKG P-R INTERVAL: 144 MS
EKG Q-T INTERVAL: 436 MS
EKG QRS DURATION: 64 MS
EKG QTC CALCULATION (BAZETT): 480 MS
EKG R AXIS: 13 DEGREES
EKG T AXIS: 42 DEGREES
EKG VENTRICULAR RATE: 73 BPM
EOSINOPHIL # BLD: 0.04 K/UL (ref 0–0.4)
EOSINOPHIL NFR BLD: 0.5 % (ref 0–7)
ERYTHROCYTE [DISTWIDTH] IN BLOOD BY AUTOMATED COUNT: 15.1 % (ref 11.5–14.5)
GLOBULIN SER CALC-MCNC: 3.8 G/DL (ref 2–4)
GLUCOSE SERPL-MCNC: 133 MG/DL (ref 65–100)
HCT VFR BLD AUTO: 32.2 % (ref 35–47)
HGB BLD-MCNC: 9.4 G/DL (ref 11.5–16)
IMM GRANULOCYTES # BLD AUTO: 0.03 K/UL (ref 0–0.04)
IMM GRANULOCYTES NFR BLD AUTO: 0.4 % (ref 0–0.5)
LYMPHOCYTES # BLD: 0.9 K/UL (ref 0.8–3.5)
LYMPHOCYTES NFR BLD: 10.9 % (ref 12–49)
MAGNESIUM SERPL-MCNC: 2 MG/DL (ref 1.6–2.4)
MCH RBC QN AUTO: 25.1 PG (ref 26–34)
MCHC RBC AUTO-ENTMCNC: 29.2 G/DL (ref 30–36.5)
MCV RBC AUTO: 85.9 FL (ref 80–99)
MONOCYTES # BLD: 0.51 K/UL (ref 0–1)
MONOCYTES NFR BLD: 6.2 % (ref 5–13)
NEUTS SEG # BLD: 6.74 K/UL (ref 1.8–8)
NEUTS SEG NFR BLD: 81.4 % (ref 32–75)
NRBC # BLD: 0 K/UL (ref 0–0.01)
NRBC BLD-RTO: 0 PER 100 WBC
PLATELET # BLD AUTO: 360 K/UL (ref 150–400)
PMV BLD AUTO: 9.5 FL (ref 8.9–12.9)
POTASSIUM SERPL-SCNC: 3.8 MMOL/L (ref 3.5–5.1)
PROT SERPL-MCNC: 7.1 G/DL (ref 6.4–8.2)
RBC # BLD AUTO: 3.75 M/UL (ref 3.8–5.2)
SODIUM SERPL-SCNC: 141 MMOL/L (ref 136–145)
SPECIMEN HOLD: NORMAL
TROPONIN I SERPL HS-MCNC: 6 NG/L (ref 0–51)
WBC # BLD AUTO: 8.3 K/UL (ref 3.6–11)

## 2025-06-18 PROCEDURE — 93005 ELECTROCARDIOGRAM TRACING: CPT | Performed by: EMERGENCY MEDICINE

## 2025-06-18 PROCEDURE — 71045 X-RAY EXAM CHEST 1 VIEW: CPT

## 2025-06-18 PROCEDURE — 2580000003 HC RX 258: Performed by: EMERGENCY MEDICINE

## 2025-06-18 PROCEDURE — 80053 COMPREHEN METABOLIC PANEL: CPT

## 2025-06-18 PROCEDURE — 84484 ASSAY OF TROPONIN QUANT: CPT

## 2025-06-18 PROCEDURE — 99285 EMERGENCY DEPT VISIT HI MDM: CPT

## 2025-06-18 PROCEDURE — 83735 ASSAY OF MAGNESIUM: CPT

## 2025-06-18 PROCEDURE — 85025 COMPLETE CBC W/AUTO DIFF WBC: CPT

## 2025-06-18 PROCEDURE — 96360 HYDRATION IV INFUSION INIT: CPT

## 2025-06-18 PROCEDURE — 36415 COLL VENOUS BLD VENIPUNCTURE: CPT

## 2025-06-18 PROCEDURE — 96361 HYDRATE IV INFUSION ADD-ON: CPT

## 2025-06-18 RX ORDER — 0.9 % SODIUM CHLORIDE 0.9 %
1000 INTRAVENOUS SOLUTION INTRAVENOUS ONCE
Status: COMPLETED | OUTPATIENT
Start: 2025-06-18 | End: 2025-06-18

## 2025-06-18 RX ADMIN — SODIUM CHLORIDE 1000 ML: 0.9 INJECTION, SOLUTION INTRAVENOUS at 13:54

## 2025-06-18 ASSESSMENT — LIFESTYLE VARIABLES
HOW MANY STANDARD DRINKS CONTAINING ALCOHOL DO YOU HAVE ON A TYPICAL DAY: PATIENT DOES NOT DRINK
HOW OFTEN DO YOU HAVE A DRINK CONTAINING ALCOHOL: NEVER

## 2025-06-18 ASSESSMENT — PAIN - FUNCTIONAL ASSESSMENT: PAIN_FUNCTIONAL_ASSESSMENT: NONE - DENIES PAIN

## 2025-06-18 NOTE — ED PROVIDER NOTES
dry mucous membranes and reports of thirst. This is likely contributing to the observed hypotension. The dehydration may be a factor in the reported syncopal episode.  Plan:  - Administer IV fluids  - Encourage oral fluid intake  - Reassess hydration status and blood pressure after fluid administration.      Re-Assessment: Patient had normal cardiac monitoring, blood pressure normalized after IV fluids, and patient has no renal dysfunction.  Patient's anemia of 9.4 appears to be chronic in nature, no reported black stools or bloody stools.  Patient feeling up to baseline able to ambulate normally after fluids and safer discharge at this time.    Heart Score:   NIHSS:         Disposition Considerations (Tests not done, Shared Decision Making, Pt Expectation of Test or Tx.):      FINAL IMPRESSION     1. Syncope and collapse    2. Hypotension due to hypovolemia    3. Dehydration          DISPOSITION/PLAN   DISPOSITION Decision To Discharge 06/18/2025 03:56:51 PM   DISPOSITION CONDITION Stable           Discharge Note: The patient is stable for discharge home. The signs, symptoms, diagnosis, and discharge instructions have been discussed, understanding conveyed, and agreed upon. The patient is to follow up as recommended or return to ER should their symptoms worsen.      PATIENT REFERRED TO:  Bucky Levin MD  [address]  [facility] [address] [phone]    In 3 days  follow-up appointment.  Please continue to drink plenty of fluid, take your midodrine on a daily basis with any low blood pressures, and follow-up with your primary care doctor for reassessment x-ray days.    [facility]  [address]  [facility] [address] [phone]    If symptoms worsen         DISCHARGE MEDICATIONS:     Medication List        ASK your doctor about these medications      albuterol sulfate  (90 Base) MCG/ACT inhaler  Commonly known as:

## 2025-06-18 NOTE — DISCHARGE INSTRUCTIONS
Thank you for choosing our Emergency Department for your care.  It is our privilege to care for you in your time of need.  In the next several days, you may receive a survey via email or mailed to your home about your experience with our team.  We would greatly appreciate you taking a few minutes to complete the survey, as we use this information to learn what we have done well and what we could be doing better. Thank you for trusting us with your care!    Below you will find a list of your tests from today's visit.   Labs and Radiology Studies  Recent Results (from the past 12 hours)   EKG 12 Lead    Collection Time: 06/18/25  1:16 PM   Result Value Ref Range    Ventricular Rate 73 BPM    Atrial Rate 73 BPM    P-R Interval 144 ms    QRS Duration 64 ms    Q-T Interval 436 ms    QTc Calculation (Bazett) 480 ms    P Axis 50 degrees    R Axis 13 degrees    T Axis 42 degrees    Diagnosis       Normal sinus rhythm  Normal ECG  When compared with ECG of 15-SEP-2024 08:51,  No significant change was found  Confirmed by Adi Mejias MD ([id number]) on 6/18/2025 2:37:57 PM     CBC with Auto Differential    Collection Time: 06/18/25  1:26 PM   Result Value Ref Range    WBC 8.3 3.6 - 11.0 K/uL    RBC 3.75 (L) 3.80 - 5.20 M/uL    Hemoglobin 9.4 (L) 11.5 - 16.0 g/dL    Hematocrit 32.2 (L) 35.0 - 47.0 %    MCV 85.9 80.0 - 99.0 FL    MCH 25.1 (L) 26.0 - 34.0 PG    MCHC 29.2 (L) 30.0 - 36.5 g/dL    RDW 15.1 (H) 11.5 - 14.5 %    Platelets 360 150 - 400 K/uL    MPV 9.5 8.9 - 12.9 FL    Nucleated RBCs 0.0 0  WBC    nRBC 0.00 0.00 - 0.01 K/uL    Neutrophils % 81.4 (H) 32.0 - 75.0 %    Lymphocytes % 10.9 (L) 12.0 - 49.0 %    Monocytes % 6.2 5.0 - 13.0 %    Eosinophils % 0.5 0.0 - 7.0 %    Basophils % 0.6 0.0 - 1.0 %    Immature Granulocytes % 0.4 0.0 - 0.5 %    Neutrophils Absolute 6.74 1.80 - 8.00 K/UL    Lymphocytes Absolute 0.90 0.80 - 3.50 K/UL    Monocytes Absolute 0.51 0.00 - 1.00 K/UL    Eosinophils Absolute 0.04

## 2025-06-29 ENCOUNTER — APPOINTMENT (OUTPATIENT)
Facility: HOSPITAL | Age: 89
DRG: 312 | End: 2025-06-29
Payer: MEDICARE

## 2025-06-29 ENCOUNTER — HOSPITAL ENCOUNTER (INPATIENT)
Facility: HOSPITAL | Age: 89
LOS: 3 days | Discharge: NURSING, REHAB OR LONG-TERM CARE | DRG: 312 | End: 2025-07-02
Attending: EMERGENCY MEDICINE | Admitting: STUDENT IN AN ORGANIZED HEALTH CARE EDUCATION/TRAINING PROGRAM
Payer: MEDICARE

## 2025-06-29 DIAGNOSIS — R55 SYNCOPE AND COLLAPSE: Primary | ICD-10-CM

## 2025-06-29 DIAGNOSIS — T67.1XXA HEAT SYNCOPE, INITIAL ENCOUNTER: ICD-10-CM

## 2025-06-29 DIAGNOSIS — R55 RECURRENT SYNCOPE: ICD-10-CM

## 2025-06-29 DIAGNOSIS — D64.9 ANEMIA, UNSPECIFIED TYPE: ICD-10-CM

## 2025-06-29 LAB
ALBUMIN SERPL-MCNC: 3.1 G/DL (ref 3.5–5)
ALBUMIN/GLOB SERPL: 1.1 (ref 1.1–2.2)
ALP SERPL-CCNC: 68 U/L (ref 45–117)
ALT SERPL-CCNC: 14 U/L (ref 12–78)
ANION GAP SERPL CALC-SCNC: 7 MMOL/L (ref 2–12)
APPEARANCE UR: CLEAR
AST SERPL-CCNC: 11 U/L (ref 15–37)
BACTERIA URNS QL MICRO: NEGATIVE /HPF
BASOPHILS # BLD: 0.04 K/UL (ref 0–0.1)
BASOPHILS NFR BLD: 0.4 % (ref 0–1)
BILIRUB SERPL-MCNC: 0.6 MG/DL (ref 0.2–1)
BILIRUB UR QL: NEGATIVE
BUN SERPL-MCNC: 26 MG/DL (ref 6–20)
BUN/CREAT SERPL: 21 (ref 12–20)
CALCIUM SERPL-MCNC: 8.7 MG/DL (ref 8.5–10.1)
CHLORIDE SERPL-SCNC: 108 MMOL/L (ref 97–108)
CO2 SERPL-SCNC: 26 MMOL/L (ref 21–32)
COLOR UR: ABNORMAL
CREAT SERPL-MCNC: 1.26 MG/DL (ref 0.55–1.02)
DIFFERENTIAL METHOD BLD: ABNORMAL
EKG ATRIAL RATE: 63 BPM
EKG ATRIAL RATE: 63 BPM
EKG DIAGNOSIS: NORMAL
EKG DIAGNOSIS: NORMAL
EKG P AXIS: 22 DEGREES
EKG P AXIS: 79 DEGREES
EKG P-R INTERVAL: 142 MS
EKG P-R INTERVAL: 156 MS
EKG Q-T INTERVAL: 466 MS
EKG Q-T INTERVAL: 472 MS
EKG QRS DURATION: 62 MS
EKG QRS DURATION: 64 MS
EKG QTC CALCULATION (BAZETT): 476 MS
EKG QTC CALCULATION (BAZETT): 483 MS
EKG R AXIS: -2 DEGREES
EKG R AXIS: 7 DEGREES
EKG T AXIS: 18 DEGREES
EKG T AXIS: 41 DEGREES
EKG VENTRICULAR RATE: 63 BPM
EKG VENTRICULAR RATE: 63 BPM
EOSINOPHIL # BLD: 0.03 K/UL (ref 0–0.4)
EOSINOPHIL NFR BLD: 0.3 % (ref 0–7)
EPITH CASTS URNS QL MICRO: ABNORMAL /LPF
ERYTHROCYTE [DISTWIDTH] IN BLOOD BY AUTOMATED COUNT: 15.1 % (ref 11.5–14.5)
GLOBULIN SER CALC-MCNC: 2.9 G/DL (ref 2–4)
GLUCOSE SERPL-MCNC: 102 MG/DL (ref 65–100)
GLUCOSE UR STRIP.AUTO-MCNC: NEGATIVE MG/DL
HCT VFR BLD AUTO: 27.2 % (ref 35–47)
HGB BLD-MCNC: 8 G/DL (ref 11.5–16)
HGB UR QL STRIP: NEGATIVE
HYALINE CASTS URNS QL MICRO: ABNORMAL /LPF (ref 0–2)
IMM GRANULOCYTES # BLD AUTO: 0.04 K/UL (ref 0–0.04)
IMM GRANULOCYTES NFR BLD AUTO: 0.4 % (ref 0–0.5)
KETONES UR QL STRIP.AUTO: ABNORMAL MG/DL
LEUKOCYTE ESTERASE UR QL STRIP.AUTO: NEGATIVE
LYMPHOCYTES # BLD: 1.16 K/UL (ref 0.8–3.5)
LYMPHOCYTES NFR BLD: 10.3 % (ref 12–49)
MCH RBC QN AUTO: 25 PG (ref 26–34)
MCHC RBC AUTO-ENTMCNC: 29.4 G/DL (ref 30–36.5)
MCV RBC AUTO: 85 FL (ref 80–99)
MONOCYTES # BLD: 1 K/UL (ref 0–1)
MONOCYTES NFR BLD: 8.9 % (ref 5–13)
NEUTS SEG # BLD: 8.94 K/UL (ref 1.8–8)
NEUTS SEG NFR BLD: 79.7 % (ref 32–75)
NITRITE UR QL STRIP.AUTO: NEGATIVE
NRBC # BLD: 0 K/UL (ref 0–0.01)
NRBC BLD-RTO: 0 PER 100 WBC
PH UR STRIP: 6 (ref 5–8)
PLATELET # BLD AUTO: 316 K/UL (ref 150–400)
PMV BLD AUTO: 9.7 FL (ref 8.9–12.9)
POTASSIUM SERPL-SCNC: 3.7 MMOL/L (ref 3.5–5.1)
PROT SERPL-MCNC: 6 G/DL (ref 6.4–8.2)
PROT UR STRIP-MCNC: 30 MG/DL
RBC # BLD AUTO: 3.2 M/UL (ref 3.8–5.2)
RBC #/AREA URNS HPF: ABNORMAL /HPF (ref 0–5)
SODIUM SERPL-SCNC: 141 MMOL/L (ref 136–145)
SP GR UR REFRACTOMETRY: 1.03
TROPONIN I SERPL HS-MCNC: 6 NG/L (ref 0–51)
URINE CULTURE IF INDICATED: ABNORMAL
UROBILINOGEN UR QL STRIP.AUTO: 1 EU/DL (ref 0.2–1)
WBC # BLD AUTO: 11.2 K/UL (ref 3.6–11)
WBC URNS QL MICRO: ABNORMAL /HPF (ref 0–4)

## 2025-06-29 PROCEDURE — 82728 ASSAY OF FERRITIN: CPT

## 2025-06-29 PROCEDURE — 1100000000 HC RM PRIVATE

## 2025-06-29 PROCEDURE — 84439 ASSAY OF FREE THYROXINE: CPT

## 2025-06-29 PROCEDURE — 82746 ASSAY OF FOLIC ACID SERUM: CPT

## 2025-06-29 PROCEDURE — 80053 COMPREHEN METABOLIC PANEL: CPT

## 2025-06-29 PROCEDURE — 84443 ASSAY THYROID STIM HORMONE: CPT

## 2025-06-29 PROCEDURE — 84484 ASSAY OF TROPONIN QUANT: CPT

## 2025-06-29 PROCEDURE — 80061 LIPID PANEL: CPT

## 2025-06-29 PROCEDURE — 99285 EMERGENCY DEPT VISIT HI MDM: CPT

## 2025-06-29 PROCEDURE — 70450 CT HEAD/BRAIN W/O DYE: CPT

## 2025-06-29 PROCEDURE — 82607 VITAMIN B-12: CPT

## 2025-06-29 PROCEDURE — 81001 URINALYSIS AUTO W/SCOPE: CPT

## 2025-06-29 PROCEDURE — 83550 IRON BINDING TEST: CPT

## 2025-06-29 PROCEDURE — 83036 HEMOGLOBIN GLYCOSYLATED A1C: CPT

## 2025-06-29 PROCEDURE — 85025 COMPLETE CBC W/AUTO DIFF WBC: CPT

## 2025-06-29 PROCEDURE — 83540 ASSAY OF IRON: CPT

## 2025-06-29 PROCEDURE — 93005 ELECTROCARDIOGRAM TRACING: CPT | Performed by: EMERGENCY MEDICINE

## 2025-06-29 PROCEDURE — 36415 COLL VENOUS BLD VENIPUNCTURE: CPT

## 2025-06-29 RX ORDER — SODIUM CHLORIDE 9 MG/ML
INJECTION, SOLUTION INTRAVENOUS PRN
Status: DISCONTINUED | OUTPATIENT
Start: 2025-06-29 | End: 2025-07-02 | Stop reason: HOSPADM

## 2025-06-29 RX ORDER — SODIUM CHLORIDE 0.9 % (FLUSH) 0.9 %
5-40 SYRINGE (ML) INJECTION EVERY 12 HOURS SCHEDULED
Status: DISCONTINUED | OUTPATIENT
Start: 2025-06-29 | End: 2025-07-02 | Stop reason: HOSPADM

## 2025-06-29 RX ORDER — ASPIRIN 300 MG/1
300 SUPPOSITORY RECTAL DAILY
Status: DISCONTINUED | OUTPATIENT
Start: 2025-06-30 | End: 2025-07-02 | Stop reason: HOSPADM

## 2025-06-29 RX ORDER — POLYETHYLENE GLYCOL 3350 17 G/17G
17 POWDER, FOR SOLUTION ORAL DAILY PRN
Status: DISCONTINUED | OUTPATIENT
Start: 2025-06-29 | End: 2025-07-02 | Stop reason: HOSPADM

## 2025-06-29 RX ORDER — THIAMINE HYDROCHLORIDE 100 MG/ML
100 INJECTION, SOLUTION INTRAMUSCULAR; INTRAVENOUS DAILY
Status: DISCONTINUED | OUTPATIENT
Start: 2025-06-30 | End: 2025-07-02 | Stop reason: HOSPADM

## 2025-06-29 RX ORDER — ENOXAPARIN SODIUM 100 MG/ML
30 INJECTION SUBCUTANEOUS DAILY
Status: DISCONTINUED | OUTPATIENT
Start: 2025-06-30 | End: 2025-07-02

## 2025-06-29 RX ORDER — ASPIRIN 81 MG/1
81 TABLET, CHEWABLE ORAL DAILY
Status: DISCONTINUED | OUTPATIENT
Start: 2025-06-30 | End: 2025-07-02 | Stop reason: HOSPADM

## 2025-06-29 RX ORDER — SODIUM CHLORIDE 0.9 % (FLUSH) 0.9 %
5-40 SYRINGE (ML) INJECTION PRN
Status: DISCONTINUED | OUTPATIENT
Start: 2025-06-29 | End: 2025-07-02 | Stop reason: HOSPADM

## 2025-06-29 RX ORDER — ATORVASTATIN CALCIUM 40 MG/1
80 TABLET, FILM COATED ORAL NIGHTLY
Status: DISCONTINUED | OUTPATIENT
Start: 2025-06-29 | End: 2025-07-02 | Stop reason: HOSPADM

## 2025-06-29 ASSESSMENT — PAIN - FUNCTIONAL ASSESSMENT: PAIN_FUNCTIONAL_ASSESSMENT: NONE - DENIES PAIN

## 2025-06-29 NOTE — ED NOTES
Bedside and Verbal shift change report given to Lorraine RN (oncoming nurse) by Dorothy RN (offgoing nurse). Report included the following information ED SBAR.

## 2025-06-29 NOTE — ED PROVIDER NOTES
[facility] EMERGENCY DEPARTMENT  EMERGENCY DEPARTMENT ENCOUNTER       Pt Name: Maria De Jesus Mack  MRN: [id number]  Birthdate 1/20/1932  Date of evaluation: 6/29/2025  Provider: Lu Oswald MD   PCP: Bucky Levin MD  Note Started: 7:40 PM EDT 6/29/25     CHIEF COMPLAINT       Chief Complaint   Patient presents with    Loss of Consciousness     BIBEMS from [facility] C/o of syncopal episodes after a fall yesterday. EMS reports that pt has hx of hypotension but was seen for it and was giving midodrine. Unknown if LOC or if she hit her head. A&O0 at baseline per ems.         HISTORY OF PRESENT ILLNESS: 1 or more elements      History From: EMS, patient's family, History limited by: Dementia     Maria De Jesus Mack is a 89 y.o. female who presents following a fall with recurrent near syncopal episodes.       Please See MDM for Additional Details of the HPI/PMH  Nursing Notes were all reviewed and agreed with or any disagreements were addressed in the HPI.     REVIEW OF SYSTEMS        Positives and Pertinent negatives as per HPI.    PAST HISTORY     Past Medical History:  Past Medical History:   Diagnosis Date    Asthma     Dr. Herman--appt June 2013--doesn't see routinely--pt reports no change in allergies--no need for routine follow-up.  Dr. Ashby sees for mild COPD--on Symbicort since     C[facility]uminosis     [facility]    Chronic obstructive pulmonary disease (HCC)     Encounter for screening colonoscopy     2006    Fainting     Falls     Fatigue     [facility] loss, left     [facility]    History of mammogram May 2014    Normal/no change.    History of shingles 2010    Dec 2014:  Pt reports clinical history and shingles vaccine 4yrs ago.  To try to obtain exact date of zostavax from /source.    History of skin cancer     melanoma 2003?, BCC & SCC- followed by Dr. Steward- twice a yr appts.  July 2013-still on 6mo schedule for skin checks.    Memory disorder     Osteopenia     DEXA  abdominal pain.    On exam patient is overall nontoxic-appearing, hemodynamically stable with low normal blood pressure.  She is speaking in complete sentences and satting well on room air.  Bandage noted over the left arm is clean dry and intact.  She is able to move her hips, knees, elbows, and shoulders without pain.  Differential includes vasovagal syncope, electrolyte disturbance, anemia, intracranial injury related to recent fall, UTI    Will check basic lab work to evaluate for evidence of severe electrolyte derangements or anemia.  Will check urinalysis to evaluate for evidence of UTI  Will check troponin and ekg to evaluate for evidence of ACS  Will check ct head to evaluate for evidence of acute intracranial process such as bleed, stroke, or tumor      Problems Addressed:  Anemia, unspecified type: acute illness or injury  Syncope and collapse: acute illness or injury    Amount and/or Complexity of Data Reviewed  Labs: ordered.  Radiology: ordered.  ECG/medicine tests: ordered.    Risk  Decision regarding hospitalization.          CLINICAL MANAGEMENT TOOLS:  Not Applicable      ED Course as of 06/29/25 1940   Sun Jun 29, 2025   1646 EKG performed at 1639 shows sinus rhythm with occasional PVCs, rate of 63, no acute ischemic changes per my interpretation  [IVÁN]      ED Course User Index  [IVÁN] Lu Oswald MD     Perfect Serve Consult for Admission 6/29/25   7:41 PM EDT    Room # 08/   Patient Name  Maria De Jesus Mack   Date of Birth 1/20/1932   Medical Record Number  [id number]    Age  89 y.o.   Working diagnosis  1. Syncope and collapse    2. Anemia, unspecified type         Clinical summary:   89 y.o. yo female who presents after ground-level fall.  Additionally is had recurrent episodes of syncope and near syncope.  This has been ongoing for last few days.  Facility recently started her on midodrine because her blood pressures have been low.  On presentation she has a low normal blood pressure

## 2025-06-30 ENCOUNTER — APPOINTMENT (OUTPATIENT)
Facility: HOSPITAL | Age: 89
DRG: 312 | End: 2025-06-30
Payer: MEDICARE

## 2025-06-30 ENCOUNTER — APPOINTMENT (OUTPATIENT)
Facility: HOSPITAL | Age: 89
DRG: 312 | End: 2025-06-30
Attending: STUDENT IN AN ORGANIZED HEALTH CARE EDUCATION/TRAINING PROGRAM
Payer: MEDICARE

## 2025-06-30 PROBLEM — R41.82 ACUTE ALTERATION IN MENTAL STATUS: Status: ACTIVE | Noted: 2025-06-30

## 2025-06-30 PROBLEM — R55 CONVULSIVE SYNCOPE: Status: ACTIVE | Noted: 2025-06-30

## 2025-06-30 LAB
CHOLEST SERPL-MCNC: 154 MG/DL
ERYTHROCYTE [DISTWIDTH] IN BLOOD BY AUTOMATED COUNT: 15.1 % (ref 11.5–14.5)
EST. AVERAGE GLUCOSE BLD GHB EST-MCNC: 117 MG/DL
FERRITIN SERPL-MCNC: 8 NG/ML (ref 26–388)
FOLATE SERPL-MCNC: 8.8 NG/ML (ref 5–21)
HBA1C MFR BLD: 5.7 % (ref 4–5.6)
HCT VFR BLD AUTO: 29.5 % (ref 35–47)
HDLC SERPL-MCNC: 76 MG/DL
HDLC SERPL: 2 (ref 0–5)
HGB BLD-MCNC: 8.6 G/DL (ref 11.5–16)
IRON SATN MFR SERPL: 6 % (ref 20–50)
IRON SERPL-MCNC: 20 UG/DL (ref 35–150)
LDLC SERPL CALC-MCNC: 57.4 MG/DL (ref 0–100)
MCH RBC QN AUTO: 24.6 PG (ref 26–34)
MCHC RBC AUTO-ENTMCNC: 29.2 G/DL (ref 30–36.5)
MCV RBC AUTO: 84.5 FL (ref 80–99)
NRBC # BLD: 0 K/UL (ref 0–0.01)
NRBC BLD-RTO: 0 PER 100 WBC
PLATELET # BLD AUTO: 323 K/UL (ref 150–400)
PMV BLD AUTO: 9.7 FL (ref 8.9–12.9)
RBC # BLD AUTO: 3.49 M/UL (ref 3.8–5.2)
T4 FREE SERPL-MCNC: 0.9 NG/DL (ref 0.8–1.5)
TIBC SERPL-MCNC: 353 UG/DL (ref 250–450)
TRIGL SERPL-MCNC: 103 MG/DL
TSH SERPL DL<=0.05 MIU/L-ACNC: 2.16 UIU/ML (ref 0.36–3.74)
VAS RIGHT CCA DIST EDV: 20.4 CM/S
VAS RIGHT CCA DIST PSV: 67.1 CM/S
VAS RIGHT CCA PROX EDV: 15.5 CM/S
VAS RIGHT CCA PROX PSV: 63.4 CM/S
VAS RIGHT ECA EDV: 6 CM/S
VAS RIGHT ECA PSV: 47 CM/S
VAS RIGHT ICA DIST EDV: 23.1 CM/S
VAS RIGHT ICA DIST PSV: 63.9 CM/S
VAS RIGHT ICA MID EDV: 19.5 CM/S
VAS RIGHT ICA MID PSV: 65.3 CM/S
VAS RIGHT ICA PROX EDV: 12.2 CM/S
VAS RIGHT ICA PROX PSV: 44.1 CM/S
VAS RIGHT ICA/CCA PSV: 1 NO UNITS
VAS RIGHT SUBCLAVIAN PROX EDV: 0 CM/S
VAS RIGHT SUBCLAVIAN PROX PSV: 107.6 CM/S
VAS RIGHT VERTEBRAL EDV: 13.8 CM/S
VAS RIGHT VERTEBRAL PSV: 48.7 CM/S
VIT B12 SERPL-MCNC: >2000 PG/ML (ref 193–986)
VLDLC SERPL CALC-MCNC: 20.6 MG/DL
WBC # BLD AUTO: 8.3 K/UL (ref 3.6–11)

## 2025-06-30 PROCEDURE — 70551 MRI BRAIN STEM W/O DYE: CPT

## 2025-06-30 PROCEDURE — 99221 1ST HOSP IP/OBS SF/LOW 40: CPT | Performed by: PSYCHIATRY & NEUROLOGY

## 2025-06-30 PROCEDURE — 97116 GAIT TRAINING THERAPY: CPT

## 2025-06-30 PROCEDURE — 95816 EEG AWAKE AND DROWSY: CPT | Performed by: PSYCHIATRY & NEUROLOGY

## 2025-06-30 PROCEDURE — 97530 THERAPEUTIC ACTIVITIES: CPT

## 2025-06-30 PROCEDURE — 2500000003 HC RX 250 WO HCPCS: Performed by: STUDENT IN AN ORGANIZED HEALTH CARE EDUCATION/TRAINING PROGRAM

## 2025-06-30 PROCEDURE — 36415 COLL VENOUS BLD VENIPUNCTURE: CPT

## 2025-06-30 PROCEDURE — 85027 COMPLETE CBC AUTOMATED: CPT

## 2025-06-30 PROCEDURE — 1100000003 HC PRIVATE W/ TELEMETRY

## 2025-06-30 PROCEDURE — 6370000000 HC RX 637 (ALT 250 FOR IP): Performed by: STUDENT IN AN ORGANIZED HEALTH CARE EDUCATION/TRAINING PROGRAM

## 2025-06-30 PROCEDURE — 95816 EEG AWAKE AND DROWSY: CPT

## 2025-06-30 PROCEDURE — 93882 EXTRACRANIAL UNI/LTD STUDY: CPT | Performed by: PSYCHIATRY & NEUROLOGY

## 2025-06-30 PROCEDURE — 97165 OT EVAL LOW COMPLEX 30 MIN: CPT

## 2025-06-30 PROCEDURE — 6360000002 HC RX W HCPCS: Performed by: STUDENT IN AN ORGANIZED HEALTH CARE EDUCATION/TRAINING PROGRAM

## 2025-06-30 PROCEDURE — 97535 SELF CARE MNGMENT TRAINING: CPT

## 2025-06-30 PROCEDURE — 93880 EXTRACRANIAL BILAT STUDY: CPT

## 2025-06-30 PROCEDURE — 97161 PT EVAL LOW COMPLEX 20 MIN: CPT

## 2025-06-30 RX ADMIN — SODIUM CHLORIDE, PRESERVATIVE FREE 10 ML: 5 INJECTION INTRAVENOUS at 21:04

## 2025-06-30 RX ADMIN — MELATONIN 3 MG: at 21:04

## 2025-06-30 RX ADMIN — THIAMINE HYDROCHLORIDE 100 MG: 100 INJECTION, SOLUTION INTRAMUSCULAR; INTRAVENOUS at 10:35

## 2025-06-30 RX ADMIN — SODIUM CHLORIDE, PRESERVATIVE FREE 10 ML: 5 INJECTION INTRAVENOUS at 10:36

## 2025-06-30 RX ADMIN — ASPIRIN 81 MG: 81 TABLET, CHEWABLE ORAL at 10:36

## 2025-06-30 RX ADMIN — ATORVASTATIN CALCIUM 80 MG: 40 TABLET, FILM COATED ORAL at 21:04

## 2025-06-30 RX ADMIN — ENOXAPARIN SODIUM 30 MG: 100 INJECTION SUBCUTANEOUS at 10:35

## 2025-06-30 RX ADMIN — SODIUM CHLORIDE, PRESERVATIVE FREE 10 ML: 5 INJECTION INTRAVENOUS at 04:48

## 2025-06-30 NOTE — PROGRESS NOTES
TRANSFER - IN REPORT:    0517-Verbal report received from Lorraine MEADOWS on Maria De Jesus Mack  being received from ED for routine progression of patient care      Report consisted of patient's Situation, Background, Assessment and   Recommendations(SBAR).     Information from the following report(s) Nurse Handoff Report, MAR, Recent Results, and Cardiac Rhythm NSR was reviewed with the receiving nurse.    Opportunity for questions and clarification was provided.      Assessment completed upon patient's arrival to unit and care assumed.      0545- patient arrived in CMSU

## 2025-06-30 NOTE — PROGRESS NOTES
Bedside and Verbal shift change report given to May RN (oncoming nurse) by Nano MEADOWS (offgoing nurse). Report included the following information Nurse Handoff Report, MAR, Recent Results, and Cardiac Rhythm NSR.   Sitter at bedside. Patient is calm and cooperative at this time.      1944-patient picked up to MRI  2043- patient back to room 2118. Patient refused MRI.  End of Shift Note    Bedside shift change report given to Bailee MEADOWS (oncoming nurse) by Lori Navarrete RN (offgoing nurse).  Report included the following information SBAR, MAR, Recent Results, and Cardiac Rhythm NSR    Shift worked:  7p-7a     Shift summary and any significant changes:     Patient slept most of the time, but remain uncooperative, refused lab draw and NIH reassessment.      Concerns for physician to address:       Zone phone for oncoming shift:          Activity:  Level of Assistance: Standby assist, set-up cues, supervision of patient - no hands on  Number times ambulated in hallways past shift: 0  Number of times OOB to chair past shift: 2    Cardiac:   Cardiac Monitoring: Yes      Cardiac Rhythm: Sinus rhythm    Access:  Current line(s): PIV    Genitourinary:        Respiratory:   O2 Device: None (Room air)  Chronic home O2 use?: NO  Incentive spirometer at bedside: N/A    GI:  Last BM (including prior to admit): 06/30/25  Current diet:  ADULT DIET; Regular  Passing flatus: YES    Pain Management:   Patient states pain is manageable on current regimen: N/A    Skin:  [place] Scale Score: 20  Interventions: Wound Offloading (Prevention Methods): Bed, pressure reduction mattress, Elevate heels, Pillows, Repositioning, Turning    Patient Safety:  Fall Risk: Nursing Judgement-Fall Risk High(Add Comments): Yes  Fall Risk Interventions  Nursing Judgement-Fall Risk High(Add Comments): Yes  Toilet Every 2 Hours-In Advance of Need: Yes  Hourly Visual Checks: Awake, In bed  Fall Visual Posted: Armband, Fall sign posted, Socks  Room Door Open:

## 2025-06-30 NOTE — PROGRESS NOTES
0700: Bedside and Verbal shift change report given to Nano MEADOWS (oncoming nurse) by May RN (offgoing nurse). Report included the following information Nurse Handoff Report, Index, Intake/Output, MAR, Recent Results, and Cardiac Rhythm NSR.       1100: MRI screening form completed and faxed.    1346: RN called to room by dtr as pt. Is attempting to stand on her own and also has pulled out PIV. Pt. Redirected to chair and helped to change gown, this RN placed another PIV in her LFA at this time, covered with a sleeve. Pt. Also given activity blanket as diversionary activity. Mendel MD notified and Avasure also placed in room at this time as this was multiple pulled PIV and pt. Is requiring frequent redirection to stay in bed/chair.     1533: Dtr. No longer at bedside, pt. Has removed another PIV despite Avasure. Pt. Not redirectable to virtual  cues, concern for pt. Safety due to impulsivity. PIV replaced on LW and wrapped again. RN attempted music as redirection as pt. States she likes to listen to music. Mendel MD notified for request for bedside  for pt. Per MD, to come to bedside to see pt.     1550: Mendel MD at bedside, per MD, okay if pt. Removes PIV, per MD, do not need to replace at this time if she does. No additional orders received at this time.     1650: Pt. In hallway attempting to leave fully dressed with shoes on. RN and tech attempted redirection, pt. Attempting to hit staff and very agitated. Code MATI called at this time. This RN able to get pt. To return to room on her own, nursing supervisor arrived at this time along with security. Pt. Agreeable currently to sit in recliner to eat dinner. Tech now at bedside to sit with pt.     1830: RN placed tele box on pt. As she has requested to walk in hallway with assistance.     End of Shift Note    Bedside shift change report given to Lori MEADOWS (oncoming nurse) by Nano Cantu RN (offgoing nurse).  Report included the

## 2025-06-30 NOTE — PROGRESS NOTES
Speech Pathology Note    Reviewed chart and note patient admitted with recurrent syncope with concern for CVA. Note CT showed \"no acute intracranial process identified\" and MRI pending. Note patient passed the [place] Swallow Screen and a regular diet was ordered. NIHSS=1. Discussed case with RN who reported no SLP-related concerns. Introduced self and role of SLP to patient and patient's daughter. Patient's daughter provided majority of patient history of PMHx includes dementia. Daughter denied any decline in motor speech, language, cognitive, or swallowing function. Formal SLP evaluation not clinically indicated at this time. Will sign off. Please re-consult if further needs arise. Thank you.    Travis Brooke M.S., CCC-SLP

## 2025-06-30 NOTE — ED NOTES
TRANSFER - OUT REPORT:    Verbal report given to May RN on Maria De Jesus Mack  being transferred to 2118 for routine progression of patient care       Report consisted of patient's Situation, Background, Assessment and   Recommendations(SBAR).     Information from the following report(s) Nurse Handoff Report, Index, ED Encounter Summary, ED SBAR, Adult Overview, MAR, Recent Results, and Neuro Assessment was reviewed with the receiving nurse.    [place] Fall Assessment:    Presents to emergency department  because of falls (Syncope, seizure, or loss of consciousness): Yes  Age > 70: Yes  Altered Mental Status, Intoxication with alcohol or substance confusion (Disorientation, impaired judgment, poor safety awaremess, or inability to follow instructions): No  Impaired Mobility: Ambulates or transfers with assistive devices or assistance; Unable to ambulate or transer.: Yes  Nursing Judgement: Yes          Lines:   Peripheral IV 06/30/25 Left;Proximal;Anterior Forearm (Active)        Opportunity for questions and clarification was provided.      Patient transported with:  Monitor and Registered Nurse

## 2025-06-30 NOTE — H&P
Hospitalist Admission Note    NAME:   Maria De Jesus Mack   : 1932   MRN: [id number]     Date/Time: 2025 8:49 PM    Patient PCP: Bucky Levin MD    ______________________________________________________________________  Given the patient's current clinical presentation, I have a high level of concern for decompensation if discharged from the emergency department.  Complex decision making was performed, which includes reviewing the patient's available past medical records, laboratory results, and x-ray films.       My assessment of this patient's clinical condition and my plan of care is as follows.    Assessment / Plan:    Recurrent syncope  Ground-level fall  CVA evaluation  - Presented with syncopal episode after a fall 1 day ago, hypotension as per EMS, A/O baseline per EMS  -CT head negative for intracranial pathology  - UA negative  - Troponin 6  - CBC mild leukocytosis of 11, with left band shift  - CMP shows no electrolyte derangement  - EKG shows sinus rhythm with PVCs  -Admit for observation/further evaluation; telemetry monitoring.  -Frequent neurochecks, vital sign parameters  -MRI Brain without contrast to evaluate for any  structural abnormalities that may be contributing to symptoms.   -Aspirin 81 mg po q day and high intensity statin  -Fasting lipid panel, HbA1c, TTE ECHO  with bubble study,  -If TTE ECHO is positive consult cardiology    -Bedside swallow evaluation; formal SLP consultation as well   -PT/OT consultations   -Carotid ultrasound  - Orthostatic vitals    Normocytic anemia  - Last H&H  down from 9.4  - Will check iron levels, B12 folate  -FOBT    Chronic hypotension  - On midodrine chronically outpatient    Asthma  Hx shingles  Skin cancer  Memory disorder  Osteopenia  Dementia  - C/W home medications          Medical Decision Making:   I personally reviewed labs: CBC BMP  I personally reviewed imaging: CT head  I personally reviewed EKG: Sinus rhythm  Toxic drug

## 2025-06-30 NOTE — PLAN OF CARE
Problem: Physical Therapy - Adult  Goal: By Discharge: Performs mobility at highest level of function for planned discharge setting.  See evaluation for individualized goals.  Description: FUNCTIONAL STATUS PRIOR TO ADMISSION: Patient was ambulatory without an AD within her memory care facility.    HOME SUPPORT PRIOR TO ADMISSION: The patient lived at a memory care facility with assist from staff for ADLs as needed.    Physical Therapy Goals  Initiated 6/30/2025  1.  Patient will move from supine to sit and sit to supine in bed with independence within 7 day(s).    2.  Patient will perform sit to stand with independence within 7 day(s).  3.  Patient will transfer from bed to chair and chair to bed with independence using the least restrictive device within 7 day(s).  4.  Patient will ambulate with supervision/set-up for 300 feet with the least restrictive device within 7 day(s).     Outcome: Progressing    PHYSICAL THERAPY EVALUATION    Patient: Maria De Jesus Mack (89 y.o. female)  Date: 6/30/2025  Primary Diagnosis: Syncope and collapse [R55]  Heat syncope, initial encounter [T67.1XXA]  Anemia, unspecified type [D64.9]  Recurrent syncope [R55]       Precautions: Restrictions/Precautions  Restrictions/Precautions: Fall Risk, Bed Alarm  Activity Level: Up with Assist            ASSESSMENT :   DEFICITS/IMPAIRMENTS:   The patient is limited by decreased functional mobility, endurance, safety awareness, cognition, balance     Based on the impairments listed above pt is appropriate for discharge back to her memory care facility. Pt is typically ambulatory without an AD. Today pt is performing bed mobility with supervision. CGA provided for transfers and ambulation in hallway for safety (pt was admitted with syncope/fall), however pt demonstrated steady gait and denied symptoms of light headedness, dizziness, etc throughout session. She did report LE weakness/fatigue after ambulating 100ft in hallway. See below for  orthostatic BPs. From a mobility standpoint pt is safe to d/c back to her memory care facility once medically cleared.    Patient will benefit from skilled intervention to address the above impairments.    Functional Outcome Measure:  The patient scored 22/24 on the AM-PAC outcome measure which is indicative of need for further skilled therapy services.           PLAN :  Recommendations and Planned Interventions:   gait training and therapeutic activities    Frequency/Duration: Patient will be followed by physical therapy to address goals, PT Plan of Care: 3 times/week to address goals.        Recommendation for discharge: (in order for the patient to meet his/her long term goals):   No skilled physical therapy    Other factors to consider for discharge: no additional factors    IF patient discharges home will need the following DME: none                SUBJECTIVE:   Patient stated “Oh I would love to go for a stroll.”    OBJECTIVE DATA SUMMARY:       Past Medical History:   Diagnosis Date    Asthma     Dr. Herman--appt June 2013--doesn't see routinely--pt reports no change in allergies--no need for routine follow-up.  Dr. Ashby sees for mild COPD--on Symbicort since     Ceruminosis     [facility]    Chronic obstructive pulmonary disease (HCC)     Encounter for screening colonoscopy     2006    Fainting     Falls     Fatigue     [facility]    History of mammogram May 2014    Normal/no change.    History of shingles 2010    Dec 2014:  Pt reports clinical history and shingles vaccine 4yrs ago.  To try to obtain exact date of zostavax from /source.    History of skin cancer     melanoma 2003?, BCC & SCC- followed by Dr. Steward- twice a yr appts.  July 2013-still on 6mo schedule for skin checks.    Memory disorder     Osteopenia     DEXA 8/11    Overactive bladder     detrol [place].  [facility]--sees Urology Spring/Fall.  Considering procedure for incontinence.

## 2025-06-30 NOTE — CONSULTS
CONSULT - Neurology      Name:  Maria De Jesus Mack       MRN: [id number]  Location: 2118/01    Date: 6/30/2025  Time:  10:18 AM        Chief Complaint:   Chief Complaint   Patient presents with    Loss of Consciousness     BIBEMS from [facility] C/o of syncopal episodes after a fall yesterday. EMS reports that pt has hx of hypotension but was seen for it and was giving midodrine. Unknown if LOC or if she hit her head. A&O0 at baseline per ems.        HPI:  It is a great pleasure to see Maria De Jesus Mack, a 89 y.o. female today in the hospital . Briefly these are the events happened as per the chart H&P and taken from the chart. The patient was doing fine and the symptoms started with recurrent syncopal episodes. and , had a fall yesterday, was brought in by EMS. The symptoms fluctuated in the beginning. The patient was brought to the emergency room for further evaluation. It is unknown if the patient had loss of consciousness or hit her head yesterday after the fall. At baseline patient is alert and oriented x 0. CT head negative for any acute intracranial abnormality.        PAST MEDICAL HISTORY:  Past Medical History:   Diagnosis Date    Asthma     Dr. Herman--appt June 2013--doesn't see routinely--pt reports no change in allergies--no need for routine follow-up.  Dr. Ashby sees for mild COPD--on Symbicort since     Ceruminosis     periodic ear cleaning at [facility]    Chronic obstructive pulmonary disease (HCC)     Encounter for screening colonoscopy     2006    Fainting     Falls     Fatigue     [facility] loss[facility] left     [facility]    History of mammogram May 2014    Normal/no change.    History of shingles 2010    Dec 2014:  Pt reports clinical history and shingles vaccine 4yrs ago.  To try to obtain exact date of zostavax from /source.    History of skin cancer     melanoma 2003?, BCC & SCC- followed by Dr. Steward- twice a yr appts.  July 2013-still on 6mo schedule for skin checks.    Memory disorder

## 2025-06-30 NOTE — PROCEDURES
[facility]              [address] [place] [address]                                   EEG      PATIENT NAME: BRANDY STEELE              : 1932  MED REC NO: [id number]                       ROOM: 2118  ACCOUNT NO: [id number]                       ADMIT DATE: 2025  PROVIDER: Amandeep Thao MD    DATE OF SERVICE:  2025    CLINICAL INDICATION:  The patient is a 89-year-old female with a history of persistent syncope, EEG to rule out seizures, rule out encephalopathy, rule out partial seizures.    EEG CLASSIFICATION:  Essentially normal, awake, and drowsy.    DESCRIPTION OF THE RECORD:  This is a 16-channel EEG recording on a patient for passing-out spells.  Study shows a posteriorly located occipital alpha rhythm of 8-9 hertz that did attenuate some with eye opening.  Throughout the recording, there were no clear areas of focal slowing, and no clear spike or spike-and-wave discharges seen, and no recorded electrographic or dysrhythmic spells of any type seen.  The patient has a fair amount of movement, muscle and electrode artifact seen in the recording.  The patient did enter brief states of drowsiness, but no stages of sleep were identified.  Hyperventilation was not performed.  Photic stimulation produced a very minimal driving response in the posterior head regions.    INTERPRETATION:  This is an essentially normal electroencephalogram for the patient's age showing no clear areas of focal slowing, no clear spike or spike-and-wave discharges.  No electrographic spells of any type seen.  Clinical correlation recommended.        AMANDEEP THAO MD      TAS/AQS  D:  2025 17:31:12  T:  2025 18:12:03  JOB #:  [id number]    CC:   Amandeep Thao MD

## 2025-06-30 NOTE — PROGRESS NOTES
Hospitalist Progress Note    NAME:   Maria De Jesus Mack   : 1932   MRN: [id number]     Date/Time: 2025 6:25 PM  Patient PCP: Bucky Levin MD    Estimated discharge date:   Barriers: MRI, MRA, echo, carotid duplex      Assessment / Plan:    Recurrent syncope  Ground-level fall  CVA evaluation  - Presented with syncopal episode after a fall 1 day ago, hypotension as per EMS, A/O baseline per EMS  -CT head negative for intracranial pathology  - UA negative  - Troponin 6  - CBC mild leukocytosis of 11, with left band shift  - CMP shows no electrolyte derangement  - EKG shows sinus rhythm with PVCs  -Admit for observation/further evaluation; telemetry monitoring.  -Frequent neurochecks, vital sign parameters  -MRI Brain without contrast to evaluate for any  structural abnormalities that may be contributing to symptoms, remains pending  -Aspirin 81 mg po q day and high intensity statin  -Fasting lipid panel, HbA1c, TTE ECHO  with bubble study,  - seen by Neurology, MRA ordered  -If TTE ECHO is positive consult cardiology    -Bedside swallow evaluation; formal SLP consultation as well   -PT/OT consultations   -Carotid ultrasound, pending  - Orthostatic vitals     DIANA   - Last H&H  down from 9.4  Iron levels low, giving IV iron x3 doses     Chronic hypotension  - On midodrine chronically outpatient, continue     Asthma  Hx shingles  Skin cancer  Memory disorder  Osteopenia  Dementia  - C/W home medications      Code Status: DNR  DVT Prophylaxis: Lovenox  Baseline: Dementia, lives in [facility]      Medical Decision Making:    [] High (any 2)     A. Problems (any 1)  [] Acute/Chronic Illness/injury posing threat to life or bodily function:    [] Severe exacerbation of chronic illness:    ---------------------------------------------------------------------  B. Risk of Treatment (any 1)   [] Drugs/treatments that require intensive monitoring for toxicity include:    [] IV ABX requiring serial renal

## 2025-06-30 NOTE — PLAN OF CARE
Problem: Occupational Therapy - Adult  Goal: By Discharge: Performs self-care activities at highest level of function for planned discharge setting.  See evaluation for individualized goals.  Description: FUNCTIONAL STATUS PRIOR TO ADMISSION:  Pt resided in memory care at [facility] per her family   ,  ,  ,  ,  ,  ,  ,  ,  , Prior Level of Assist for Transfers: Independent,       HOME SUPPORT: per chart and her family, pt needed assist from staff for ADLs and transfers due to her decreased memory    Occupational Therapy Goals:  Initiated 6/30/2025  1.  Patient will perform grooming with Supervision within 7 day(s).  2.  Patient will perform bathing with Supervision within 7 day(s).  3.  Patient will perform lower body dressing with Supervision within 7 day(s).  4.  Patient will perform toilet transfers with Supervision  within 7 day(s).  5.  Patient will perform all aspects of toileting with Supervision within 7 day(s).  Outcome: Not Progressing   OCCUPATIONAL THERAPY EVALUATION    Patient: Maria De Jesus Mack (89 y.o. female)  Date: 6/30/2025  Primary Diagnosis: Syncope and collapse [R55]  Heat syncope, initial encounter [T67.1XXA]  Anemia, unspecified type [D64.9]  Recurrent syncope [R55]         Precautions: Fall Risk, Bed Alarm                  ASSESSMENT :  The patient is limited by decreased functional mobility, independence in ADLs, strength, activity tolerance, endurance, cognition.    Based on the impairments listed above pt was supine in bed and motivated to get out of bed and move.  She was very pleasant and motivated to work with therapy.  She is CGA for transfers with out AD, and able to perform bed mobility with CGA to SBA.   OT to see pt 1-2 more times to work on ADLs standing at the sink.     Functional Outcome Measure:  The patient scored 55/100 on the Barthel outcome measure which is indicative of min to CGA.         PLAN :  Recommendations and Planned Interventions:   self care training,

## 2025-06-30 NOTE — PROGRESS NOTES
End of Shift Note    Bedside shift change report given to Nano MEADOWS (oncoming nurse) by Lori Navarrete RN (offgoing nurse).  Report included the following information SBAR    Shift worked:  7p-7a     Shift summary and any significant changes:     Patient is able to ambulate to the bathroom without problem, stable vital signs.     Concerns for physician to address:       Zone phone for oncoming shift:          Activity:  Level of Assistance: Moderate assist, patient does 50-74%  Number times ambulated in hallways past shift: 0  Number of times OOB to chair past shift: 2    Cardiac:   Cardiac Monitoring: Yes      Cardiac Rhythm: Sinus rhythm    Access:  Current line(s): PIV    Genitourinary:        Respiratory:      Chronic home O2 use?: NO  Incentive spirometer at bedside: N/A    GI:  Last BM (including prior to admit): 06/30/25  Current diet:  ADULT DIET; Regular  Passing flatus: YES    Pain Management:   Patient states pain is manageable on current regimen: YES    Skin:  Orlando Scale Score: 21  Interventions: Wound Offloading (Prevention Methods): Turning, Repositioning    Patient Safety:  Fall Risk:         Active Consults:   IP CONSULT TO NEUROLOGY  IP CONSULT TO CASE MANAGEMENT    Length of Stay:  Expected LOS: 3  Actual LOS: 1    Lori Navarrete RN

## 2025-06-30 NOTE — ED NOTES
Pt refusing to keep blood pressure cuff/ SPO2 monitor on at this time. Walked in to respond to bed alarm to find IV was pulled out and on the ground.

## 2025-07-01 ENCOUNTER — APPOINTMENT (OUTPATIENT)
Facility: HOSPITAL | Age: 89
DRG: 312 | End: 2025-07-01
Attending: STUDENT IN AN ORGANIZED HEALTH CARE EDUCATION/TRAINING PROGRAM
Payer: MEDICARE

## 2025-07-01 LAB
ANION GAP SERPL CALC-SCNC: 4 MMOL/L (ref 2–12)
BASOPHILS # BLD: 0.03 K/UL (ref 0–0.1)
BASOPHILS NFR BLD: 0.3 % (ref 0–1)
BUN SERPL-MCNC: 16 MG/DL (ref 6–20)
BUN/CREAT SERPL: 22 (ref 12–20)
CALCIUM SERPL-MCNC: 8.4 MG/DL (ref 8.5–10.1)
CHLORIDE SERPL-SCNC: 111 MMOL/L (ref 97–108)
CO2 SERPL-SCNC: 26 MMOL/L (ref 21–32)
CREAT SERPL-MCNC: 0.72 MG/DL (ref 0.55–1.02)
DIFFERENTIAL METHOD BLD: ABNORMAL
EOSINOPHIL # BLD: 0.04 K/UL (ref 0–0.4)
EOSINOPHIL NFR BLD: 0.4 % (ref 0–7)
ERYTHROCYTE [DISTWIDTH] IN BLOOD BY AUTOMATED COUNT: 15.4 % (ref 11.5–14.5)
GLUCOSE SERPL-MCNC: 88 MG/DL (ref 65–100)
HCT VFR BLD AUTO: 27.5 % (ref 35–47)
HGB BLD-MCNC: 8.2 G/DL (ref 11.5–16)
IMM GRANULOCYTES # BLD AUTO: 0.06 K/UL (ref 0–0.04)
IMM GRANULOCYTES NFR BLD AUTO: 0.6 % (ref 0–0.5)
LYMPHOCYTES # BLD: 0.98 K/UL (ref 0.8–3.5)
LYMPHOCYTES NFR BLD: 10.5 % (ref 12–49)
MAGNESIUM SERPL-MCNC: 1.8 MG/DL (ref 1.6–2.4)
MCH RBC QN AUTO: 25 PG (ref 26–34)
MCHC RBC AUTO-ENTMCNC: 29.8 G/DL (ref 30–36.5)
MCV RBC AUTO: 83.8 FL (ref 80–99)
MONOCYTES # BLD: 0.69 K/UL (ref 0–1)
MONOCYTES NFR BLD: 7.4 % (ref 5–13)
NEUTS SEG # BLD: 7.56 K/UL (ref 1.8–8)
NEUTS SEG NFR BLD: 80.8 % (ref 32–75)
NRBC # BLD: 0 K/UL (ref 0–0.01)
NRBC BLD-RTO: 0 PER 100 WBC
PHOSPHATE SERPL-MCNC: 3.1 MG/DL (ref 2.6–4.7)
PLATELET # BLD AUTO: 302 K/UL (ref 150–400)
PMV BLD AUTO: 9.8 FL (ref 8.9–12.9)
POTASSIUM SERPL-SCNC: 3.4 MMOL/L (ref 3.5–5.1)
RBC # BLD AUTO: 3.28 M/UL (ref 3.8–5.2)
SODIUM SERPL-SCNC: 141 MMOL/L (ref 136–145)
WBC # BLD AUTO: 9.4 K/UL (ref 3.6–11)

## 2025-07-01 PROCEDURE — 36415 COLL VENOUS BLD VENIPUNCTURE: CPT

## 2025-07-01 PROCEDURE — 6370000000 HC RX 637 (ALT 250 FOR IP): Performed by: HOSPITALIST

## 2025-07-01 PROCEDURE — 2580000003 HC RX 258: Performed by: STUDENT IN AN ORGANIZED HEALTH CARE EDUCATION/TRAINING PROGRAM

## 2025-07-01 PROCEDURE — 84100 ASSAY OF PHOSPHORUS: CPT

## 2025-07-01 PROCEDURE — 2500000003 HC RX 250 WO HCPCS: Performed by: STUDENT IN AN ORGANIZED HEALTH CARE EDUCATION/TRAINING PROGRAM

## 2025-07-01 PROCEDURE — 80048 BASIC METABOLIC PNL TOTAL CA: CPT

## 2025-07-01 PROCEDURE — 83735 ASSAY OF MAGNESIUM: CPT

## 2025-07-01 PROCEDURE — 6360000002 HC RX W HCPCS: Performed by: STUDENT IN AN ORGANIZED HEALTH CARE EDUCATION/TRAINING PROGRAM

## 2025-07-01 PROCEDURE — 1100000003 HC PRIVATE W/ TELEMETRY

## 2025-07-01 PROCEDURE — 85025 COMPLETE CBC W/AUTO DIFF WBC: CPT

## 2025-07-01 RX ORDER — ACETAMINOPHEN 325 MG/1
650 TABLET ORAL EVERY 6 HOURS PRN
Status: DISCONTINUED | OUTPATIENT
Start: 2025-07-01 | End: 2025-07-02 | Stop reason: HOSPADM

## 2025-07-01 RX ORDER — ONDANSETRON 2 MG/ML
4 INJECTION INTRAMUSCULAR; INTRAVENOUS EVERY 6 HOURS PRN
Status: DISCONTINUED | OUTPATIENT
Start: 2025-07-01 | End: 2025-07-02 | Stop reason: HOSPADM

## 2025-07-01 RX ORDER — MIDODRINE HYDROCHLORIDE 5 MG/1
5 TABLET ORAL
Status: DISCONTINUED | OUTPATIENT
Start: 2025-07-01 | End: 2025-07-02 | Stop reason: HOSPADM

## 2025-07-01 RX ADMIN — ACETAMINOPHEN 650 MG: 325 TABLET ORAL at 12:44

## 2025-07-01 RX ADMIN — IRON SUCROSE 300 MG: 20 INJECTION, SOLUTION INTRAVENOUS at 09:08

## 2025-07-01 RX ADMIN — THIAMINE HYDROCHLORIDE 100 MG: 100 INJECTION, SOLUTION INTRAMUSCULAR; INTRAVENOUS at 09:03

## 2025-07-01 RX ADMIN — ENOXAPARIN SODIUM 30 MG: 100 INJECTION SUBCUTANEOUS at 09:03

## 2025-07-01 RX ADMIN — MIDODRINE HYDROCHLORIDE 5 MG: 5 TABLET ORAL at 17:04

## 2025-07-01 RX ADMIN — SODIUM CHLORIDE, PRESERVATIVE FREE 10 ML: 5 INJECTION INTRAVENOUS at 09:04

## 2025-07-01 ASSESSMENT — PAIN SCALES - GENERAL
PAINLEVEL_OUTOF10: 4
PAINLEVEL_OUTOF10: 4
PAINLEVEL_OUTOF10: 5

## 2025-07-01 ASSESSMENT — PAIN DESCRIPTION - LOCATION
LOCATION: WRIST
LOCATION: WRIST

## 2025-07-01 ASSESSMENT — PAIN DESCRIPTION - ORIENTATION
ORIENTATION: LEFT
ORIENTATION: LEFT

## 2025-07-01 ASSESSMENT — PAIN DESCRIPTION - DESCRIPTORS: DESCRIPTORS: ACHING;CRAMPING;DISCOMFORT;CRUSHING

## 2025-07-01 NOTE — CARE COORDINATION
Care Management Initial Assessment       RUR: 16% (moderate RUR)  Readmission? No  1st IM letter given? Yes - 2025  1st  letter given: Yes - 2025     0923 - CM contacted room phone and reached sylvester, who said that patient was confused.  CM saw a number for Ken, spouse ([phone]); phone rang a few times but did not appear to be a working number.  CM contacted DTRShea, who verified that spouse, Ken, is .  Patient resides in memory care at [facility] ([address]).  She is independent with ambulation.      1051 - Referral sent to  SNF to determine if they need anything before the patient returns.  They were notified that patient has sitter.    1410 - Bedside IDR team met with DTRs and patient at bedside.  They want her to return to memory care.  CW notified.  CW said earlier that patient appears to be at her baseline so they can take her back into memory care.     25 0927   Service Assessment   Patient Orientation Person   Cognition Alert   History Provided By Child/Family  (DTRShea)   Primary Caregiver Family   Accompanied By/Relationship N/A   Support Systems Children   Patient's Healthcare Decision Maker is: Named in Scanned ACP Document  (Patient's spouse, Ken, is .)   PCP Verified by CM Yes  (Patient sees Dr. Lima at  Memory Care)   Last Visit to PCP Within last 3 months  (one week ago)   Prior Functional Level Assistance with the following:;Bathing;Dressing;Toileting;Cooking;Housework;Shopping;Mobility   Current Functional Level Assistance with the following:;Bathing;Dressing;Toileting;Cooking;Housework;Shopping;Mobility   Can patient return to prior living arrangement Yes   Ability to make needs known: Good   Family able to assist with home care needs: Yes   Would you like for me to discuss the discharge plan with any other family members/significant others, and if so, who? No   Financial Resources Medicare;Other (Comment)  ( - patient is
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
## 2025-07-01 NOTE — PROGRESS NOTES
OT note;  OT reviewed chart and attempted to see pt and she was NOEMI for MRI. Will defer and continue to follow

## 2025-07-01 NOTE — PROGRESS NOTES
End of Shift Note    Bedside shift change report given to May RN (oncoming nurse) by Bailee Delaney RN (offgoing nurse).  Report included the following information SBAR, ED Summary, MAR, Recent Results, and Cardiac Rhythm NSR    Shift worked:  [id number]     Shift summary and any significant changes:     No significant changes     Concerns for physician to address:  N/a     Zone phone for oncoming shift:   [id number]       Activity:  Level of Assistance: Standby assist, set-up cues, supervision of patient - no hands on  Number times ambulated in hallways past shift: 0  Number of times OOB to chair past shift: 1    Cardiac:   Cardiac Monitoring: Yes      Cardiac Rhythm: Sinus rhythm    Access:  Current line(s): PIV     Genitourinary:        Respiratory:   O2 Device: None (Room air)  Chronic home O2 use?: NO  Incentive spirometer at bedside: NO    GI:  Last BM (including prior to admit): 06/30/25  Current diet:  ADULT DIET; Regular  Passing flatus: YES    Pain Management:   Patient states pain is manageable on current regimen: YES    Skin:  Orlando Scale Score: 20  Interventions: Wound Offloading (Prevention Methods): Repositioning, Turning    Patient Safety:  Fall Risk: Nursing Judgement-Fall Risk High(Add Comments): Yes  Fall Risk Interventions  Nursing Judgement-Fall Risk High(Add Comments): Yes  Toilet Every 2 Hours-In Advance of Need: Yes  Hourly Visual Checks: Awake, In chair  Fall Visual Posted: Armband, Fall sign posted, Socks  Room Door Open: No (Comment)  Alarm On: Chair  Patient Moved Closer to Nursing Station: Yes    Active Consults:   IP CONSULT TO NEUROLOGY  IP CONSULT TO CASE MANAGEMENT    Length of Stay:  Expected LOS: 3  Actual LOS: 2    Bailee Delaney RN

## 2025-07-01 NOTE — PLAN OF CARE
Problem: Occupational Therapy - Adult  Goal: By Discharge: Performs self-care activities at highest level of function for planned discharge setting.  See evaluation for individualized goals.  Description: FUNCTIONAL STATUS PRIOR TO ADMISSION:  Pt resided in memory care at [facility] per her family   ,  ,  ,  ,  ,  ,  ,  ,  , Prior Level of Assist for Transfers: Independent,       HOME SUPPORT: per chart and her family, pt needed assist from staff for ADLs and transfers due to her decreased memory    Occupational Therapy Goals:  Initiated 6/30/2025  1.  Patient will perform grooming with Supervision within 7 day(s).  2.  Patient will perform bathing with Supervision within 7 day(s).  3.  Patient will perform lower body dressing with Supervision within 7 day(s).  4.  Patient will perform toilet transfers with Supervision  within 7 day(s).  5.  Patient will perform all aspects of toileting with Supervision within 7 day(s).  6/30/2025 1338 by Rebecca Cortes OT  Outcome: Not Progressing     Problem: Occupational Therapy - Adult  Goal: By Discharge: Performs self-care activities at highest level of function for planned discharge setting.  See evaluation for individualized goals.  Description: FUNCTIONAL STATUS PRIOR TO ADMISSION:  Pt resided in memory care at [facility] per her family   ,  ,  ,  ,  ,  ,  ,  ,  , Prior Level of Assist for Transfers: Independent,       HOME SUPPORT: per chart and her family, pt needed assist from staff for ADLs and transfers due to her decreased memory    Occupational Therapy Goals:  Initiated 6/30/2025  1.  Patient will perform grooming with Supervision within 7 day(s).  2.  Patient will perform bathing with Supervision within 7 day(s).  3.  Patient will perform lower body dressing with Supervision within 7 day(s).  4.  Patient will perform toilet transfers with Supervision  within 7 day(s).  5.  Patient will perform all aspects of toileting with Supervision within 7

## 2025-07-01 NOTE — PLAN OF CARE
Problem: Chronic Conditions and Co-morbidities  Goal: Patient's chronic conditions and co-morbidity symptoms are monitored and maintained or improved  7/1/2025 1029 by Bailee Delaney RN  Outcome: Progressing  6/30/2025 2213 by Lori Navarrete RN  Outcome: Progressing     Problem: Discharge Planning  Goal: Discharge to home or other facility with appropriate resources  7/1/2025 1029 by Bailee Delaney RN  Outcome: Progressing  6/30/2025 2213 by Lori Navarrete RN  Outcome: Progressing     Problem: Safety - Adult  Goal: Free from fall injury  7/1/2025 1029 by Bailee Delaney RN  Outcome: Progressing  6/30/2025 2213 by Lori Navarrete RN  Outcome: Progressing

## 2025-07-01 NOTE — PROGRESS NOTES
Hospitalist Progress Note    NAME:   Maria De Jesus Mack   : 1932   MRN: [id number]     Date/Time: 2025 4:30 PM  Patient PCP: Bucky Levin MD    Estimated discharge date:   Barriers: MRI, MRA, echo, carotid duplex      Assessment / Plan:    Recurrent syncope  Ground-level fall  CVA evaluation  - Presented with syncopal episode after a fall 1 day ago, hypotension as per EMS, A/O baseline per EMS  -CT head negative for intracranial pathology  - UA negative  - Troponin 6  - CBC mild leukocytosis of 11, with left band shift  - CMP shows no electrolyte derangement  - EKG shows sinus rhythm with PVCs  -Admit for observation/further evaluation; telemetry monitoring.  -Frequent neurochecks, vital sign parameters  -MRI Brain without contrast to evaluate for any  structural abnormalities that may be contributing to symptoms, remains pending  -Aspirin 81 mg po q day and high intensity statin  -Fasting lipid panel, HbA1c, TTE ECHO  with bubble study,  - seen by Neurology, MRA ordered  -If TTE ECHO is positive consult cardiology    -Bedside swallow evaluation; formal SLP consultation as well   -PT/OT consultations   -Carotid ultrasound, pending  - Orthostatic vitals  : Suspect syncope due to orthostatic hypotension.   Transthoracic echo pending. MRI brain shows unchanged generalized parenchymal volume loss and severe chronic microvascular ischemic disease  Patient was on midodrine and florinef prior to admission. We will re-start midodrine 5 mg po TID     Iron deficiency anemia  Suspect due to chronic GI bleeding  Ferritin 8, iron 20, TIBC 353, iron sat 6%  Continue I.V venofer      Chronic hypotension  - On midodrine chronically outpatient, continue     Asthma  Hx shingles  Skin cancer  Memory disorder  Osteopenia  Dementia  - C/W home medications      Code Status: DNR  DVT Prophylaxis: Lovenox  Baseline: Dementia, lives in [facility]      Medical Decision Making:    [] High (any 2)     A. Problems (any  1)  [] Acute/Chronic Illness/injury posing threat to life or bodily function:    [] Severe exacerbation of chronic illness:    ---------------------------------------------------------------------  B. Risk of Treatment (any 1)   [] Drugs/treatments that require intensive monitoring for toxicity include:    [] IV ABX requiring serial renal monitoring for nephrotoxicity:     [] IV Narcotic analgesia for adverse drug reaction  [] Aggressive IV diuresis requiring serial monitoring for renal impairment and electrolyte derangements  [] Critical electrolyte abnormalities requiring IV replacement and close serial monitoring  [] Insulin - monitoring serial FSBS for Hypoglycemic adverse drug reaction  [] Other -   [] Change in code status:    [] Decision to escalate care:    [] Major surgery/procedure with associated risk factors:    ----------------------------------------------------------------------  C. Data (any 2)  [x] Discussed current management and discharge planning options with Case Management.  [x] Discussed management of the case with:  Neurology  [] Telemetry personally reviewed and interpreted as documented above    [] Imaging personally reviewed and interpreted, includes:     [x] Data Review (any 3)  [x] All available Consultant notes from yesterday/today were reviewed  [x] All current labs were reviewed and interpreted for clinical significance   [x] Appropriate follow-up labs were ordered  [] Collateral history obtained from:        Subjective:     Chief Complaint / Reason for Physician Visit  \"Followup syncope\".  Discussed with RN events overnight. Patient pleasantly demented. No complaints.      Objective:     VITALS:   Last 24hrs VS reviewed since prior progress note. Most recent are:  Patient Vitals for the past 24 hrs:   BP Temp Temp src Pulse Resp SpO2   07/01/25 1500 (!) 108/59 97.4 °F (36.3 °C) Oral 69 17 91 %   07/01/25 1400 (!) 98/55 -- -- -- -- --   07/01/25 1358 104/65 -- -- -- -- --   07/01/25

## 2025-07-02 ENCOUNTER — APPOINTMENT (OUTPATIENT)
Facility: HOSPITAL | Age: 89
DRG: 312 | End: 2025-07-02
Attending: STUDENT IN AN ORGANIZED HEALTH CARE EDUCATION/TRAINING PROGRAM
Payer: MEDICARE

## 2025-07-02 VITALS
BODY MASS INDEX: 25.49 KG/M2 | WEIGHT: 135 LBS | TEMPERATURE: 97.4 F | HEART RATE: 91 BPM | RESPIRATION RATE: 18 BRPM | OXYGEN SATURATION: 94 % | DIASTOLIC BLOOD PRESSURE: 86 MMHG | HEIGHT: 61 IN | SYSTOLIC BLOOD PRESSURE: 151 MMHG

## 2025-07-02 LAB
ECHO AO ASC DIAM: 2.6 CM
ECHO AO ASCENDING AORTA INDEX: 1.64 CM/M2
ECHO AO ROOT DIAM: 2.5 CM
ECHO AO ROOT INDEX: 1.57 CM/M2
ECHO AV AREA PEAK VELOCITY: 1.4 CM2
ECHO AV AREA/BSA PEAK VELOCITY: 0.9 CM2/M2
ECHO AV CUSP MM: 1.6 CM
ECHO AV MEAN GRADIENT: 5 MMHG
ECHO AV MEAN VELOCITY: 1 M/S
ECHO AV PEAK GRADIENT: 10 MMHG
ECHO AV PEAK VELOCITY: 1.6 M/S
ECHO AV VELOCITY RATIO: 0.63
ECHO AV VTI: 32.2 CM
ECHO BSA: 1.62 M2
ECHO LA DIAMETER INDEX: 2.01 CM/M2
ECHO LA DIAMETER: 3.2 CM
ECHO LA TO AORTIC ROOT RATIO: 1.28
ECHO LV EF PHYSICIAN: 55 %
ECHO LV FRACTIONAL SHORTENING: 34 % (ref 28–44)
ECHO LV INTERNAL DIMENSION DIASTOLE INDEX: 2.58 CM/M2
ECHO LV INTERNAL DIMENSION DIASTOLIC: 4.1 CM (ref 3.9–5.3)
ECHO LV INTERNAL DIMENSION SYSTOLIC INDEX: 1.7 CM/M2
ECHO LV INTERNAL DIMENSION SYSTOLIC: 2.7 CM
ECHO LV IVSD: 0.8 CM (ref 0.6–0.9)
ECHO LV MASS 2D: 97.3 G (ref 67–162)
ECHO LV MASS INDEX 2D: 61.2 G/M2 (ref 43–95)
ECHO LV POSTERIOR WALL DIASTOLIC: 0.8 CM (ref 0.6–0.9)
ECHO LV RELATIVE WALL THICKNESS RATIO: 0.39
ECHO LVOT AREA: 2.3 CM2
ECHO LVOT DIAM: 1.7 CM
ECHO LVOT PEAK GRADIENT: 4 MMHG
ECHO LVOT PEAK VELOCITY: 1 M/S
ECHO MV A VELOCITY: 1.75 M/S
ECHO MV E DECELERATION TIME (DT): 203 MS
ECHO MV E VELOCITY: 1.44 M/S
ECHO MV E/A RATIO: 0.82
ECHO MV REGURGITANT PEAK GRADIENT: 41 MMHG
ECHO MV REGURGITANT PEAK VELOCITY: 3.2 M/S
ECHO PULMONARY ARTERY END DIASTOLIC PRESSURE: 14 MMHG
ECHO PV MAX VELOCITY: 0.9 M/S
ECHO PV MEAN GRADIENT: 2 MMHG
ECHO PV MEAN VELOCITY: 0.7 M/S
ECHO PV PEAK GRADIENT: 4 MMHG
ECHO PV VTI: 17 CM
ECHO PVEIN A DURATION: 129.4 MS
ECHO PVEIN A VELOCITY: 0.3 M/S
ECHO PVEIN PEAK D VELOCITY: 0.5 M/S
ECHO PVEIN PEAK S VELOCITY: 0.7 M/S
ECHO PVEIN S/D RATIO: 1.4
ECHO RV INTERNAL DIMENSION: 2.9 CM
ECHO TV REGURGITANT MAX VELOCITY: 2.79 M/S
ECHO TV REGURGITANT PEAK GRADIENT: 31 MMHG

## 2025-07-02 PROCEDURE — 99233 SBSQ HOSP IP/OBS HIGH 50: CPT

## 2025-07-02 PROCEDURE — 2580000003 HC RX 258: Performed by: STUDENT IN AN ORGANIZED HEALTH CARE EDUCATION/TRAINING PROGRAM

## 2025-07-02 PROCEDURE — 6370000000 HC RX 637 (ALT 250 FOR IP): Performed by: HOSPITALIST

## 2025-07-02 PROCEDURE — 2500000003 HC RX 250 WO HCPCS: Performed by: STUDENT IN AN ORGANIZED HEALTH CARE EDUCATION/TRAINING PROGRAM

## 2025-07-02 PROCEDURE — 6370000000 HC RX 637 (ALT 250 FOR IP): Performed by: STUDENT IN AN ORGANIZED HEALTH CARE EDUCATION/TRAINING PROGRAM

## 2025-07-02 PROCEDURE — 93321 DOPPLER ECHO F-UP/LMTD STD: CPT

## 2025-07-02 PROCEDURE — 6360000002 HC RX W HCPCS: Performed by: STUDENT IN AN ORGANIZED HEALTH CARE EDUCATION/TRAINING PROGRAM

## 2025-07-02 PROCEDURE — 93325 DOPPLER ECHO COLOR FLOW MAPG: CPT

## 2025-07-02 RX ORDER — MIDODRINE HYDROCHLORIDE 5 MG/1
10 TABLET ORAL
Qty: 180 TABLET | Refills: 3 | Status: SHIPPED | OUTPATIENT
Start: 2025-07-02

## 2025-07-02 RX ORDER — ASPIRIN 81 MG/1
81 TABLET, CHEWABLE ORAL DAILY
Qty: 30 TABLET | Refills: 3 | Status: SHIPPED | OUTPATIENT
Start: 2025-07-03

## 2025-07-02 RX ORDER — ENOXAPARIN SODIUM 100 MG/ML
40 INJECTION SUBCUTANEOUS DAILY
Status: DISCONTINUED | OUTPATIENT
Start: 2025-07-03 | End: 2025-07-02 | Stop reason: HOSPADM

## 2025-07-02 RX ADMIN — MIDODRINE HYDROCHLORIDE 5 MG: 5 TABLET ORAL at 08:01

## 2025-07-02 RX ADMIN — THIAMINE HYDROCHLORIDE 100 MG: 100 INJECTION, SOLUTION INTRAMUSCULAR; INTRAVENOUS at 08:02

## 2025-07-02 RX ADMIN — SODIUM CHLORIDE, PRESERVATIVE FREE 10 ML: 5 INJECTION INTRAVENOUS at 08:03

## 2025-07-02 RX ADMIN — ASPIRIN 81 MG: 81 TABLET, CHEWABLE ORAL at 08:02

## 2025-07-02 RX ADMIN — IRON SUCROSE 300 MG: 20 INJECTION, SOLUTION INTRAVENOUS at 08:13

## 2025-07-02 RX ADMIN — ENOXAPARIN SODIUM 30 MG: 100 INJECTION SUBCUTANEOUS at 08:02

## 2025-07-02 RX ADMIN — MIDODRINE HYDROCHLORIDE 5 MG: 5 TABLET ORAL at 12:15

## 2025-07-02 NOTE — PROGRESS NOTES
free T3.      T4 Free 06/29/2025 0.9  0.8 - 1.5 NG/DL Final    Cholesterol, Total 06/29/2025 154  <200 MG/DL Final    Triglycerides 06/29/2025 103  <150 MG/DL Final    Based on NCEP-ATP III:  Triglycerides <150 mg/dL  is considered normal, 150-199 mg/dL  borderline high,  200-499 mg/dL high and  greater than or equal to 500 mg/dL very high.    HDL 06/29/2025 76  MG/DL Final    Based on NCEP ATP III, HDL Cholesterol <40 mg/dL is considered low and >60 mg/dL is elevated.    LDL Cholesterol 06/29/2025 57.4  0 - 100 MG/DL Final    Comment: Based on the NCEP-ATP: LDL-C concentrations are considered  optimal <100 mg/dL,  near optimal/above Normal 100-129 mg/dL  Borderline High: 130-159, High: 160-189 mg/dL  Very High: Greater than or equal to 190 mg/dL      VLDL Cholesterol Calculated 06/29/2025 20.6  MG/DL Final    Chol/HDL Ratio 06/29/2025 2.0  0.0 - 5.0   Final    Ferritin 06/29/2025 8 (L)  26 - 388 NG/ML Final    Iron 06/29/2025 20 (L)  35 - 150 ug/dL Final    TIBC 06/29/2025 353  250 - 450 ug/dL Final    Iron % Saturation 06/29/2025 6 (L)  20 - 50 % Final    Vitamin B-12 06/29/2025 >2000 (H)  193 - 986 pg/mL Final    Folate 06/29/2025 8.8  5.0 - 21.0 ng/mL Final    Right subclavian prox PSV 06/30/2025 107.6  cm/s Final    Right subclavian prox EDV 06/30/2025 0.0  cm/s Final    Right cca dist PSV 06/30/2025 67.1  cm/s Final    Right CCA dist EDV 06/30/2025 20.4  cm/s Final    Right CCA prox PSV 06/30/2025 63.4  cm/s Final    Right CCA prox EDV 06/30/2025 15.5  cm/s Final    Right ICA dist PSV 06/30/2025 63.9  cm/s Final    Right ICA dist EDV 06/30/2025 23.1  cm/s Final    Right ICA mid PSV 06/30/2025 65.3  cm/s Final    Right ICA mid EDV 06/30/2025 19.5  cm/s Final    Right ICA prox PSV 06/30/2025 44.1  cm/s Final    Right ICA prox EDV 06/30/2025 12.2  cm/s Final    Right ECA PSV 06/30/2025 47.0  cm/s Final    Right ECA EDV 06/30/2025 6.00  cm/s Final    Right vertebral PSV 06/30/2025 48.7  cm/s Final    Right  abnormality  - Continue midodrine  - TTE - Pending  - Telemetry  - Vitamin B12, folate, TSH, free T4 - normal  - Iron deficiency: On iron infusion  - Continue aspirin 81 mg daily  - Continue aggressive secondary stroke risk factor modification  - Will defer cardiac workup to primary team  - PT/OT - please obtain orthostatic vitals prior therapy    History of dementia:  - Continue home dose of medication  - On melatonin  - Patient is at high risk for hospital-acquired delirium.  Please implement nonpharmacological measures.    There is no additional work-up needed at this time from a neurological standpoint.  Neurology will sign off.  Patient can follow-up with her primary care provider in the outpatient setting after discharge.  Please do not hesitate to reach out for any questions or concerns.     Thank you very much for this consultation.    I spent 55 minutes providing care to this acutely ill inpatient with > 50% of the time counseling as well as reviewing the patient's chart, notes, labs, medications and preparing documentation along with assisting in the coordination of care of the patient on the patient's hospital floor/unit.     STEPHY Camarena - NP     Collaborating physician:   Dr. Cheikh Pearson

## 2025-07-02 NOTE — PLAN OF CARE
Problem: Chronic Conditions and Co-morbidities  Goal: Patient's chronic conditions and co-morbidity symptoms are monitored and maintained or improved  7/2/2025 0939 by Bailee Delaney RN  Outcome: Progressing  7/2/2025 0021 by Lori Navarrete RN  Outcome: Progressing     Problem: Discharge Planning  Goal: Discharge to home or other facility with appropriate resources  7/2/2025 0939 by Bailee Delaney RN  Outcome: Progressing  7/2/2025 0021 by Lori Navarrete RN  Outcome: Progressing     Problem: Safety - Adult  Goal: Free from fall injury  7/2/2025 0939 by Bailee Delaney RN  Outcome: Progressing  7/2/2025 0021 by Lori Navarrete RN  Outcome: Progressing     Problem: Confusion  Goal: Confusion, delirium, dementia, or psychosis is improved or at baseline  Description: INTERVENTIONS:  1. Assess for possible contributors to thought disturbance, including medications, impaired vision or hearing, underlying metabolic abnormalities, dehydration, psychiatric diagnoses, and notify attending LIP  2. [place] high risk fall precautions, as indicated  3. Provide frequent short contacts to provide reality reorientation, refocusing and direction  4. Decrease environmental stimuli, including noise as appropriate  5. Monitor and intervene to maintain adequate nutrition, hydration, elimination, sleep and activity  6. If unable to ensure safety without constant attention obtain sitter and review sitter guidelines with assigned personnel  7. Initiate Psychosocial CNS and Spiritual Care consult, as indicated  Outcome: Progressing     Problem: Pain  Goal: Verbalizes/displays adequate comfort level or baseline comfort level  7/2/2025 0939 by Bailee Delaney RN  Outcome: Progressing  7/2/2025 0021 by Lori Navarrete RN  Outcome: Progressing

## 2025-07-02 NOTE — PLAN OF CARE
Problem: Chronic Conditions and Co-morbidities  Goal: Patient's chronic conditions and co-morbidity symptoms are monitored and maintained or improved  7/2/2025 0021 by Lori Navarrete RN  Outcome: Progressing  7/1/2025 1029 by Bailee Delaney RN  Outcome: Progressing     Problem: Discharge Planning  Goal: Discharge to home or other facility with appropriate resources  7/2/2025 0021 by Lori Navarrete RN  Outcome: Progressing  7/1/2025 1029 by Bailee Delaney RN  Outcome: Progressing     Problem: Safety - Adult  Goal: Free from fall injury  7/2/2025 0021 by Lori Navarrete RN  Outcome: Progressing  7/1/2025 1029 by Bailee Delaney RN  Outcome: Progressing     Problem: Pain  Goal: Verbalizes/displays adequate comfort level or baseline comfort level  Outcome: Progressing

## 2025-07-02 NOTE — PROGRESS NOTES
Discharge Summary     Patient: Maria De Jesus Mack MRN: [id number]  SSN: [id number]    YOB: 1932  Age: 89 y.o.  Sex: female       Code Status: DNR  Allergies:   Allergies   Allergen Reactions    Latex      Other reaction(s): Unknown (comments)    Dog Epithelium (Canis Lupus Familiaris) Shortness Of Breath       Admit Date: 6/29/2025    Discharge Date: 7/2/2025      Admission Diagnoses: Syncope and collapse [R55]  Heat syncope, initial encounter [T67.1XXA]  Anemia, unspecified type [D64.9]  Recurrent syncope [R55]    PMH:   Past Medical History:   Diagnosis Date    Asthma     Dr. Herman--appt June 2013--doesn't see routinely--pt reports no change in allergies--no need for routine follow-up.  Dr. Ashby sees for mild COPD--on Symbicort since     Ceruminosis     periodic ear cleaning at [facility]    Chronic obstructive pulmonary disease (HCC)     Encounter for screening colonoscopy     2006    Fainting     Falls     Fatigue     Hearing loss, left     va hearing    History of mammogram May 2014    Normal/no change.    History of shingles 2010    Dec 2014:  Pt reports clinical history and shingles vaccine 4yrs ago.  To try to obtain exact date of zostavax from /source.    History of skin cancer     melanoma 2003?, BCC & SCC- followed by Dr. Steward- twice a yr appts.  July 2013-still on 6mo schedule for skin checks.    Memory disorder     Osteopenia     DEXA 8/11    Overactive bladder     detrol LA.  [facility]--sees Urology Spring/Fall.  Considering procedure for incontinence.    Postmenopausal hormone replacement therapy     since hysterectomy    Right shoulder pain Sept 2013.    Mild osteopenia; mild AC osteoarthritis.  Sports Med Eval Oct 2013.    Well woman exam (no gynecological exam) Aug 3, 2012    \"[facility] Screening\":  Results scanned to chart. Updated Nov 2014-no change except noted concern for GFR--see Dec 2014 note, letter as reviewed with pt.       Social History:  Social history   Social

## 2025-07-02 NOTE — DISCHARGE SUMMARY
Discharge Summary    Name: Maria De Jesus Mack  [id number]  YOB: 1932 (Age: 89 y.o.)   Date of Admission: 6/29/2025  Date of Discharge: 7/2/2025  Attending Physician: Renzo Ortiz MD    Discharge Diagnosis:   Recurrent syncope  Ground-level fall  CVA evaluation- ruled out   Iron deficiency anemia  Chronic hypotension  Asthma  Hx shingles  Skin cancer  Memory disorder  Osteopenia  Dementia  Consultations:  IP CONSULT TO NEUROLOGY  IP CONSULT TO CASE MANAGEMENT      Brief Admission History/Reason for Admission Per Jake Piña MD:     Maria De Jesus Mack is a 89 y.o.  female with PMHx significant for hypotension on midodrine, dementia, asthma who was brought in by EMS for recurrent syncopal episodes.  As per chart review, patient poor historian due to dementia, tried to call family for further information but went to the voicemail.  Patient has been having recurrent syncopal episodes, had a fall yesterday, was brought in by EMS.  It is unknown if the patient had loss of consciousness or hit her head yesterday after the fall.  At baseline patient is alert and oriented x 0.  Patient evaluated at bedside, pleasantly confused, oriented to self only.  Denies any symptoms chest pain shortness of breath coughing fevers chills UTI symptoms.  We were asked to admit for work up and evaluation of the above problems.      Brief Hospital Course by Main Problems:   Recurrent syncope  Ground-level fall  CVA evaluation  Presented with syncopal episode after a fall 1 day ago, hypotension as per EMS, A/O baseline per EMS  Patient admitted to stepdown unit with tele   CT head done on 6/29/2025-negative for intracranial pathology  UA negative  Troponin 6  CBC mild leukocytosis of 11, with left band shift  CMP shows no electrolyte derangement  EKG shows sinus rhythm with PVCs  MRI brain without contrast on 6/30/2025-Limited motion degraded exam no acute intracranial abnormality.  Generalized

## 2025-07-02 NOTE — PROGRESS NOTES
Hospitalist Progress Note    NAME:   Maria De Jesus Mack   : 1932   MRN: [id number]     Date/Time: 2025 9:03 AM  Patient PCP: Bucky Lvein MD    Estimated discharge date: 7/3  Barriers: echo read     Assessment / Plan:  Recurrent syncope  Ground-level fall  CVA evaluation  Presented with syncopal episode after a fall 1 day ago, hypotension as per EMS, A/O baseline per EMS  Patient admitted to stepdown unit with tele   CT head done on 2025-negative for intracranial pathology  UA negative  Troponin 6  CBC mild leukocytosis of 11, with left band shift  CMP shows no electrolyte derangement  EKG shows sinus rhythm with PVCs  MRI brain without contrast on 2025-Limited motion degraded exam no acute intracranial abnormality.  Generalized volume loss severe chronic microvascular ischemic changes  LDL 57, A1c 5.7  B12 more than 2000  Carotid ultrasound on 2025-mild stenosis in the right carotid artery  Frequent neurochecks, vital sign parameters  Aspirin 81 mg po q day and high intensity statin  Follow-up echo-has been done however not read.  Tried to call echo lab however no answer.  : Suspect syncope due to orthostatic hypotension.   We will re-start midodrine 5 mg po TID     Iron deficiency anemia  Suspect due to chronic GI bleeding  Ferritin 8, iron 20, TIBC 353, iron sat 6%  Status post I.V venofer      Chronic hypotension  On midodrine chronically outpatient, continue     Asthma  Hx shingles  Skin cancer  Memory disorder  Osteopenia  Dementia  C/W home medications    Medical Decision Making:   I personally reviewed labs: CBC BMP , iron studies  I personally reviewed imaging:MRI brain without contrast on 2025-Limited motion degraded exam no acute intracranial abnormality.  Generalized volume loss severe chronic microvascular ischemic changes  I personally reviewed EKG:none   Toxic drug monitoring: Monitor for bleeding while on enoxaparin, daily CBC  Discussed case with: RN     Code

## 2025-07-02 NOTE — CARE COORDINATION
Patient is clear from a CM standpoint.    Transition of Care Plan: Return to [facility]    RUR: 15% (moderate RUR)  Prior Level of Functioning: Assistance with ADLs  Disposition: Return to [facility]  ABDULAZIZ: 7/2/2025  If SNF or IPR: Date FOC offered: N/A  Date FOC received: N/A  Accepting facility: N/A  Date authorization started with reference number: N/A  Date authorization received and expires: N/A  Follow up appointments: defer to memory care.  DME needed: defer to memory care.  Transportation at discharge: DTR at bedside to transport to [facility] by 4 PM, room A120, nursing call report to: [phone].  IM/IMM Medicare/ letter given: 2nd IM/ done 7/2/2025.  Is patient a [place] and connected with [facility]? No.   If yes, was [place] transfer form completed and [facility] notified? N/A  Caregiver Contact: Shea Conner - DTR - [phone] -   Discharge Caregiver contacted prior to discharge? Patient to contact.  Care Conference needed? No.  Barriers to discharge: None.    1029 - Franci at  asked for CM to let her know when patient is ready.  Message sent to Franci regarding ABDULAZIZ today pending Echo and MRA.  SCOUT asked for bed # and call report #.    1048 - CM contacted Franci who said that patient can return to room A120 by 4PM with nursing to call report to: [phone].    1130 - RN and DTRShea, aware that patient can discharge to  Memory Care if medically cleared, but the patient will need to be there by 4 PM today.    1335 - Attending notified that patient needs to be at memory care by 4 PM today.    1543 to 1546 - CM unable to reach primary RN and Franci at [facility].  Discharge summary sent to [facility].  SCOUT spoke with charge RN who said patient has been discharged.  Message sent to Franci via [employer] regarding this.  SCOUT contacted Franci at  again and notified her of situation.     07/02/25 1217   Services At/After Discharge   Transition of Care

## 2025-07-02 NOTE — PROGRESS NOTES
Chart reviewed. Patient going off the floor for echo. Patient cleared for dc to memory care unit once cleared medically. PT will continue to follow.    Ruddy Quiñones, PT

## 2025-07-02 NOTE — PROGRESS NOTES
ECHO paged multiple times to get HILARY read before discharged. Patient needs to be at [facility] by 4PM. RN paged MD to see if patient okay for discharge without echo results. Per MD patient cannot be discharged until echo has been read.

## 2025-07-02 NOTE — PROGRESS NOTES
Occupational Therapy     Chart reviewed pt in prep for OT treatment session. Attempted to see pt, however, pt currently going NOEMI for ECHO. Will defer and continue to follow pt. Pt w/ possible dc today as well, per IDR, CM, and care team.     Gold Veliz, OTALEX, OTR/L

## 2025-07-06 NOTE — PROGRESS NOTES
Physician Progress Note      PATIENT:               BRANDY STEELE  [facility] #:                  [id number]  :                       1932  ADMIT DATE:       2025 4:21 PM  DISCH DATE:        2025 3:45 PM  RESPONDING  PROVIDER #:        David L Mendel MD          QUERY TEXT:    Syncope is documented in the medical record H&P by Jake Piña MD.  Please   clarify the cause such as:    The clinical indicators include:  > ED Provider Notes by Lu Oswald MD 2025-\"Heat syncope,   initial encounter\"    > H&P by Jake Piña MD-\"brought in by EMS for recurrent syncopal   episodes.\"    > Procedures by Amandeep Thao MD 2025- \"EEG CLASSIFICATION:    Essentially normal, awake, and drowsy.\"    > Seen by Neurology, MRA ordered  Options provided:  -- Syncope related to TIA  -- Syncope related to Stroke  -- Syncope related to Carotid Stenosis  -- Syncope related to orthostatic hypotension secondary to *** (diabetic,   Parkinson's or MS) autonomic neuropathy, Please document cause.  -- Syncope related to orthostatic hypotension secondary to *** (dehydration,   KAYA, hypoglycemia), Please document cause.  -- Syncope related to other hypotension  -- Carotid sinus syncope  -- Other - I will add my own diagnosis  -- Disagree - Not applicable / Not valid  -- Disagree - Clinically unable to determine / Unknown  -- Refer to Clinical Documentation Reviewer    PROVIDER RESPONSE TEXT:    Syncope related to orthostatic hypotension    Query created by: Lu Hernandez on 2025 2:54 PM      Electronically signed by:  David L Mendel MD 2025 8:01 AM